# Patient Record
Sex: FEMALE | Race: WHITE | NOT HISPANIC OR LATINO | Employment: OTHER | URBAN - METROPOLITAN AREA
[De-identification: names, ages, dates, MRNs, and addresses within clinical notes are randomized per-mention and may not be internally consistent; named-entity substitution may affect disease eponyms.]

---

## 2017-02-06 ENCOUNTER — GENERIC CONVERSION - ENCOUNTER (OUTPATIENT)
Dept: OTHER | Facility: OTHER | Age: 82
End: 2017-02-06

## 2017-02-07 ENCOUNTER — GENERIC CONVERSION - ENCOUNTER (OUTPATIENT)
Dept: OTHER | Facility: OTHER | Age: 82
End: 2017-02-07

## 2017-02-14 ENCOUNTER — GENERIC CONVERSION - ENCOUNTER (OUTPATIENT)
Dept: OTHER | Facility: OTHER | Age: 82
End: 2017-02-14

## 2017-02-16 ENCOUNTER — ALLSCRIPTS OFFICE VISIT (OUTPATIENT)
Dept: OTHER | Facility: OTHER | Age: 82
End: 2017-02-16

## 2017-05-25 ENCOUNTER — ALLSCRIPTS OFFICE VISIT (OUTPATIENT)
Dept: OTHER | Facility: OTHER | Age: 82
End: 2017-05-25

## 2017-05-25 DIAGNOSIS — E03.9 HYPOTHYROIDISM: ICD-10-CM

## 2017-05-25 DIAGNOSIS — I48.91 ATRIAL FIBRILLATION (HCC): ICD-10-CM

## 2017-08-01 ENCOUNTER — ALLSCRIPTS OFFICE VISIT (OUTPATIENT)
Dept: OTHER | Facility: OTHER | Age: 82
End: 2017-08-01

## 2017-08-01 ENCOUNTER — GENERIC CONVERSION - ENCOUNTER (OUTPATIENT)
Dept: OTHER | Facility: OTHER | Age: 82
End: 2017-08-01

## 2017-10-11 ENCOUNTER — ALLSCRIPTS OFFICE VISIT (OUTPATIENT)
Dept: OTHER | Facility: OTHER | Age: 82
End: 2017-10-11

## 2017-10-13 NOTE — PROGRESS NOTES
Assessment  1  Benign essential hypertension (401 1) (I10)   2  Atrial fibrillation (427 31) (I48 91)   3  Hypercholesterolemia (272 0) (E78 00)   4  Hypothyroidism (244 9) (E03 9)    Plan  Benign essential hypertension    · *VB - Urinary Incontinence Screen (Dx Z13 89 Screen for UI); Status:Complete - Retrospective By  Protocol Authorization;   Done: 06LJP0570 11:30AM  Need for immunization against influenza    · Stop: Fluzone High-Dose 0 5 ML Intramuscular Suspension Prefilled Syringe  PMH: Depression screen    · *VB-Depression Screening ; every 1 year; Last 43VPC0693; Next 25HJJ4638; Status:Active  PMH: Depression screen, PMH: Encounter for special screening examination for genitourinary  disorder    · *VB - Urinary Incontinence Screen (Dx Z13 89 Screen for UI) ; every 1 year; Last 94LBW6391; Next  76IWW2475; Status:Active    Discussion/Summary  Counseling Documentation With Imm: The patient was counseled regarding diagnostic results,-prognosis,-impressions  Chief Complaint  Chief Complaint Free Text Note Form: Patient is here today for a follow up visit for hypertension  testing done 10/4/17  History of Present Illness  Hypertension (Follow-Up): The patient's hypertension is secondary to   The patient states she has been stable with her blood pressure control since the last visit  She has no comorbid illnesses  Symptoms: The patient is currently asymptomatic  denies impaired vision,-denies dyspnea,-denies chest pain,-denies intermittent leg claudication-and-denies lower extremity edema  Home monitoring: The patient checks her blood pressure sporadically  Blood pressure control has been poor  Atrial Fibrillation (Follow-Up): The patient presents with paroxysmal atrial fibrillation  The treatment strategy for this patient is rate control  She states her atrial fibrillation has been well controlled since the last visit  She has no comorbid illnesses  She has no significant interval events  Symptoms: The patient is currently asymptomatic  denies palpitations,-denies chest pain,-denies exercise intolerance,-denies dyspnea on exertion-and-denies dizziness  Monitoring: The patient has regular PT/INR checks  Risks: increased risk for falling, but-the patient has had a home evaluation for prevention of falls  Disease Management: the patient is doing well with her goals  Hypothyroidism (Follow-Up): The patient is being seen for follow-up of hypothyroidism  The patient reports doing well and TSH normal  She has had no significant interval events  The patient is currently asymptomatic  Medications include levothyroxine  Review of Systems  Complete-Female:   Constitutional: feeling tired, but-no fever,-not feeling poorly-and-no chills  Eyes: No complaints of eye pain, no red eyes, no eyesight problems, no discharge, no dry eyes, no itching of eyes  Cardiovascular: No complaints of slow heart rate, no fast heart rate, no chest pain, no palpitations, no leg claudication, no lower extremity edema  Respiratory: No complaints of shortness of breath, no wheezing, no cough, no SOB on exertion, no orthopnea, no PND  Gastrointestinal: No complaints of abdominal pain, no constipation, no nausea or vomiting, no diarrhea, no bloody stools  Genitourinary: incontinence  Musculoskeletal: arthralgias  Integumentary: No complaints of skin rash or lesions, no itching, no skin wounds, no breast pain or lump  Neurological: No complaints of headache, no confusion, no convulsions, no numbness, no dizziness or fainting, no tingling, no limb weakness, no difficulty walking  Psychiatric: Not suicidal, no sleep disturbance, no anxiety or depression, no change in personality, no emotional problems  Hematologic/Lymphatic: No complaints of swollen glands, no swollen glands in the neck, does not bleed easily, does not bruise easily     Preventive Quality 65 Older:   Preventive Quality 65 and Older: Falls Risk: The patient fell 0 times in the past 12 months  Symptoms Include: leg weakness, but no confusion, no lightheadedness, no vertigo, no dizziness, no syncope, no impaired balance, no visual problems, no recurring falls and no recent fall Urinary Incontinence Symptoms includes: urinary incontinence, but no incomplete bladder emptying, no urinary frequency, no urinary urgency, no urinary hesitancy, no dysuria, no nocturia, no straining, no weak stream, no intermittent stream, no post-void dribbling, no vaginal pressure and no vaginal dryness       Active Problems  1  At high risk for falls (V15 88) (Z91 81)   2  Atrial fibrillation (427 31) (I48 91)   3  Benign essential hypertension (401 1) (I10)   4  Bladder prolapse, female, 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) (N81 10)   5  Enteritis (558 9) (K52 9)   6  Hypercholesterolemia (272 0) (E78 00)   7  Hypothyroidism (244 9) (E03 9)   8  Loss of bladder control (788 30) (R32)   9  Osteoarthrosis of knee (715 36) (M17 10)   10  Primary osteoarthritis of both knees (715 16) (M17 0)   11  Retention of urine (788 20) (R33 9)    Past Medical History  1  History of , 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)   2  History of Acute gastroenteritis (558 9) (K52 9)   3  History of Acute pain of left knee (719 46) (M25 562)   4  History of Acute pain of right knee (719 46) (M25 561)   5  History of Carcinoma Of The Skin (V10 83)   6  Denied: History of Carrier Of STD   7  History of Cellulitis (682 9) (L03 90)   8  History of Effusion of both knee joints (719 06) (M25 461,M25 462)   9  History of Encounter for screening colonoscopy (V76 51) (Z12 11)   10  Encounter for special screening examination for genitourinary disorder (V81 6) (Z13 89)   11  History of Female Stress Incontinence (625 6)   12  History of Fever of unknown origin (780 60) (R50 9)   13  History of Foot Pain (Soft Tissue) (729 5)   14  History of Gastric ulcer (531 90) (K25 9)   15   History of Hip pain, unspecified laterality   16  History of cataract (V12 49) (Z86 69)   17  History of common cold (V12 09) (Z86 19)   18  History of dehydration (V12 29) (Z86 39)   19  History of dizziness (V13 89) (Z87 898)   20  History of gastroesophageal reflux (GERD) (V12 79) (Z87 19)   21  History of hyperlipidemia (V12 29) (Z86 39)   22  History of hypertension (V12 59) (Z86 79)   23  History of low back pain (V13 59) (Z87 39)   24  History of spinal stenosis (V13 59) (Z87 39)   25  History of Hyperthyroidism (242 90) (E05 90)   26  History of Joint pain, knee (719 46) (M25 569)   27  History of Lyme disease (088 81) (A69 20)   28  Denied: History of Mental Status Change   29  History of Microscopic hematuria (599 72) (R31 29)   30  History of Need for prophylactic vaccination and inoculation against influenza (V04 81) (Z23)   31  History of Need for prophylactic vaccination and inoculation against influenza (V04 81) (Z23)   32  History of Nonvenomous Insect Bite (E906 4)   33  Normal delivery (650) (O80,Z37 9)   34  History of Paroxysmal atrial fibrillation (427 31) (I48 0)   35  History of Perirectal abscess (566) (K61 1)   36  History of Platelet dysfunction (287 1) (D69 1)   37  History of Screening for osteoporosis (V82 81) (Z13 820)   38  History of Sinus Tachycardia (427 89)   39  History of Tachycardia (785 0) (R00 0)   40  History of Trigger finger, acquired (727 03) (M65 30)   41  History of Visit for pre-operative examination (V72 84) (Z01 818)   42  History of Visit for screening mammogram (V76 12) (Z12 31)    Surgical History  1  History of Hip Replacement   2  History of Knee Arthroscopy (Therapeutic)   3  History of Laminectomy Lumbar   4  History of Neuroplasty Median Nerve At Carpal Tunnel   5  History of Tonsillectomy With Adenoidectomy    Family History  Mother    1  Family history of rheumatic fever (V17 49) (Z82 49)  Father    2  Family history of cardiac disorder (V17 49) (Z82 49)  Daughter    3   Family history of psoriasis (V19 4) (Z84 0)  Son    4  Family history of cardiac disorder (V17 49) (Z82 49)  Brother    5  Family history of Crohn's (Granulomatous) Colitis    Social History   · Alcohol Use (History)   · Caffeine Use   · Denied: History of Drug Use   · Marital History   · Never A Smoker   · Occupation:   ·     Current Meds   1  Levothyroxine Sodium 50 MCG Oral Tablet; TAKE 1 TABLET DAILY AS DIRECTED  Requested for:   19Sep2017; Last Rx:19Sep2017 Ordered   2  Losartan Potassium 100 MG Oral Tablet; TAKE 1 TABLET DAILY; Therapy: 27DRG8054 to (Ancelmo Romo)  Requested for: 05Sep2017; Last Rx:05Sep2017   Ordered   3  Metoprolol Tartrate 100 MG Oral Tablet; TAKE 1 TABLET TWICE DAILY  Requested for: 05Sep2017;   Last Rx:05Sep2017 Ordered   4  MiraLax POWD; MIX 1 CAPFUL IN 8 OUNCES OF WATER AND DRINK AT BEDTIME AS NEEDED FOR   CONSTIPATION; Therapy: (Recorded:87Twh4714) to Recorded   5  Senna-Docusate Sodium 8 6-50 MG Oral Tablet; TAKE 1 TABLET TWICE DAILY; Therapy: (Recorded:66Jys0746) to Recorded   6  Simvastatin 10 MG Oral Tablet; take 1 tablet daily at bedtime  Requested for: 19Sep2017; Last   Rx:19Sep2017 Ordered   7  Vicodin ES 7 5-300 MG Oral Tablet; TAKE 1 TABLET Every 6 hours PRN pain; Therapy: 31VCH4072 to (Evaluate:03Oct2017); Last Rx:28Tec0301 Ordered   8  Warfarin Sodium 5 MG Oral Tablet; TAKE 1 TABLET DAILY OR AS DIRECTED  Requested for:   19Sep2017; Last Rx:66Psb6548 Ordered    Allergies  1  Daypro TABS   2  Penicillins    Vitals  Vital Signs    Recorded: 97JNS6497 11:33AM   Temperature 97 9 F, Tympanic   Heart Rate 72, L Radial   Pulse Quality Regular, L Radial   Respiration 14   Systolic 149, LUE, Sitting   Diastolic 80, LUE, Sitting   Height 5 ft 0 24 in   Weight 169 lb 12 8 oz   BMI Calculated 32 9   BSA Calculated 1 75   O2 Saturation 98, RA     Physical Exam    Constitutional   General appearance: No acute distress, well appearing and well nourished      Eyes   Conjunctiva and lids: No swelling, erythema or discharge  Ears, Nose, Mouth, and Throat   External inspection of ears and nose: Normal     Pulmonary   Auscultation of lungs: Clear to auscultation  Cardiovascular   Auscultation of heart: Normal rate and rhythm, normal S1 and S2, without murmurs  Examination of extremities for edema and/or varicosities: Normal     Carotid pulses: Normal     Lymphatic   Palpation of lymph nodes in neck: No lymphadenopathy  Musculoskeletal   Gait and station: Abnormal   Gait evaluation demonstrated insufficiencies for an exercise program-and-limping on the right  Digits and nails: Abnormal     Inspection/palpation of joints, bones, and muscles: Normal     Skin   Skin and subcutaneous tissue: Abnormal   dry skin  Neurologic   Cranial nerves: Cranial nerves 2-12 intact  Reflexes: 2+ and symmetric  Sensation: No sensory loss  Psychiatric   Orientation to person, place, and time: Normal     Mood and affect: Normal          Results/Data  PHQ-9 Adult Depression Screening 56IQU4489 11:32AM User, [facility]     Test Name Result Flag Reference   PHQ-9 Adult Depression Score 6     Over the last two weeks, how often have you been bothered by any of the following problems? Little interest or pleasure in doing things: Not at all - 0  Feeling down, depressed, or hopeless: Not at all - 0  Trouble falling or staying asleep, or sleeping too much: Nearly every day - 3  Feeling tired or having little energy: Not at all - 0  Poor appetite or over eating: Not at all - 0  Feeling bad about yourself - or that you are a failure or have let yourself or your family down: Not at all - 0  Trouble concentrating on things, such as reading the newspaper or watching television: Not at all - 0  Moving or speaking so slowly that other people could have noticed   Or the opposite -  being so fidgety or restless that you have been moving around a lot more than usual: Nearly every day - 3  Thoughts that you would be better off dead, or of hurting yourself in some way: Not at all - 0   PHQ-9 Adult Depression Screening Negative     PHQ-9 Difficulty Level Not difficult at all     PHQ-9 Severity Mild Depression       Falls Risk Assessment (Dx Z13 89 Screen for Neurologic Disorder) 11Oct2017 11:30AM User, Ryne     Test Name Result Flag Reference   Falls Risk      No falls in the past year     *VB - Urinary Incontinence Screen (Dx Z13 89 Screen for UI) 01RNE6528 11:30AM Jaylan Ramírez     Test Name Result Flag Reference   Urinary Incontinence Assessment 11Oct2017         Health Management  Encounter for screening for other nervous system disorder   *VB - Fall Risk Assessment  (Dx Z13 89 Screen for Neurologic Disorder); every 1 year; Last  50BQL2206; Next Due: 88VTH0745; Overdue  Encounter for special screening examination for genitourinary disorder   *VB - Urinary Incontinence Screen (Dx Z13 89 Screen for UI); every 1 year; Last 63UCC5094; Next  Due: 02NTI5269; Active  History of Depression screen   *VB - Urinary Incontinence Screen (Dx Z13 89 Screen for UI); every 1 year; Last 90HQW2377; Next  Due: 26UJN7239; Active  *VB-Depression Screening; every 1 year; Last 33OKP9252; Next Due: 29XUU6921; Active  History of Screening for osteoporosis   * Dexa Scan Axial Skel (Hip, Pelvis, Spine); every 2 years; Last 79ROK3610; Next Due: 22Foo3488; Overdue  Osteoarthrosis of knee   Dexa Scan - POC; every 2 years; Last 63Dxd7833; Next Due: 51Aep0899; Overdue  Health Maintenance   Medicare Annual Wellness Visit; every 1 year; Last 67BXT7758; Next Due: 16DQP9191;  Overdue    Signatures   Electronically signed by : Leny Elizabeth MD; Oct 11 2017 11:55AM EST                       (Author)

## 2017-12-20 DIAGNOSIS — I48.91 ATRIAL FIBRILLATION (HCC): ICD-10-CM

## 2017-12-22 ENCOUNTER — GENERIC CONVERSION - ENCOUNTER (OUTPATIENT)
Dept: OTHER | Facility: OTHER | Age: 82
End: 2017-12-22

## 2018-01-09 NOTE — MISCELLANEOUS
History of Present Illness  TCM Communication [facility]: The patient is being contacted for follow-up after hospitalization and Unable to contact pt within [facility] timeframe  She was hospitalized at [facility]  The date of admission: 17, date of discharge: 2/10/17  Diagnosis: enteritis, HTN, A-fib, hypothyroid, loss of bladder control, retention, of urine  She was discharged to home  Medications were not reviewed today  She did not schedule a follow up appointment  Communication performed and completed by kb      Active Problems    1  Atrial fibrillation (427 31) (I48 91)   2  Benign essential hypertension (401 1) (I10)   3  Bladder prolapse, female, 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) (N81 10)   4  Enteritis (558 9) (K52 9)   5  Hypercholesterolemia (272 0) (E78 00)   6  Hypothyroidism (244 9) (E03 9)   7  Loss of bladder control (788 30) (R32)   8  Osteoarthrosis of knee (715 36) (M17 9)   9  Primary osteoarthritis of both knees (715 16) (M17 0)   10  Retention of urine (788 20) (R33 9)    Past Medical History    1  History of , 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)   2  History of Acute pain of left knee (719 46) (M25 562)   3  History of Acute pain of right knee (719 46) (M25 561)   4  History of Carcinoma Of The Skin (V10 83)   5  Denied: History of Carrier Of STD   6  History of Cellulitis (682 9) (L03 90)   7  History of Effusion of both knee joints (719 06) (M25 461,M25 462)   8  History of Encounter for screening colonoscopy (V76 51) (Z12 11)   9  Encounter for special screening examination for genitourinary disorder (V81 6) (Z13 89)   10  History of Female Stress Incontinence (625 6)   11  History of Fever of unknown origin (780 60) (R50 9)   12  History of Foot Pain (Soft Tissue) (729 5)   13  History of Gastric ulcer (531 90) (K25 9)   14  History of Hip pain, unspecified laterality   15  History of common cold (V12 09) (Z86 19)   16  History of dehydration (V12 29) (Z86 39)   17   History of dizziness (V13 89) (Z87 898)   18  History of gastroesophageal reflux (GERD) (V12 79) (Z87 19)   19  History of hyperlipidemia (V12 29) (Z86 39)   20  History of hypertension (V12 59) (Z86 79)   21  History of low back pain (V13 59) (Z87 39)   22  History of spinal stenosis (V13 59) (Z87 39)   23  History of Hyperthyroidism (242 90) (E05 90)   24  History of Joint pain, knee (719 46) (M25 569)   25  History of Lyme disease (088 81) (A69 20)   26  Denied: History of Mental Status Change   27  History of Microscopic hematuria (599 72) (R31 29)   28  History of Need for prophylactic vaccination and inoculation against influenza (V04 81)    (Z23)   29  History of Need for prophylactic vaccination and inoculation against influenza (V04 81)    (Z23)   30  History of Nonvenomous Insect Bite (E906 4)   31  Normal delivery (650) (O80,Z37 9)   32  History of Paroxysmal atrial fibrillation (427 31) (I48 0)   33  History of Perirectal abscess (566) (K61 1)   34  History of Platelet dysfunction (287 1) (D69 1)   35  History of Screening for osteoporosis (V82 81) (Z13 820)   36  History of Sinus Tachycardia (427 89)   37  History of Tachycardia (785 0) (R00 0)   38  History of Trigger finger, acquired (727 03) (M65 30)   39  History of Visit for pre-operative examination (V72 84) (Z01 818)   40  History of Visit for screening mammogram (V76 12) (Z12 31)    Surgical History    1  History of Hip Replacement   2  History of Knee Arthroscopy   3  History of Laminectomy Lumbar   4  History of Neuroplasty Median Nerve At Carpal Tunnel   5  History of Tonsillectomy With Adenoidectomy    Family History  Mother    1  Family history of rheumatic fever (V17 49) (Z82 49)  Father    2  Family history of cardiac disorder (V17 49) (Z82 49)  Daughter    3  Family history of psoriasis (V19 4) (Z84 0)  Son    4  Family history of cardiac disorder (V17 49) (Z82 49)  Brother    5   Family history of Crohn's (Granulomatous) Colitis    Social History    · Alcohol Use (History)   · Caffeine Use   · Denied: History of Drug Use   · Marital History   · Never A Smoker   · Occupation:   ·     Current Meds   1  Levothyroxine Sodium 50 MCG Oral Tablet; TAKE 1 TABLET DAILY AS DIRECTED    Requested for: 65Bdw4096; Last Rx:72Xib4636 Ordered   2  Losartan Potassium 100 MG Oral Tablet (Cozaar); TAKE 1 TABLET DAILY; Therapy: 89XTM2630 to (Evaluate:12Mar2017)  Requested for: 44Ypx5851; Last   Rx:17Vvv1244 Ordered   3  Metoprolol Tartrate 100 MG Oral Tablet (Lopressor); TAKE 1 TABLET TWICE DAILY    Requested for: 45Wrl8899; Last Rx:63Lrt7796 Ordered   4  Polyethylene Glycol 3350 Oral Packet; take 1 packet daily; Therapy: (Recorded:88Yoi8872) to Recorded   5  Senna-Docusate Sodium 8 6-50 MG Oral Tablet; TAKE 1 TABLET TWICE DAILY; Therapy: (Recorded:48Xok7588) to Recorded   6  Simvastatin 10 MG Oral Tablet (Zocor); take 1 tablet daily at bedtime  Requested for:   65Wfv6091; Last Rx:13Jxx1551 Ordered   7  Vicodin ES 7 5-300 MG Oral Tablet; TAKE 1 TABLET Every 6 hours PRN pain; Therapy: 84HTJ6842 to (Evaluate:97Hcp2917); Last Rx:22Pyp8853 Ordered   8  Warfarin Sodium 5 MG Oral Tablet (Coumadin); TAKE 1 TABLET DAILY OR AS DIRECTED    Requested for: 73Itx3460; Last Rx:73Rpy5917 Ordered    Allergies    1  Daypro TABS   2  Penicillins    Health Management  Encounter for screening for other nervous system disorder   *VB - Fall Risk Assessment  (Dx Z13 89 Screen for Neurologic Disorder); every 1 year; Last  90BGW2893; Next Due: 18VVU2832; Overdue  Encounter for special screening examination for genitourinary disorder   *VB - Urinary Incontinence Screen (Dx Z13 89 Screen for UI); every 1 year; Last 58SPT9762; Next  Due: 63HVH3509; Overdue  History of Depression screen   *VB - Urinary Incontinence Screen (Dx Z13 89 Screen for UI); every 1 year; Last 36BYJ8269; Next  Due: 71DLA6006; Overdue  *VB-Depression Screening; every 1 year; Last 86SMN8081; Next Due: 07NEK9091;  Overdue  History of Screening for osteoporosis   * Dexa Scan Axial Skel (Hip, Pelvis, Spine); every 2 years; Last 33UTG1872; Next Due: 59Vjw5998; Overdue  Osteoarthrosis of knee   Dexa Scan - POC; every 2 years; Last 76Muv7426; Next Due: 04Hmh8059; Overdue  Health Maintenance   Medicare Annual Wellness Visit; every 1 year; Last 59FWX3926; Next Due: 30IJM3199;  Overdue    Future Appointments    Date/Time Provider Specialty Site   02/16/2017 09:30 AM Amilcar Vela MD Internal Medicine [facility]     Signatures   Electronically signed by : Patrice Singh, ; Feb 14 2017  4:05PM EST                       (Co-author)    Electronically signed by : Afshin Pedraza MD; Feb 15 2017  5:49PM EST

## 2018-01-12 VITALS
HEIGHT: 61 IN | DIASTOLIC BLOOD PRESSURE: 82 MMHG | OXYGEN SATURATION: 97 % | HEART RATE: 80 BPM | BODY MASS INDEX: 32.1 KG/M2 | SYSTOLIC BLOOD PRESSURE: 140 MMHG | WEIGHT: 170 LBS | TEMPERATURE: 98.9 F

## 2018-01-13 VITALS
HEIGHT: 60 IN | DIASTOLIC BLOOD PRESSURE: 80 MMHG | OXYGEN SATURATION: 98 % | TEMPERATURE: 97.9 F | RESPIRATION RATE: 14 BRPM | SYSTOLIC BLOOD PRESSURE: 150 MMHG | HEART RATE: 72 BPM | BODY MASS INDEX: 33.34 KG/M2 | WEIGHT: 169.8 LBS

## 2018-01-14 VITALS
TEMPERATURE: 98.5 F | DIASTOLIC BLOOD PRESSURE: 82 MMHG | SYSTOLIC BLOOD PRESSURE: 148 MMHG | WEIGHT: 169.8 LBS | BODY MASS INDEX: 33.16 KG/M2 | HEART RATE: 73 BPM | OXYGEN SATURATION: 96 %

## 2018-01-14 VITALS
BODY MASS INDEX: 33.28 KG/M2 | SYSTOLIC BLOOD PRESSURE: 176 MMHG | HEART RATE: 76 BPM | TEMPERATURE: 97.6 F | WEIGHT: 169.5 LBS | DIASTOLIC BLOOD PRESSURE: 96 MMHG | OXYGEN SATURATION: 95 % | HEIGHT: 60 IN

## 2018-01-23 ENCOUNTER — GENERIC CONVERSION - ENCOUNTER (OUTPATIENT)
Dept: OTHER | Facility: OTHER | Age: 83
End: 2018-01-23

## 2018-01-26 ENCOUNTER — TELEPHONE (OUTPATIENT)
Dept: INTERNAL MEDICINE CLINIC | Age: 83
End: 2018-01-26

## 2018-01-26 NOTE — TELEPHONE ENCOUNTER
Patient is calling to request a refill on her Vicoden 7 5/300mg 1 tablet po every 6 hours as needed      Please call her at the home number when ready to

## 2018-01-29 DIAGNOSIS — M17.10 OSTEOARTHRITIS OF KNEE, UNSPECIFIED LATERALITY, UNSPECIFIED OSTEOARTHRITIS TYPE: Primary | ICD-10-CM

## 2018-01-29 RX ORDER — HYDROCODONE BITARTRATE AND ACETAMINOPHEN 7.5; 3 MG/1; MG/1
1 TABLET ORAL EVERY 6 HOURS PRN
Qty: 60 TABLET | Refills: 0 | Status: SHIPPED | OUTPATIENT
Start: 2018-01-29 | End: 2018-02-19 | Stop reason: SDUPTHER

## 2018-01-29 RX ORDER — HYDROCODONE BITARTRATE AND ACETAMINOPHEN 7.5; 3 MG/1; MG/1
1 TABLET ORAL EVERY 6 HOURS PRN
COMMUNITY
Start: 2014-07-01 | End: 2018-01-29 | Stop reason: SDUPTHER

## 2018-02-19 ENCOUNTER — OFFICE VISIT (OUTPATIENT)
Dept: INTERNAL MEDICINE CLINIC | Age: 83
End: 2018-02-19
Payer: MEDICARE

## 2018-02-19 VITALS
OXYGEN SATURATION: 97 % | RESPIRATION RATE: 16 BRPM | WEIGHT: 172.2 LBS | SYSTOLIC BLOOD PRESSURE: 144 MMHG | HEIGHT: 60 IN | TEMPERATURE: 99 F | DIASTOLIC BLOOD PRESSURE: 90 MMHG | BODY MASS INDEX: 33.81 KG/M2 | HEART RATE: 76 BPM

## 2018-02-19 DIAGNOSIS — M17.10 OSTEOARTHRITIS OF KNEE, UNSPECIFIED LATERALITY, UNSPECIFIED OSTEOARTHRITIS TYPE: ICD-10-CM

## 2018-02-19 DIAGNOSIS — I48.91 ATRIAL FIBRILLATION, UNSPECIFIED TYPE (HCC): ICD-10-CM

## 2018-02-19 DIAGNOSIS — E78.5 HYPERLIPIDEMIA, UNSPECIFIED HYPERLIPIDEMIA TYPE: ICD-10-CM

## 2018-02-19 DIAGNOSIS — E78.00 HYPERCHOLESTEROLEMIA: ICD-10-CM

## 2018-02-19 DIAGNOSIS — Z78.0 POSTMENOPAUSAL: Primary | ICD-10-CM

## 2018-02-19 DIAGNOSIS — I10 HYPERTENSION, UNSPECIFIED TYPE: ICD-10-CM

## 2018-02-19 DIAGNOSIS — M17.0 PRIMARY OSTEOARTHRITIS OF BOTH KNEES: ICD-10-CM

## 2018-02-19 DIAGNOSIS — I10 BENIGN ESSENTIAL HYPERTENSION: ICD-10-CM

## 2018-02-19 DIAGNOSIS — E03.9 HYPOTHYROIDISM, UNSPECIFIED TYPE: ICD-10-CM

## 2018-02-19 PROCEDURE — 99214 OFFICE O/P EST MOD 30 MIN: CPT | Performed by: INTERNAL MEDICINE

## 2018-02-19 RX ORDER — HYDROCODONE BITARTRATE AND ACETAMINOPHEN 5; 300 MG/1; MG/1
1 TABLET ORAL EVERY 6 HOURS
COMMUNITY
End: 2018-02-19 | Stop reason: DRUGHIGH

## 2018-02-19 RX ORDER — WARFARIN SODIUM 5 MG/1
1 TABLET ORAL DAILY
COMMUNITY
End: 2018-02-19 | Stop reason: SDUPTHER

## 2018-02-19 RX ORDER — LEVOTHYROXINE SODIUM 0.05 MG/1
1 TABLET ORAL DAILY
COMMUNITY
End: 2018-02-19 | Stop reason: SDUPTHER

## 2018-02-19 RX ORDER — LEVOTHYROXINE SODIUM 0.05 MG/1
50 TABLET ORAL DAILY
Qty: 30 TABLET | Refills: 5 | Status: SHIPPED | OUTPATIENT
Start: 2018-02-19 | End: 2018-06-27 | Stop reason: SDUPTHER

## 2018-02-19 RX ORDER — METOPROLOL TARTRATE 100 MG/1
100 TABLET ORAL 2 TIMES DAILY
Qty: 30 TABLET | Refills: 5 | Status: SHIPPED | OUTPATIENT
Start: 2018-02-19 | End: 2018-03-28 | Stop reason: SDUPTHER

## 2018-02-19 RX ORDER — LOSARTAN POTASSIUM 100 MG/1
1 TABLET ORAL DAILY
COMMUNITY
Start: 2014-05-07 | End: 2018-02-19 | Stop reason: SDUPTHER

## 2018-02-19 RX ORDER — SIMVASTATIN 10 MG
10 TABLET ORAL
Qty: 30 TABLET | Refills: 5 | Status: SHIPPED | OUTPATIENT
Start: 2018-02-19 | End: 2018-06-27 | Stop reason: SDUPTHER

## 2018-02-19 RX ORDER — HYDROCODONE BITARTRATE AND ACETAMINOPHEN 7.5; 3 MG/1; MG/1
1 TABLET ORAL EVERY 6 HOURS PRN
Qty: 60 TABLET | Refills: 0 | Status: SHIPPED | OUTPATIENT
Start: 2018-02-19 | End: 2018-03-16 | Stop reason: SDUPTHER

## 2018-02-19 RX ORDER — METOPROLOL TARTRATE 100 MG/1
1 TABLET ORAL 2 TIMES DAILY
COMMUNITY
End: 2018-02-19 | Stop reason: SDUPTHER

## 2018-02-19 RX ORDER — WARFARIN SODIUM 5 MG/1
TABLET ORAL
Qty: 30 TABLET | Refills: 5 | Status: SHIPPED | OUTPATIENT
Start: 2018-02-19 | End: 2018-06-27 | Stop reason: SDUPTHER

## 2018-02-19 RX ORDER — SIMVASTATIN 10 MG
1 TABLET ORAL
COMMUNITY
End: 2018-02-19 | Stop reason: SDUPTHER

## 2018-02-19 RX ORDER — LOSARTAN POTASSIUM 100 MG/1
100 TABLET ORAL DAILY
Qty: 30 TABLET | Refills: 5 | Status: SHIPPED | OUTPATIENT
Start: 2018-02-19 | End: 2018-06-27 | Stop reason: SDUPTHER

## 2018-02-19 NOTE — PROGRESS NOTES
Assessment/Plan:         Diagnoses and all orders for this visit:    Postmenopausal  -     DXA bone density spine hip and pelvis; Future   we will order DEXA scan for follow-up      -       Osteoarthritis of knee, unspecified laterality, unspecified osteoarthritis type  -     HYDROcodone-acetaminophen (VICODIN ES) 7 5-300 MG per tablet; Take 1 tablet by mouth every 6 (six) hours as needed for severe pain Max Daily Amount: 4 tablets    Hypothyroidism  -     levothyroxine 50 mcg tablet; Take 1 tablet (50 mcg total) by mouth daily    Last TSH was normal  Benign essential hypertension    -     losartan (COZAAR) 100 MG tablet; Take 1 tablet (100 mg total) by mouth daily  -     metoprolol tartrate (LOPRESSOR) 100 mg tablet; Take 1 tablet (100 mg total) by mouth 2 (two) times a day  e  Hyperlipidemia   no lipid panel is done this time we will follow it up  Atrial fibrillation, unspecified type (HCC)  -     simvastatin (ZOCOR) 10 mg tablet; Take 1 tablet (10 mg total) by mouth  -     warfarin (COUMADIN) 5 mg tablet; M, W, F, Sun 2 5 mg, all other days 5 mg      Hypercholesterolemia    Other orders  -     levothyroxine 50 mcg tablet; Take 1 tablet by mouth daily  -     losartan (COZAAR) 100 MG tablet; Take 1 tablet by mouth daily  -     simvastatin (ZOCOR) 10 mg tablet; Take 1 tablet by mouth  -     warfarin (COUMADIN) 5 mg tablet; Take 1 tablet by mouth daily  -     Discontinue: HYDROcodone-acetaminophen (XODOL) 5-300 MG per tablet; Take 1 tablet by mouth every 6 (six) hours  -     Polyethylene Glycol 3350 (MIRALAX PO); Take 17 g by mouth every 24 hours  -     metoprolol tartrate (LOPRESSOR) 100 mg tablet; Take 1 tablet by mouth 2 (two) times a day          Subjective:      Patient ID: Toi Sotelo is a 80 y o  female  Patient  offer no problems today  HPI  Hypertension (Follow-Up):  Primary her essential hypertension The patient states she has been stable with her blood pressure control since the last visit   She has no comorbid illnesses  Symptoms: The patient is currently asymptomatic  denies impaired vision,-denies dyspnea,-denies chest pain,-denies intermittent leg claudication-and-denies lower extremity edema  Home monitoring: The patient checks her blood pressure sporadically  Blood pressure control has been poor  Atrial Fibrillation (Follow-Up): The patient presents with paroxysmal atrial fibrillation  The treatment strategy for this patient is rate control  She states her atrial fibrillation has been well controlled since the last visit  She has no comorbid illnesses  She has no significant interval events  Symptoms: The patient is currently asymptomatic  denies palpitations,-denies chest pain,-denies exercise intolerance,-denies dyspnea on exertion-and-denies dizziness  Monitoring: The patient has regular PT/INR checks  Risks: increased risk for falling, but-the patient has had a home evaluation for prevention of falls  Disease Management: the patient is doing well with her goals  Hypothyroidism (Follow-Up): The patient is being seen for follow-up of hypothyroidism  The patient reports doing well and TSH normal  She has had no significant interval events  The patient is currently asymptomatic  Medications include levothyroxine  The following portions of the patient's history were reviewed and updated as appropriate: allergies, current medications, past family history, past medical history, past social history, past surgical history and problem list     Review of Systems   Constitutional: Negative for chills and fatigue  HENT: Negative for congestion, ear pain, hearing loss, postnasal drip, sinus pressure, sore throat and voice change  Eyes: Negative for pain, discharge and visual disturbance  Respiratory: Negative for cough, chest tightness and shortness of breath  Cardiovascular: Negative for chest pain, palpitations and leg swelling     Gastrointestinal: Negative for abdominal pain, blood in stool, diarrhea, nausea and rectal pain  Genitourinary: Negative for difficulty urinating, dysuria and urgency  Musculoskeletal: Negative for arthralgias and joint swelling  Skin: Negative for rash  Allergic/Immunologic: Negative for environmental allergies and food allergies  Neurological: Negative for dizziness, tremors, weakness, numbness and headaches  Hematological: Negative for adenopathy  Bruises/bleeds easily  Psychiatric/Behavioral: Negative for behavioral problems and hallucinations           Past Medical History:   Diagnosis Date    , spontaneous complete     Carcinoma of skin     Cataract     last assessed: 17    Cellulitis     Effusion of both knee joints     resolved: 3/25/15    Female stress incontinence     last assessed: 13    Gastric ulcer     last assessed: 14    GERD (gastroesophageal reflux disease)     Hyperlipidemia     Hypertension     Hyperthyroidism     Lyme disease     last assessed: 13    Microscopic hematuria     last assessed: 13    Normal delivery     1950 son, 1952 son, 12 daughter, 26 daughter, 65 daughter    Paroxysmal atrial fibrillation ([facility] [address] )     last assessed: 13    Perirectal abscess     last assessed: 13    Platelet dysfunction (HCC)     PAF    Sinus tachycardia     last assessed: 13    Spinal stenosis     lumbar; last assessed: 10/4/13    Tachycardia     unspecified; last assessed: 13    Trigger finger, acquired     last assessed: 6/30/15         Current Outpatient Prescriptions:     losartan (COZAAR) 100 MG tablet, Take 1 tablet by mouth daily, Disp: , Rfl:     Polyethylene Glycol 3350 (MIRALAX PO), Take 17 g by mouth every 24 hours, Disp: , Rfl:     HYDROcodone-acetaminophen (VICODIN ES) 7 5-300 MG per tablet, Take 1 tablet by mouth every 6 (six) hours as needed for severe pain Earliest Fill Date: 18 Max Daily Amount: 4 tablets, Disp: 60 tablet, Rfl: 0    levothyroxine 50 mcg tablet, Take 1 tablet by mouth daily, Disp: , Rfl:     metoprolol tartrate (LOPRESSOR) 100 mg tablet, Take 1 tablet by mouth 2 (two) times a day, Disp: , Rfl:     simvastatin (ZOCOR) 10 mg tablet, Take 1 tablet by mouth, Disp: , Rfl:     warfarin (COUMADIN) 5 mg tablet, Take 1 tablet by mouth daily, Disp: , Rfl:     Allergies   Allergen Reactions    Oxaprozin Hives    Penicillins Hives       Social History   Past Surgical History:   Procedure Laterality Date    KNEE ARTHROSCOPY Bilateral     2008    LUMBAR LAMINECTOMY      5/09    NEUROPLASTY / TRANSPOSITION MEDIAN NERVE AT CARPAL TUNNEL Bilateral     2011    TONSILLECTOMY AND ADENOIDECTOMY      TOTAL HIP ARTHROPLASTY      joint replacement; L hip; 2/2/10     Family History   Problem Relation Age of Onset    Rheumatic fever Mother     Heart disease Father     Crohn's disease Brother      (Granulomatous) Golitis    Psoriasis Daughter     Heart disease Son        Objective:  /90 (BP Location: Left arm, Patient Position: Sitting, Cuff Size: Adult)   Pulse 76   Temp 99 °F (37 2 °C) (Oral)   Resp 16   Ht 4' 11 72" (1 517 m)   Wt 78 1 kg (172 lb 3 2 oz)   SpO2 97% Comment: room air  BMI 33 94 kg/m²        Physical Exam   Constitutional: She is oriented to person, place, and time  She appears well-developed and well-nourished  HENT:   Right Ear: External ear normal    Mouth/Throat: Oropharynx is clear and moist    Eyes: Conjunctivae and EOM are normal  Pupils are equal, round, and reactive to light  Neck: Normal range of motion  No JVD present  No thyromegaly present  Cardiovascular: Normal rate, regular rhythm, normal heart sounds and intact distal pulses  Pulmonary/Chest: Breath sounds normal    Abdominal: Soft  Bowel sounds are normal    Musculoskeletal: Normal range of motion  Lymphadenopathy:     She has no cervical adenopathy  Neurological: She is alert and oriented to person, place, and time  She has normal reflexes     Skin: Skin is dry  Psychiatric: She has a normal mood and affect   Her behavior is normal  Judgment and thought content normal

## 2018-02-21 LAB — INR PPP: 2.2 (ref 0.86–1.16)

## 2018-02-23 ENCOUNTER — ANTICOAG VISIT (OUTPATIENT)
Dept: INTERNAL MEDICINE CLINIC | Age: 83
End: 2018-02-23

## 2018-02-23 DIAGNOSIS — I48.20 CHRONIC ATRIAL FIBRILLATION (HCC): Primary | ICD-10-CM

## 2018-03-16 DIAGNOSIS — M17.10 OSTEOARTHRITIS OF KNEE, UNSPECIFIED LATERALITY, UNSPECIFIED OSTEOARTHRITIS TYPE: ICD-10-CM

## 2018-03-16 RX ORDER — HYDROCODONE BITARTRATE AND ACETAMINOPHEN 7.5; 3 MG/1; MG/1
1 TABLET ORAL EVERY 6 HOURS PRN
Qty: 60 TABLET | Refills: 0 | Status: SHIPPED | OUTPATIENT
Start: 2018-03-16 | End: 2018-04-13 | Stop reason: SDUPTHER

## 2018-03-23 LAB — INR PPP: 2.6 (ref 0.86–1.16)

## 2018-03-26 ENCOUNTER — ANTICOAG VISIT (OUTPATIENT)
Dept: INTERNAL MEDICINE CLINIC | Age: 83
End: 2018-03-26

## 2018-03-28 DIAGNOSIS — I10 HYPERTENSION, UNSPECIFIED TYPE: ICD-10-CM

## 2018-03-28 RX ORDER — METOPROLOL TARTRATE 100 MG/1
100 TABLET ORAL 2 TIMES DAILY
Qty: 60 TABLET | Refills: 5 | Status: SHIPPED | OUTPATIENT
Start: 2018-03-28 | End: 2018-06-27 | Stop reason: SDUPTHER

## 2018-04-13 DIAGNOSIS — M17.10 OSTEOARTHRITIS OF KNEE, UNSPECIFIED LATERALITY, UNSPECIFIED OSTEOARTHRITIS TYPE: ICD-10-CM

## 2018-04-13 RX ORDER — HYDROCODONE BITARTRATE AND ACETAMINOPHEN 7.5; 3 MG/1; MG/1
1 TABLET ORAL EVERY 6 HOURS PRN
Qty: 60 TABLET | Refills: 0 | Status: SHIPPED | OUTPATIENT
Start: 2018-04-13 | End: 2018-05-14 | Stop reason: SDUPTHER

## 2018-04-26 LAB — INR PPP: 2.8 (ref 0.86–1.16)

## 2018-04-27 ENCOUNTER — ANTICOAG VISIT (OUTPATIENT)
Dept: INTERNAL MEDICINE CLINIC | Age: 83
End: 2018-04-27

## 2018-05-09 LAB — INR PPP: 2.2 (ref 0.86–1.16)

## 2018-05-10 ENCOUNTER — ANTICOAG VISIT (OUTPATIENT)
Dept: INTERNAL MEDICINE CLINIC | Age: 83
End: 2018-05-10

## 2018-05-14 DIAGNOSIS — M17.10 OSTEOARTHRITIS OF KNEE, UNSPECIFIED LATERALITY, UNSPECIFIED OSTEOARTHRITIS TYPE: ICD-10-CM

## 2018-05-14 RX ORDER — HYDROCODONE BITARTRATE AND ACETAMINOPHEN 7.5; 3 MG/1; MG/1
1 TABLET ORAL EVERY 6 HOURS PRN
Qty: 60 TABLET | Refills: 0 | Status: SHIPPED | OUTPATIENT
Start: 2018-05-14 | End: 2018-06-13 | Stop reason: SDUPTHER

## 2018-05-15 NOTE — TELEPHONE ENCOUNTER
5/15/18 called pt [facility] pickup [facility]-staci joshi sent there- do not have to come into [facility]

## 2018-05-21 ENCOUNTER — OFFICE VISIT (OUTPATIENT)
Dept: INTERNAL MEDICINE CLINIC | Age: 83
End: 2018-05-21
Payer: MEDICARE

## 2018-05-21 ENCOUNTER — DOCUMENTATION (OUTPATIENT)
Dept: INTERNAL MEDICINE CLINIC | Age: 83
End: 2018-05-21

## 2018-05-21 VITALS
BODY MASS INDEX: 33.89 KG/M2 | HEART RATE: 87 BPM | WEIGHT: 172.6 LBS | TEMPERATURE: 98.8 F | DIASTOLIC BLOOD PRESSURE: 70 MMHG | SYSTOLIC BLOOD PRESSURE: 120 MMHG | HEIGHT: 60 IN | OXYGEN SATURATION: 97 %

## 2018-05-21 DIAGNOSIS — J32.9 BACTERIAL SINUSITIS: Primary | ICD-10-CM

## 2018-05-21 DIAGNOSIS — H10.9 BACTERIAL CONJUNCTIVITIS OF LEFT EYE: ICD-10-CM

## 2018-05-21 DIAGNOSIS — B96.89 BACTERIAL SINUSITIS: Primary | ICD-10-CM

## 2018-05-21 PROBLEM — R32 URINARY INCONTINENCE: Status: ACTIVE | Noted: 2017-02-07

## 2018-05-21 PROBLEM — R32 LOSS OF BLADDER CONTROL: Status: ACTIVE | Noted: 2017-02-13

## 2018-05-21 PROBLEM — K52.9 ENTERITIS: Status: ACTIVE | Noted: 2017-02-13

## 2018-05-21 PROBLEM — R33.9 RETENTION OF URINE: Status: ACTIVE | Noted: 2017-02-13

## 2018-05-21 PROCEDURE — 99214 OFFICE O/P EST MOD 30 MIN: CPT | Performed by: NURSE PRACTITIONER

## 2018-05-21 RX ORDER — POLYETHYLENE GLYCOL 3350 17 G/17G
POWDER, FOR SOLUTION ORAL AS NEEDED
COMMUNITY
End: 2019-07-01 | Stop reason: SDUPTHER

## 2018-05-21 RX ORDER — SENNA AND DOCUSATE SODIUM 50; 8.6 MG/1; MG/1
1 TABLET, FILM COATED ORAL AS NEEDED
COMMUNITY
End: 2019-07-01 | Stop reason: SDUPTHER

## 2018-05-21 RX ORDER — OFLOXACIN 3 MG/ML
1 SOLUTION/ DROPS OPHTHALMIC 4 TIMES DAILY
Qty: 5 ML | Refills: 0 | Status: SHIPPED | OUTPATIENT
Start: 2018-05-21 | End: 2018-10-01 | Stop reason: ALTCHOICE

## 2018-05-21 RX ORDER — DOXYCYCLINE HYCLATE 100 MG/1
100 CAPSULE ORAL EVERY 12 HOURS SCHEDULED
Qty: 14 CAPSULE | Refills: 0 | Status: SHIPPED | OUTPATIENT
Start: 2018-05-21 | End: 2018-05-28

## 2018-05-21 NOTE — PROGRESS NOTES
Patient was started on doxycycline today for sinus infection  She was due to have her INR checked this Wednesday 5/23  I advised her to have an INR checked on 05/24  I also advised her to have her INR checked again on 05/30  It is then that she will have completed the course of antibiotics and we can make a determination on when she is next due to have her INR checked based on those results  I wanted to make you aware to monitor for those impending results, thank you

## 2018-05-21 NOTE — PATIENT INSTRUCTIONS
Start Doxy for sinus infection and use eye drops 4 times per day for eye infection  CHECK INR ON Thursday 5/24 AND AGAIN ON MAY 30TH  WE WILL GIVE YOU INSTRUCTIONS ON THOSE DAYS FOR HOW TO ADJUST YOUR COUMADIN  Conjunctivitis   AMBULATORY CARE:   Conjunctivitis,  or pink eye, is inflammation of your conjunctiva  The conjunctiva is a thin tissue that covers the front of your eye and the back of your eyelids  The conjunctiva helps protect your eye and keep it moist  Conjunctivitis may be caused by bacteria, allergies, or a virus  If your conjunctivitis is caused by bacteria, it may get better on its own in about 7 days  Viral conjunctivitis can last up to 3 weeks  Common symptoms may include any of the following: You will usually have symptoms in both eyes if your conjunctivitis is caused by allergies  You may also have other allergic symptoms, such as a rash or runny nose  Symptoms will usually start in 1 eye if your conjunctivitis is caused by a virus or bacteria  · Redness in the whites of your eye    · Itching in your eye or around your eye    · Feeling like there is something in your eye    · Watery or thick, sticky discharge    · Crusty eyelids when you wake up in the morning    · Burning, stinging, or swelling in your eye    · Pain when you see bright light  Seek care immediately if:   · You have worsening eye pain  · The swelling in your eye gets worse, even after treatment  · Your vision suddenly becomes worse or you cannot see at all  Contact your healthcare provider if:   · You develop a fever and ear pain  · You have tiny bumps or spots of blood on your eye  · You have questions or concerns about your condition or care  Treatment  will depend on the cause of your conjunctivitis  You may need antibiotics or allergy medicine as a pill, eye drop, or eye ointment  Manage your symptoms:   · Apply a cool compress  Wet a washcloth with cold water and place it on your eye   This will help decrease itching and irritation  · Do not wear contact lenses  They can irritate your eye  Throw away the pair you are using and ask when you can wear them again  Use a new pair of lenses when your healthcare provider says it is okay  · Avoid irritants  Stay away from smoke filled areas  Shield your eyes from wind and sun  · Flush your eye  You may need to flush your eye with saline to help decrease your symptoms  Ask for more information on how to flush your eye  Medicines:  Treatment depends on what is causing your conjunctivitis  You may be given any of the following:  · Allergy medicine  helps decrease itchy, red, swollen eyes caused by allergies  It may be given as a pill, eye drops, or nasal spray  · Antibiotics  may be needed if your conjunctivitis is caused by bacteria  This medicine may be given as a pill, eye drops, or eye ointment  · Take your medicine as directed  Contact your healthcare provider if you think your medicine is not helping or if you have side effects  Tell him or her if you are allergic to any medicine  Keep a list of the medicines, vitamins, and herbs you take  Include the amounts, and when and why you take them  Bring the list or the pill bottles to follow-up visits  Carry your medicine list with you in case of an emergency  Prevent the spread of conjunctivitis:   · Wash your hands with soap and water often  Wash your hands before and after you touch your eyes  Also wash your hands before you prepare or eat food and after you use the bathroom or change a diaper  · Avoid allergens  Try to avoid the things that cause your allergies, such as pets, dust, or grass  · Avoid contact with others  Do not share towels or washcloths  Try to stay away from others as much as possible  Ask when you can return to work or school  · Throw away eye makeup  The bacteria that caused your conjunctivitis can stay in eye makeup   Throw away mascara and other eye makeup  © 2017 [employer]  Information is for End User's use only and may not be sold, redistributed or otherwise used for commercial purposes  All illustrations and images included in CareNotes® are the copyrighted property of [employer]  or [employer]  The above information is an  only  It is not intended as medical advice for individual conditions or treatments  Talk to your doctor, nurse or pharmacist before following any medical regimen to see if it is safe and effective for you  Sinusitis   AMBULATORY CARE:   Sinusitis  is inflammation or infection of your sinuses  It is most often caused by a virus  Acute sinusitis may last up to 12 weeks  Chronic sinusitis lasts longer than 12 weeks  Recurrent sinusitis means you have 4 or more times in 1 year  Common symptoms include the following:   · Fever    · Pain, pressure, redness, or swelling around the forehead, cheeks, or eyes    · Thick yellow or green discharge from your nose    · Tenderness when you touch your face over your sinuses    · Dry cough that happens mostly at night or when you lie down    · Headache and face pain that is worse when you lean forward    · Tooth pain, or pain when you chew  Seek care immediately if:   · Your eye and eyelid are red, swollen, and painful  · You cannot open your eye  · You have vision changes, such as double vision  · Your eyeball bulges out or you cannot move your eye  · You are more sleepy than normal, or you notice changes in your ability to think, move, or talk  · You have a stiff neck, a fever, or a bad headache  · You have swelling of your forehead or scalp  Contact your healthcare provider if:   · Your symptoms do not improve after 3 days  · Your symptoms do not go away after 10 days  · You have nausea and are vomiting  · Your nose is bleeding  · You have questions or concerns about your condition or care    Treatment for sinusitis:  Your symptoms may go away on their own  Your healthcare provider may recommend watchful waiting for up to 10 days before starting antibiotics  You may  need any of the following:  · Acetaminophen  decreases pain and fever  It is available without a doctor's order  Ask how much to take and how often to take it  Follow directions  Read the labels of all other medicines you are using to see if they also contain acetaminophen, or ask your doctor or pharmacist  Acetaminophen can cause liver damage if not taken correctly  Do not use more than 4 grams (4,000 milligrams) total of acetaminophen in one day  · NSAIDs , such as ibuprofen, help decrease swelling, pain, and fever  This medicine is available with or without a doctor's order  NSAIDs can cause stomach bleeding or kidney problems in certain people  If you take blood thinner medicine, always ask your healthcare provider if NSAIDs are safe for you  Always read the medicine label and follow directions  · Nasal steroid sprays  may help decrease inflammation in your nose and sinuses  · Decongestants  help reduce swelling and drain mucus in the nose and sinuses  They may help you breathe easier  · Antihistamines  help dry mucus in the nose and relieve sneezing  · Antibiotics  help treat or prevent a bacterial infection  · Take your medicine as directed  Contact your healthcare provider if you think your medicine is not helping or if you have side effects  Tell him or her if you are allergic to any medicine  Keep a list of the medicines, vitamins, and herbs you take  Include the amounts, and when and why you take them  Bring the list or the pill bottles to follow-up visits  Carry your medicine list with you in case of an emergency  Self-care:   · Rinse your sinuses  Use a sinus rinse device to rinse your nasal passages with a saline (salt water) solution or distilled water  Do not use tap water   This will help thin the mucus in your nose and rinse away pollen and dirt  It will also help reduce swelling so you can breathe normally  Ask your healthcare provider how often to do this  · Breathe in steam   Heat a bowl of water until you see steam  Lean over the bowl and make a tent over your head with a large towel  Breathe deeply for about 20 minutes  Be careful not to get too close to the steam or burn yourself  Do this 3 times a day  You can also breathe deeply when you take a hot shower  · Sleep with your head elevated  Place an extra pillow under your head before you go to sleep to help your sinuses drain  · Drink liquids as directed  Ask your healthcare provider how much liquid to drink each day and which liquids are best for you  Liquids will thin the mucus in your nose and help it drain  Avoid drinks that contain alcohol or caffeine  · Do not smoke, and avoid secondhand smoke  Nicotine and other chemicals in cigarettes and cigars can make your symptoms worse  Ask your healthcare provider for information if you currently smoke and need help to quit  E-cigarettes or smokeless tobacco still contain nicotine  Talk to your healthcare provider before you use these products  Prevent the spread of germs that cause sinusitis:  Wash your hands often with soap and water  Wash your hands after you use the bathroom, change a child's diaper, or sneeze  Wash your hands before you prepare or eat food  Follow up with your healthcare provider as directed: You may be referred to an ear, nose, and throat specialist  Write down your questions so you remember to ask them during your visits  © 2017 [employer] Information is for End User's use only and may not be sold, redistributed or otherwise used for commercial purposes  All illustrations and images included in CareNotes® are the copyrighted property of [employer]  or [employer]  The above information is an  only   It is not intended as medical advice for individual conditions or treatments  Talk to your doctor, nurse or pharmacist before following any medical regimen to see if it is safe and effective for you

## 2018-05-21 NOTE — PROGRESS NOTES
Assessment/Plan:    No problem-specific Assessment & Plan notes found for this encounter  Diagnoses and all orders for this visit:    Bacterial sinusitis  -     doxycycline hyclate (VIBRAMYCIN) 100 mg capsule; Take 1 capsule (100 mg total) by mouth every 12 (twelve) hours for 7 days    Bacterial conjunctivitis of left eye  -     ofloxacin (OCUFLOX) 0 3 % ophthalmic solution; Administer 1 drop into the left eye 4 (four) times a day    Other orders  -     polyethylene glycol (MIRALAX) powder; Take by mouth  -     senna-docusate sodium (SENOKOT-S) 8 6-50 mg per tablet; Take 1 tablet by mouth 2 (two) times a day     Will start patient on doxycycline for sinusitis given the point tenderness and foul taste in her mouth  Will also start her on ofloxacin ophthalmic drops in her left eye for bacterial conjunctivitis  The patient is on Coumadin, and would be due to have her INR checked on 05/23, however will have patient check INR on 05/24 and again on 05/30 to monitor her INR while on doxycycline  The patient and her daughter verbalized understanding of these instructions  They also verbalized understanding to complete full course of antibiotics to prevent resistance and provide full treatment for the patient  They will return as needed should symptoms worsen or fail to improve  Subjective:      Patient ID: Timo Amos is a 80 y o  female  Patient presents for an acute visit  She reports that 3 days ago, she started to notice that her left eye was dry and red  She applied some eyedrops, but did not notice any improvement  Today she noted green eye drainage draining from her left eye and reports that her eye is now itching  She denies pain in her eye or any known injury to her eye  She does were glasses but denies wearing contact lenses and did have left cataract surgery in that left eye last summer  She also reports that over the past 2 days, she has noted a sore throat and a very mild cough  When she does cough she notes a very foul, "rancid" taste in her mouth that she is concerned about  She denies a headache but does report some head congestion and some postnasal drip  She denies known fevers or chills, but does report a history of sinusitis in the past with nontraditional symptoms  Eye Problem    The left eye is affected  This is a new problem  The current episode started in the past 7 days  The problem occurs constantly  The problem has been gradually worsening  There was no injury mechanism  The patient is experiencing no pain  There is no known exposure to pink eye  She does not wear contacts  Associated symptoms include an eye discharge, eye redness, itching and a recent URI  Pertinent negatives include no blurred vision, double vision, fever, foreign body sensation, nausea, photophobia or vomiting  She has tried eye drops for the symptoms  The treatment provided mild relief  Cough   This is a new problem  The current episode started yesterday  The problem has been unchanged  The problem occurs every few hours  The cough is productive of sputum  Associated symptoms include ear congestion (hearing feels muffled), eye redness, postnasal drip (patient is unsure), rhinorrhea and a sore throat  Pertinent negatives include no chest pain, chills, ear pain, fever, headaches, heartburn, myalgias, nasal congestion, rash or shortness of breath  Nothing aggravates the symptoms  Treatments tried:  cough drop  The treatment provided mild relief  The following portions of the patient's history were reviewed and updated as appropriate: allergies, current medications, past family history, past medical history, past social history, past surgical history and problem list     Review of Systems   Constitutional: Negative for chills, fatigue and fever  HENT: Positive for congestion, postnasal drip (patient is unsure), rhinorrhea and sore throat  Negative for ear pain, sinus pain and sinus pressure  Eyes: Positive for discharge, redness and itching  Negative for blurred vision, double vision, photophobia and pain  Respiratory: Positive for cough  Negative for shortness of breath  Cardiovascular: Negative for chest pain  Gastrointestinal: Negative for abdominal pain, diarrhea, heartburn, nausea and vomiting  Genitourinary: Negative for dysuria  Musculoskeletal: Negative for myalgias  Skin: Negative for rash  Neurological: Negative for headaches  Psychiatric/Behavioral: The patient is not nervous/anxious            Past Medical History:   Diagnosis Date    , spontaneous complete     Carcinoma of skin     Cataract     last assessed: 17    Cellulitis     Effusion of both knee joints     resolved: 3/25/15    Female stress incontinence     last assessed: 13    Gastric ulcer     last assessed: 14    GERD (gastroesophageal reflux disease)     Hyperlipidemia     Hypertension     Hyperthyroidism     Lyme disease     last assessed: 13    Microscopic hematuria     last assessed: 13    Normal delivery     1950 son, 1952 son, 12 daughter, 26 daughter, 65 daughter    Paroxysmal atrial fibrillation ([facility] [address] )     last assessed: 13    Perirectal abscess     last assessed: 13    Platelet dysfunction (HCC)     PAF    Sinus tachycardia     last assessed: 13    Spinal stenosis     lumbar; last assessed: 10/4/13    Tachycardia     unspecified; last assessed: 13    Trigger finger, acquired     last assessed: 6/30/15         Current Outpatient Prescriptions:     HYDROcodone-acetaminophen (VICODIN ES) 7 5-300 MG per tablet, Take 1 tablet by mouth every 6 (six) hours as needed for severe pain Earliest Fill Date: 18 Max Daily Amount: 4 tablets, Disp: 60 tablet, Rfl: 0    levothyroxine 50 mcg tablet, Take 1 tablet (50 mcg total) by mouth daily, Disp: 30 tablet, Rfl: 5    losartan (COZAAR) 100 MG tablet, Take 1 tablet (100 mg total) by mouth daily, Disp: 30 tablet, Rfl: 5    metoprolol tartrate (LOPRESSOR) 100 mg tablet, Take 1 tablet (100 mg total) by mouth 2 (two) times a day, Disp: 60 tablet, Rfl: 5    polyethylene glycol (MIRALAX) powder, Take by mouth, Disp: , Rfl:     Polyethylene Glycol 3350 (MIRALAX PO), Take 17 g by mouth every 24 hours, Disp: , Rfl:     senna-docusate sodium (SENOKOT-S) 8 6-50 mg per tablet, Take 1 tablet by mouth 2 (two) times a day, Disp: , Rfl:     warfarin (COUMADIN) 5 mg tablet, M, W, F, Sun 2 5 mg, all other days 5 mg, Disp: 30 tablet, Rfl: 5    doxycycline hyclate (VIBRAMYCIN) 100 mg capsule, Take 1 capsule (100 mg total) by mouth every 12 (twelve) hours for 7 days, Disp: 14 capsule, Rfl: 0    ofloxacin (OCUFLOX) 0 3 % ophthalmic solution, Administer 1 drop into the left eye 4 (four) times a day, Disp: 5 mL, Rfl: 0    simvastatin (ZOCOR) 10 mg tablet, Take 1 tablet (10 mg total) by mouth daily at bedtime for 90 days, Disp: 30 tablet, Rfl: 5    Allergies   Allergen Reactions    Oxaprozin Hives    Penicillins Hives       Social History   Past Surgical History:   Procedure Laterality Date    KNEE ARTHROSCOPY Bilateral     2008    LUMBAR LAMINECTOMY      5/09    NEUROPLASTY / TRANSPOSITION MEDIAN NERVE AT CARPAL TUNNEL Bilateral     2011    TONSILLECTOMY AND ADENOIDECTOMY      TOTAL HIP ARTHROPLASTY      joint replacement; L hip; 2/2/10     Family History   Problem Relation Age of Onset    Rheumatic fever Mother     Heart disease Father     Crohn's disease Brother      (Granulomatous) Golitis    Psoriasis Daughter     Heart disease Son        Objective:  /70 (BP Location: Right arm, Patient Position: Sitting, Cuff Size: Standard)   Pulse 87   Temp 98 8 °F (37 1 °C) (Tympanic)   Ht 4' 11 72" (1 517 m)   Wt 78 3 kg (172 lb 9 6 oz)   SpO2 97%   BMI 34 03 kg/m²        Physical Exam   Constitutional: She is oriented to person, place, and time  She appears well-developed and well-nourished  No distress  HENT:   Head: Normocephalic and atraumatic  Right Ear: External ear normal    Left Ear: External ear normal    Nose: Right sinus exhibits maxillary sinus tenderness  Mouth/Throat: Oropharynx is clear and moist  No oropharyngeal exudate  Eyes: EOM are normal  Pupils are equal, round, and reactive to light  Lids are everted and swept, no foreign bodies found  Left eye exhibits discharge (Purulent and green in color) and exudate  No foreign body present in the left eye  Left conjunctiva is injected  Left conjunctiva has no hemorrhage  No scleral icterus  Neck: Normal range of motion  Neck supple  No thyromegaly present  Cardiovascular: Normal rate and normal heart sounds  Pulmonary/Chest: Effort normal and breath sounds normal  No respiratory distress  She has no wheezes  Abdominal: Soft  Bowel sounds are normal  She exhibits no distension  Musculoskeletal:   Ambulates with a cane   Neurological: She is alert and oriented to person, place, and time  Skin: Skin is warm and dry  Psychiatric: She has a normal mood and affect  Her behavior is normal  Judgment and thought content normal    Vitals reviewed

## 2018-05-24 LAB — INR PPP: 2.4 (ref 0.86–1.17)

## 2018-05-25 ENCOUNTER — ANTICOAG VISIT (OUTPATIENT)
Dept: INTERNAL MEDICINE CLINIC | Age: 83
End: 2018-05-25

## 2018-05-30 LAB — INR PPP: 2.1 (ref 0.86–1.17)

## 2018-05-31 ENCOUNTER — ANTICOAG VISIT (OUTPATIENT)
Dept: INTERNAL MEDICINE CLINIC | Age: 83
End: 2018-05-31

## 2018-06-13 DIAGNOSIS — M17.10 OSTEOARTHRITIS OF KNEE, UNSPECIFIED LATERALITY, UNSPECIFIED OSTEOARTHRITIS TYPE: ICD-10-CM

## 2018-06-13 RX ORDER — HYDROCODONE BITARTRATE AND ACETAMINOPHEN 7.5; 3 MG/1; MG/1
1 TABLET ORAL EVERY 6 HOURS PRN
Qty: 60 TABLET | Refills: 0 | Status: SHIPPED | OUTPATIENT
Start: 2018-06-13 | End: 2018-07-16 | Stop reason: SDUPTHER

## 2018-06-20 ENCOUNTER — TELEPHONE (OUTPATIENT)
Dept: INTERNAL MEDICINE CLINIC | Facility: CLINIC | Age: 83
End: 2018-06-20

## 2018-06-26 ENCOUNTER — TELEPHONE (OUTPATIENT)
Dept: INTERNAL MEDICINE CLINIC | Facility: CLINIC | Age: 83
End: 2018-06-26

## 2018-06-26 NOTE — TELEPHONE ENCOUNTER
Message left on [phone] reminding the patient that her PT/INR is overdue  She is to go for this testing as soon as possible  If this test was done recently, a request was made to call the office with information as to when and where so that the results can be obtained and addressed

## 2018-06-27 ENCOUNTER — OFFICE VISIT (OUTPATIENT)
Dept: INTERNAL MEDICINE CLINIC | Age: 83
End: 2018-06-27
Payer: MEDICARE

## 2018-06-27 VITALS
SYSTOLIC BLOOD PRESSURE: 156 MMHG | HEART RATE: 78 BPM | WEIGHT: 173.2 LBS | BODY MASS INDEX: 34 KG/M2 | OXYGEN SATURATION: 97 % | DIASTOLIC BLOOD PRESSURE: 82 MMHG | TEMPERATURE: 96.2 F | HEIGHT: 60 IN

## 2018-06-27 DIAGNOSIS — I48.91 ATRIAL FIBRILLATION, UNSPECIFIED TYPE (HCC): ICD-10-CM

## 2018-06-27 DIAGNOSIS — I10 HYPERTENSION, UNSPECIFIED TYPE: ICD-10-CM

## 2018-06-27 DIAGNOSIS — E03.9 HYPOTHYROIDISM, UNSPECIFIED TYPE: ICD-10-CM

## 2018-06-27 DIAGNOSIS — N39.492 POSTURAL URINARY INCONTINENCE: Primary | ICD-10-CM

## 2018-06-27 PROBLEM — K52.9 ENTERITIS: Status: RESOLVED | Noted: 2017-02-13 | Resolved: 2018-06-27

## 2018-06-27 PROBLEM — R33.9 RETENTION OF URINE: Status: RESOLVED | Noted: 2017-02-13 | Resolved: 2018-06-27

## 2018-06-27 PROCEDURE — 99214 OFFICE O/P EST MOD 30 MIN: CPT | Performed by: INTERNAL MEDICINE

## 2018-06-27 RX ORDER — LEVOTHYROXINE SODIUM 0.05 MG/1
50 TABLET ORAL DAILY
Qty: 30 TABLET | Refills: 5 | Status: SHIPPED | OUTPATIENT
Start: 2018-06-27 | End: 2018-11-02 | Stop reason: SDUPTHER

## 2018-06-27 RX ORDER — LOSARTAN POTASSIUM 100 MG/1
100 TABLET ORAL DAILY
Qty: 30 TABLET | Refills: 5 | Status: SHIPPED | OUTPATIENT
Start: 2018-06-27 | End: 2018-11-02 | Stop reason: SDUPTHER

## 2018-06-27 RX ORDER — METOPROLOL TARTRATE 100 MG/1
100 TABLET ORAL 2 TIMES DAILY
Qty: 60 TABLET | Refills: 5 | Status: SHIPPED | OUTPATIENT
Start: 2018-06-27 | End: 2018-11-02 | Stop reason: SDUPTHER

## 2018-06-27 RX ORDER — SIMVASTATIN 10 MG
10 TABLET ORAL
Qty: 30 TABLET | Refills: 5 | Status: SHIPPED | OUTPATIENT
Start: 2018-06-27 | End: 2018-11-02 | Stop reason: SDUPTHER

## 2018-06-27 RX ORDER — WARFARIN SODIUM 5 MG/1
TABLET ORAL
Qty: 30 TABLET | Refills: 5 | Status: SHIPPED | OUTPATIENT
Start: 2018-06-27 | End: 2018-11-02 | Stop reason: SDUPTHER

## 2018-06-27 NOTE — PROGRESS NOTES
Assessment/Plan:    No problem-specific Assessment & Plan notes found for this encounter  Diagnoses and all orders for this visit:    Postural urinary incontinence  Patient has some postural and also stress incontinence,  Was the seen  By the urologist but the that did not help she wear depends  Atrial fibrillation, unspecified type ([address] )  -     simvastatin (ZOCOR) 10 mg tablet; Take 1 tablet (10 mg total) by mouth daily at bedtime for 90 days  -     warfarin (COUMADIN) 5 mg tablet; Take 5 mg on Tue, Thur, Sat and 2 5 mg on Sun, Mon, Wed, Fri   patient is on anticoagulation for atrial fibrillation also she had a previous history of DVT her atrial fibrillation is the paroxysmal with controlled ventricular response no signs of CHF  Hypertension, unspecified type  -     losartan (COZAAR) 100 MG tablet; Take 1 tablet (100 mg total) by mouth daily  -     metoprolol tartrate (LOPRESSOR) 100 mg tablet; Take 1 tablet (100 mg total) by mouth 2 (two) times a day   hypertension is poorly controlled on Cozaar and the Lopressor  Hypothyroidism, unspecified type  -     levothyroxine 50 mcg tablet;  Take 1 tablet (50 mcg total) by mouth daily   will check the TSH for evaluation of hypothyroidism at this point she does not have any symptoms       right knee discomfort and also difficulty in ambulation bowing of the knee was noted she is high risk for falls because of this knee problem she is a moderate risk for total knee replacement I discussed with the patient regarding follow-up with Orthopedics in case if we need to go for a total knee replacement patient is not very anxious about it     stage III chronic renal insufficiency was noted which is stable her creatinine is 1 1 it was 1  1 8 before she has a mild degree of hypo calcemia which is better than before she was 7 4 now it is 8 4  Subjective:    complaining of difficulty in walking and the knee difficulty and also swelling of the knee   Patient ID: Quentin Kim is a 80 y o  female  HPI   this 80 years young lady is presented to the office with chief complain of knee problem but these are chronic problems she is followed up here for hypothyroidism hypertension atrial fibrillation patient is stable living at home with her daughters or the daughters lives with her  The following portions of the patient's history were reviewed and updated as appropriate:   Review of Systems   Constitutional: Negative for activity change, appetite change, fatigue and unexpected weight change  HENT: Positive for sinus pain  Negative for ear pain and hearing loss  Eyes: Negative for pain and visual disturbance  Respiratory: Negative for shortness of breath  Cardiovascular: Negative for chest pain and leg swelling  Gastrointestinal: Negative for abdominal pain, constipation and diarrhea  Genitourinary: Positive for urgency (with some incontinence standing up)  Negative for dysuria  Musculoskeletal: Positive for arthralgias and back pain  Neurological: Negative for dizziness, light-headedness and numbness  Occaisional "hot, burning" sensation in feet and the feet turn red and then it goes away   Psychiatric/Behavioral: Positive for sleep disturbance (trouble falling asleep, can only stay asleep a few hours, need to nap during the day)  Negative for decreased concentration  Objective:      /82 (BP Location: Left arm, Patient Position: Sitting, Cuff Size: Standard)   Pulse 78   Temp (!) 96 2 °F (35 7 °C) (Tympanic)   Ht 4' 11 76" (1 518 m)   Wt 78 6 kg (173 lb 3 2 oz)   SpO2 97%   BMI 34 09 kg/m²          Physical Exam   Constitutional: She appears well-developed  No distress  HENT:   Head: Normocephalic and atraumatic  Eyes: Conjunctivae are normal  No scleral icterus  Neck: No JVD present  No tracheal deviation present  No thyromegaly present  Cardiovascular: Normal rate, regular rhythm and normal heart sounds      Pulmonary/Chest: Effort normal and breath sounds normal  She exhibits no tenderness  Abdominal: Soft  Bowel sounds are normal  There is no tenderness  Lymphadenopathy:     She has no cervical adenopathy  Neurological: She has normal reflexes  A sensory deficit (left lateral foot decreased sensation to pinprick ) is present  Gait abnormal    Cannot straighten back when walking   Skin: Skin is dry  She is not diaphoretic

## 2018-06-29 ENCOUNTER — TELEPHONE (OUTPATIENT)
Dept: INTERNAL MEDICINE CLINIC | Facility: CLINIC | Age: 83
End: 2018-06-29

## 2018-07-06 ENCOUNTER — TELEPHONE (OUTPATIENT)
Dept: INTERNAL MEDICINE CLINIC | Age: 83
End: 2018-07-06

## 2018-07-06 ENCOUNTER — ANTICOAG VISIT (OUTPATIENT)
Dept: INTERNAL MEDICINE CLINIC | Facility: CLINIC | Age: 83
End: 2018-07-06

## 2018-07-06 LAB — INR PPP: 2.6 (ref 0.86–1.17)

## 2018-07-06 NOTE — TELEPHONE ENCOUNTER
Patient had pt inr done yesterdat at [facility] in [place] she is waiting for her orders

## 2018-07-12 ENCOUNTER — TELEPHONE (OUTPATIENT)
Dept: INTERNAL MEDICINE CLINIC | Age: 83
End: 2018-07-12

## 2018-07-12 ENCOUNTER — APPOINTMENT (EMERGENCY)
Dept: RADIOLOGY | Facility: HOSPITAL | Age: 83
End: 2018-07-12
Payer: MEDICARE

## 2018-07-12 ENCOUNTER — HOSPITAL ENCOUNTER (EMERGENCY)
Facility: HOSPITAL | Age: 83
Discharge: HOME | End: 2018-07-12
Attending: EMERGENCY MEDICINE | Admitting: EMERGENCY MEDICINE
Payer: MEDICARE

## 2018-07-12 VITALS
HEART RATE: 103 BPM | HEIGHT: 60 IN | TEMPERATURE: 99.2 F | DIASTOLIC BLOOD PRESSURE: 67 MMHG | RESPIRATION RATE: 22 BRPM | WEIGHT: 170 LBS | BODY MASS INDEX: 33.38 KG/M2 | SYSTOLIC BLOOD PRESSURE: 154 MMHG | OXYGEN SATURATION: 97 %

## 2018-07-12 DIAGNOSIS — R11.10 VOMITING: Primary | ICD-10-CM

## 2018-07-12 LAB
ALBUMIN SERPL BCP-MCNC: 3.3 G/DL (ref 3.5–5)
ALP SERPL-CCNC: 114 U/L (ref 46–116)
ALT SERPL W P-5'-P-CCNC: 15 U/L (ref 12–78)
ANION GAP SERPL CALCULATED.3IONS-SCNC: 10 MMOL/L (ref 4–13)
AST SERPL W P-5'-P-CCNC: 17 U/L (ref 5–45)
ATRIAL RATE: 146 BPM
BASOPHILS # BLD MANUAL: 0 THOUSAND/UL (ref 0–0.1)
BASOPHILS NFR MAR MANUAL: 0 % (ref 0–1)
BILIRUB SERPL-MCNC: 0.55 MG/DL (ref 0.2–1)
BUN SERPL-MCNC: 22 MG/DL (ref 5–25)
CALCIUM SERPL-MCNC: 8.5 MG/DL (ref 8.3–10.1)
CHLORIDE SERPL-SCNC: 104 MMOL/L (ref 100–108)
CO2 SERPL-SCNC: 25 MMOL/L (ref 21–32)
CREAT SERPL-MCNC: 1.21 MG/DL (ref 0.6–1.3)
EOSINOPHIL # BLD MANUAL: 0 THOUSAND/UL (ref 0–0.4)
EOSINOPHIL NFR BLD MANUAL: 0 % (ref 0–6)
ERYTHROCYTE [DISTWIDTH] IN BLOOD BY AUTOMATED COUNT: 14.7 % (ref 11.6–15.1)
GFR SERPL CREATININE-BSD FRML MDRD: 39 ML/MIN/1.73SQ M
GLUCOSE SERPL-MCNC: 153 MG/DL (ref 65–140)
HCT VFR BLD AUTO: 45.2 % (ref 34.8–46.1)
HGB BLD-MCNC: 15.1 G/DL (ref 11.5–15.4)
LIPASE SERPL-CCNC: 66 U/L (ref 73–393)
LYMPHOCYTES # BLD AUTO: 0.3 THOUSAND/UL (ref 0.6–4.47)
LYMPHOCYTES # BLD AUTO: 3 % (ref 14–44)
MCH RBC QN AUTO: 28.7 PG (ref 26.8–34.3)
MCHC RBC AUTO-ENTMCNC: 33.4 G/DL (ref 31.4–37.4)
MCV RBC AUTO: 86 FL (ref 82–98)
MONOCYTES # BLD AUTO: 0.3 THOUSAND/UL (ref 0–1.22)
MONOCYTES NFR BLD: 3 % (ref 4–12)
MYELOCYTES NFR BLD MANUAL: 1 % (ref 0–1)
NEUTROPHILS # BLD MANUAL: 9.24 THOUSAND/UL (ref 1.85–7.62)
NEUTS BAND NFR BLD MANUAL: 3 % (ref 0–8)
NEUTS SEG NFR BLD AUTO: 90 % (ref 43–75)
NRBC BLD AUTO-RTO: 0 /100 WBCS
P AXIS: 246 DEGREES
PLATELET # BLD AUTO: 209 THOUSANDS/UL (ref 149–390)
PLATELET BLD QL SMEAR: ADEQUATE
PMV BLD AUTO: 10.4 FL (ref 8.9–12.7)
POTASSIUM SERPL-SCNC: 4 MMOL/L (ref 3.5–5.3)
PROT SERPL-MCNC: 7 G/DL (ref 6.4–8.2)
QRS AXIS: 51 DEGREES
QRSD INTERVAL: 80 MS
QT INTERVAL: 288 MS
QTC INTERVAL: 448 MS
RBC # BLD AUTO: 5.26 MILLION/UL (ref 3.81–5.12)
RBC MORPH BLD: NORMAL
SODIUM SERPL-SCNC: 139 MMOL/L (ref 136–145)
T WAVE AXIS: 53 DEGREES
VENTRICULAR RATE: 146 BPM
WBC # BLD AUTO: 9.94 THOUSAND/UL (ref 4.31–10.16)

## 2018-07-12 PROCEDURE — 85007 BL SMEAR W/DIFF WBC COUNT: CPT | Performed by: EMERGENCY MEDICINE

## 2018-07-12 PROCEDURE — 93005 ELECTROCARDIOGRAM TRACING: CPT

## 2018-07-12 PROCEDURE — 93010 ELECTROCARDIOGRAM REPORT: CPT | Performed by: INTERNAL MEDICINE

## 2018-07-12 PROCEDURE — 80053 COMPREHEN METABOLIC PANEL: CPT | Performed by: EMERGENCY MEDICINE

## 2018-07-12 PROCEDURE — 36415 COLL VENOUS BLD VENIPUNCTURE: CPT

## 2018-07-12 PROCEDURE — 74176 CT ABD & PELVIS W/O CONTRAST: CPT

## 2018-07-12 PROCEDURE — 85027 COMPLETE CBC AUTOMATED: CPT | Performed by: EMERGENCY MEDICINE

## 2018-07-12 PROCEDURE — 99284 EMERGENCY DEPT VISIT MOD MDM: CPT

## 2018-07-12 PROCEDURE — 83690 ASSAY OF LIPASE: CPT | Performed by: EMERGENCY MEDICINE

## 2018-07-12 RX ORDER — HYDROCODONE BITARTRATE AND ACETAMINOPHEN 5; 325 MG/1; MG/1
1 TABLET ORAL ONCE
Status: DISCONTINUED | OUTPATIENT
Start: 2018-07-12 | End: 2018-07-12

## 2018-07-12 RX ORDER — ONDANSETRON 4 MG/1
4 TABLET, FILM COATED ORAL EVERY 6 HOURS
Qty: 12 TABLET | Refills: 0 | Status: SHIPPED | OUTPATIENT
Start: 2018-07-12 | End: 2019-02-27 | Stop reason: SDUPTHER

## 2018-07-12 RX ORDER — ACETAMINOPHEN 325 MG/1
325 TABLET ORAL ONCE
Status: COMPLETED | OUTPATIENT
Start: 2018-07-12 | End: 2018-07-12

## 2018-07-12 RX ADMIN — ACETAMINOPHEN 325 MG: 325 TABLET, FILM COATED ORAL at 16:24

## 2018-07-12 NOTE — ED ATTENDING ATTESTATION
Tammy Bustamante DO, saw and evaluated the patient  I have discussed the patient with the resident/non-physician practitioner and agree with the resident's/non-physician practitioner's findings, Plan of Care, and MDM as documented in the resident's/non-physician practitioner's note, except where noted  All available labs and Radiology studies were reviewed  At this point I agree with the current assessment done in the Emergency Department  I have conducted an independent evaluation of this patient a history and physical is as follows:      79 yo female presents for evaluation of acute onset vomiting last night mostly clear with some pink tint after eating ravioli  Had increased fecal urgency described as "foamy" but without blood or mucus  Pt states she is unable to "hold anything down"  Has had a couple more episodes of emesis this AM  Denies sick contacts, f/c, CP/SOB, urinary sxs, HA, neck pain,       abd snd mild to mod TTP diffusely, R side > L  No r/g bs+    Imp: acute vomiting, diarrhea  Likely AGE but pts  at one point, now down to 101 NSR  Unclear why pt tachycardic plan: abd labs reviewed - no acute findings  Will obtain CT abd/pelvis to assess for intraabdominal pathology  Pt has CKD and eGFR today is 39 so we will obtain non contrast CT      Critical Care Time  CritCare Time    Procedures

## 2018-07-12 NOTE — TELEPHONE ENCOUNTER
Patients daughter called regarding Jessica Martinez:  Patient has been vomitting and throwing up since before midnight and it is getting worse    Satnam Ribeiro wondering if she should go to the er or been seen  I contacted Blanche and told her the situation and she stated the patient should be seen in the ER    They will be calling 911 and taking her to [facility]

## 2018-07-12 NOTE — ED PROVIDER NOTES
History  Chief Complaint   Patient presents with    Vomiting     Pt c/o vomiting since last night as well as diarrhea that started after eating ravioli  79 y/o female presents to ED via EMS for nausea and vomiting  Last night she woke up and started vomiting (8x) and shortly after experienced multiple episodes of foamy diarrhea (5x)  Vomit was described as a clearish liquid with a red tinge to it which the patient attributes to having ravioli for dinner  Diarrhea was described as foamy and non-bloody  Patient endorses limited colicky pain diffusely in the LLQ and RLQ which did not travel anywhere and has since resolved  Family is with her and claim that she gets "stress diarrhea" when she gets bad news and she was informed that a family member is dying yesterday  Patient on coumadin as  DVT prophylaxis  Prior to Admission Medications   Prescriptions Last Dose Informant Patient Reported? Taking?    HYDROcodone-acetaminophen (VICODIN ES) 7 5-300 MG per tablet   No Yes   Sig: Take 1 tablet by mouth every 6 (six) hours as needed for severe pain Max Daily Amount: 4 tablets   levothyroxine 50 mcg tablet   No Yes   Sig: Take 1 tablet (50 mcg total) by mouth daily   losartan (COZAAR) 100 MG tablet   No Yes   Sig: Take 1 tablet (100 mg total) by mouth daily   metoprolol tartrate (LOPRESSOR) 100 mg tablet   No Yes   Sig: Take 1 tablet (100 mg total) by mouth 2 (two) times a day   ofloxacin (OCUFLOX) 0 3 % ophthalmic solution   No No   Sig: Administer 1 drop into the left eye 4 (four) times a day   polyethylene glycol (MIRALAX) powder   Yes No   Sig: Take by mouth   senna-docusate sodium (SENOKOT-S) 8 6-50 mg per tablet   Yes No   Sig: Take 1 tablet by mouth 2 (two) times a day   simvastatin (ZOCOR) 10 mg tablet   No Yes   Sig: Take 1 tablet (10 mg total) by mouth daily at bedtime for 90 days   warfarin (COUMADIN) 5 mg tablet   No Yes   Sig: Take 5 mg on Tue, Thur, Sat and 2 5 mg on Sun, Mon, Wed, Fri Facility-Administered Medications: None       Past Medical History:   Diagnosis Date    , spontaneous complete     Carcinoma of skin     Cataract     last assessed: 17    Cellulitis     Effusion of both knee joints     resolved: 3/25/15    Female stress incontinence     last assessed: 13    Gastric ulcer     last assessed: 14    GERD (gastroesophageal reflux disease)     Hyperlipidemia     Hypertension     Hyperthyroidism     Lyme disease     last assessed: 13    Microscopic hematuria     last assessed: 13    Normal delivery     1950 son, 1952 son, 12 daughter, 26 daughter, 65 daughter    Paroxysmal atrial fibrillation ([facility] [address] )     last assessed: 13    Perirectal abscess     last assessed: 13    Platelet dysfunction (HCC)     PAF    Sinus tachycardia     last assessed: 13    Spinal stenosis     lumbar; last assessed: 10/4/13    Tachycardia     unspecified; last assessed: 13    Trigger finger, acquired     last assessed: 6/30/15       Past Surgical History:   Procedure Laterality Date    KNEE ARTHROSCOPY Bilateral         LUMBAR LAMINECTOMY          NEUROPLASTY / TRANSPOSITION MEDIAN NERVE AT CARPAL TUNNEL Bilateral         TONSILLECTOMY AND ADENOIDECTOMY      TOTAL HIP ARTHROPLASTY      joint replacement; L hip; 2/2/10       Family History   Problem Relation Age of Onset    Rheumatic fever Mother     Heart disease Father     Crohn's disease Brother         (Granulomatous) Golitis    Psoriasis Daughter     Heart disease Son      I have reviewed and agree with the history as documented  Social History   Substance Use Topics    Smoking status: Never Smoker    Smokeless tobacco: Never Used    Alcohol use Yes      Comment: very infrequently        Review of Systems   Constitutional: Negative for chills, diaphoresis, fatigue and fever  Respiratory: Negative for cough and shortness of breath      Cardiovascular: Negative for chest pain and palpitations  Gastrointestinal: Positive for abdominal pain (Colicky in lower quadrants, since resolved), diarrhea (Non bloody, foamy), nausea and vomiting (Non bilious, non bloody)  Negative for abdominal distention and constipation  Genitourinary: Negative for dysuria, frequency and hematuria  Musculoskeletal: Positive for arthralgias (Chronic arthritis pain in knees and back)  Negative for myalgias and neck pain  Neurological: Negative for dizziness, syncope, light-headedness and headaches  Physical Exam  ED Triage Vitals [07/12/18 1219]   Temperature Pulse Respirations Blood Pressure SpO2   99 2 °F (37 3 °C) 80 18 117/69 94 %      Temp Source Heart Rate Source Patient Position - Orthostatic VS BP Location FiO2 (%)   Oral Monitor Lying Right arm --      Pain Score       No Pain           Orthostatic Vital Signs  Vitals:    07/12/18 1219 07/12/18 1230 07/12/18 1330 07/12/18 1551   BP: 117/69 117/69 135/65 154/67   Pulse: 80 (!) 144 (!) 106 103   Patient Position - Orthostatic VS: Lying  Lying Lying       Physical Exam   Constitutional: She is oriented to person, place, and time  She appears well-developed and well-nourished  No distress  HENT:   Head: Normocephalic and atraumatic  Eyes: Conjunctivae and EOM are normal  Pupils are equal, round, and reactive to light  Right eye exhibits no discharge  Left eye exhibits no discharge  Neck: Normal range of motion  Neck supple  No JVD present  Cardiovascular: Regular rhythm, normal heart sounds and intact distal pulses  Exam reveals no gallop and no friction rub  No murmur heard  Pulmonary/Chest: Effort normal and breath sounds normal  No respiratory distress  She has no wheezes  She has no rales  Abdominal: Soft  Bowel sounds are normal  She exhibits no distension and no mass  There is tenderness (Grimaces with deep palpation of lower quadrants)  There is no rebound and no guarding     Neurological: She is alert and oriented to person, place, and time  Skin: She is not diaphoretic  ED Medications  Medications   HYDROcodone-acetaminophen (NORCO) 5-325 mg per tablet 1 tablet (1 tablet Oral Not Given 7/12/18 1551)       Diagnostic Studies  Results Reviewed     Procedure Component Value Units Date/Time    CBC and differential [[id number]]  (Abnormal) Collected:  07/12/18 1236    Lab Status:  Final result Specimen:  Blood from Arm, Left Updated:  07/12/18 1308     WBC 9 94 Thousand/uL      RBC 5 26 (H) Million/uL      Hemoglobin 15 1 g/dL      Hematocrit 45 2 %      MCV 86 fL      MCH 28 7 pg      MCHC 33 4 g/dL      RDW 14 7 %      MPV 10 4 fL      Platelets 191 Thousands/uL      nRBC 0 /100 WBCs     Narrative: This is an appended report  These results have been appended to a previously verified report  Comprehensive metabolic panel [[id number]]  (Abnormal) Collected:  07/12/18 1236    Lab Status:  Final result Specimen:  Blood from Arm, Left Updated:  07/12/18 1305     Sodium 139 mmol/L      Potassium 4 0 mmol/L      Chloride 104 mmol/L      CO2 25 mmol/L      Anion Gap 10 mmol/L      BUN 22 mg/dL      Creatinine 1 21 mg/dL      Glucose 153 (H) mg/dL      Calcium 8 5 mg/dL      AST 17 U/L      ALT 15 U/L      Alkaline Phosphatase 114 U/L      Total Protein 7 0 g/dL      Albumin 3 3 (L) g/dL      Total Bilirubin 0 55 mg/dL      eGFR 39 ml/min/1 73sq m     Narrative:         National Kidney Disease Education Program recommendations are as follows:  GFR calculation is accurate only with a steady state creatinine  Chronic Kidney disease less than 60 ml/min/1 73 sq  meters  Kidney failure less than 15 ml/min/1 73 sq  meters  Lipase [[id number]]  (Abnormal) Collected:  07/12/18 1236    Lab Status:  Final result Specimen:  Blood from Arm, Left Updated:  07/12/18 1305     Lipase 66 (L) u/L                  CT abdomen pelvis wo contrast   Final Result by Calista Nicole MD (07/12 1121)      1    No acute inflammatory process in the abdomen or pelvis  2   Diverticulosis coli  3   4 mm lingular nodule  Based on current Fleischner Society 2017 Guidelines on incidental pulmonary nodule, no routine follow-up is needed if the patient is considered low risk for lung cancer  If the patient is considered high risk for lung cancer,    12 month follow-up non-contrast chest CT is recommended  Considerations related to the patient's age and/or comorbidities may be used to alter these recommendations  Workstation performed: [id number]               Procedures  Procedures      Phone Consults  ED Phone Contact    ED Course           Identification of Seniors at Risk      Most Recent Value   (ISAR) Identification of Seniors at Risk   Before the illness or injury that brought you to the Emergency, did you need someone to help you on a regular basis? 0 Filed at: 07/12/2018 1220   In the last 24 hours, have you needed more help than usual?  0 Filed at: 07/12/2018 1220   Have you been hospitalized for one or more nights during the past 6 months? 0 Filed at: 07/12/2018 1220   In general, do you see well?  0 Filed at: 07/12/2018 1220   In general, do you have serious problems with your memory? 0 Filed at: 07/12/2018 1220   Do you take more than three different medications every day? 1 Filed at: 07/12/2018 1220   ISAR Score  1 Filed at: 07/12/2018 1220                          MDM  Number of Diagnoses or Management Options  Vomiting:   Diagnosis management comments: 79 y/o female presents to ED for multiple episode of vomiting and diarrhea  Blood work is unremarkable with normal white count, slightly elevated glucose, and electrolytes wnl  Ct performed to rule out intra-abdominal pathology due to unexplained vomiting, tachycardia, and abdominal tenderness  CT was returned showing diverticulosis and no abdominal pathology  Patient was discharged home with zofran for nausea and instructions to maintain hydration       CritCare Time    Disposition  Final diagnoses:   Vomiting     Time reflects when diagnosis was documented in both MDM as applicable and the Disposition within this note     Time User Action Codes Description Comment    7/12/2018  3:46 PM Cece Parson Add [R11 10] Vomiting       ED Disposition     ED Disposition Condition Comment    Discharge  [facility] discharge to home/self care  Condition at discharge: Stable        Follow-up Information     Follow up With Specialties Details Why Contact Info Additional [address] Emergency Department Emergency Medicine  If symptoms worsen [address]  [phone]  ED, [address] [place], [place], [address]          Patient's Medications   Discharge Prescriptions    ONDANSETRON (ZOFRAN) 4 MG TABLET    Take 1 tablet (4 mg total) by mouth every 6 (six) hours       Start Date: 7/12/2018 End Date: --       Order Dose: 4 mg       Quantity: 12 tablet    Refills: 0     No discharge procedures on file  ED Provider  Attending physically available and evaluated [facility] I managed the patient along with the ED Attending      Electronically Signed by         Quentin Ferro MD  07/12/18 8706

## 2018-07-12 NOTE — DISCHARGE INSTRUCTIONS
Acute Nausea and Vomiting   WHAT YOU NEED TO KNOW:   Acute nausea and vomiting start suddenly, worsen quickly, and last a short time  DISCHARGE INSTRUCTIONS:   Return to the emergency department if:   · You see blood in your vomit or your bowel movements  · You have sudden, severe pain in your chest and upper abdomen after hard vomiting or retching  · You have swelling in your neck and chest      · You are dizzy, cold, and thirsty and your eyes and mouth are dry  · You are urinating very little or not at all  · You have muscle weakness, leg cramps, and trouble breathing  · Your heart is beating much faster than normal      · You continue to vomit for more than 48 hours  Contact your healthcare provider if:   · You have frequent dry heaves (vomiting but nothing comes out)  · Your nausea and vomiting does not get better or go away after you use medicine  · You have questions or concerns about your condition or treatment  Medicines: You may need any of the following:  · Medicines  may be given to calm your stomach and stop your vomiting  You may also need medicines to help you feel more relaxed or to stop nausea and vomiting caused by motion sickness  · Gastrointestinal stimulants  are used to help empty your stomach and bowels  This may help decrease nausea and vomiting  · Take your medicine as directed  Contact your healthcare provider if you think your medicine is not helping or if you have side effects  Tell him or her if you are allergic to any medicine  Keep a list of the medicines, vitamins, and herbs you take  Include the amounts, and when and why you take them  Bring the list or the pill bottles to follow-up visits  Carry your medicine list with you in case of an emergency  Prevent or manage acute nausea and vomiting:   · Do not drink alcohol  Alcohol may upset or irritate your stomach  Too much alcohol can also cause acute nausea and vomiting  · Control stress    Headaches due to stress may cause nausea and vomiting  Find ways to relax and manage your stress  Get more rest and sleep  · Drink more liquids as directed  Vomiting can lead to dehydration  It is important to drink more liquids to help replace lost body fluids  Ask your healthcare provider how much liquid to drink each day and which liquids are best for you  Your provider may recommend that you drink an oral rehydration solution (ORS)  ORS contains water, salts, and sugar that are needed to replace the lost body fluids  Ask what kind of ORS to use, how much to drink, and where to get it  · Eat smaller meals, more often  Eat small amounts of food every 2 to 3 hours, even if you are not hungry  Food in your stomach may decrease your nausea  · Talk to your healthcare provider before you take over-the-counter (OTC) medicines  These medicines can cause serious problems if you use certain other medicines, or you have a medical condition  You may have problems if you use too much or use them for longer than the label says  Follow directions on the label carefully  Follow up with your healthcare provider as directed:  Write down your questions so you remember to ask them during your follow-up visits  © 2017 [address]  Information is for End User's use only and may not be sold, redistributed or otherwise used for commercial purposes  All illustrations and images included in CareNotes® are the copyrighted property of [employer]  or [address]  The above information is an  only  It is not intended as medical advice for individual conditions or treatments  Talk to your doctor, nurse or pharmacist before following any medical regimen to see if it is safe and effective for you

## 2018-07-16 DIAGNOSIS — M17.10 OSTEOARTHRITIS OF KNEE, UNSPECIFIED LATERALITY, UNSPECIFIED OSTEOARTHRITIS TYPE: ICD-10-CM

## 2018-07-16 RX ORDER — HYDROCODONE BITARTRATE AND ACETAMINOPHEN 7.5; 3 MG/1; MG/1
1 TABLET ORAL EVERY 6 HOURS PRN
Qty: 60 TABLET | Refills: 0 | Status: SHIPPED | OUTPATIENT
Start: 2018-07-16 | End: 2018-08-16 | Stop reason: SDUPTHER

## 2018-07-16 NOTE — TELEPHONE ENCOUNTER
Needs refill of Hydrocodone----pharmacy called her and told her she did not have any more refills      To [facility]

## 2018-08-07 ENCOUNTER — ANTICOAG VISIT (OUTPATIENT)
Dept: INTERNAL MEDICINE CLINIC | Facility: CLINIC | Age: 83
End: 2018-08-07

## 2018-08-07 LAB — INR PPP: 2.7 (ref 0.86–1.17)

## 2018-08-15 DIAGNOSIS — M17.10 OSTEOARTHRITIS OF KNEE, UNSPECIFIED LATERALITY, UNSPECIFIED OSTEOARTHRITIS TYPE: ICD-10-CM

## 2018-08-15 RX ORDER — HYDROCODONE BITARTRATE AND ACETAMINOPHEN 7.5; 3 MG/1; MG/1
1 TABLET ORAL EVERY 6 HOURS PRN
Qty: 60 TABLET | OUTPATIENT
Start: 2018-08-15

## 2018-08-15 NOTE — TELEPHONE ENCOUNTER
Patient would like a refill for her hydrocodone 7 5/300 called in to [facility] as they will deliver it to her home

## 2018-08-16 DIAGNOSIS — M17.10 OSTEOARTHRITIS OF KNEE, UNSPECIFIED LATERALITY, UNSPECIFIED OSTEOARTHRITIS TYPE: ICD-10-CM

## 2018-08-16 RX ORDER — HYDROCODONE BITARTRATE AND ACETAMINOPHEN 7.5; 3 MG/1; MG/1
1 TABLET ORAL EVERY 6 HOURS PRN
Qty: 60 TABLET | Refills: 0 | Status: SHIPPED | OUTPATIENT
Start: 2018-08-16 | End: 2018-09-14 | Stop reason: SDUPTHER

## 2018-09-10 ENCOUNTER — ANTICOAG VISIT (OUTPATIENT)
Dept: INTERNAL MEDICINE CLINIC | Facility: CLINIC | Age: 83
End: 2018-09-10

## 2018-09-10 LAB — INR PPP: 3.2 (ref 0.86–1.17)

## 2018-09-14 RX ORDER — HYDROCODONE BITARTRATE AND ACETAMINOPHEN 7.5; 3 MG/1; MG/1
1 TABLET ORAL EVERY 6 HOURS PRN
Qty: 60 TABLET | Refills: 0 | Status: SHIPPED | OUTPATIENT
Start: 2018-09-14 | End: 2018-10-08 | Stop reason: SDUPTHER

## 2018-09-17 LAB — INR PPP: 2.4 (ref 0.86–1.17)

## 2018-09-18 ENCOUNTER — ANTICOAG VISIT (OUTPATIENT)
Dept: INTERNAL MEDICINE CLINIC | Age: 83
End: 2018-09-18

## 2018-09-18 NOTE — PROGRESS NOTES
LMOM - on daughter, Veronika Shafer, cell [phone], no answer on patient's phone  Left Coumadin dosage instructions and to call back confirming call

## 2018-10-01 ENCOUNTER — OFFICE VISIT (OUTPATIENT)
Dept: INTERNAL MEDICINE CLINIC | Age: 83
End: 2018-10-01
Payer: MEDICARE

## 2018-10-01 VITALS
OXYGEN SATURATION: 96 % | DIASTOLIC BLOOD PRESSURE: 80 MMHG | SYSTOLIC BLOOD PRESSURE: 128 MMHG | HEIGHT: 60 IN | BODY MASS INDEX: 33.65 KG/M2 | WEIGHT: 171.4 LBS | HEART RATE: 67 BPM | TEMPERATURE: 98.5 F

## 2018-10-01 DIAGNOSIS — Z00.00 MEDICARE ANNUAL WELLNESS VISIT, SUBSEQUENT: ICD-10-CM

## 2018-10-01 DIAGNOSIS — Z23 NEED FOR STREPTOCOCCUS PNEUMONIAE VACCINATION: ICD-10-CM

## 2018-10-01 DIAGNOSIS — Z78.0 POSTMENOPAUSAL: Primary | ICD-10-CM

## 2018-10-01 PROCEDURE — 90670 PCV13 VACCINE IM: CPT

## 2018-10-01 PROCEDURE — G0009 ADMIN PNEUMOCOCCAL VACCINE: HCPCS

## 2018-10-01 PROCEDURE — G0439 PPPS, SUBSEQ VISIT: HCPCS | Performed by: NURSE PRACTITIONER

## 2018-10-01 NOTE — PATIENT INSTRUCTIONS
Pneumonia vaccine given in office today  Please follow up with the pharmacy for the Shingrix vaccine (for shingles)  You can check with your insurance regarding the tetanus vaccine  Please schedule your DEXA scan and an eye exam for glaucoma screening prior to your next follow up in November  Continue well balanced diet  Activity as tolerated  Keep follow up in November with Dr Sis Hilario

## 2018-10-01 NOTE — PROGRESS NOTES
Assessment and Plan:    Problem List Items Addressed This Visit     Medicare annual wellness visit, subsequent      Other Visit Diagnoses     Postmenopausal    -  Primary    Need for Streptococcus pneumoniae vaccination        Relevant Orders    PNEUMOCOCCAL CONJUGATE VACCINE 13-VALENT GREATER THAN 6 MONTHS (Completed)        Health Maintenance Due   Topic Date Due    SLP PLAN OF CARE  [birth year]27    DTaP,Tdap,and Td Vaccines (1 - Tdap) 1948    Pneumococcal PPSV23/PCV13 65+ Years / High and Highest Risk (2 of 2 - PCV13) 2014    DXA SCAN  2016    INFLUENZA VACCINE  2018     PCV13 given in office today  Pt refused influenza vaccine but was counseled on benefits of vaccine  She will follow up with pharmacy regarding [address] and will contact her insurance regarding Tetanus vaccine  She erports she is UTD on glaucoma screening and had it "a few weeks ago" She is due for a DEXA scan and the order was reprinted for her today  She is doing quite well on exam and had no concerns or complaints  She will follow up routinely with Dr Trena Gonzalez on   HPI:  Yady Schumacher is a 80 y o  female here for her Subsequent Wellness Visit      Patient Active Problem List   Diagnosis    Atrial fibrillation ([facility] [address] )    Benign essential hypertension    Hypercholesterolemia    Hypertension    Hypothyroidism    Primary osteoarthritis of both knees    Bladder prolapse, female, acquired    Loss of bladder control    Osteoarthrosis of knee    Urinary incontinence    Medicare annual wellness visit, subsequent     Past Medical History:   Diagnosis Date    , spontaneous complete     Carcinoma of skin     Cataract     last assessed: 17    Cellulitis     Effusion of both knee joints     resolved: 3/25/15    Female stress incontinence     last assessed: 13    Gastric ulcer     last assessed: 14    GERD (gastroesophageal reflux disease)     Hyperlipidemia     Hypertension     Hyperthyroidism     Lyme disease     last assessed: 9/26/13    Microscopic hematuria     last assessed: 9/26/13    Normal delivery     1950 son, 1952 son, 12 daughter, 26 daughter, 65 daughter    Paroxysmal atrial fibrillation ([facility] [address] )     last assessed: 9/26/13    Perirectal abscess     last assessed: 9/26/13    Platelet dysfunction (HCC)     PAF    Sinus tachycardia     last assessed: 9/26/13    Spinal stenosis     lumbar; last assessed: 10/4/13    Tachycardia     unspecified; last assessed: 9/26/13    Trigger finger, acquired     last assessed: 6/30/15     Past Surgical History:   Procedure Laterality Date    KNEE ARTHROSCOPY Bilateral     2008    LUMBAR LAMINECTOMY      5/09    NEUROPLASTY / TRANSPOSITION MEDIAN NERVE AT CARPAL TUNNEL Bilateral     2011    TONSILLECTOMY AND ADENOIDECTOMY      TOTAL HIP ARTHROPLASTY      joint replacement; L hip; 2/2/10     Family History   Problem Relation Age of Onset    Rheumatic fever Mother     Heart disease Father     Crohn's disease Brother         (Granulomatous) Golitis    Psoriasis Daughter     Heart disease Son      History   Smoking Status    Never Smoker   Smokeless Tobacco    Never Used     History   Alcohol Use    Yes     Comment: rare      History   Drug Use No       Current Outpatient Prescriptions   Medication Sig Dispense Refill    HYDROcodone-acetaminophen (VICODIN ES) 7 5-300 MG per tablet Take 1 tablet by mouth every 6 (six) hours as needed for severe pain Max Daily Amount: 4 tablets 60 tablet 0    levothyroxine 50 mcg tablet Take 1 tablet (50 mcg total) by mouth daily 30 tablet 5    losartan (COZAAR) 100 MG tablet Take 1 tablet (100 mg total) by mouth daily 30 tablet 5    metoprolol tartrate (LOPRESSOR) 100 mg tablet Take 1 tablet (100 mg total) by mouth 2 (two) times a day 60 tablet 5    ondansetron (ZOFRAN) 4 mg tablet Take 1 tablet (4 mg total) by mouth every 6 (six) hours 12 tablet 0    polyethylene glycol (MIRALAX) powder Take by mouth      senna-docusate sodium (SENOKOT-S) 8 6-50 mg per tablet Take 1 tablet by mouth 2 (two) times a day      simvastatin (ZOCOR) 10 mg tablet Take 1 tablet (10 mg total) by mouth daily at bedtime for 90 days 30 tablet 5    warfarin (COUMADIN) 5 mg tablet Take 5 mg on Tue, Thur, Sat and 2 5 mg on Sun, Mon, Wed, Fri 30 tablet 5     No current facility-administered medications for this visit  Allergies   Allergen Reactions    Cortisone Hives     swelling of mouth    Oxaprozin Hives    Penicillins Hives     Immunization History   Administered Date(s) Administered    Pneumococcal Conjugate 13-Valent 10/01/2018    Pneumococcal Polysaccharide PPV23 07/09/2013    Zoster 07/12/2013       Patient Care Team:  Christal Crespo MD as PCP - Kristine Earl MD (Ophthalmology)    Medicare Screening Tests and Risk Assessments:  Last Medicare Wellness visit information reviewed, patient interviewed and updates made to the record today  Health Risk Assessment:  Patient rates overall health as very good  Patient feels that their physical health rating is Same  Eyesight was rated as Same  Hearing was rated as Same  Patient feels that their emotional and mental health rating is Same  Pain experienced by patient in the last 7 days has been None  Patient states that she has experienced no weight loss or gain in last 6 months  Emotional/Mental Health:  Patient has been feeling nervous/anxious  PHQ-9 Depression Screening:    Frequency of the following problems over the past two weeks:      1  Little interest or pleasure in doing things: 0 - not at all      2  Feeling down, depressed, or hopeless: 0 - not at all  PHQ-2 Score: 0          Broken Bones/Falls: Fall Risk Assessment:    In the past year, patient has experienced: No history of falling in past year          Bladder/Bowel:  Patient has leaked urine accidently in the last six months    Patient reports no loss of bowel control  Immunizations:  Patient has not had a flu vaccination within the last year  Patient has received a pneumonia shot  Patient has received a shingles shot  Patient has not received tetanus/diphtheria shot  (Additional Comments: She refuses influenza vaccine  Pt had PPSV23 but needs Prevnar 13  Had zostavax in 2013, due for Shingrix)    Home Safety:  Patient does not have trouble with stairs inside or outside of their home  Patient currently reports that there are no safety hazards present in home, no working smoke alarms, no working carbon monoxide detectors  (Additional Comments: Pt will be getting Smoke alarms & CO detectors )    Preventative Screenings:   No breast cancer screening performed, no colon cancer screen completed, cholesterol screen completed, 7/12/2018  no glaucoma eye exam completed(Additional Comments: Due for eye exam)    Nutrition:  Current diet: Regular and Limited junk food with servings of the following:    Medications:  Patient is not currently taking any over-the-counter supplements  Patient is able to manage medications  Lifestyle Choices:  Patient reports no tobacco use  Patient has not smoked or used tobacco in the past   Patient reports alcohol use  Alcohol use per week: [place] [employer] Birth[employer]  Patient drives a vehicle  Patient wears seat belt  Current level of exercise of physical activity described by patient as: Limited, uses cane  Activities of Daily Living:  Can get out of bed by his or her self, able to dress self, able to make own meals, able to do own shopping, able to bathe self, can do own laundry/housekeeping, can manage own money, pay bills and track expenses    Previous Hospitalizations:  Hospitalization or ED visit in past 12 months  Number of hospitalizations within the last year: 1-2  Additional Comments: Patient was in the ED for vomiting on 7/12/2018 but no admission    Advanced Directives:  Patient has decided on a power of   Patient has spoken to designated power of   Patient has completed advanced directive  Preventative Screening/Counseling:      Cardiovascular:      General: Risks and Benefits Discussed and Screening Current      Counseling: Healthy Diet and Healthy Weight      Comments: Current ECG in chart        Diabetes:      General: Risks and Benefits Discussed and Screening Current      Counseling: Healthy Diet          Colorectal Cancer:      General: Screening Not Indicated          Breast Cancer:      General: Screening Not Indicated          Cervical Cancer:      General: Screening Not Indicated          Osteoporosis:      General: Risks and Benefits Discussed      Due for studies: Bone Density Ultrasound          AAA:      General: Screening Not Indicated          Glaucoma:      General: Risks and Benefits Discussed      Comments: Patient has eye dr but due for appt        HIV:      General: Screening Not Indicated          Hepatitis C:      General: Screening Not Indicated        Advanced Directives:   Patient has living will for healthcare, patient has an advanced directive  End of life assessment reviewed with patient  Provider agrees with end of life decisions   Immunizations:      Influenza: Patient Declines      Pneumococcal: Risks & Benefits Discussed and Lifetime Vaccine Completed      Shingrix: Shingrix Vaccine Needed Today      Zostavax: Zostavax Vaccine UTD      TDAP: Tdap Vaccine Needed Today  Additional Comments: Patient will check with insurance on Tdap  Will follow up at pharmacy for Shingrix  Refused influenza  Will get Prevnar 13 in office today     Other Preventative Counseling (Non-Medicare): Fall Prevention, Nutrition Counseling and Car/seat belt/driving safety reviewed      Physical Exam   Constitutional: She is oriented to person, place, and time  She appears well-developed and well-nourished  No distress  HENT:   Head: Normocephalic and atraumatic     Eyes: Conjunctivae and EOM are normal    Neck: Normal range of motion  Neck supple  No thyromegaly present  Cardiovascular: Normal rate, regular rhythm and normal heart sounds  Pulmonary/Chest: Effort normal and breath sounds normal  No respiratory distress  She has no wheezes  Musculoskeletal: She exhibits no edema  Lymphadenopathy:     She has no cervical adenopathy  Neurological: She is alert and oriented to person, place, and time  Skin: Skin is warm and dry  She is not diaphoretic  Psychiatric: She has a normal mood and affect  Her behavior is normal    Vitals reviewed

## 2018-10-03 ENCOUNTER — ANTICOAG VISIT (OUTPATIENT)
Dept: INTERNAL MEDICINE CLINIC | Age: 83
End: 2018-10-03

## 2018-10-03 LAB — INR PPP: 2.1 (ref 0.86–1.17)

## 2018-10-08 DIAGNOSIS — M17.10 OSTEOARTHRITIS OF KNEE, UNSPECIFIED LATERALITY, UNSPECIFIED OSTEOARTHRITIS TYPE: ICD-10-CM

## 2018-10-09 RX ORDER — HYDROCODONE BITARTRATE AND ACETAMINOPHEN 7.5; 3 MG/1; MG/1
1 TABLET ORAL EVERY 6 HOURS PRN
Qty: 60 TABLET | Refills: 0 | Status: SHIPPED | OUTPATIENT
Start: 2018-10-09 | End: 2018-11-02 | Stop reason: SDUPTHER

## 2018-10-25 ENCOUNTER — ANTICOAG VISIT (OUTPATIENT)
Dept: INTERNAL MEDICINE CLINIC | Facility: CLINIC | Age: 83
End: 2018-10-25

## 2018-10-25 LAB — INR PPP: 2.6 (ref 0.86–1.17)

## 2018-11-02 ENCOUNTER — OFFICE VISIT (OUTPATIENT)
Dept: INTERNAL MEDICINE CLINIC | Age: 83
End: 2018-11-02
Payer: MEDICARE

## 2018-11-02 VITALS
TEMPERATURE: 98.1 F | OXYGEN SATURATION: 98 % | DIASTOLIC BLOOD PRESSURE: 88 MMHG | HEART RATE: 83 BPM | WEIGHT: 173.4 LBS | SYSTOLIC BLOOD PRESSURE: 152 MMHG | BODY MASS INDEX: 33.86 KG/M2

## 2018-11-02 DIAGNOSIS — M17.10 OSTEOARTHRITIS OF KNEE, UNSPECIFIED LATERALITY, UNSPECIFIED OSTEOARTHRITIS TYPE: ICD-10-CM

## 2018-11-02 DIAGNOSIS — N39.492 POSTURAL URINARY INCONTINENCE: ICD-10-CM

## 2018-11-02 DIAGNOSIS — I48.91 ATRIAL FIBRILLATION, UNSPECIFIED TYPE (HCC): ICD-10-CM

## 2018-11-02 DIAGNOSIS — E03.9 HYPOTHYROIDISM, UNSPECIFIED TYPE: Primary | ICD-10-CM

## 2018-11-02 DIAGNOSIS — M17.0 PRIMARY OSTEOARTHRITIS OF BOTH KNEES: ICD-10-CM

## 2018-11-02 DIAGNOSIS — I10 HYPERTENSION, UNSPECIFIED TYPE: ICD-10-CM

## 2018-11-02 PROCEDURE — 99214 OFFICE O/P EST MOD 30 MIN: CPT | Performed by: INTERNAL MEDICINE

## 2018-11-02 RX ORDER — LEVOTHYROXINE SODIUM 0.05 MG/1
50 TABLET ORAL DAILY
Qty: 30 TABLET | Refills: 5 | Status: SHIPPED | OUTPATIENT
Start: 2018-11-02 | End: 2019-07-01 | Stop reason: SDUPTHER

## 2018-11-02 RX ORDER — HYDROCODONE BITARTRATE AND ACETAMINOPHEN 7.5; 3 MG/1; MG/1
1 TABLET ORAL EVERY 6 HOURS PRN
Qty: 60 TABLET | Refills: 0 | Status: SHIPPED | OUTPATIENT
Start: 2018-11-02 | End: 2018-11-29 | Stop reason: SDUPTHER

## 2018-11-02 RX ORDER — WARFARIN SODIUM 5 MG/1
TABLET ORAL
Qty: 30 TABLET | Refills: 5 | Status: SHIPPED | OUTPATIENT
Start: 2018-11-02 | End: 2019-07-01 | Stop reason: SDUPTHER

## 2018-11-02 RX ORDER — METOPROLOL TARTRATE 100 MG/1
100 TABLET ORAL 2 TIMES DAILY
Qty: 60 TABLET | Refills: 5 | Status: SHIPPED | OUTPATIENT
Start: 2018-11-02 | End: 2019-07-01 | Stop reason: SDUPTHER

## 2018-11-02 RX ORDER — LOSARTAN POTASSIUM 100 MG/1
100 TABLET ORAL DAILY
Qty: 30 TABLET | Refills: 5 | Status: SHIPPED | OUTPATIENT
Start: 2018-11-02 | End: 2019-07-01 | Stop reason: SDUPTHER

## 2018-11-02 RX ORDER — SIMVASTATIN 10 MG
10 TABLET ORAL
Qty: 30 TABLET | Refills: 5 | Status: SHIPPED | OUTPATIENT
Start: 2018-11-02 | End: 2019-07-01 | Stop reason: SDUPTHER

## 2018-11-02 NOTE — PROGRESS NOTES
Assessment/Plan:    No problem-specific Assessment & Plan notes found for this encounter  Diagnoses and all orders for this visit:    Hypothyroidism, unspecified type  This is a chronic problem that is stable at this time on levothyroxine  Patient states she does not have any new symptoms and denies fatigue, cold intolerance, brittle / dry hair, constipation, or weight gain  Plan to continue levothyroxine 50 mcg  Postural urinary incontinence  Currently wearing Depends  Patient states there have been no new changes  Atrial fibrillation, unspecified type [facility])  Patient currently takes warfarin, with INR of 2 6 on 10/24/2018  Patient denies palpations, lightheadedness, and weakness  The patient does not have any signs or symptoms of CHF continue with the present management of a her atrial fibrillation    Hypertension, unspecified type  Patient denies all symptoms including chest pain, SOB, headaches, dizziness, vision changes, leg pain or leg swelling  Patient does not check her blood pressure at home  Hypertension is poor controlled but recent blood pressures were pretty good I will continue with the same medication and no changes    Bilateral knee pain    Patient states this problem has become slightly aggravated due to the cold weather  Currently takes hydrocodone twice a day at most, usually in the morning and at night before nighttime  Patient states this problem is unchanged  Continue current treatment regimen  She also states that she has redness and a burning sensation in her feet that has been present for a few years  Subjective: The patient presents today with no new / acute complaints  Patient ID: Lacey Taylor is a 80 y o  female      HPI      The following portions of the patient's history were reviewed and updated as appropriate: allergies, current medications, past family history, past medical history, past social history, past surgical history and problem list     Review of Systems   Constitutional: Negative for activity change, appetite change, fatigue, fever and unexpected weight change  HENT: Negative for congestion, ear discharge, ear pain, rhinorrhea, sinus pain and trouble swallowing  Mild ear congestion - relieved by yawning or coughing   Eyes: Negative for pain, discharge and visual disturbance  Respiratory: Negative for cough, chest tightness and shortness of breath  Cardiovascular: Negative for chest pain, palpitations and leg swelling  Gastrointestinal: Negative for abdominal pain, constipation, diarrhea, nausea and vomiting  Endocrine: Negative for cold intolerance  Genitourinary: Negative for dysuria, flank pain, frequency and hematuria  Postural incontinence   Musculoskeletal: Positive for arthralgias, back pain and neck pain  Negative for gait problem  Chronic neck / knee / back pain due to arthritis   Skin:        Rash at waistband from Depends   Neurological: Negative for dizziness, weakness, light-headedness and headaches  Psychiatric/Behavioral: Negative for confusion and decreased concentration  Objective:      /88 (BP Location: Left arm, Patient Position: Sitting, Cuff Size: Standard)   Pulse 83   Temp 98 1 °F (36 7 °C) (Tympanic)   Wt 78 7 kg (173 lb 6 4 oz)   SpO2 98%   BMI 33 86 kg/m²          Physical Exam   Constitutional: She is oriented to person, place, and time  She appears well-developed and well-nourished  HENT:   Head: Normocephalic and atraumatic  Right Ear: External ear normal    Left Ear: External ear normal    Nose: Nose normal    Mouth/Throat: Oropharynx is clear and moist    Eyes: Pupils are equal, round, and reactive to light  Conjunctivae are normal  Right eye exhibits no discharge  Left eye exhibits no discharge  Neck: Neck supple  Cardiovascular: Normal rate, regular rhythm, normal heart sounds and intact distal pulses  No murmur heard    Pulmonary/Chest: Effort normal and breath sounds normal  No respiratory distress  Abdominal: Soft  Bowel sounds are normal  She exhibits no distension  There is no tenderness  Musculoskeletal: She exhibits no edema, tenderness or deformity  Lymphadenopathy:     She has no cervical adenopathy  Neurological: She is alert and oriented to person, place, and time  She exhibits normal muscle tone  Skin: Skin is warm and dry  Psychiatric: She has a normal mood and affect   Her behavior is normal

## 2018-11-23 ENCOUNTER — ANTICOAG VISIT (OUTPATIENT)
Dept: INTERNAL MEDICINE CLINIC | Age: 83
End: 2018-11-23

## 2018-11-23 LAB — INR PPP: 2 (ref 0.86–1.17)

## 2018-11-26 ENCOUNTER — TELEPHONE (OUTPATIENT)
Dept: INTERNAL MEDICINE CLINIC | Age: 83
End: 2018-11-26

## 2018-11-26 NOTE — TELEPHONE ENCOUNTER
Patient is called for her Hydocodoine, but I told her that it was too soon and to call back on Friday

## 2018-11-29 DIAGNOSIS — M17.10 OSTEOARTHRITIS OF KNEE, UNSPECIFIED LATERALITY, UNSPECIFIED OSTEOARTHRITIS TYPE: ICD-10-CM

## 2018-11-29 RX ORDER — HYDROCODONE BITARTRATE AND ACETAMINOPHEN 7.5; 3 MG/1; MG/1
1 TABLET ORAL EVERY 6 HOURS PRN
Qty: 60 TABLET | Refills: 0 | Status: SHIPPED | OUTPATIENT
Start: 2018-11-29 | End: 2018-12-26 | Stop reason: SDUPTHER

## 2018-12-26 DIAGNOSIS — M17.10 OSTEOARTHRITIS OF KNEE, UNSPECIFIED LATERALITY, UNSPECIFIED OSTEOARTHRITIS TYPE: ICD-10-CM

## 2018-12-26 RX ORDER — HYDROCODONE BITARTRATE AND ACETAMINOPHEN 7.5; 3 MG/1; MG/1
1 TABLET ORAL EVERY 6 HOURS PRN
Qty: 60 TABLET | Refills: 0 | Status: SHIPPED | OUTPATIENT
Start: 2018-12-26 | End: 2019-01-24 | Stop reason: SDUPTHER

## 2018-12-27 DIAGNOSIS — I48.91 ATRIAL FIBRILLATION, UNSPECIFIED TYPE (HCC): ICD-10-CM

## 2018-12-28 ENCOUNTER — ANTICOAG VISIT (OUTPATIENT)
Dept: INTERNAL MEDICINE CLINIC | Facility: CLINIC | Age: 83
End: 2018-12-28

## 2018-12-28 LAB — INR PPP: 2.3 (ref 0.86–1.17)

## 2019-01-11 ENCOUNTER — OFFICE VISIT (OUTPATIENT)
Dept: OBGYN CLINIC | Facility: MEDICAL CENTER | Age: 84
End: 2019-01-11
Payer: MEDICARE

## 2019-01-11 VITALS — HEART RATE: 80 BPM | DIASTOLIC BLOOD PRESSURE: 90 MMHG | RESPIRATION RATE: 20 BRPM | SYSTOLIC BLOOD PRESSURE: 153 MMHG

## 2019-01-11 DIAGNOSIS — M25.461 EFFUSION OF RIGHT KNEE: ICD-10-CM

## 2019-01-11 DIAGNOSIS — G89.29 CHRONIC PAIN OF RIGHT KNEE: ICD-10-CM

## 2019-01-11 DIAGNOSIS — M17.12 PRIMARY OSTEOARTHRITIS OF LEFT KNEE: ICD-10-CM

## 2019-01-11 DIAGNOSIS — G89.29 CHRONIC PAIN OF LEFT KNEE: ICD-10-CM

## 2019-01-11 DIAGNOSIS — M25.562 CHRONIC PAIN OF LEFT KNEE: ICD-10-CM

## 2019-01-11 DIAGNOSIS — M17.11 PRIMARY OSTEOARTHRITIS OF RIGHT KNEE: Primary | ICD-10-CM

## 2019-01-11 DIAGNOSIS — M25.561 CHRONIC PAIN OF RIGHT KNEE: ICD-10-CM

## 2019-01-11 PROCEDURE — 20610 DRAIN/INJ JOINT/BURSA W/O US: CPT | Performed by: ORTHOPAEDIC SURGERY

## 2019-01-11 PROCEDURE — 99214 OFFICE O/P EST MOD 30 MIN: CPT | Performed by: ORTHOPAEDIC SURGERY

## 2019-01-11 RX ORDER — BUPIVACAINE HYDROCHLORIDE 2.5 MG/ML
2 INJECTION, SOLUTION INFILTRATION; PERINEURAL
Status: COMPLETED | OUTPATIENT
Start: 2019-01-11 | End: 2019-01-11

## 2019-01-11 RX ORDER — BETAMETHASONE SODIUM PHOSPHATE AND BETAMETHASONE ACETATE 3; 3 MG/ML; MG/ML
12 INJECTION, SUSPENSION INTRA-ARTICULAR; INTRALESIONAL; INTRAMUSCULAR; SOFT TISSUE
Status: COMPLETED | OUTPATIENT
Start: 2019-01-11 | End: 2019-01-11

## 2019-01-11 RX ORDER — LIDOCAINE HYDROCHLORIDE 20 MG/ML
2 INJECTION, SOLUTION EPIDURAL; INFILTRATION; INTRACAUDAL; PERINEURAL
Status: COMPLETED | OUTPATIENT
Start: 2019-01-11 | End: 2019-01-11

## 2019-01-11 RX ADMIN — BUPIVACAINE HYDROCHLORIDE 2 ML: 2.5 INJECTION, SOLUTION INFILTRATION; PERINEURAL at 10:56

## 2019-01-11 RX ADMIN — LIDOCAINE HYDROCHLORIDE 2 ML: 20 INJECTION, SOLUTION EPIDURAL; INFILTRATION; INTRACAUDAL; PERINEURAL at 10:56

## 2019-01-11 RX ADMIN — BUPIVACAINE HYDROCHLORIDE 2 ML: 2.5 INJECTION, SOLUTION INFILTRATION; PERINEURAL at 10:57

## 2019-01-11 RX ADMIN — BETAMETHASONE SODIUM PHOSPHATE AND BETAMETHASONE ACETATE 12 MG: 3; 3 INJECTION, SUSPENSION INTRA-ARTICULAR; INTRALESIONAL; INTRAMUSCULAR; SOFT TISSUE at 10:57

## 2019-01-11 RX ADMIN — LIDOCAINE HYDROCHLORIDE 2 ML: 20 INJECTION, SOLUTION EPIDURAL; INFILTRATION; INTRACAUDAL; PERINEURAL at 10:57

## 2019-01-11 RX ADMIN — BETAMETHASONE SODIUM PHOSPHATE AND BETAMETHASONE ACETATE 12 MG: 3; 3 INJECTION, SUSPENSION INTRA-ARTICULAR; INTRALESIONAL; INTRAMUSCULAR; SOFT TISSUE at 10:56

## 2019-01-11 NOTE — PROGRESS NOTES
Assessment:  1  Primary osteoarthritis of right knee  Large joint arthrocentesis   2  Chronic pain of right knee  Large joint arthrocentesis   3  Effusion of right knee  Large joint arthrocentesis   4  Primary osteoarthritis of left knee  Large joint arthrocentesis   5  Chronic pain of left knee  Large joint arthrocentesis       Plan:  Diagnosis, treatment options and associated risks were discussed with the patient including no treatment, nonsurgical treatment and potential for surgical intervention  The patient was given the opportunity to ask questions regarding each  Right knee was aspirated and injected with cortisone whereas her left knee was just injected with cortisone today  Ice and post-injection protocol advised  Weight bearing and activities as tolerated   Patient was informed on the network policy of narcotic medication prescribed to non operative patients  Patient states she will return to her family physician for further prescriptions  To do next visit:  Return in about 3 months (around 4/11/2019) for re-check  The above stated was discussed in layman's terms and the patient expressed understanding  All questions were answered to the patient's satisfaction  Scribe Attestation    I,:   Uvaldo Mccall am acting as a scribe while in the presence of the attending physician :        I,:   Linda Shaikh MD personally performed the services described in this documentation    as scribed in my presence :              Subjective:   Lacey Taylor is a 80 y o  female who presents for repeat evaluation of her bilateral knees known osteoarthritis  She had cortisone injections to both knees performed in November 2016 in which she responded for rather favorably to  She returns today with use of a seated rolling walker for ambulatory assistance in a fixed forward flexed posture    She went to her primary care physician who has prescribed her narcotics which helps with some of her back and knee pain       Review of systems negative unless otherwise specified in HPI    Past Medical History:   Diagnosis Date    , spontaneous complete     Carcinoma of skin     Cataract     last assessed: 17    Cellulitis     Effusion of both knee joints     resolved: 3/25/15    Female stress incontinence     last assessed: 13    Gastric ulcer     last assessed: 14    GERD (gastroesophageal reflux disease)     Hyperlipidemia     Hypertension     Hyperthyroidism     Lyme disease     last assessed: 13    Microscopic hematuria     last assessed: 13    Normal delivery     1950 son, 1952 son, 12 daughter, 26 daughter, 65 daughter    Paroxysmal atrial fibrillation ([facility] [address] )     last assessed: 13    Perirectal abscess     last assessed: 13    Platelet dysfunction (HCC)     PAF    Sinus tachycardia     last assessed: 13    Spinal stenosis     lumbar; last assessed: 10/4/13    Tachycardia     unspecified; last assessed: 13    Trigger finger, acquired     last assessed: 6/30/15       Past Surgical History:   Procedure Laterality Date    KNEE ARTHROSCOPY Bilateral         LUMBAR LAMINECTOMY          NEUROPLASTY / TRANSPOSITION MEDIAN NERVE AT CARPAL TUNNEL Bilateral         TONSILLECTOMY AND ADENOIDECTOMY      TOTAL HIP ARTHROPLASTY      joint replacement; L hip; 2/2/10       Family History   Problem Relation Age of Onset    Rheumatic fever Mother     Heart disease Father     Crohn's disease Brother         (Granulomatous) Golitis    Psoriasis Daughter     Heart disease Son        Social History     Occupational History    Retired teacher      Social History Main Topics    Smoking status: Never Smoker    Smokeless tobacco: Never Used    Alcohol use Yes      Comment: rare    Drug use: No    Sexual activity: Not on file         Current Outpatient Prescriptions:     HYDROcodone-acetaminophen (VICODIN ES) 7 5-300 MG per tablet, Take 1 tablet by mouth every 6 (six) hours as needed for severe pain Max Daily Amount: 4 tablets, Disp: 60 tablet, Rfl: 0    levothyroxine 50 mcg tablet, Take 1 tablet (50 mcg total) by mouth daily, Disp: 30 tablet, Rfl: 5    losartan (COZAAR) 100 MG tablet, Take 1 tablet (100 mg total) by mouth daily, Disp: 30 tablet, Rfl: 5    metoprolol tartrate (LOPRESSOR) 100 mg tablet, Take 1 tablet (100 mg total) by mouth 2 (two) times a day, Disp: 60 tablet, Rfl: 5    ondansetron (ZOFRAN) 4 mg tablet, Take 1 tablet (4 mg total) by mouth every 6 (six) hours, Disp: 12 tablet, Rfl: 0    polyethylene glycol (MIRALAX) powder, Take by mouth, Disp: , Rfl:     senna-docusate sodium (SENOKOT-S) 8 6-50 mg per tablet, Take 1 tablet by mouth daily  , Disp: , Rfl:     simvastatin (ZOCOR) 10 mg tablet, Take 1 tablet (10 mg total) by mouth daily at bedtime for 90 days, Disp: 30 tablet, Rfl: 5    warfarin (COUMADIN) 5 mg tablet, Take 5 mg on Tue, Thur, Sat and 2 5 mg on Sun, Mon, Wed, Fri, Disp: 30 tablet, Rfl: 5    Allergies   Allergen Reactions    Cortisone Hives     swelling of mouth    Oxaprozin Hives    Penicillins Hives            Vitals:    01/11/19 1003   BP: 153/90   Pulse:    Resp:        Objective:                    Right Knee Exam     Tenderness   Right knee tenderness location: Diffuse tenderness medially and laterally  Other   Erythema: absent  Sensation: normal  Swelling: mild  Other tests: effusion present      Left Knee Exam     Tenderness   Left knee tenderness location: Diffuse medial tenderness      Other   Erythema: absent  Sensation: normal  Swelling: mild  Effusion: no effusion present    Comments:    Range of motion shows decreased extension and flexion restricted by pain, with significant crepitus at both knees            Diagnostics, reviewed and taken today if performed as documented:    None performed          Procedures, if performed today:    Large joint arthrocentesis  Date/Time: 1/11/2019 10:56 AM  Consent given by: patient  Site marked: site marked  Supporting Documentation  Indications: pain   Procedure Details  Location: knee - R knee  Preparation: Patient was prepped and draped in the usual sterile fashion  Needle size: 22 G  Ultrasound guidance: no  Medications administered: 12 mg betamethasone acetate-betamethasone sodium phosphate 6 (3-3) mg/mL; 2 mL bupivacaine 0 25 %; 2 mL lidocaine (PF) 2 %    Aspirate amount: 20 mL  Aspirate: clear and yellow  Patient tolerance: patient tolerated the procedure well with no immediate complications  Dressing:  Sterile dressing applied  Large joint arthrocentesis  Date/Time: 1/11/2019 10:57 AM  Consent given by: patient  Site marked: site marked  Supporting Documentation  Indications: pain   Procedure Details  Location: knee - L knee  Preparation: Patient was prepped and draped in the usual sterile fashion  Needle size: 22 G  Ultrasound guidance: no  Medications administered: 12 mg betamethasone acetate-betamethasone sodium phosphate 6 (3-3) mg/mL; 2 mL bupivacaine 0 25 %; 2 mL lidocaine (PF) 2 %    Patient tolerance: patient tolerated the procedure well with no immediate complications  Dressing:  Sterile dressing applied          Portions of the record may have been created with voice recognition software   Occasional wrong word or "sound a like" substitutions may have occurred due to the inherent limitations of voice recognition software   Read the chart carefully and recognize, using context, where substitutions have occurred

## 2019-01-24 DIAGNOSIS — M17.10 OSTEOARTHRITIS OF KNEE, UNSPECIFIED LATERALITY, UNSPECIFIED OSTEOARTHRITIS TYPE: ICD-10-CM

## 2019-01-24 RX ORDER — HYDROCODONE BITARTRATE AND ACETAMINOPHEN 7.5; 3 MG/1; MG/1
1 TABLET ORAL EVERY 6 HOURS PRN
Qty: 60 TABLET | Refills: 0 | Status: SHIPPED | OUTPATIENT
Start: 2019-01-24 | End: 2019-02-19 | Stop reason: SDUPTHER

## 2019-01-29 ENCOUNTER — ANTICOAG VISIT (OUTPATIENT)
Dept: INTERNAL MEDICINE CLINIC | Facility: CLINIC | Age: 84
End: 2019-01-29

## 2019-01-29 LAB — INR PPP: 1.7 (ref 0.86–1.17)

## 2019-02-13 ENCOUNTER — ANTICOAG VISIT (OUTPATIENT)
Dept: INTERNAL MEDICINE CLINIC | Facility: CLINIC | Age: 84
End: 2019-02-13

## 2019-02-13 LAB — INR PPP: 2 (ref 0.86–1.17)

## 2019-02-19 DIAGNOSIS — M17.10 OSTEOARTHRITIS OF KNEE, UNSPECIFIED LATERALITY, UNSPECIFIED OSTEOARTHRITIS TYPE: ICD-10-CM

## 2019-02-19 RX ORDER — HYDROCODONE BITARTRATE AND ACETAMINOPHEN 7.5; 3 MG/1; MG/1
1 TABLET ORAL EVERY 6 HOURS PRN
Qty: 60 TABLET | Refills: 0 | Status: SHIPPED | OUTPATIENT
Start: 2019-02-19 | End: 2019-03-18 | Stop reason: SDUPTHER

## 2019-02-26 LAB — INR PPP: 1.8 (ref 0.86–1.17)

## 2019-02-27 ENCOUNTER — OFFICE VISIT (OUTPATIENT)
Dept: INTERNAL MEDICINE CLINIC | Age: 84
End: 2019-02-27
Payer: MEDICARE

## 2019-02-27 ENCOUNTER — ANTICOAG VISIT (OUTPATIENT)
Dept: INTERNAL MEDICINE CLINIC | Age: 84
End: 2019-02-27

## 2019-02-27 VITALS
SYSTOLIC BLOOD PRESSURE: 142 MMHG | WEIGHT: 174.8 LBS | DIASTOLIC BLOOD PRESSURE: 80 MMHG | TEMPERATURE: 98.8 F | OXYGEN SATURATION: 96 % | BODY MASS INDEX: 34.14 KG/M2 | HEART RATE: 84 BPM

## 2019-02-27 DIAGNOSIS — M17.0 PRIMARY OSTEOARTHRITIS OF BOTH KNEES: ICD-10-CM

## 2019-02-27 DIAGNOSIS — R11.10 VOMITING: ICD-10-CM

## 2019-02-27 DIAGNOSIS — E03.9 HYPOTHYROIDISM, UNSPECIFIED TYPE: ICD-10-CM

## 2019-02-27 DIAGNOSIS — Z13.820 SCREENING FOR OSTEOPOROSIS: Primary | ICD-10-CM

## 2019-02-27 DIAGNOSIS — I48.91 ATRIAL FIBRILLATION, UNSPECIFIED TYPE (HCC): ICD-10-CM

## 2019-02-27 PROBLEM — Z00.00 MEDICARE ANNUAL WELLNESS VISIT, SUBSEQUENT: Status: RESOLVED | Noted: 2018-10-01 | Resolved: 2019-02-27

## 2019-02-27 PROCEDURE — 99214 OFFICE O/P EST MOD 30 MIN: CPT | Performed by: INTERNAL MEDICINE

## 2019-02-27 RX ORDER — ONDANSETRON 4 MG/1
4 TABLET, FILM COATED ORAL EVERY 6 HOURS
Qty: 12 TABLET | Refills: 0 | Status: SHIPPED | OUTPATIENT
Start: 2019-02-27 | End: 2019-06-03

## 2019-02-27 NOTE — PROGRESS NOTES
Assessment/Plan:    No problem-specific Assessment & Plan notes found for this encounter  Diagnoses and all orders for this visit:    Screening for osteoporosis  -     DXA bone density spine hip and pelvis; Future    Vomiting  -     ondansetron (ZOFRAN) 4 mg tablet; Take 1 tablet (4 mg total) by mouth every 6 (six) hours  Patient does not use the medications regularly but she wanted to keep it on hand so that if she needed she can take it but she did not take this medication for a long time now  Hypothyroidism, unspecified type  Last TSH was done in in May of 2018 and it was normal will repeat again on her next visit   Atrial fibrillation, unspecified type [facility])  Patient is on anticoagulation for atrial fibrillation no signs or symptoms of CHF  Primary osteoarthritis of both knees    patient was seen by the orthopedic surgery for bilateral swelling of the knees and arthrocentesis was done, patient is concerned about the Lyme disease because she had history of Lyme disease before  Apparently the synovial fluid was not sent for any testing and their diagnosis was primary osteoarthritis of the knee    Subjective:   Complaining of about recent knee swelling and concerned about Lyme disease   Patient ID: Lacey Taylor is a 80 y o  female      HPI  [de-identified] years young lady is here today for the regular follow-up recently seen by the Orthopedics as given above she denying any problems right now except for the pain in the knee sometime no chest pain or shortness of breath no palpitation no nausea or vomiting no diarrhea she is living with her daughter but she is completely independent in doing all her ADLs she uses walker for walking no recent falls at home  The following portions of the patient's history were reviewed and updated as appropriate: allergies, current medications, past family history, past medical history, past social history, past surgical history and problem list     Review of Systems Constitutional: Negative for activity change, appetite change, fatigue, fever and unexpected weight change  HENT: Negative for congestion, ear discharge, ear pain, rhinorrhea, sinus pain and trouble swallowing  Mild ear congestion - relieved by yawning or coughing   Eyes: Negative for pain, discharge and visual disturbance  Respiratory: Negative for cough, chest tightness and shortness of breath  Cardiovascular: Negative for chest pain, palpitations and leg swelling  Gastrointestinal: Negative for abdominal pain, constipation, diarrhea, nausea and vomiting  Endocrine: Negative for cold intolerance  Genitourinary: Negative for dysuria, flank pain, frequency and hematuria  Postural incontinence   Musculoskeletal: Positive for arthralgias, back pain and neck pain  Negative for gait problem  Chronic neck / knee / back pain due to arthritis   Skin:        Rash at waistband from [employer]   Neurological: Negative for dizziness, weakness, light-headedness and headaches  Psychiatric/Behavioral: Negative for confusion and decreased concentration           Past Medical History:   Diagnosis Date    , spontaneous complete     Carcinoma of skin     Cataract     last assessed: 17    Cellulitis     Effusion of both knee joints     resolved: 3/25/15    Female stress incontinence     last assessed: 13    Gastric ulcer     last assessed: 14    GERD (gastroesophageal reflux disease)     Hyperlipidemia     Hypertension     Hyperthyroidism     Lyme disease     last assessed: 13    Microscopic hematuria     last assessed: 13    Normal delivery     1950 son, 1952 son, 12 daughter, 26 daughter, 65 daughter    Paroxysmal atrial fibrillation ([facility] [address] )     last assessed: 13    Perirectal abscess     last assessed: 13    Platelet dysfunction (HCC)     PAF    Sinus tachycardia     last assessed: 13    Spinal stenosis     lumbar; last assessed: 10/4/13    Tachycardia     unspecified; last assessed: 9/26/13    Trigger finger, acquired     last assessed: 6/30/15         Current Outpatient Medications:     HYDROcodone-acetaminophen (VICODIN ES) 7 5-300 MG per tablet, Take 1 tablet by mouth every 6 (six) hours as needed for severe painMax Daily Amount: 4 tablets, Disp: 60 tablet, Rfl: 0    levothyroxine 50 mcg tablet, Take 1 tablet (50 mcg total) by mouth daily, Disp: 30 tablet, Rfl: 5    losartan (COZAAR) 100 MG tablet, Take 1 tablet (100 mg total) by mouth daily, Disp: 30 tablet, Rfl: 5    metoprolol tartrate (LOPRESSOR) 100 mg tablet, Take 1 tablet (100 mg total) by mouth 2 (two) times a day, Disp: 60 tablet, Rfl: 5    ondansetron (ZOFRAN) 4 mg tablet, Take 1 tablet (4 mg total) by mouth every 6 (six) hours (Patient taking differently: Take 4 mg by mouth as needed ), Disp: 12 tablet, Rfl: 0    polyethylene glycol (MIRALAX) powder, Take by mouth as needed , Disp: , Rfl:     senna-docusate sodium (SENOKOT-S) 8 6-50 mg per tablet, Take 1 tablet by mouth as needed , Disp: , Rfl:     simvastatin (ZOCOR) 10 mg tablet, Take 1 tablet (10 mg total) by mouth daily at bedtime for 90 days, Disp: 30 tablet, Rfl: 5    warfarin (COUMADIN) 5 mg tablet, Take 5 mg on Tue, Thur, Sat and 2 5 mg on Sun, Mon, Wed, Fri (Patient taking differently: Take 5 mg on Tue, W, Sat and 2 5 mg all other days), Disp: 30 tablet, Rfl: 5    Allergies   Allergen Reactions    Cortisone Hives     swelling of mouth    Oxaprozin Hives    Penicillins Hives       Social History   Past Surgical History:   Procedure Laterality Date    KNEE ARTHROSCOPY Bilateral     2008    LUMBAR LAMINECTOMY      5/09    NEUROPLASTY / TRANSPOSITION MEDIAN NERVE AT CARPAL TUNNEL Bilateral     2011    TONSILLECTOMY AND ADENOIDECTOMY      TOTAL HIP ARTHROPLASTY      joint replacement; L hip; 2/2/10     Family History   Problem Relation Age of Onset    Rheumatic fever Mother     Heart disease Father     Crohn's disease Brother         (Granulomatous) Golitis    Psoriasis Daughter     Heart disease Son        Objective:  /80 (BP Location: Left arm, Patient Position: Sitting, Cuff Size: Standard)   Pulse 84   Temp 98 8 °F (37 1 °C) (Tympanic)   Wt 79 3 kg (174 lb 12 8 oz)   SpO2 96%   BMI 34 14 kg/m²        Physical Exam   Constitutional: She is oriented to person, place, and time  She appears well-developed and well-nourished  HENT:   Head: Normocephalic and atraumatic  Right Ear: External ear normal    Left Ear: External ear normal    Nose: Nose normal    Mouth/Throat: Oropharynx is clear and moist    Eyes: Pupils are equal, round, and reactive to light  Conjunctivae are normal  Right eye exhibits no discharge  Left eye exhibits no discharge  Neck: Neck supple  Cardiovascular: Normal rate, regular rhythm, normal heart sounds and intact distal pulses  No murmur heard  Pulmonary/Chest: Effort normal and breath sounds normal  No respiratory distress  Abdominal: Soft  Bowel sounds are normal  She exhibits no distension  There is no tenderness  Musculoskeletal: She exhibits no edema, tenderness or deformity  Bilateral knee swelling and also bowing of the right knee she does not wanted to go for total knee replacement although she is not a very poor candidate and she is very active   Lymphadenopathy:     She has no cervical adenopathy  Neurological: She is alert and oriented to person, place, and time  She exhibits normal muscle tone  Skin: Skin is warm and dry  Psychiatric: She has a normal mood and affect   Her behavior is normal          Recent Results (from the past 672 hour(s))   Protime-INR    Collection Time: 02/11/19 12:00 AM   Result Value Ref Range    INR 2 00 (A) 0 86 - 1 17   Protime-INR    Collection Time: 02/26/19 12:00 AM   Result Value Ref Range    INR 1 80 (A) 0 86 - 1 17

## 2019-03-11 DIAGNOSIS — Z78.0 POST-MENOPAUSAL: Primary | ICD-10-CM

## 2019-03-12 ENCOUNTER — ANTICOAG VISIT (OUTPATIENT)
Dept: INTERNAL MEDICINE CLINIC | Facility: CLINIC | Age: 84
End: 2019-03-12

## 2019-03-12 LAB — INR PPP: 2 (ref 0.86–1.17)

## 2019-03-12 NOTE — TELEPHONE ENCOUNTER
Left message regarding results. No need for call back, informed if any concerns to call back or follow up with primary care doctor.    Message left on home voicemail  The PT/INR is overdue  She is to go for the testing as soon as possible or call the office if the testing was already done so that we can track down the results

## 2019-03-18 DIAGNOSIS — M17.10 OSTEOARTHRITIS OF KNEE, UNSPECIFIED LATERALITY, UNSPECIFIED OSTEOARTHRITIS TYPE: ICD-10-CM

## 2019-03-19 RX ORDER — HYDROCODONE BITARTRATE AND ACETAMINOPHEN 7.5; 3 MG/1; MG/1
1 TABLET ORAL EVERY 6 HOURS PRN
Qty: 60 TABLET | Refills: 0 | Status: SHIPPED | OUTPATIENT
Start: 2019-03-19 | End: 2019-04-11 | Stop reason: SDUPTHER

## 2019-03-26 ENCOUNTER — ANTICOAG VISIT (OUTPATIENT)
Dept: INTERNAL MEDICINE CLINIC | Age: 84
End: 2019-03-26

## 2019-03-26 LAB — INR PPP: 2.2 (ref 0.86–1.17)

## 2019-03-31 DIAGNOSIS — Z78.0 POST-MENOPAUSAL: ICD-10-CM

## 2019-04-11 DIAGNOSIS — M17.10 OSTEOARTHRITIS OF KNEE, UNSPECIFIED LATERALITY, UNSPECIFIED OSTEOARTHRITIS TYPE: ICD-10-CM

## 2019-04-11 RX ORDER — HYDROCODONE BITARTRATE AND ACETAMINOPHEN 7.5; 3 MG/1; MG/1
1 TABLET ORAL EVERY 6 HOURS PRN
Qty: 60 TABLET | Refills: 0 | Status: SHIPPED | OUTPATIENT
Start: 2019-04-11 | End: 2019-05-07 | Stop reason: SDUPTHER

## 2019-04-12 ENCOUNTER — OFFICE VISIT (OUTPATIENT)
Dept: OBGYN CLINIC | Facility: MEDICAL CENTER | Age: 84
End: 2019-04-12
Payer: MEDICARE

## 2019-04-12 VITALS
RESPIRATION RATE: 20 BRPM | WEIGHT: 170 LBS | BODY MASS INDEX: 33.38 KG/M2 | SYSTOLIC BLOOD PRESSURE: 131 MMHG | HEIGHT: 60 IN | DIASTOLIC BLOOD PRESSURE: 84 MMHG | HEART RATE: 88 BPM

## 2019-04-12 DIAGNOSIS — G89.29 CHRONIC PAIN OF LEFT KNEE: ICD-10-CM

## 2019-04-12 DIAGNOSIS — M25.562 CHRONIC PAIN OF LEFT KNEE: ICD-10-CM

## 2019-04-12 DIAGNOSIS — M25.561 CHRONIC PAIN OF RIGHT KNEE: ICD-10-CM

## 2019-04-12 DIAGNOSIS — M17.12 PRIMARY OSTEOARTHRITIS OF LEFT KNEE: ICD-10-CM

## 2019-04-12 DIAGNOSIS — M17.11 PRIMARY OSTEOARTHRITIS OF RIGHT KNEE: Primary | ICD-10-CM

## 2019-04-12 DIAGNOSIS — G89.29 CHRONIC PAIN OF RIGHT KNEE: ICD-10-CM

## 2019-04-12 PROCEDURE — 99213 OFFICE O/P EST LOW 20 MIN: CPT | Performed by: ORTHOPAEDIC SURGERY

## 2019-04-12 PROCEDURE — 20610 DRAIN/INJ JOINT/BURSA W/O US: CPT | Performed by: ORTHOPAEDIC SURGERY

## 2019-04-12 RX ORDER — BETAMETHASONE SODIUM PHOSPHATE AND BETAMETHASONE ACETATE 3; 3 MG/ML; MG/ML
12 INJECTION, SUSPENSION INTRA-ARTICULAR; INTRALESIONAL; INTRAMUSCULAR; SOFT TISSUE
Status: COMPLETED | OUTPATIENT
Start: 2019-04-12 | End: 2019-04-12

## 2019-04-12 RX ORDER — LIDOCAINE HYDROCHLORIDE 10 MG/ML
2 INJECTION, SOLUTION INFILTRATION; PERINEURAL
Status: COMPLETED | OUTPATIENT
Start: 2019-04-12 | End: 2019-04-12

## 2019-04-12 RX ADMIN — BETAMETHASONE SODIUM PHOSPHATE AND BETAMETHASONE ACETATE 12 MG: 3; 3 INJECTION, SUSPENSION INTRA-ARTICULAR; INTRALESIONAL; INTRAMUSCULAR; SOFT TISSUE at 10:31

## 2019-04-12 RX ADMIN — LIDOCAINE HYDROCHLORIDE 2 ML: 10 INJECTION, SOLUTION INFILTRATION; PERINEURAL at 10:31

## 2019-04-29 ENCOUNTER — TELEPHONE (OUTPATIENT)
Dept: INTERNAL MEDICINE CLINIC | Facility: CLINIC | Age: 84
End: 2019-04-29

## 2019-04-29 ENCOUNTER — ANTICOAG VISIT (OUTPATIENT)
Dept: INTERNAL MEDICINE CLINIC | Facility: CLINIC | Age: 84
End: 2019-04-29

## 2019-04-29 LAB — INR PPP: 2.6 (ref 0.86–1.17)

## 2019-05-07 DIAGNOSIS — M17.10 OSTEOARTHRITIS OF KNEE, UNSPECIFIED LATERALITY, UNSPECIFIED OSTEOARTHRITIS TYPE: ICD-10-CM

## 2019-05-07 RX ORDER — HYDROCODONE BITARTRATE AND ACETAMINOPHEN 7.5; 3 MG/1; MG/1
1 TABLET ORAL EVERY 6 HOURS PRN
Qty: 60 TABLET | Refills: 0 | Status: SHIPPED | OUTPATIENT
Start: 2019-05-07 | End: 2019-06-03 | Stop reason: SDUPTHER

## 2019-05-15 ENCOUNTER — TELEPHONE (OUTPATIENT)
Dept: INTERNAL MEDICINE CLINIC | Age: 84
End: 2019-05-15

## 2019-05-15 DIAGNOSIS — N39.492 POSTURAL URINARY INCONTINENCE: Primary | ICD-10-CM

## 2019-05-28 ENCOUNTER — ANTICOAG VISIT (OUTPATIENT)
Dept: INTERNAL MEDICINE CLINIC | Facility: CLINIC | Age: 84
End: 2019-05-28

## 2019-05-28 LAB — INR PPP: 2.4 (ref 0.86–1.17)

## 2019-06-03 ENCOUNTER — OFFICE VISIT (OUTPATIENT)
Dept: INTERNAL MEDICINE CLINIC | Age: 84
End: 2019-06-03
Payer: MEDICARE

## 2019-06-03 VITALS
DIASTOLIC BLOOD PRESSURE: 78 MMHG | HEART RATE: 88 BPM | OXYGEN SATURATION: 97 % | TEMPERATURE: 98.2 F | BODY MASS INDEX: 34.04 KG/M2 | SYSTOLIC BLOOD PRESSURE: 130 MMHG | WEIGHT: 173.4 LBS | HEIGHT: 60 IN

## 2019-06-03 DIAGNOSIS — I48.91 ATRIAL FIBRILLATION, UNSPECIFIED TYPE (HCC): ICD-10-CM

## 2019-06-03 DIAGNOSIS — M17.10 OSTEOARTHRITIS OF KNEE, UNSPECIFIED LATERALITY, UNSPECIFIED OSTEOARTHRITIS TYPE: ICD-10-CM

## 2019-06-03 DIAGNOSIS — E03.9 HYPOTHYROIDISM, UNSPECIFIED TYPE: ICD-10-CM

## 2019-06-03 DIAGNOSIS — N81.10 BLADDER PROLAPSE, FEMALE, ACQUIRED: ICD-10-CM

## 2019-06-03 DIAGNOSIS — N18.30 STAGE 3 CHRONIC KIDNEY DISEASE (HCC): Primary | ICD-10-CM

## 2019-06-03 PROCEDURE — 99214 OFFICE O/P EST MOD 30 MIN: CPT | Performed by: INTERNAL MEDICINE

## 2019-06-03 RX ORDER — HYDROCODONE BITARTRATE AND ACETAMINOPHEN 7.5; 3 MG/1; MG/1
1 TABLET ORAL EVERY 6 HOURS PRN
Qty: 60 TABLET | Refills: 0 | Status: SHIPPED | OUTPATIENT
Start: 2019-06-03 | End: 2019-07-01 | Stop reason: SDUPTHER

## 2019-06-03 RX ORDER — WARFARIN SODIUM 2 MG/1
TABLET ORAL DAILY
COMMUNITY
End: 2022-05-11 | Stop reason: DRUGHIGH

## 2019-06-24 ENCOUNTER — HOSPITAL ENCOUNTER (EMERGENCY)
Facility: HOSPITAL | Age: 84
Discharge: HOME | End: 2019-06-24
Admitting: SURGERY
Payer: MEDICARE

## 2019-06-24 ENCOUNTER — TELEPHONE (OUTPATIENT)
Dept: OTHER | Facility: OTHER | Age: 84
End: 2019-06-24

## 2019-06-24 ENCOUNTER — APPOINTMENT (EMERGENCY)
Dept: RADIOLOGY | Facility: HOSPITAL | Age: 84
End: 2019-06-24
Payer: MEDICARE

## 2019-06-24 VITALS
BODY MASS INDEX: 33.38 KG/M2 | DIASTOLIC BLOOD PRESSURE: 92 MMHG | OXYGEN SATURATION: 96 % | HEIGHT: 60 IN | WEIGHT: 170 LBS | HEART RATE: 84 BPM | TEMPERATURE: 98.3 F | RESPIRATION RATE: 16 BRPM | SYSTOLIC BLOOD PRESSURE: 186 MMHG

## 2019-06-24 PROBLEM — S09.90XA CLOSED HEAD INJURY: Status: ACTIVE | Noted: 2019-06-24

## 2019-06-24 PROBLEM — M54.50 CHRONIC LOW BACK PAIN: Status: ACTIVE | Noted: 2019-06-24

## 2019-06-24 PROBLEM — G89.29 CHRONIC LOW BACK PAIN: Status: ACTIVE | Noted: 2019-06-24

## 2019-06-24 PROBLEM — R29.6 UNWITNESSED FALL: Status: ACTIVE | Noted: 2019-06-24

## 2019-06-24 PROBLEM — M54.2 NECK PAIN: Chronic | Status: ACTIVE | Noted: 2019-06-24

## 2019-06-24 LAB
BASE EXCESS BLDA CALC-SCNC: 6 MMOL/L (ref -2–3)
CA-I BLD-SCNC: 1.01 MMOL/L (ref 1.12–1.32)
GLUCOSE SERPL-MCNC: 115 MG/DL (ref 65–140)
HCO3 BLDA-SCNC: 32.8 MMOL/L (ref 24–30)
HCT VFR BLD CALC: 45 % (ref 34.8–46.1)
HGB BLDA-MCNC: 15.3 G/DL (ref 11.5–15.4)
PCO2 BLD: 34 MMOL/L (ref 21–32)
PCO2 BLD: 52.3 MM HG (ref 42–50)
PH BLD: 7.41 [PH] (ref 7.3–7.4)
PO2 BLD: 23 MM HG (ref 35–45)
POTASSIUM BLD-SCNC: 7.2 MMOL/L (ref 3.5–5.3)
SAO2 % BLD FROM PO2: 38 % (ref 95–98)
SODIUM BLD-SCNC: 138 MMOL/L (ref 136–145)
SPECIMEN SOURCE: ABNORMAL

## 2019-06-24 PROCEDURE — NC001 PR NO CHARGE: Performed by: EMERGENCY MEDICINE

## 2019-06-24 PROCEDURE — 85014 HEMATOCRIT: CPT

## 2019-06-24 PROCEDURE — 84132 ASSAY OF SERUM POTASSIUM: CPT

## 2019-06-24 PROCEDURE — 74177 CT ABD & PELVIS W/CONTRAST: CPT

## 2019-06-24 PROCEDURE — 99282 EMERGENCY DEPT VISIT SF MDM: CPT | Performed by: SURGERY

## 2019-06-24 PROCEDURE — 99284 EMERGENCY DEPT VISIT MOD MDM: CPT

## 2019-06-24 PROCEDURE — 82803 BLOOD GASES ANY COMBINATION: CPT

## 2019-06-24 PROCEDURE — 70450 CT HEAD/BRAIN W/O DYE: CPT

## 2019-06-24 PROCEDURE — 84295 ASSAY OF SERUM SODIUM: CPT

## 2019-06-24 PROCEDURE — 82330 ASSAY OF CALCIUM: CPT

## 2019-06-24 PROCEDURE — 82947 ASSAY GLUCOSE BLOOD QUANT: CPT

## 2019-06-24 PROCEDURE — 72125 CT NECK SPINE W/O DYE: CPT

## 2019-06-24 RX ORDER — AMOXICILLIN 250 MG
1 CAPSULE ORAL AS NEEDED
COMMUNITY

## 2019-06-24 RX ORDER — LEVOTHYROXINE SODIUM 0.05 MG/1
50 TABLET ORAL DAILY
COMMUNITY
End: 2019-07-01 | Stop reason: SDUPTHER

## 2019-06-24 RX ORDER — HYDROCODONE BITARTRATE AND ACETAMINOPHEN 7.5; 3 MG/1; MG/1
1 TABLET ORAL EVERY 6 HOURS PRN
COMMUNITY
End: 2019-07-01 | Stop reason: SDUPTHER

## 2019-06-24 RX ORDER — LOSARTAN POTASSIUM 100 MG/1
100 TABLET ORAL DAILY
COMMUNITY
End: 2019-07-01 | Stop reason: SDUPTHER

## 2019-06-24 RX ORDER — SIMVASTATIN 10 MG
10 TABLET ORAL
COMMUNITY
End: 2019-07-01 | Stop reason: SDUPTHER

## 2019-06-24 RX ORDER — ONDANSETRON 4 MG/1
4 TABLET, FILM COATED ORAL EVERY 6 HOURS PRN
COMMUNITY
End: 2021-07-07 | Stop reason: SDUPTHER

## 2019-06-24 RX ORDER — WARFARIN SODIUM 5 MG/1
TABLET ORAL
COMMUNITY
End: 2019-07-01 | Stop reason: SDUPTHER

## 2019-06-24 RX ORDER — METOPROLOL TARTRATE 100 MG/1
25 TABLET ORAL EVERY 12 HOURS SCHEDULED
COMMUNITY
End: 2019-07-01 | Stop reason: SDUPTHER

## 2019-06-24 RX ORDER — POLYETHYLENE GLYCOL 3350 17 G/17G
17 POWDER, FOR SOLUTION ORAL AS NEEDED
COMMUNITY

## 2019-06-24 RX ADMIN — IOHEXOL 100 ML: 350 INJECTION, SOLUTION INTRAVENOUS at 02:52

## 2019-06-27 ENCOUNTER — TELEPHONE (OUTPATIENT)
Dept: INTERNAL MEDICINE CLINIC | Age: 84
End: 2019-06-27

## 2019-06-27 DIAGNOSIS — Z71.89 COMPLEX CARE COORDINATION: Primary | ICD-10-CM

## 2019-06-27 DIAGNOSIS — I48.91 ATRIAL FIBRILLATION, UNSPECIFIED TYPE (HCC): Primary | ICD-10-CM

## 2019-06-28 ENCOUNTER — ANTICOAG VISIT (OUTPATIENT)
Dept: INTERNAL MEDICINE CLINIC | Facility: CLINIC | Age: 84
End: 2019-06-28

## 2019-06-28 LAB — INR PPP: 2.8 (ref 0.84–1.19)

## 2019-07-01 DIAGNOSIS — I48.91 ATRIAL FIBRILLATION, UNSPECIFIED TYPE (HCC): ICD-10-CM

## 2019-07-01 DIAGNOSIS — E03.9 HYPOTHYROIDISM, UNSPECIFIED TYPE: ICD-10-CM

## 2019-07-01 DIAGNOSIS — I10 HYPERTENSION, UNSPECIFIED TYPE: ICD-10-CM

## 2019-07-01 DIAGNOSIS — M17.10 OSTEOARTHRITIS OF KNEE, UNSPECIFIED LATERALITY, UNSPECIFIED OSTEOARTHRITIS TYPE: ICD-10-CM

## 2019-07-01 RX ORDER — HYDROCODONE BITARTRATE AND ACETAMINOPHEN 7.5; 3 MG/1; MG/1
1 TABLET ORAL EVERY 6 HOURS PRN
Qty: 60 TABLET | Refills: 0 | Status: SHIPPED | OUTPATIENT
Start: 2019-07-01 | End: 2019-07-31 | Stop reason: SDUPTHER

## 2019-07-01 RX ORDER — LEVOTHYROXINE SODIUM 0.05 MG/1
50 TABLET ORAL DAILY
Qty: 30 TABLET | Refills: 5 | Status: SHIPPED | OUTPATIENT
Start: 2019-07-01 | End: 2019-11-20 | Stop reason: SDUPTHER

## 2019-07-01 RX ORDER — LOSARTAN POTASSIUM 100 MG/1
100 TABLET ORAL DAILY
Qty: 30 TABLET | Refills: 5 | Status: SHIPPED | OUTPATIENT
Start: 2019-07-01 | End: 2020-01-20 | Stop reason: SDUPTHER

## 2019-07-01 RX ORDER — SIMVASTATIN 10 MG
10 TABLET ORAL
Qty: 30 TABLET | Refills: 5 | Status: SHIPPED | OUTPATIENT
Start: 2019-07-01 | End: 2020-01-20 | Stop reason: SDUPTHER

## 2019-07-01 RX ORDER — WARFARIN SODIUM 5 MG/1
TABLET ORAL
Qty: 30 TABLET | Refills: 5 | Status: SHIPPED | OUTPATIENT
Start: 2019-07-01 | End: 2020-01-20 | Stop reason: SDUPTHER

## 2019-07-01 RX ORDER — METOPROLOL TARTRATE 100 MG/1
100 TABLET ORAL 2 TIMES DAILY
Qty: 60 TABLET | Refills: 5 | Status: SHIPPED | OUTPATIENT
Start: 2019-07-01 | End: 2020-01-20 | Stop reason: SDUPTHER

## 2019-07-01 NOTE — TELEPHONE ENCOUNTER
Patient is requesting a refill of the hydrocodone  She said this is the second request  Please send to [facility] [place]

## 2019-07-01 NOTE — TELEPHONE ENCOUNTER
Patient called back and said she also needs refills of the followin) Losartan  2) Simvastatin  3) Warfarin  4) Metoprolol  5) Levothyroxine    She uses [facility]

## 2019-07-12 ENCOUNTER — OFFICE VISIT (OUTPATIENT)
Dept: OBGYN CLINIC | Facility: MEDICAL CENTER | Age: 84
End: 2019-07-12
Payer: MEDICARE

## 2019-07-12 VITALS
HEIGHT: 60 IN | SYSTOLIC BLOOD PRESSURE: 165 MMHG | HEART RATE: 73 BPM | BODY MASS INDEX: 33.96 KG/M2 | DIASTOLIC BLOOD PRESSURE: 91 MMHG | WEIGHT: 173 LBS | RESPIRATION RATE: 20 BRPM

## 2019-07-12 DIAGNOSIS — M17.11 PRIMARY OSTEOARTHRITIS OF RIGHT KNEE: Primary | ICD-10-CM

## 2019-07-12 DIAGNOSIS — M17.12 PRIMARY OSTEOARTHRITIS OF LEFT KNEE: ICD-10-CM

## 2019-07-12 PROCEDURE — 99213 OFFICE O/P EST LOW 20 MIN: CPT | Performed by: ORTHOPAEDIC SURGERY

## 2019-07-12 PROCEDURE — 20610 DRAIN/INJ JOINT/BURSA W/O US: CPT | Performed by: ORTHOPAEDIC SURGERY

## 2019-07-12 RX ORDER — LIDOCAINE HYDROCHLORIDE 10 MG/ML
2 INJECTION, SOLUTION INFILTRATION; PERINEURAL
Status: COMPLETED | OUTPATIENT
Start: 2019-07-12 | End: 2019-07-12

## 2019-07-12 RX ORDER — BETAMETHASONE SODIUM PHOSPHATE AND BETAMETHASONE ACETATE 3; 3 MG/ML; MG/ML
12 INJECTION, SUSPENSION INTRA-ARTICULAR; INTRALESIONAL; INTRAMUSCULAR; SOFT TISSUE
Status: COMPLETED | OUTPATIENT
Start: 2019-07-12 | End: 2019-07-12

## 2019-07-12 RX ORDER — BUPIVACAINE HYDROCHLORIDE 7.5 MG/ML
2 INJECTION, SOLUTION EPIDURAL; RETROBULBAR
Status: COMPLETED | OUTPATIENT
Start: 2019-07-12 | End: 2019-07-12

## 2019-07-12 RX ADMIN — LIDOCAINE HYDROCHLORIDE 2 ML: 10 INJECTION, SOLUTION INFILTRATION; PERINEURAL at 10:33

## 2019-07-12 RX ADMIN — BETAMETHASONE SODIUM PHOSPHATE AND BETAMETHASONE ACETATE 12 MG: 3; 3 INJECTION, SUSPENSION INTRA-ARTICULAR; INTRALESIONAL; INTRAMUSCULAR; SOFT TISSUE at 10:33

## 2019-07-12 RX ADMIN — BUPIVACAINE HYDROCHLORIDE 2 ML: 7.5 INJECTION, SOLUTION EPIDURAL; RETROBULBAR at 10:33

## 2019-07-12 NOTE — PROGRESS NOTES
89 y o female presenting for follow-up with bilateral knee pain  She was administered bilateral knee corticosteroid injections at her last visit  She reports that this improved her pain significantly, but that the shots have started to wear off  Currently she has pain with extended walking, but is still able to do most of her activities      Review of Systems  Review of systems negative unless otherwise specified in HPI    Past Medical History  Past Medical History:   Diagnosis Date    , spontaneous complete     Carcinoma of skin     Cataract     last assessed: 17    Cataract     Cellulitis     Effusion of both knee joints     resolved: 3/25/15    Effusion of both knee joints     Female stress incontinence     last assessed: 13    Female stress incontinence     Gastric ulcer     last assessed: 14    Gastric ulcer     GERD (gastroesophageal reflux disease)     Hyperlipidemia     Hypertension     Hyperthyroidism     Lyme disease     last assessed: 13    Lyme disease     Microscopic hematuria     last assessed: 13    Microscopic hematuria     Normal delivery     1950 son, 1952 son, 12 daughter, 26 daughter, 65 daughter    Paroxysmal atrial fibrillation ([address] )     last assessed: 13    Paroxysmal atrial fibrillation ([address] )     Perirectal abscess     last assessed: 13    Platelet dysfunction (HCC)     PAF    Platelet dysfunction (HCC)     Sinus tachycardia     last assessed: 13    Sinus tachycardia     Spinal stenosis     lumbar; last assessed: 10/4/13    Stage 3 chronic kidney disease ([address] ) 6/3/2019    Stage 3 chronic kidney disease ([address] )     Tachycardia     unspecified; last assessed: 13    Trigger finger, acquired     last assessed: 6/30/15    Trigger finger, acquired        Past Surgical History  Past Surgical History:   Procedure Laterality Date    KNEE ARTHROSCOPY Bilateral         KNEE ARTHROSCOPY      LUMBAR LAMINECTOMY      5/09    LUMBAR LAMINECTOMY      NEUROPLASTY / TRANSPOSITION MEDIAN NERVE AT CARPAL TUNNEL Bilateral     2011    NEUROPLASTY / TRANSPOSITION MEDIAN NERVE AT CARPAL TUNNEL      TONSILLECTOMY AND ADENOIDECTOMY      TOTAL HIP ARTHROPLASTY      joint replacement; L hip; 2/2/10    TOTAL HIP ARTHROPLASTY         Current Medications  Current Outpatient Medications on File Prior to Visit   Medication Sig Dispense Refill    HYDROcodone-acetaminophen (VICODIN ES) 7 5-300 MG per tablet Take 1 tablet by mouth every 6 (six) hours as needed for severe painMax Daily Amount: 4 tablets 60 tablet 0    levothyroxine 50 mcg tablet Take 1 tablet (50 mcg total) by mouth daily 30 tablet 5    losartan (COZAAR) 100 MG tablet Take 1 tablet (100 mg total) by mouth daily 30 tablet 5    metoprolol tartrate (LOPRESSOR) 100 mg tablet Take 1 tablet (100 mg total) by mouth 2 (two) times a day 60 tablet 5    ondansetron (ZOFRAN) 4 mg tablet Take 4 mg by mouth every 6 (six) hours as needed for nausea or vomiting      polyethylene glycol (MIRALAX) 17 g packet Take 17 g by mouth daily      senna-docusate sodium (SENOKOT S) 8 6-50 mg per tablet Take 1 tablet by mouth as needed for constipation      simvastatin (ZOCOR) 10 mg tablet Take 1 tablet (10 mg total) by mouth daily at bedtime for 90 days 30 tablet 5    warfarin (COUMADIN) 2 mg tablet Take by mouth daily      warfarin (COUMADIN) 5 mg tablet Take 5 mg on Tue, W, Sat and 2 5 mg all other days 30 tablet 5     No current facility-administered medications on file prior to visit          Recent Labs [facility])  0   Lab Value Date/Time    HCT 45 06/24/2019 0234    HCT 45 2 07/12/2018 1236    HCT 46 2 (H) 04/24/2015 0732    HGB 15 3 06/24/2019 0234    HGB 15 1 07/12/2018 1236    HGB 15 6 (H) 04/24/2015 0732    WBC 9 94 07/12/2018 1236    WBC 8 14 04/24/2015 0732    INR 2 80 (A) 06/28/2019    INR 1 68 (H) 04/24/2015 0732    GLUCOSE 115 06/24/2019 0234 GLUCOSE 94 04/24/2015 0732         Physical exam  · General: Awake, Alert, Oriented  · Eyes: Pupils equal, round and reactive to light  · Heart: regular rate and rhythm  · Lungs: No audible wheezing  · Abdomen: soft  MSK left knee:  Valgus knee  Minimal effusion  Moderately tender to palpation around the knee  Range of motion from 5-120 degrees  Stable to valgus/varus  No gross motor sensory deficit    MSK right knee:  Valgus knee  Minimal effusion  Moderately tender palpation around the knee  Range of motion from  degrees  Stable to valgus/varus  No gross motor sensory deficits    Imaging  No new imaging this visit    Procedure  Large joint arthrocentesis: bilateral knee  Date/Time: 7/12/2019 10:33 AM  Consent given by: patient  Site marked: site marked  Timeout: Immediately prior to procedure a time out was called to verify the correct patient, procedure, equipment, support staff and site/side marked as required   Supporting Documentation  Indications: pain   Procedure Details  Location: knee - bilateral knee  Preparation: Patient was prepped and draped in the usual sterile fashion  Needle size: 22 G  Approach: anteromedial    Medications (Right): 2 mL bupivacaine (PF) 0 75 %; 12 mg betamethasone acetate-betamethasone sodium phosphate 6 (3-3) mg/mL; 2 mL lidocaine 1 %Medications (Left): 2 mL bupivacaine (PF) 0 75 %; 12 mg betamethasone acetate-betamethasone sodium phosphate 6 (3-3) mg/mL; 2 mL lidocaine 1 %   Patient tolerance: patient tolerated the procedure well with no immediate complications  Dressing:  Sterile dressing applied            Assessment/Plan:   80 y  o female presenting for follow-up for bilateral knee osteoarthritis

## 2019-07-24 ENCOUNTER — PATIENT OUTREACH (OUTPATIENT)
Dept: INTERNAL MEDICINE CLINIC | Age: 84
End: 2019-07-24

## 2019-07-24 NOTE — PROGRESS NOTES
Outpatient Care Management Note:  RE: Message left for return outreach after explaining role at the practice

## 2019-07-29 ENCOUNTER — ANTICOAG VISIT (OUTPATIENT)
Dept: INTERNAL MEDICINE CLINIC | Facility: CLINIC | Age: 84
End: 2019-07-29

## 2019-07-29 ENCOUNTER — TELEPHONE (OUTPATIENT)
Dept: INTERNAL MEDICINE CLINIC | Facility: CLINIC | Age: 84
End: 2019-07-29

## 2019-07-29 DIAGNOSIS — M17.10 OSTEOARTHRITIS OF KNEE, UNSPECIFIED LATERALITY, UNSPECIFIED OSTEOARTHRITIS TYPE: ICD-10-CM

## 2019-07-29 LAB — INR PPP: 3.2 (ref 0.84–1.19)

## 2019-07-29 NOTE — TELEPHONE ENCOUNTER
Patient is calling in for the refill on her hydrocodone/acetaminophen sent to [facility]    Patient is aware it is to early and if we can take care of it on Wednesday 07/31/2019 for her so it can be delivered to her house from [address] [place] [address]

## 2019-07-29 NOTE — PROGRESS NOTES
INR 3 2  Currently on 5mg Tu, Wed, Sat, 5mg all others  Previously stable on this dose for many months  Will change to 5mg Wed, Sat, 2 5mg all others  Check in 2 weeks  Thank you

## 2019-07-29 NOTE — PROGRESS NOTES
Spoke with pt  She confirmed current Coumadin dose is 5 mg every Tu,W,Sat and 2 5 mg all other days  She will change to 5 mg every Wed,Sat and 2 5 mg all other days and check INR in 2 wks   kz

## 2019-07-30 DIAGNOSIS — E55.9 VITAMIN D DEFICIENCY: Primary | ICD-10-CM

## 2019-07-30 RX ORDER — ERGOCALCIFEROL 1.25 MG/1
50000 CAPSULE ORAL WEEKLY
Qty: 8 CAPSULE | Refills: 0 | Status: SHIPPED | OUTPATIENT
Start: 2019-07-30 | End: 2019-09-03 | Stop reason: SDUPTHER

## 2019-07-31 ENCOUNTER — PATIENT OUTREACH (OUTPATIENT)
Dept: INTERNAL MEDICINE CLINIC | Age: 84
End: 2019-07-31

## 2019-07-31 RX ORDER — HYDROCODONE BITARTRATE AND ACETAMINOPHEN 7.5; 3 MG/1; MG/1
1 TABLET ORAL EVERY 6 HOURS PRN
Qty: 60 TABLET | Refills: 0 | Status: SHIPPED | OUTPATIENT
Start: 2019-07-31 | End: 2019-08-23 | Stop reason: SDUPTHER

## 2019-07-31 NOTE — PROGRESS NOTES
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

## 2019-08-15 ENCOUNTER — TELEPHONE (OUTPATIENT)
Dept: INTERNAL MEDICINE CLINIC | Facility: CLINIC | Age: 84
End: 2019-08-15

## 2019-08-15 ENCOUNTER — ANTICOAG VISIT (OUTPATIENT)
Dept: INTERNAL MEDICINE CLINIC | Facility: CLINIC | Age: 84
End: 2019-08-15

## 2019-08-15 LAB — INR PPP: 2.3 (ref 0.84–1.19)

## 2019-08-15 NOTE — TELEPHONE ENCOUNTER
----- Message from [address] sent at 8/15/2019  9:22 AM EDT -----  INR therapeutic  Continue same dose and recheck INR in 1 month

## 2019-08-23 DIAGNOSIS — M17.10 OSTEOARTHRITIS OF KNEE, UNSPECIFIED LATERALITY, UNSPECIFIED OSTEOARTHRITIS TYPE: ICD-10-CM

## 2019-08-23 NOTE — TELEPHONE ENCOUNTER
Dr Tony Webb PCP, not in office will send to [facility] [place] provider pool to sign off on

## 2019-08-26 RX ORDER — HYDROCODONE BITARTRATE AND ACETAMINOPHEN 7.5; 3 MG/1; MG/1
1 TABLET ORAL EVERY 6 HOURS PRN
Qty: 60 TABLET | Refills: 0 | Status: SHIPPED | OUTPATIENT
Start: 2019-08-26 | End: 2019-09-20 | Stop reason: SDUPTHER

## 2019-09-03 ENCOUNTER — OFFICE VISIT (OUTPATIENT)
Dept: INTERNAL MEDICINE CLINIC | Age: 84
End: 2019-09-03
Payer: MEDICARE

## 2019-09-03 VITALS
WEIGHT: 174 LBS | DIASTOLIC BLOOD PRESSURE: 90 MMHG | HEIGHT: 60 IN | SYSTOLIC BLOOD PRESSURE: 130 MMHG | OXYGEN SATURATION: 96 % | HEART RATE: 74 BPM | TEMPERATURE: 97.5 F | BODY MASS INDEX: 34.16 KG/M2

## 2019-09-03 DIAGNOSIS — E03.9 HYPOTHYROIDISM, UNSPECIFIED TYPE: Primary | ICD-10-CM

## 2019-09-03 DIAGNOSIS — M17.0 PRIMARY OSTEOARTHRITIS OF BOTH KNEES: ICD-10-CM

## 2019-09-03 DIAGNOSIS — E55.9 VITAMIN D DEFICIENCY: ICD-10-CM

## 2019-09-03 DIAGNOSIS — I48.91 ATRIAL FIBRILLATION, UNSPECIFIED TYPE (HCC): ICD-10-CM

## 2019-09-03 DIAGNOSIS — N18.30 STAGE 3 CHRONIC KIDNEY DISEASE (HCC): ICD-10-CM

## 2019-09-03 DIAGNOSIS — D75.1 POLYCYTHEMIA: ICD-10-CM

## 2019-09-03 DIAGNOSIS — I10 BENIGN ESSENTIAL HYPERTENSION: ICD-10-CM

## 2019-09-03 PROCEDURE — 99214 OFFICE O/P EST MOD 30 MIN: CPT | Performed by: INTERNAL MEDICINE

## 2019-09-03 RX ORDER — ERGOCALCIFEROL 1.25 MG/1
50000 CAPSULE ORAL WEEKLY
Qty: 8 CAPSULE | Refills: 0 | Status: SHIPPED | OUTPATIENT
Start: 2019-09-03

## 2019-09-03 NOTE — ASSESSMENT & PLAN NOTE
She does not have any symptoms of polycythemia she is on Coumadin her hemoglobin hematocrit is variable but it is on the high side will continue to follow she may need the Hematology-Oncology consultation if the numbers gets more worst or if we see increase in the WBC count and platelet count

## 2019-09-03 NOTE — ASSESSMENT & PLAN NOTE
Patient takes medication as directed with no complaints  Denies weight changes or intolerance to cold

## 2019-09-03 NOTE — PROGRESS NOTES
Assessment/Plan:    Hypothyroidism  Patient takes medication as directed with no complaints  Denies weight changes or intolerance to cold    Atrial fibrillation (HCC)  Patient compliant with coumadin  Denies SOB and chest pain    Benign essential hypertension  Patient compliant with medications    Primary osteoarthritis of both knees  Patient complaining of pain and fluid in both legs    Polycythemia  She does not have any symptoms of polycythemia she is on Coumadin her hemoglobin hematocrit is variable but it is on the high side will continue to follow she may need the Hematology-Oncology consultation if the numbers gets more worst or if we see increase in the WBC count and platelet count       Diagnoses and all orders for this visit:    Hypothyroidism, unspecified type    Vitamin D deficiency  -     ergocalciferol (VITAMIN D2) 50,000 units; Take 1 capsule (50,000 Units total) by mouth once a week    Stage 3 chronic kidney disease ([facility] [address] )    Primary osteoarthritis of both knees    Atrial fibrillation, unspecified type ([facility] [address] )    Benign essential hypertension        Subjective: The patient was recently fell out of the bed and went to the emergency room at [facility] in [facility] where she was evaluated  CT scan of the head cervical spine abdomen and pelvis and the lumbar spine was done, multiple trauma x-rays were done patient was discharged home after the whole workup patient does not have any complaints right now related to that I reviewed the her testings   Patient ID: Cornell Martínez is a 80 y o  female  HPI  Please see above for the detail of her hospital visit and HPI patient is here for the regular follow-up    Does not have much complaints today  The following portions of the patient's history were reviewed and updated as appropriate: allergies, current medications, past family history, past medical history, past social history, past surgical history and problem list     Review of Systems Constitutional: Negative for activity change, appetite change, chills, diaphoresis, fatigue, fever and unexpected weight change  HENT: Negative  Eyes: Negative  Respiratory: Negative  Cardiovascular: Positive for leg swelling  Negative for chest pain and palpitations  Gastrointestinal: Positive for constipation  Negative for abdominal distention, abdominal pain, anal bleeding, blood in stool, diarrhea, nausea, rectal pain and vomiting  Endocrine: Negative  Genitourinary: Negative  Musculoskeletal: Positive for arthralgias, back pain, joint swelling, neck pain and neck stiffness  Negative for gait problem and myalgias  Skin: Negative  Allergic/Immunologic: Positive for environmental allergies  Negative for food allergies and immunocompromised state  Neurological: Negative  Hematological: Negative  Psychiatric/Behavioral: Negative  Objective:      /90 (BP Location: Left arm, Patient Position: Sitting, Cuff Size: Large)   Pulse 74   Temp 97 5 °F (36 4 °C) (Tympanic)   Ht 5' (1 524 m)   Wt 78 9 kg (174 lb)   SpO2 96%   BMI 33 98 kg/m²          Physical Exam   Constitutional: She is oriented to person, place, and time  She appears well-developed and well-nourished  No distress  HENT:   Head: Normocephalic  Eyes: Pupils are equal, round, and reactive to light  Cardiovascular: Normal rate, regular rhythm, normal heart sounds and intact distal pulses  Pulmonary/Chest: Effort normal and breath sounds normal    Musculoskeletal: She exhibits edema and tenderness  Neurological: She is alert and oriented to person, place, and time  Skin: Skin is warm and dry  Capillary refill takes 2 to 3 seconds  She is not diaphoretic  Psychiatric: She has a normal mood and affect   Her behavior is normal  Judgment and thought content normal

## 2019-09-18 ENCOUNTER — ANTICOAG VISIT (OUTPATIENT)
Dept: INTERNAL MEDICINE CLINIC | Facility: CLINIC | Age: 84
End: 2019-09-18

## 2019-09-18 LAB — INR PPP: 1.8 (ref 0.84–1.19)

## 2019-09-19 ENCOUNTER — TELEPHONE (OUTPATIENT)
Dept: INTERNAL MEDICINE CLINIC | Age: 84
End: 2019-09-19

## 2019-09-19 NOTE — TELEPHONE ENCOUNTER
Patient would like to have a refill on the Ergocalciferol 50,000 units    Dr Lurdes Posada stated on her last visit that she was able to take extra of the Hydrocodone due to her fall she had but now she is short before she is due      Can we find out with Dr Lurdes Posada if this was ok for her to do and see what we can do to get this refilled earlier for her    [facility]

## 2019-09-19 NOTE — TELEPHONE ENCOUNTER
Dr José Walker in [address] [place] [address] please speak with him when in office tomorrow

## 2019-09-20 DIAGNOSIS — M17.10 OSTEOARTHRITIS OF KNEE, UNSPECIFIED LATERALITY, UNSPECIFIED OSTEOARTHRITIS TYPE: ICD-10-CM

## 2019-09-20 RX ORDER — HYDROCODONE BITARTRATE AND ACETAMINOPHEN 7.5; 3 MG/1; MG/1
1 TABLET ORAL EVERY 6 HOURS PRN
Qty: 75 TABLET | Refills: 0 | Status: SHIPPED | OUTPATIENT
Start: 2019-09-20 | End: 2019-10-22 | Stop reason: SDUPTHER

## 2019-10-04 ENCOUNTER — ANTICOAG VISIT (OUTPATIENT)
Dept: INTERNAL MEDICINE CLINIC | Facility: CLINIC | Age: 84
End: 2019-10-04

## 2019-10-04 LAB — INR PPP: 2.7 (ref 0.84–1.19)

## 2019-10-11 ENCOUNTER — OFFICE VISIT (OUTPATIENT)
Dept: OBGYN CLINIC | Facility: MEDICAL CENTER | Age: 84
End: 2019-10-11
Payer: MEDICARE

## 2019-10-11 VITALS — DIASTOLIC BLOOD PRESSURE: 71 MMHG | SYSTOLIC BLOOD PRESSURE: 123 MMHG | RESPIRATION RATE: 18 BRPM | HEART RATE: 94 BPM

## 2019-10-11 DIAGNOSIS — M17.0 BILATERAL PRIMARY OSTEOARTHRITIS OF KNEE: Primary | ICD-10-CM

## 2019-10-11 PROCEDURE — 99213 OFFICE O/P EST LOW 20 MIN: CPT | Performed by: ORTHOPAEDIC SURGERY

## 2019-10-11 PROCEDURE — 20610 DRAIN/INJ JOINT/BURSA W/O US: CPT | Performed by: ORTHOPAEDIC SURGERY

## 2019-10-11 RX ORDER — LIDOCAINE HYDROCHLORIDE 10 MG/ML
2 INJECTION, SOLUTION INFILTRATION; PERINEURAL
Status: COMPLETED | OUTPATIENT
Start: 2019-10-11 | End: 2019-10-11

## 2019-10-11 RX ORDER — BUPIVACAINE HYDROCHLORIDE 2.5 MG/ML
2 INJECTION, SOLUTION INFILTRATION; PERINEURAL
Status: COMPLETED | OUTPATIENT
Start: 2019-10-11 | End: 2019-10-11

## 2019-10-11 RX ORDER — BETAMETHASONE SODIUM PHOSPHATE AND BETAMETHASONE ACETATE 3; 3 MG/ML; MG/ML
12 INJECTION, SUSPENSION INTRA-ARTICULAR; INTRALESIONAL; INTRAMUSCULAR; SOFT TISSUE
Status: COMPLETED | OUTPATIENT
Start: 2019-10-11 | End: 2019-10-11

## 2019-10-11 RX ADMIN — BETAMETHASONE SODIUM PHOSPHATE AND BETAMETHASONE ACETATE 12 MG: 3; 3 INJECTION, SUSPENSION INTRA-ARTICULAR; INTRALESIONAL; INTRAMUSCULAR; SOFT TISSUE at 10:17

## 2019-10-11 RX ADMIN — BUPIVACAINE HYDROCHLORIDE 2 ML: 2.5 INJECTION, SOLUTION INFILTRATION; PERINEURAL at 10:17

## 2019-10-11 RX ADMIN — LIDOCAINE HYDROCHLORIDE 2 ML: 10 INJECTION, SOLUTION INFILTRATION; PERINEURAL at 10:17

## 2019-10-11 NOTE — PROGRESS NOTES
80 y o female presenting for follow-up for bilateral knee osteoarthritis  At her last visit patient received bilateral corticosteroid injections  She reports significant relief from these injections, but the relief has worn off  She is interested in getting repeat injections today  Additionally she is reporting left-sided lower back pain  No numbness or tingling to the extremities      Review of Systems  Review of systems negative unless otherwise specified in HPI    Past Medical History  Past Medical History:   Diagnosis Date    , spontaneous complete     Carcinoma of skin     Cataract     last assessed: 17    Cataract     Cellulitis     Effusion of both knee joints     resolved: 3/25/15    Effusion of both knee joints     Enteritis     Female stress incontinence     last assessed: 13    Female stress incontinence     Gastric ulcer     last assessed: 14    Gastric ulcer     GERD (gastroesophageal reflux disease)     Hyperlipidemia     Hypertension     Hyperthyroidism     Lyme disease     last assessed: 13    Lyme disease     Microscopic hematuria     last assessed: 13    Microscopic hematuria     Normal delivery     1950 son, 1952 son, 12 daughter, 26 daughter, 65 daughter    Paroxysmal atrial fibrillation ([address] )     last assessed: 13    Paroxysmal atrial fibrillation ([address] )     Perirectal abscess     last assessed: 13    Platelet dysfunction (HCC)     PAF    Platelet dysfunction (HCC)     Sinus tachycardia     last assessed: 13    Sinus tachycardia     Spinal stenosis     lumbar; last assessed: 10/4/13    Stage 3 chronic kidney disease ([address] ) 6/3/2019    Stage 3 chronic kidney disease ([address] )     Tachycardia     unspecified; last assessed: 13    Trigger finger, acquired     last assessed: 6/30/15    Trigger finger, acquired        Past Surgical History  Past Surgical History:   Procedure Laterality Date    KNEE ARTHROSCOPY Bilateral     2008    KNEE ARTHROSCOPY      LUMBAR LAMINECTOMY      5/09    LUMBAR LAMINECTOMY      NEUROPLASTY / TRANSPOSITION MEDIAN NERVE AT CARPAL TUNNEL Bilateral     2011    NEUROPLASTY / TRANSPOSITION MEDIAN NERVE AT CARPAL TUNNEL      TONSILLECTOMY AND ADENOIDECTOMY      TOTAL HIP ARTHROPLASTY      joint replacement; L hip; 2/2/10    TOTAL HIP ARTHROPLASTY         Current Medications  Current Outpatient Medications on File Prior to Visit   Medication Sig Dispense Refill    ergocalciferol (VITAMIN D2) 50,000 units Take 1 capsule (50,000 Units total) by mouth once a week 8 capsule 0    HYDROcodone-acetaminophen (VICODIN ES) 7 5-300 MG per tablet Take 1 tablet by mouth every 6 (six) hours as needed for severe painMax Daily Amount: 4 tablets 75 tablet 0    levothyroxine 50 mcg tablet Take 1 tablet (50 mcg total) by mouth daily 30 tablet 5    losartan (COZAAR) 100 MG tablet Take 1 tablet (100 mg total) by mouth daily 30 tablet 5    metoprolol tartrate (LOPRESSOR) 100 mg tablet Take 1 tablet (100 mg total) by mouth 2 (two) times a day 60 tablet 5    ondansetron (ZOFRAN) 4 mg tablet Take 4 mg by mouth every 6 (six) hours as needed for nausea or vomiting      polyethylene glycol (MIRALAX) 17 g packet Take 17 g by mouth daily      senna-docusate sodium (SENOKOT S) 8 6-50 mg per tablet Take 1 tablet by mouth as needed for constipation      simvastatin (ZOCOR) 10 mg tablet Take 1 tablet (10 mg total) by mouth daily at bedtime for 90 days 30 tablet 5    warfarin (COUMADIN) 2 mg tablet Take by mouth daily      warfarin (COUMADIN) 5 mg tablet Take 5 mg on Tue, W, Sat and 2 5 mg all other days 30 tablet 5     No current facility-administered medications on file prior to visit          Recent Labs [facility])  0   Lab Value Date/Time    HCT 45 06/24/2019 0234    HCT 45 2 07/12/2018 1236    HCT 46 2 (H) 04/24/2015 0732    HGB 15 3 06/24/2019 0234    HGB 15 1 07/12/2018 1236    HGB 15 6 (H) 04/24/2015 0732    WBC 9 94 07/12/2018 1236    WBC 8 14 04/24/2015 0732    INR 2 70 (A) 10/03/2019    INR 1 68 (H) 04/24/2015 0732    GLUCOSE 115 06/24/2019 0234    GLUCOSE 94 04/24/2015 0732         Physical exam  · General: Awake, Alert, Oriented  · Eyes: Pupils equal, round and reactive to light  · Heart: regular rate and rhythm  · Lungs: No audible wheezing  · Abdomen: soft  MSK left knee  -skin intact over the left knee without erythema or ecchymosis  -trace effusion  -no tenderness to palpation  -range of motion 0-100 degrees  -stable to valgus/varus, Lachman's, posterior drawer  -motor intact hip flexion, knee flexion/extension, ankle dorsiflexion/plantar flexion, EHL/FHL  -sensation intact L3-S1  -2+ PT pulse    MSK right knee skin intact over the right knee without erythema or ecchymosis  -no effusion  -no tenderness to palpation  -range of motion from 10 to 90°  -stable to valgus/varus, Lachman's, posterior drawer  -motor intact hip flexion, knee flexion/extension, ankle dorsiflexion/plantar flexion EHL/FHL  -sensation intact L3-S1  -2+ PT pulse    Imaging  No new imaging this encounter    Procedure  Large joint arthrocentesis: bilateral knee  Date/Time: 10/11/2019 10:17 AM  Consent given by: patient  Site marked: site marked  Timeout: Immediately prior to procedure a time out was called to verify the correct patient, procedure, equipment, support staff and site/side marked as required   Supporting Documentation  Indications: pain   Procedure Details  Location: knee - bilateral knee  Needle size: 22 G  Approach: anteromedial    Medications (Right): 2 mL bupivacaine 0 25 %; 2 mL lidocaine 1 %; 12 mg betamethasone acetate-betamethasone sodium phosphate 6 (3-3) mg/mLMedications (Left): 2 mL bupivacaine 0 25 %; 2 mL lidocaine 1 %; 12 mg betamethasone acetate-betamethasone sodium phosphate 6 (3-3) mg/mL   Patient tolerance: patient tolerated the procedure well with no immediate complications  Dressing: Sterile dressing applied            Assessment/Plan:   80 y  o female presenting for follow-up for bilateral knee osteoarthritis  Patient was offered and administered bilateral knee corticosteroid injections    Patient was instructed to return to the office in 3 months for repeat examination and possible injections if indicated at that time

## 2019-10-22 DIAGNOSIS — M17.10 OSTEOARTHRITIS OF KNEE, UNSPECIFIED LATERALITY, UNSPECIFIED OSTEOARTHRITIS TYPE: ICD-10-CM

## 2019-10-22 RX ORDER — HYDROCODONE BITARTRATE AND ACETAMINOPHEN 7.5; 3 MG/1; MG/1
1 TABLET ORAL EVERY 6 HOURS PRN
Qty: 75 TABLET | Refills: 0 | Status: SHIPPED | OUTPATIENT
Start: 2019-10-22 | End: 2019-11-23 | Stop reason: SDUPTHER

## 2019-11-07 ENCOUNTER — ANTICOAG VISIT (OUTPATIENT)
Dept: INTERNAL MEDICINE CLINIC | Facility: CLINIC | Age: 84
End: 2019-11-07

## 2019-11-07 LAB — INR PPP: 2.7 (ref 0.84–1.19)

## 2019-11-07 NOTE — PROGRESS NOTES
Spoke with pt  She confirmed current Coumadin dose 5 mg every M,W,Sat and 2 5 mg allother days    She will cont same dose and check INR in 1 month  kz

## 2019-11-07 NOTE — PROGRESS NOTES
current Coumadin dose 5 mg every M,W,F and 2 5 mg all other days  - cont same dose and check INR in 1 month

## 2019-11-11 ENCOUNTER — OFFICE VISIT (OUTPATIENT)
Dept: INTERNAL MEDICINE CLINIC | Age: 84
End: 2019-11-11
Payer: MEDICARE

## 2019-11-11 VITALS
TEMPERATURE: 98.1 F | HEIGHT: 60 IN | BODY MASS INDEX: 34.32 KG/M2 | HEART RATE: 64 BPM | SYSTOLIC BLOOD PRESSURE: 140 MMHG | DIASTOLIC BLOOD PRESSURE: 80 MMHG | OXYGEN SATURATION: 95 % | WEIGHT: 174.8 LBS

## 2019-11-11 DIAGNOSIS — D75.1 POLYCYTHEMIA: ICD-10-CM

## 2019-11-11 DIAGNOSIS — E03.9 HYPOTHYROIDISM, UNSPECIFIED TYPE: ICD-10-CM

## 2019-11-11 DIAGNOSIS — I48.91 ATRIAL FIBRILLATION, UNSPECIFIED TYPE (HCC): ICD-10-CM

## 2019-11-11 DIAGNOSIS — I10 BENIGN ESSENTIAL HYPERTENSION: Primary | ICD-10-CM

## 2019-11-11 DIAGNOSIS — N18.30 CKD (CHRONIC KIDNEY DISEASE) STAGE 3, GFR 30-59 ML/MIN (HCC): ICD-10-CM

## 2019-11-11 PROCEDURE — 99214 OFFICE O/P EST MOD 30 MIN: CPT | Performed by: INTERNAL MEDICINE

## 2019-11-11 PROCEDURE — G0439 PPPS, SUBSEQ VISIT: HCPCS | Performed by: INTERNAL MEDICINE

## 2019-11-11 NOTE — PROGRESS NOTES
Assessment/Plan:       Problem List Items Addressed This Visit        Endocrine    Hypothyroidism     - Well controlled on 50 mcg thyroxine  Denies cold intolerance, fatigue, weight gain  Cardiovascular and Mediastinum    Atrial fibrillation ([address] )     - Compliant with medications, most recent INR 2 7  Benign essential hypertension - Primary     Controlled on current medications, at goal as patient is 80years old                 Subjective:   Chief Complaint   Patient presents with    Medicare Wellness Visit     SUB  AWV        Patient ID: Syeda Blair is a 80 y o  female  Patient states she has been experiencing worsening pain for the past month from her arthritis as she has been more active (shopping)  Usually, she takes 2 Vicodin 7 5/300 (one in the morning and one at night) but has been taking 3 due to the increased pain, which helps  Followed by ortho for knee injections, most recent 10/11  Her other complaint is that she has to use [employer]ends for incontinence since 2009 and is worried about the cost  Patient reports constant dribbling and urination with cough/sneezing  She is curious about other options  Otherwise patient is doing well  The following portions of the patient's history were reviewed and updated as appropriate: allergies, current medications, past family history, past medical history, past social history, past surgical history and problem list     Review of Systems   Constitutional: Negative for chills, fatigue and fever  Respiratory: Negative for cough, chest tightness and shortness of breath  Cardiovascular: Negative for chest pain  Gastrointestinal: Negative for abdominal pain  Endocrine: Negative for heat intolerance  Genitourinary:        Incontinence   Musculoskeletal: Positive for back pain, gait problem and joint swelling  Using walker   Neurological: Negative for dizziness and headaches           Objective:      /80 (BP Location: Left arm, Patient Position: Sitting, Cuff Size: Standard)   Pulse 64   Temp 98 1 °F (36 7 °C) (Tympanic)   Ht 5' (1 524 m)   Wt 79 3 kg (174 lb 12 8 oz)   SpO2 95%   BMI 34 14 kg/m²          Physical Exam   Constitutional: She is oriented to person, place, and time  She appears well-developed and well-nourished  No distress  HENT:   Head: Normocephalic  Neck: Normal range of motion  Cardiovascular: Normal rate and intact distal pulses  No murmur heard  Irregular rhythm   Pulmonary/Chest: Effort normal and breath sounds normal  She has no wheezes  Abdominal: Soft  Bowel sounds are normal  There is no tenderness  Musculoskeletal: Normal range of motion  She exhibits edema  +1 pitting edema bilaterally   Bilateral knee swelling, lateral joint tenderness   Neurological: She is alert and oriented to person, place, and time  Skin: Skin is warm and dry  Capillary refill takes less than 2 seconds  Psychiatric: She has a normal mood and affect

## 2019-11-11 NOTE — PATIENT INSTRUCTIONS
Medicare Preventive Visit Patient Instructions  Thank you for completing your Welcome to Medicare Visit or Medicare Annual Wellness Visit today  Your next wellness visit will be due in one year (11/11/2020)  The screening/preventive services that you may require over the next 5-10 years are detailed below  Some tests may not apply to you based off risk factors and/or age  Screening tests ordered at today's visit but not completed yet may show as past due  Also, please note that scanned in results may not display below  Preventive Screenings:  Service Recommendations Previous Testing/Comments   Colorectal Cancer Screening  * Colonoscopy    * Fecal Occult Blood Test (FOBT)/Fecal Immunochemical Test (FIT)  * Fecal DNA/Cologuard Test  * Flexible Sigmoidoscopy Age: 54-65 years old   Colonoscopy: every 10 years (may be performed more frequently if at higher risk)  OR  FOBT/FIT: every 1 year  OR  Cologuard: every 3 years  OR  Sigmoidoscopy: every 5 years  Screening may be recommended earlier than age 48 if at higher risk for colorectal cancer  Also, an individualized decision between you and your healthcare provider will decide whether screening between the ages of 74-80 would be appropriate  Colonoscopy: 01/22/2008  FOBT/FIT: Not on file  Cologuard: Not on file  Sigmoidoscopy: Not on file    Screening Not Indicated     Breast Cancer Screening Age: 36 years old  Frequency: every 1-2 years  Not required if history of left and right mastectomy Mammogram: 07/18/2003       Cervical Cancer Screening Between the ages of 21-29, pap smear recommended once every 3 years  Between the ages of 33-67, can perform pap smear with HPV co-testing every 5 years     Recommendations may differ for women with a history of total hysterectomy, cervical cancer, or abnormal pap smears in past  Pap Smear: Not on file    Screening Not Indicated   Hepatitis C Screening Once for adults born between 1945 and 1965  More frequently in patients at high risk for Hepatitis C Hep C Antibody: Not on file       Diabetes Screening 1-2 times per year if you're at risk for diabetes or have pre-diabetes Fasting glucose: No results in last 5 years   A1C: No results in last 5 years       Cholesterol Screening Once every 5 years if you don't have a lipid disorder  May order more often based on risk factors  Lipid panel: 03/11/2019    Screening Not Indicated  History Lipid Disorder     Other Preventive Screenings Covered by Medicare:  1  Abdominal Aortic Aneurysm (AAA) Screening: covered once if your at risk  You're considered to be at risk if you have a family history of AAA  2  Lung Cancer Screening: covers low dose CT scan once per year if you meet all of the following conditions: (1) Age 50-69; (2) No signs or symptoms of lung cancer; (3) Current smoker or have quit smoking within the last 15 years; (4) You have a tobacco smoking history of at least 30 pack years (packs per day multiplied by number of years you smoked); (5) You get a written order from a healthcare provider  3  Glaucoma Screening: covered annually if you're considered high risk: (1) You have diabetes OR (2) Family history of glaucoma OR (3)  aged 48 and older OR (3)  American aged 72 and older  3  Osteoporosis Screening: covered every 2 years if you meet one of the following conditions: (1) You're estrogen deficient and at risk for osteoporosis based off medical history and other findings; (2) Have a vertebral abnormality; (3) On glucocorticoid therapy for more than 3 months; (4) Have primary hyperparathyroidism; (5) On osteoporosis medications and need to assess response to drug therapy  · Last bone density test (DXA Scan): 03/25/2019   5  HIV Screening: covered annually if you're between the age of 15-65  Also covered annually if you are younger than 13 and older than 72 with risk factors for HIV infection   For pregnant patients, it is covered up to 3 times per pregnancy  Immunizations:  Immunization Recommendations   Influenza Vaccine Annual influenza vaccination during flu season is recommended for all persons aged >= 6 months who do not have contraindications   Pneumococcal Vaccine (Prevnar and Pneumovax)  * Prevnar = PCV13  * Pneumovax = PPSV23   Adults 25-60 years old: 1-3 doses may be recommended based on certain risk factors  Adults 72 years old: Prevnar (PCV13) vaccine recommended followed by Pneumovax (PPSV23) vaccine  If already received PPSV23 since turning 65, then PCV13 recommended at least one year after PPSV23 dose  Hepatitis B Vaccine 3 dose series if at intermediate or high risk (ex: diabetes, end stage renal disease, liver disease)   Tetanus (Td) Vaccine - COST NOT COVERED BY MEDICARE PART B Following completion of primary series, a booster dose should be given every 10 years to maintain immunity against tetanus  Td may also be given as tetanus wound prophylaxis  Tdap Vaccine - COST NOT COVERED BY MEDICARE PART B Recommended at least once for all adults  For pregnant patients, recommended with each pregnancy  Shingles Vaccine (Shingrix) - COST NOT COVERED BY MEDICARE PART B  2 shot series recommended in those aged 48 and above     Health Maintenance Due:      Topic Date Due    DXA SCAN  03/25/2021     Immunizations Due:      Topic Date Due    HEPATITIS B VACCINES (1 of 3 - Risk 3-dose series) 02/03/1946    DTaP,Tdap,and Td Vaccines (1 - Tdap) 02/03/1948     Advance Directives   What are advance directives? Advance directives are legal documents that state your wishes and plans for medical care  These plans are made ahead of time in case you lose your ability to make decisions for yourself  Advance directives can apply to any medical decision, such as the treatments you want, and if you want to donate organs  What are the types of advance directives?   There are many types of advance directives, and each state has rules about how to use them  You may choose a combination of any of the following:  · Living will: This is a written record of the treatment you want  You can also choose which treatments you do not want, which to limit, and which to stop at a certain time  This includes surgery, medicine, IV fluid, and tube feedings  · Durable power of  for healthcare [facility]): This is a written record that states who you want to make healthcare choices for you when you are unable to make them for yourself  This person, called a proxy, is usually a family member or a friend  You may choose more than 1 proxy  · Do not resuscitate (DNR) order:  A DNR order is used in case your heart stops beating or you stop breathing  It is a request not to have certain forms of treatment, such as CPR  A DNR order may be included in other types of advance directives  · Medical directive: This covers the care that you want if you are in a coma, near death, or unable to make decisions for yourself  You can list the treatments you want for each condition  Treatment may include pain medicine, surgery, blood transfusions, dialysis, IV or tube feedings, and a ventilator (breathing machine)  · Values history: This document has questions about your views, beliefs, and how you feel and think about life  This information can help others choose the care that you would choose  Why are advance directives important? An advance directive helps you control your care  Although spoken wishes may be used, it is better to have your wishes written down  Spoken wishes can be misunderstood, or not followed  Treatments may be given even if you do not want them  An advance directive may make it easier for your family to make difficult choices about your care  Urinary Incontinence   Urinary incontinence (UI)  is when you lose control of your bladder  UI develops because your bladder cannot store or empty urine properly   The 3 most common types of UI are stress incontinence, urge incontinence, or both  Medicines:   · May be given to help strengthen your bladder control  Report any side effects of medication to your healthcare provider  Do pelvic muscle exercises often:  Your pelvic muscles help you stop urinating  Squeeze these muscles tight for 5 seconds, then relax for 5 seconds  Gradually work up to squeezing for 10 seconds  Do 3 sets of 15 repetitions a day, or as directed  This will help strengthen your pelvic muscles and improve bladder control  Train your bladder:  Go to the bathroom at set times, such as every 2 hours, even if you do not feel the urge to go  You can also try to hold your urine when you feel the urge to go  For example, hold your urine for 5 minutes when you feel the urge to go  As that becomes easier, hold your urine for 10 minutes  Self-care:   · Keep a UI record  Write down how often you leak urine and how much you leak  Make a note of what you were doing when you leaked urine  · Drink liquids as directed  You may need to limit the amount of liquid you drink to help control your urine leakage  Do not drink any liquid right before you go to bed  Limit or do not have drinks that contain caffeine or alcohol  · Prevent constipation  Eat a variety of high-fiber foods  Good examples are high-fiber cereals, beans, vegetables, and whole-grain breads  Walking is the best way to trigger your intestines to have a bowel movement  · Exercise regularly and maintain a healthy weight  Weight loss and exercise will decrease pressure on your bladder and help you control your leakage  · Use a catheter as directed  to help empty your bladder  A catheter is a tiny, plastic tube that is put into your bladder to drain your urine  · Go to behavior therapy as directed  Behavior therapy may be used to help you learn to control your urge to urinate      Weight Management   Why it is important to manage your weight:  Being overweight increases your risk of health conditions such as heart disease, high blood pressure, type 2 diabetes, and certain types of cancer  It can also increase your risk for osteoarthritis, sleep apnea, and other respiratory problems  Aim for a slow, steady weight loss  Even a small amount of weight loss can lower your risk of health problems  How to lose weight safely:  A safe and healthy way to lose weight is to eat fewer calories and get regular exercise  You can lose up about 1 pound a week by decreasing the number of calories you eat by 500 calories each day  Healthy meal plan for weight management:  A healthy meal plan includes a variety of foods, contains fewer calories, and helps you stay healthy  A healthy meal plan includes the following:  · Eat whole-grain foods more often  A healthy meal plan should contain fiber  Fiber is the part of grains, fruits, and vegetables that is not broken down by your body  Whole-grain foods are healthy and provide extra fiber in your diet  Some examples of whole-grain foods are whole-wheat breads and pastas, oatmeal, brown rice, and bulgur  · Eat a variety of vegetables every day  Include dark, leafy greens such as spinach, kale, gail greens, and mustard greens  Eat yellow and orange vegetables such as carrots, sweet potatoes, and winter squash  · Eat a variety of fruits every day  Choose fresh or canned fruit (canned in its own juice or light syrup) instead of juice  Fruit juice has very little or no fiber  · Eat low-fat dairy foods  Drink fat-free (skim) milk or 1% milk  Eat fat-free yogurt and low-fat cottage cheese  Try low-fat cheeses such as mozzarella and other reduced-fat cheeses  · Choose meat and other protein foods that are low in fat  Choose beans or other legumes such as split peas or lentils  Choose fish, skinless poultry (chicken or turkey), or lean cuts of red meat (beef or pork)  Before you cook meat or poultry, cut off any visible fat  · Use less fat and oil    Try baking foods instead of frying them  Add less fat, such as margarine, sour cream, regular salad dressing and mayonnaise to foods  Eat fewer high-fat foods  Some examples of high-fat foods include french fries, doughnuts, ice cream, and cakes  · Eat fewer sweets  Limit foods and drinks that are high in sugar  This includes candy, cookies, regular soda, and sweetened drinks  Exercise:  Exercise at least 30 minutes per day on most days of the week  Some examples of exercise include walking, biking, dancing, and swimming  You can also fit in more physical activity by taking the stairs instead of the elevator or parking farther away from stores  Ask your healthcare provider about the best exercise plan for you  © Copyright [employer] 2018 Information is for End User's use only and may not be sold, redistributed or otherwise used for commercial purposes   All illustrations and images included in CareNotes® are the copyrighted property of A [employer]  or [address] [place] [address]

## 2019-11-11 NOTE — PROGRESS NOTES
Assessment and Plan:     Problem List Items Addressed This Visit     None           Preventive health issues were discussed with patient, and age appropriate screening tests were ordered as noted in patient's After Visit Summary  Personalized health advice and appropriate referrals for health education or preventive services given if needed, as noted in patient's After Visit Summary       History of Present Illness:     Patient presents for Medicare Annual Wellness visit    Patient Care Team:  Marsha Oppenheim, MD as PCP - General (Internal Medicine)  Merari Palafox MD (Ophthalmology)     Problem List:     Patient Active Problem List   Diagnosis    Atrial fibrillation ([facility] [address] )    Benign essential hypertension    Hypercholesterolemia    Hypothyroidism    Bilateral primary osteoarthritis of knee    Bladder prolapse, female, acquired    Loss of bladder control    Osteoarthrosis of knee    Urinary incontinence    Chronic pain of left knee    Effusion of right knee    Stage 3 chronic kidney disease ([address] )    Closed head injury    Unwitnessed fall    Neck pain    Chronic low back pain    Polycythemia      Past Medical and Surgical History:     Past Medical History:   Diagnosis Date    , spontaneous complete     Carcinoma of skin     Cataract     last assessed: 17    Cataract     Cellulitis     Effusion of both knee joints     resolved: 3/25/15    Effusion of both knee joints     Enteritis     Female stress incontinence     last assessed: 13    Female stress incontinence     Gastric ulcer     last assessed: 14    Gastric ulcer     GERD (gastroesophageal reflux disease)     Hyperlipidemia     Hypertension     Hyperthyroidism     Lyme disease     last assessed: 13    Lyme disease     Microscopic hematuria     last assessed: 13    Microscopic hematuria     Normal delivery     1950 son, 1952 son, 12 daughter, 26 daughter, 65 daughter   [facility] Paroxysmal atrial fibrillation ([facility] [address] )     last assessed: 9/26/13    [facility]     Perirectal abscess     last assessed: 9/26/13    Platelet dysfunction (HCC)     PAF    Platelet dysfunction (HCC)     Sinus tachycardia     last assessed: 9/26/13    Sinus tachycardia     Spinal stenosis     lumbar; last assessed: 10/4/13    Stage 3 chronic kidney disease ([facility] [address] ) 6/3/2019    Stage 3 chronic kidney disease (HCC)     Tachycardia     unspecified; last assessed: 9/26/13    Trigger finger, acquired     last assessed: 6/30/15    Trigger finger, acquired      Past Surgical History:   Procedure Laterality Date    KNEE ARTHROSCOPY Bilateral     2008    KNEE ARTHROSCOPY      LUMBAR LAMINECTOMY      5/09    LUMBAR LAMINECTOMY      NEUROPLASTY / TRANSPOSITION MEDIAN NERVE AT CARPAL TUNNEL Bilateral     2011    NEUROPLASTY / TRANSPOSITION MEDIAN NERVE AT CARPAL TUNNEL      TONSILLECTOMY AND ADENOIDECTOMY      TOTAL HIP ARTHROPLASTY      joint replacement; L hip; 2/2/10    TOTAL HIP ARTHROPLASTY        Family History:     Family History   Problem Relation Age of Onset    Rheumatic fever Mother     Heart disease Father     Crohn's disease Brother         (Granulomatous) Golitis    Psoriasis Daughter     Heart disease Son       Social History:     Social History     Socioeconomic History    Marital status:       Spouse name: Not on file    Number of children: Not on file    Years of education: Not on file    Highest education level: Not on file   Occupational History    Occupation: Retired teacher   Social Needs    Financial resource strain: Not on file    Food insecurity:     Worry: Not on file     Inability: Not on file   [employer] needs:     Medical: Not on file     Non-medical: Not on file   Tobacco Use    Smoking status: Never Smoker    Smokeless tobacco: Never Used   Substance and Sexual Activity    Alcohol use: Yes     Frequency: Monthly or less     Drinks per session: 1 or 2     Binge frequency: Never     Comment: rare    Drug use: Never    Sexual activity: Not on file   Lifestyle    Physical activity:     Days per week: Not on file     Minutes per session: Not on file    Stress: Not on file   Relationships    Social connections:     Talks on phone: Not on file     Gets together: Not on file     Attends Muslim service: Not on file     Active member of club or organization: Not on file     Attends meetings of clubs or organizations: Not on file     Relationship status: Not on file    Intimate partner violence:     Fear of current or ex partner: Not on file     Emotionally abused: Not on file     Physically abused: Not on file     Forced sexual activity: Not on file   Other Topics Concern    Not on file   Social History Narrative    ** Merged History Encounter **         Caffeine use: 2 servings of coffee a day           Medications and Allergies:     Current Outpatient Medications   Medication Sig Dispense Refill    ergocalciferol (VITAMIN D2) 50,000 units Take 1 capsule (50,000 Units total) by mouth once a week 8 capsule 0    HYDROcodone-acetaminophen (VICODIN ES) 7 5-300 MG per tablet Take 1 tablet by mouth every 6 (six) hours as needed for severe painMax Daily Amount: 4 tablets 75 tablet 0    levothyroxine 50 mcg tablet Take 1 tablet (50 mcg total) by mouth daily 30 tablet 5    losartan (COZAAR) 100 MG tablet Take 1 tablet (100 mg total) by mouth daily 30 tablet 5    metoprolol tartrate (LOPRESSOR) 100 mg tablet Take 1 tablet (100 mg total) by mouth 2 (two) times a day 60 tablet 5    ondansetron (ZOFRAN) 4 mg tablet Take 4 mg by mouth every 6 (six) hours as needed for nausea or vomiting      polyethylene glycol (MIRALAX) 17 g packet Take 17 g by mouth daily      senna-docusate sodium (SENOKOT S) 8 6-50 mg per tablet Take 1 tablet by mouth as needed for constipation      simvastatin (ZOCOR) 10 mg tablet Take 1 tablet (10 mg total) by mouth daily at bedtime for 90 days 30 tablet 5    warfarin (COUMADIN) 2 mg tablet Take by mouth daily      warfarin (COUMADIN) 5 mg tablet Take 5 mg on Tue, W, Sat and 2 5 mg all other days 30 tablet 5     No current facility-administered medications for this visit  Allergies   Allergen Reactions    Cortisone     Oxaprozin Hives    Penicillins Hives      Immunizations:     Immunization History   Administered Date(s) Administered    Pneumococcal Conjugate 13-Valent 10/01/2018    Pneumococcal Polysaccharide PPV23 07/09/2013    Zoster 07/12/2013      Health Maintenance:         Topic Date Due    DXA SCAN  03/25/2021         Topic Date Due    HEPATITIS B VACCINES (1 of 3 - Risk 3-dose series) 02/03/1946    DTaP,Tdap,and Td Vaccines (1 - Tdap) 02/03/1948      Medicare Health Risk Assessment:     There were no vitals taken for this visit  Jeffrey Zheng is here for her Subsequent Wellness visit  Last Medicare Wellness visit information reviewed, patient interviewed and updates made to the record today  Health Risk Assessment:   Patient rates overall health as good  Patient feels that their physical health rating is same  Eyesight was rated as same  Hearing was rated as same  Patient feels that their emotional and mental health rating is same  Pain experienced in the last 7 days has been some  Patient's pain rating has been 7/10  Patient states that she has experienced no weight loss or gain in last 6 months  Depression Screening:   PHQ-2 Score: 0      Fall Risk Screening: In the past year, patient has experienced: no history of falling in past year      Urinary Incontinence Screening:   Patient has leaked urine accidently in the last six months  Home Safety:  Patient does not have trouble with stairs inside or outside of their home  Patient has no working smoke alarms and has no working carbon monoxide detector  Home safety hazards include: none       Nutrition:   Current diet is Regular and No Added Salt      Medications:   Patient is not currently taking any over-the-counter supplements  Patient is able to manage medications  Activities of Daily Living (ADLs)/Instrumental Activities of Daily Living (IADLs):   Walk and transfer into and out of bed and chair?: Yes  Dress and groom yourself?: Yes    Bathe or shower yourself?: Yes    Feed yourself? Yes  Do your laundry/housekeeping?: No  Manage your money, pay your bills and track your expenses?: Yes  Make your own meals?: No    Do your own shopping?: Yes    Previous Hospitalizations:   Any hospitalizations or ED visits within the last 12 months?: No    How many hospitalizations have you had in the last year?: 1-2    Advance Care Planning:   Living will: Yes    Durable POA for healthcare: Yes    Advanced directive: Yes    Advanced directive counseling given: Yes      Cognitive Screening:   Provider or family/friend/caregiver concerned regarding cognition?: No    PREVENTIVE SCREENINGS      Cardiovascular Screening:    General: Screening Not Indicated and History Lipid Disorder      Diabetes Screening:     General: Screening Current      Colorectal Cancer Screening:     General: Screening Not Indicated      Breast Cancer Screening:     General: Screening Not Indicated      Cervical Cancer Screening:    General: Screening Not Indicated      Osteoporosis Screening:    General: Screening Current      Abdominal Aortic Aneurysm (AAA) Screening:        General: Screening Not Indicated      Lung Cancer Screening:     General: Screening Not Indicated      Hepatitis C Screening:    General: Screening Not Indicated    Other Counseling Topics:   Calcium and vitamin D intake and regular weightbearing exercise         Akbar Jefferson MD

## 2019-11-18 ENCOUNTER — TELEPHONE (OUTPATIENT)
Dept: INTERNAL MEDICINE CLINIC | Age: 84
End: 2019-11-18

## 2019-11-18 DIAGNOSIS — M17.10 OSTEOARTHRITIS OF KNEE, UNSPECIFIED LATERALITY, UNSPECIFIED OSTEOARTHRITIS TYPE: ICD-10-CM

## 2019-11-18 DIAGNOSIS — E55.9 VITAMIN D DEFICIENCY: ICD-10-CM

## 2019-11-18 RX ORDER — ERGOCALCIFEROL 1.25 MG/1
50000 CAPSULE ORAL WEEKLY
Qty: 8 CAPSULE | Refills: 0 | Status: CANCELLED | OUTPATIENT
Start: 2019-11-18

## 2019-11-18 RX ORDER — HYDROCODONE BITARTRATE AND ACETAMINOPHEN 7.5; 3 MG/1; MG/1
1 TABLET ORAL EVERY 6 HOURS PRN
Qty: 75 TABLET | Refills: 0 | Status: CANCELLED | OUTPATIENT
Start: 2019-11-18

## 2019-11-18 NOTE — TELEPHONE ENCOUNTER
This patient needs a refill on her Hydrocodone -acetaminophen and her vitamin D and the pharmacy is [facility]

## 2019-11-20 DIAGNOSIS — E03.9 HYPOTHYROIDISM, UNSPECIFIED TYPE: ICD-10-CM

## 2019-11-20 RX ORDER — LEVOTHYROXINE SODIUM 0.05 MG/1
50 TABLET ORAL DAILY
Qty: 30 TABLET | Refills: 5 | Status: SHIPPED | OUTPATIENT
Start: 2019-11-20 | End: 2019-11-23 | Stop reason: SDUPTHER

## 2019-11-20 NOTE — TELEPHONE ENCOUNTER
Patient stated she called on Monday and requested refills of levothyroxine, vitamin D and hydrocodone/acetaminophen to be called into [facility]  Please call the patient when this has been processed  Thanks

## 2019-11-23 DIAGNOSIS — E03.9 HYPOTHYROIDISM, UNSPECIFIED TYPE: ICD-10-CM

## 2019-11-23 DIAGNOSIS — M17.10 OSTEOARTHRITIS OF KNEE, UNSPECIFIED LATERALITY, UNSPECIFIED OSTEOARTHRITIS TYPE: ICD-10-CM

## 2019-11-25 RX ORDER — LEVOTHYROXINE SODIUM 0.05 MG/1
50 TABLET ORAL DAILY
Qty: 30 TABLET | Refills: 5 | Status: SHIPPED | OUTPATIENT
Start: 2019-11-25 | End: 2020-01-20 | Stop reason: SDUPTHER

## 2019-11-25 RX ORDER — HYDROCODONE BITARTRATE AND ACETAMINOPHEN 7.5; 3 MG/1; MG/1
1 TABLET ORAL EVERY 6 HOURS PRN
Qty: 75 TABLET | Refills: 0 | Status: SHIPPED | OUTPATIENT
Start: 2019-11-25 | End: 2019-12-23 | Stop reason: SDUPTHER

## 2019-12-09 ENCOUNTER — ANTICOAG VISIT (OUTPATIENT)
Dept: INTERNAL MEDICINE CLINIC | Facility: CLINIC | Age: 84
End: 2019-12-09

## 2019-12-09 LAB — INR PPP: 2.5 (ref 0.84–1.19)

## 2019-12-19 DIAGNOSIS — M17.10 OSTEOARTHRITIS OF KNEE, UNSPECIFIED LATERALITY, UNSPECIFIED OSTEOARTHRITIS TYPE: ICD-10-CM

## 2019-12-19 RX ORDER — HYDROCODONE BITARTRATE AND ACETAMINOPHEN 7.5; 3 MG/1; MG/1
1 TABLET ORAL EVERY 6 HOURS PRN
Qty: 75 TABLET | Refills: 0 | Status: CANCELLED | OUTPATIENT
Start: 2019-12-19

## 2019-12-19 NOTE — TELEPHONE ENCOUNTER
Patient requesting refill of hydrocodone/acetaminophen 7 5/300 be sent to [facility]

## 2019-12-23 DIAGNOSIS — M17.10 OSTEOARTHRITIS OF KNEE, UNSPECIFIED LATERALITY, UNSPECIFIED OSTEOARTHRITIS TYPE: ICD-10-CM

## 2019-12-23 RX ORDER — HYDROCODONE BITARTRATE AND ACETAMINOPHEN 7.5; 3 MG/1; MG/1
1 TABLET ORAL EVERY 6 HOURS PRN
Qty: 75 TABLET | Refills: 0 | Status: SHIPPED | OUTPATIENT
Start: 2019-12-23 | End: 2020-01-20 | Stop reason: SDUPTHER

## 2020-01-08 ENCOUNTER — ANTICOAG VISIT (OUTPATIENT)
Dept: INTERNAL MEDICINE CLINIC | Facility: CLINIC | Age: 85
End: 2020-01-08

## 2020-01-08 LAB — INR PPP: 2.9 (ref 0.84–1.19)

## 2020-01-17 ENCOUNTER — OFFICE VISIT (OUTPATIENT)
Dept: OBGYN CLINIC | Facility: MEDICAL CENTER | Age: 85
End: 2020-01-17
Payer: MEDICARE

## 2020-01-17 VITALS
DIASTOLIC BLOOD PRESSURE: 83 MMHG | WEIGHT: 176.6 LBS | SYSTOLIC BLOOD PRESSURE: 170 MMHG | HEART RATE: 76 BPM | BODY MASS INDEX: 34.67 KG/M2 | HEIGHT: 60 IN

## 2020-01-17 DIAGNOSIS — M17.0 PRIMARY OSTEOARTHRITIS OF BOTH KNEES: Primary | ICD-10-CM

## 2020-01-17 DIAGNOSIS — I48.91 ATRIAL FIBRILLATION, UNSPECIFIED TYPE (HCC): ICD-10-CM

## 2020-01-17 DIAGNOSIS — I10 HYPERTENSION, UNSPECIFIED TYPE: ICD-10-CM

## 2020-01-17 DIAGNOSIS — M17.10 OSTEOARTHRITIS OF KNEE, UNSPECIFIED LATERALITY, UNSPECIFIED OSTEOARTHRITIS TYPE: ICD-10-CM

## 2020-01-17 PROCEDURE — 99213 OFFICE O/P EST LOW 20 MIN: CPT | Performed by: ORTHOPAEDIC SURGERY

## 2020-01-17 PROCEDURE — 20610 DRAIN/INJ JOINT/BURSA W/O US: CPT | Performed by: ORTHOPAEDIC SURGERY

## 2020-01-17 RX ORDER — BUPIVACAINE HYDROCHLORIDE 2.5 MG/ML
2 INJECTION, SOLUTION INFILTRATION; PERINEURAL
Status: COMPLETED | OUTPATIENT
Start: 2020-01-17 | End: 2020-01-17

## 2020-01-17 RX ORDER — HYDROCODONE BITARTRATE AND ACETAMINOPHEN 7.5; 3 MG/1; MG/1
1 TABLET ORAL EVERY 6 HOURS PRN
Qty: 75 TABLET | Refills: 0 | Status: CANCELLED | OUTPATIENT
Start: 2020-01-17

## 2020-01-17 RX ORDER — BETAMETHASONE SODIUM PHOSPHATE AND BETAMETHASONE ACETATE 3; 3 MG/ML; MG/ML
12 INJECTION, SUSPENSION INTRA-ARTICULAR; INTRALESIONAL; INTRAMUSCULAR; SOFT TISSUE
Status: COMPLETED | OUTPATIENT
Start: 2020-01-17 | End: 2020-01-17

## 2020-01-17 RX ORDER — LIDOCAINE HYDROCHLORIDE 10 MG/ML
2 INJECTION, SOLUTION INFILTRATION; PERINEURAL
Status: COMPLETED | OUTPATIENT
Start: 2020-01-17 | End: 2020-01-17

## 2020-01-17 RX ADMIN — BETAMETHASONE SODIUM PHOSPHATE AND BETAMETHASONE ACETATE 12 MG: 3; 3 INJECTION, SUSPENSION INTRA-ARTICULAR; INTRALESIONAL; INTRAMUSCULAR; SOFT TISSUE at 10:30

## 2020-01-17 RX ADMIN — LIDOCAINE HYDROCHLORIDE 2 ML: 10 INJECTION, SOLUTION INFILTRATION; PERINEURAL at 10:30

## 2020-01-17 RX ADMIN — BUPIVACAINE HYDROCHLORIDE 2 ML: 2.5 INJECTION, SOLUTION INFILTRATION; PERINEURAL at 10:30

## 2020-01-17 NOTE — PROGRESS NOTES
89 y o female presenting for follow-up of bilateral knee pain  At her last visit in October she had bilateral knee steroid injections which gave her good symptom relief for approximately 2 and half months  Around Kevin she is to have recurrence of her symptoms that is worse on the left side  Pain is located diffusely throughout the knee made worse with weight-bearing activities and motion is relieved by rest   She has noticed more swelling her left knee in her left lower leg  She also complains of new onset of left ankle pain  This is associated with swelling in her leg and not exacerbated by motion or weight-bearing activities      Review of Systems  Review of systems negative unless otherwise specified in HPI    Past Medical History  Past Medical History:   Diagnosis Date    , spontaneous complete     Carcinoma of skin     Cataract     last assessed: 17    Cataract     Cellulitis     Effusion of both knee joints     resolved: 3/25/15    Effusion of both knee joints     Enteritis     Female stress incontinence     last assessed: 13    Female stress incontinence     Gastric ulcer     last assessed: 14    Gastric ulcer     GERD (gastroesophageal reflux disease)     Hyperlipidemia     Hypertension     Hyperthyroidism     Lyme disease     last assessed: 13    Lyme disease     Microscopic hematuria     last assessed: 13    Microscopic hematuria     Normal delivery     1950 son, 1952 son, 12 daughter, 26 daughter, 65 daughter    Paroxysmal atrial fibrillation ([address] )     last assessed: 13    Paroxysmal atrial fibrillation ([address] )     Perirectal abscess     last assessed: 13    Platelet dysfunction (HCC)     PAF    Platelet dysfunction (HCC)     Sinus tachycardia     last assessed: 13    Sinus tachycardia     Spinal stenosis     lumbar; last assessed: 10/4/13    Stage 3 chronic kidney disease ([address] ) 6/3/2019    [facility]     Tachycardia     unspecified; last assessed: 9/26/13    Trigger finger, acquired     last assessed: 6/30/15    Trigger finger, acquired        Past Surgical History  Past Surgical History:   Procedure Laterality Date    KNEE ARTHROSCOPY Bilateral     2008    KNEE ARTHROSCOPY      LUMBAR LAMINECTOMY      5/09    LUMBAR LAMINECTOMY      NEUROPLASTY / TRANSPOSITION MEDIAN NERVE AT CARPAL TUNNEL Bilateral     2011    NEUROPLASTY / TRANSPOSITION MEDIAN NERVE AT CARPAL TUNNEL      TONSILLECTOMY AND ADENOIDECTOMY      TOTAL HIP ARTHROPLASTY      joint replacement; L hip; 2/2/10    TOTAL HIP ARTHROPLASTY         Current Medications  Current Outpatient Medications on File Prior to Visit   Medication Sig Dispense Refill    ergocalciferol (VITAMIN D2) 50,000 units Take 1 capsule (50,000 Units total) by mouth once a week 8 capsule 0    HYDROcodone-acetaminophen (VICODIN ES) 7 5-300 MG per tablet Take 1 tablet by mouth every 6 (six) hours as needed for severe painMax Daily Amount: 4 tablets 75 tablet 0    levothyroxine 50 mcg tablet Take 1 tablet (50 mcg total) by mouth daily 30 tablet 5    losartan (COZAAR) 100 MG tablet Take 1 tablet (100 mg total) by mouth daily 30 tablet 5    metoprolol tartrate (LOPRESSOR) 100 mg tablet Take 1 tablet (100 mg total) by mouth 2 (two) times a day 60 tablet 5    ondansetron (ZOFRAN) 4 mg tablet Take 4 mg by mouth every 6 (six) hours as needed for nausea or vomiting      polyethylene glycol (MIRALAX) 17 g packet Take 17 g by mouth daily      senna-docusate sodium (SENOKOT S) 8 6-50 mg per tablet Take 1 tablet by mouth as needed for constipation      simvastatin (ZOCOR) 10 mg tablet Take 1 tablet (10 mg total) by mouth daily at bedtime for 90 days 30 tablet 5    warfarin (COUMADIN) 2 mg tablet Take by mouth daily      warfarin (COUMADIN) 5 mg tablet Take 5 mg on Tue, W, Sat and 2 5 mg all other days 30 tablet 5     No current facility-administered medications on file prior to visit  Recent Labs [facility])  0   Lab Value Date/Time    HCT 45 06/24/2019 0234    HCT 45 2 07/12/2018 1236    HCT 46 2 (H) 04/24/2015 0732    HGB 15 3 06/24/2019 0234    HGB 15 1 07/12/2018 1236    HGB 15 6 (H) 04/24/2015 0732    WBC 9 94 07/12/2018 1236    WBC 8 14 04/24/2015 0732    INR 2 90 (A) 01/08/2020    INR 1 68 (H) 04/24/2015 0732    GLUCOSE 115 06/24/2019 0234    GLUCOSE 94 04/24/2015 0732         Physical exam  · General: Awake, Alert, Oriented  · Eyes: Pupils equal, round and reactive to light  · Heart: regular rate and rhythm  · Lungs: No audible wheezing  · Abdomen: soft  Bilateral lower extremity  · Varus alignment  · Skin intact  · Extension 15°, flexion 105°  · Tender palpation over medial and lateral joint lines  · No knee effusion  · Knee stable in sagittal and coronal planes  · 5 out of 5 quad and hamstring strength  · Sensation intact L1-S1       Imaging  No new images to review    Procedure  Large joint arthrocentesis: R knee  Date/Time: 1/17/2020 10:30 AM  Consent given by: patient  Site marked: site marked  Timeout: Immediately prior to procedure a time out was called to verify the correct patient, procedure, equipment, support staff and site/side marked as required   Supporting Documentation  Indications: pain   Procedure Details  Location: knee - R knee  Preparation: Patient was prepped and draped in the usual sterile fashion  Needle size: 22 G  Ultrasound guidance: no  Approach: anteromedial  Medications administered: 2 mL bupivacaine 0 25 %; 12 mg betamethasone acetate-betamethasone sodium phosphate 6 (3-3) mg/mL; 2 mL lidocaine 1 %    Patient tolerance: patient tolerated the procedure well with no immediate complications  Dressing:  Sterile dressing applied    Large joint arthrocentesis: L knee  Date/Time: 1/17/2020 10:30 AM  Consent given by: patient  Site marked: site marked  Timeout: Immediately prior to procedure a time out was called to verify the correct patient, procedure, equipment, support staff and site/side marked as required   Supporting Documentation  Indications: pain   Procedure Details  Location: knee - L knee  Preparation: Patient was prepped and draped in the usual sterile fashion  Needle size: 22 G  Ultrasound guidance: no  Approach: anteromedial  Medications administered: 2 mL bupivacaine 0 25 %; 12 mg betamethasone acetate-betamethasone sodium phosphate 6 (3-3) mg/mL; 2 mL lidocaine 1 %    Patient tolerance: patient tolerated the procedure well with no immediate complications  Dressing:  Sterile dressing applied            Assessment/Plan:   80 y  o female with bilateral knee arthritis and a ruptured left bakers cyst causing left leg swelling    · Injection of corticosteroid medication was administered to bilateral knees as outlined above  · Weightbearing as tolerated, activities as tolerated  · Follow-up in 3 months for repeat examination

## 2020-01-20 DIAGNOSIS — I48.91 ATRIAL FIBRILLATION, UNSPECIFIED TYPE (HCC): ICD-10-CM

## 2020-01-20 DIAGNOSIS — M17.10 OSTEOARTHRITIS OF KNEE, UNSPECIFIED LATERALITY, UNSPECIFIED OSTEOARTHRITIS TYPE: ICD-10-CM

## 2020-01-20 DIAGNOSIS — E03.9 HYPOTHYROIDISM, UNSPECIFIED TYPE: ICD-10-CM

## 2020-01-20 DIAGNOSIS — I10 HYPERTENSION, UNSPECIFIED TYPE: ICD-10-CM

## 2020-01-20 RX ORDER — METOPROLOL TARTRATE 100 MG/1
100 TABLET ORAL 2 TIMES DAILY
Qty: 60 TABLET | Refills: 0 | OUTPATIENT
Start: 2020-01-20

## 2020-01-20 RX ORDER — SIMVASTATIN 10 MG
TABLET ORAL
Qty: 30 TABLET | Refills: 0 | OUTPATIENT
Start: 2020-01-20

## 2020-01-20 RX ORDER — LOSARTAN POTASSIUM 100 MG/1
100 TABLET ORAL DAILY
Qty: 30 TABLET | Refills: 0 | OUTPATIENT
Start: 2020-01-20

## 2020-01-21 RX ORDER — LEVOTHYROXINE SODIUM 0.05 MG/1
50 TABLET ORAL DAILY
Qty: 30 TABLET | Refills: 5 | Status: SHIPPED | OUTPATIENT
Start: 2020-01-21 | End: 2020-02-12 | Stop reason: SDUPTHER

## 2020-01-21 RX ORDER — SIMVASTATIN 10 MG
10 TABLET ORAL
Qty: 30 TABLET | Refills: 5 | Status: SHIPPED | OUTPATIENT
Start: 2020-01-21 | End: 2020-05-13

## 2020-01-21 RX ORDER — HYDROCODONE BITARTRATE AND ACETAMINOPHEN 7.5; 3 MG/1; MG/1
1 TABLET ORAL EVERY 6 HOURS PRN
Qty: 75 TABLET | Refills: 0 | Status: SHIPPED | OUTPATIENT
Start: 2020-01-21 | End: 2020-02-12 | Stop reason: SDUPTHER

## 2020-01-21 RX ORDER — LOSARTAN POTASSIUM 100 MG/1
100 TABLET ORAL DAILY
Qty: 30 TABLET | Refills: 5 | Status: SHIPPED | OUTPATIENT
Start: 2020-01-21 | End: 2020-02-12 | Stop reason: SDUPTHER

## 2020-01-21 RX ORDER — WARFARIN SODIUM 5 MG/1
TABLET ORAL
Qty: 30 TABLET | Refills: 5 | Status: SHIPPED | OUTPATIENT
Start: 2020-01-21 | End: 2020-08-13

## 2020-01-21 RX ORDER — METOPROLOL TARTRATE 100 MG/1
100 TABLET ORAL 2 TIMES DAILY
Qty: 60 TABLET | Refills: 5 | Status: SHIPPED | OUTPATIENT
Start: 2020-01-21 | End: 2020-02-12 | Stop reason: SDUPTHER

## 2020-01-28 LAB — INR PPP: 1.9 (ref 0.84–1.19)

## 2020-01-29 ENCOUNTER — ANTICOAG VISIT (OUTPATIENT)
Dept: INTERNAL MEDICINE CLINIC | Age: 85
End: 2020-01-29

## 2020-02-05 LAB — INR PPP: 2.7 (ref 0.84–1.19)

## 2020-02-06 ENCOUNTER — ANTICOAG VISIT (OUTPATIENT)
Dept: INTERNAL MEDICINE CLINIC | Age: 85
End: 2020-02-06

## 2020-02-12 ENCOUNTER — OFFICE VISIT (OUTPATIENT)
Dept: INTERNAL MEDICINE CLINIC | Age: 85
End: 2020-02-12
Payer: MEDICARE

## 2020-02-12 VITALS
TEMPERATURE: 98.2 F | HEIGHT: 60 IN | DIASTOLIC BLOOD PRESSURE: 72 MMHG | BODY MASS INDEX: 34.36 KG/M2 | WEIGHT: 175 LBS | SYSTOLIC BLOOD PRESSURE: 124 MMHG | OXYGEN SATURATION: 98 % | HEART RATE: 77 BPM

## 2020-02-12 DIAGNOSIS — I10 BENIGN ESSENTIAL HYPERTENSION: ICD-10-CM

## 2020-02-12 DIAGNOSIS — E03.9 HYPOTHYROIDISM, UNSPECIFIED TYPE: ICD-10-CM

## 2020-02-12 DIAGNOSIS — I10 HYPERTENSION, UNSPECIFIED TYPE: ICD-10-CM

## 2020-02-12 DIAGNOSIS — I48.21 PERMANENT ATRIAL FIBRILLATION (HCC): Primary | ICD-10-CM

## 2020-02-12 DIAGNOSIS — E78.00 HYPERCHOLESTEROLEMIA: ICD-10-CM

## 2020-02-12 DIAGNOSIS — E78.00 HYPERCHOLESTEROLEMIA: Primary | ICD-10-CM

## 2020-02-12 DIAGNOSIS — J06.9 VIRAL UPPER RESPIRATORY TRACT INFECTION: ICD-10-CM

## 2020-02-12 DIAGNOSIS — M17.0 BILATERAL PRIMARY OSTEOARTHRITIS OF KNEE: ICD-10-CM

## 2020-02-12 DIAGNOSIS — M17.10 OSTEOARTHRITIS OF KNEE, UNSPECIFIED LATERALITY, UNSPECIFIED OSTEOARTHRITIS TYPE: ICD-10-CM

## 2020-02-12 PROCEDURE — 3074F SYST BP LT 130 MM HG: CPT | Performed by: INTERNAL MEDICINE

## 2020-02-12 PROCEDURE — 1036F TOBACCO NON-USER: CPT | Performed by: INTERNAL MEDICINE

## 2020-02-12 PROCEDURE — 3078F DIAST BP <80 MM HG: CPT | Performed by: INTERNAL MEDICINE

## 2020-02-12 PROCEDURE — 99214 OFFICE O/P EST MOD 30 MIN: CPT | Performed by: INTERNAL MEDICINE

## 2020-02-12 PROCEDURE — 1160F RVW MEDS BY RX/DR IN RCRD: CPT | Performed by: INTERNAL MEDICINE

## 2020-02-12 PROCEDURE — 4040F PNEUMOC VAC/ADMIN/RCVD: CPT | Performed by: INTERNAL MEDICINE

## 2020-02-12 PROCEDURE — 3008F BODY MASS INDEX DOCD: CPT | Performed by: INTERNAL MEDICINE

## 2020-02-12 RX ORDER — HYDROCODONE BITARTRATE AND ACETAMINOPHEN 7.5; 3 MG/1; MG/1
1 TABLET ORAL EVERY 6 HOURS PRN
Qty: 75 TABLET | Refills: 0 | Status: SHIPPED | OUTPATIENT
Start: 2020-02-12 | End: 2020-03-13 | Stop reason: SDUPTHER

## 2020-02-12 RX ORDER — SIMVASTATIN 20 MG
TABLET ORAL
COMMUNITY
Start: 2020-01-21 | End: 2020-02-12 | Stop reason: SDUPTHER

## 2020-02-12 NOTE — PROGRESS NOTES
Assessment/Plan:     Diagnoses and all orders for this visit:    Permanent atrial fibrillation  Continue anticoagulation no signs or symptoms of heart failure  Osteoarthritis of knee, unspecified laterality, unspecified osteoarthritis type  -     HYDROcodone-acetaminophen (VICODIN ES) 7 5-300 MG per tablet; Take 1 tablet by mouth every 6 (six) hours as needed for severe painMax Daily Amount: 4 tablets    Benign essential hypertension  Controlled  Hypercholesterolemia  Continue with the medication the way she is taking she is taking Zocor  Hypothyroidism, unspecified type    Bilateral primary osteoarthritis of knee  Followed up by the Orthopedics  Viral upper respiratory tract infection  Viral I recommended her Tylenol cold and Sinus as needed for congestion  Other orders  -     simvastatin (ZOCOR) 20 mg tablet        BMI Counseling: Body mass index is 34 18 kg/m²  The BMI is above normal  Nutrition recommendations include decreasing portion sizes, encouraging healthy choices of fruits and vegetables and moderation in carbohydrate intake  Exercise recommendations include moderate physical activity 150 minutes/week  Subjective:   Chief Complaint   Patient presents with    Follow-up     HTN, Hypothyroidism and CKD    Health Maint     Tdap due    Cold Like Symptoms     pt reports sinus congestion and headache, ear pressure and post nasal drip X 3 days         Patient ID: Baljinder Ramos is a 80 y o  female      HPI  This is a very pleasant 80 years young lady who is here today for the regular follow-up complaining of URI symptoms sinus pressure decreased hearing sore throat for last 3 days  Hypertension is very well controlled continue with the same medications  Atrial fibrillation with controlled ventricular response continue with anticoagulation  Severe DJD of both knees difficulty in ambulation patient is followed up by the orthopedic will continue with the follow-ups  Diet exercise to lose the weight but patient cannot do much exercise because of her problems with the knee  Recent blood workup was done in 2019 which was unremarkable patient is getting the PT and INR followed up    The following portions of the patient's history were reviewed and updated as appropriate: allergies, current medications, past family history, past medical history, past social history, past surgical history and problem list     Review of Systems   Constitutional: Negative for chills and fatigue  HENT: Positive for congestion, sinus pain, sneezing and sore throat  Negative for ear pain, hearing loss, postnasal drip, sinus pressure and voice change  Eyes: Negative for pain, discharge and visual disturbance  Respiratory: Negative for cough, chest tightness and shortness of breath  Cardiovascular: Negative for chest pain, palpitations and leg swelling  Gastrointestinal: Negative for abdominal pain, blood in stool, diarrhea, nausea and rectal pain  Genitourinary: Negative for difficulty urinating, dysuria and urgency  Musculoskeletal: Positive for back pain  Negative for arthralgias and joint swelling  Knee pain better    Skin: Negative for rash  Allergic/Immunologic: Negative for environmental allergies and food allergies  Neurological: Negative for dizziness, tremors, weakness, numbness and headaches  Hematological: Negative for adenopathy  Psychiatric/Behavioral: Negative for behavioral problems and hallucinations           Past Medical History:   Diagnosis Date    , spontaneous complete     Carcinoma of skin     Cataract     last assessed: 17    Cataract     Cellulitis     Effusion of both knee joints     resolved: 3/25/15    Effusion of both knee joints     Enteritis     Female stress incontinence     last assessed: 13    Female stress incontinence     Gastric ulcer     last assessed: 14    Gastric ulcer     GERD (gastroesophageal reflux disease)     Hyperlipidemia     Hypertension     Hyperthyroidism     Lyme disease     last assessed: 9/26/13    Lyme disease     Microscopic hematuria     last assessed: 9/26/13    Microscopic hematuria     Normal delivery     1950 son, 1952 son, 12 daughter, 26 daughter, 65 daughter    Paroxysmal [facility] ([facility] )     last assessed: 9/26/13    Paroxysmal atrial fibrillation ([facility]     Perirectal abscess     last assessed: 9/26/13    Platelet dysfunction (HCC)     PAF    Platelet dysfunction (HCC)     Sinus tachycardia     last assessed: 9/26/13    Sinus tachycardia     Spinal stenosis     lumbar; last assessed: 10/4/13    Stage 3 chronic kidney disease ([facility] ) 6/3/2019    Stage 3 chronic kidney disease ([facility] )     Tachycardia     unspecified; last assessed: 9/26/13    Trigger finger, acquired     last assessed: 6/30/15    Trigger finger, acquired          Current Outpatient Medications:     ergocalciferol (VITAMIN D2) 50,000 units, Take 1 capsule (50,000 Units total) by mouth once a week, Disp: 8 capsule, Rfl: 0    HYDROcodone-acetaminophen (VICODIN ES) 7 5-300 MG per tablet, Take 1 tablet by mouth every 6 (six) hours as needed for severe painMax Daily Amount: 4 tablets, Disp: 75 tablet, Rfl: 0    levothyroxine 50 mcg tablet, Take 1 tablet (50 mcg total) by mouth daily, Disp: 30 tablet, Rfl: 5    losartan (COZAAR) 100 MG tablet, Take 1 tablet (100 mg total) by mouth daily, Disp: 30 tablet, Rfl: 5    metoprolol tartrate (LOPRESSOR) 100 mg tablet, Take 1 tablet (100 mg total) by mouth 2 (two) times a day, Disp: 60 tablet, Rfl: 5    ondansetron (ZOFRAN) 4 mg tablet, Take 4 mg by mouth every 6 (six) hours as needed for nausea or vomiting, Disp: , Rfl:     polyethylene glycol (MIRALAX) 17 g packet, Take 17 g by mouth daily, Disp: , Rfl:     senna-docusate sodium (SENOKOT S) 8 6-50 mg per tablet, Take 1 tablet by mouth as needed for constipation, Disp: , Rfl:     simvastatin (ZOCOR) 20 mg tablet, , Disp: , Rfl:     warfarin (COUMADIN) 2 mg tablet, Take by mouth daily, Disp: , Rfl:     warfarin (COUMADIN) 5 mg tablet, Take 5 mg on Tue, W, Sat and 2 5 mg all other days, Disp: 30 tablet, Rfl: 5    simvastatin (ZOCOR) 10 mg tablet, Take 1 tablet (10 mg total) by mouth daily at bedtime (Patient not taking: Reported on 2/12/2020), Disp: 30 tablet, Rfl: 5    Allergies   Allergen Reactions    Cortisone     Oxaprozin Hives    Penicillins Hives       Social History   Past Surgical History:   Procedure Laterality Date    KNEE ARTHROSCOPY Bilateral     2008    KNEE ARTHROSCOPY      LUMBAR LAMINECTOMY      5/09    LUMBAR LAMINECTOMY      NEUROPLASTY / TRANSPOSITION MEDIAN NERVE AT CARPAL TUNNEL Bilateral     2011    NEUROPLASTY / TRANSPOSITION MEDIAN NERVE AT CARPAL TUNNEL      TONSILLECTOMY AND ADENOIDECTOMY      TOTAL HIP ARTHROPLASTY      joint replacement; L hip; 2/2/10    TOTAL HIP ARTHROPLASTY       Family History   Problem Relation Age of Onset    Rheumatic fever Mother     Heart disease Father     Crohn's disease Brother         (Granulomatous) Golitis    Psoriasis Daughter     Heart disease Son        Objective:  /72 (BP Location: Left arm, Patient Position: Sitting, Cuff Size: Standard)   Pulse 77   Temp 98 2 °F (36 8 °C) (Tympanic)   Ht 5' (1 524 m)   Wt 79 4 kg (175 lb)   SpO2 98%   BMI 34 18 kg/m²        Physical Exam   Constitutional: She is oriented to person, place, and time  She appears well-developed and well-nourished  HENT:   Right Ear: External ear normal    Mouth/Throat: Oropharynx is clear and moist      Face slightly flushed,  Ear bulging TM  Nose ,turbinates swollen red and edematose no pus  Throat tongue normal , enlarge tonsils , erythema of posterior pharynx , no exudates     Eyes: Pupils are equal, round, and reactive to light  Conjunctivae and EOM are normal    Neck: Normal range of motion  No JVD present  No thyromegaly present     Cardiovascular: Normal rate, regular rhythm, normal heart sounds and intact distal pulses  Pulmonary/Chest: Breath sounds normal    Abdominal: Soft  Bowel sounds are normal    Musculoskeletal:   Ambulatory dysfunction significant bowing of the both knees patient is followed up by the Orthopedics and getting injections   Lymphadenopathy:     She has no cervical adenopathy  Neurological: She is alert and oriented to person, place, and time  She has normal reflexes  Skin: Skin is dry  Psychiatric: She has a normal mood and affect   Her behavior is normal  Judgment and thought content normal

## 2020-02-13 RX ORDER — METOPROLOL TARTRATE 100 MG/1
100 TABLET ORAL 2 TIMES DAILY
Qty: 60 TABLET | Refills: 5 | Status: SHIPPED | OUTPATIENT
Start: 2020-02-13 | End: 2020-08-13

## 2020-02-13 RX ORDER — LEVOTHYROXINE SODIUM 0.05 MG/1
50 TABLET ORAL DAILY
Qty: 30 TABLET | Refills: 5 | Status: SHIPPED | OUTPATIENT
Start: 2020-02-13 | End: 2020-08-13 | Stop reason: SDUPTHER

## 2020-02-13 RX ORDER — LOSARTAN POTASSIUM 100 MG/1
100 TABLET ORAL DAILY
Qty: 30 TABLET | Refills: 5 | Status: SHIPPED | OUTPATIENT
Start: 2020-02-13 | End: 2020-08-13 | Stop reason: SDUPTHER

## 2020-02-13 RX ORDER — SIMVASTATIN 20 MG
20 TABLET ORAL
Qty: 90 TABLET | Refills: 1 | Status: SHIPPED | OUTPATIENT
Start: 2020-02-13 | End: 2020-08-13 | Stop reason: SDUPTHER

## 2020-03-09 ENCOUNTER — ANTICOAG VISIT (OUTPATIENT)
Dept: INTERNAL MEDICINE CLINIC | Facility: CLINIC | Age: 85
End: 2020-03-09

## 2020-03-09 LAB — INR PPP: 2.5 (ref 0.84–1.19)

## 2020-03-11 ENCOUNTER — TELEPHONE (OUTPATIENT)
Dept: INTERNAL MEDICINE CLINIC | Age: 85
End: 2020-03-11

## 2020-03-11 DIAGNOSIS — M17.10 OSTEOARTHRITIS OF KNEE, UNSPECIFIED LATERALITY, UNSPECIFIED OSTEOARTHRITIS TYPE: ICD-10-CM

## 2020-03-11 RX ORDER — HYDROCODONE BITARTRATE AND ACETAMINOPHEN 7.5; 3 MG/1; MG/1
1 TABLET ORAL EVERY 6 HOURS PRN
Qty: 75 TABLET | Refills: 0 | Status: CANCELLED | OUTPATIENT
Start: 2020-03-11

## 2020-03-11 NOTE — TELEPHONE ENCOUNTER
3/11/20 pt last seen and reviewed 2/12/20, na 5/13/20, last filled  2/12/20 amt 75/0 , checked  PDMP

## 2020-03-11 NOTE — TELEPHONE ENCOUNTER
Patient needs her Hydrocodone refilled and sent to [facility] for a 30 day supply

## 2020-03-13 DIAGNOSIS — M17.10 OSTEOARTHRITIS OF KNEE, UNSPECIFIED LATERALITY, UNSPECIFIED OSTEOARTHRITIS TYPE: ICD-10-CM

## 2020-03-13 RX ORDER — HYDROCODONE BITARTRATE AND ACETAMINOPHEN 7.5; 3 MG/1; MG/1
1 TABLET ORAL EVERY 6 HOURS PRN
Qty: 75 TABLET | Refills: 0 | Status: SHIPPED | OUTPATIENT
Start: 2020-03-13 | End: 2020-03-30 | Stop reason: SDUPTHER

## 2020-03-13 NOTE — TELEPHONE ENCOUNTER
Orin Long MD never filled medication     Please medication to pharmacy for pt as PCP is now out of office

## 2020-03-29 DIAGNOSIS — M17.10 OSTEOARTHRITIS OF KNEE, UNSPECIFIED LATERALITY, UNSPECIFIED OSTEOARTHRITIS TYPE: ICD-10-CM

## 2020-03-29 RX ORDER — HYDROCODONE BITARTRATE AND ACETAMINOPHEN 7.5; 3 MG/1; MG/1
1 TABLET ORAL EVERY 6 HOURS PRN
Qty: 75 TABLET | Refills: 0 | Status: CANCELLED | OUTPATIENT
Start: 2020-03-29

## 2020-03-30 DIAGNOSIS — M17.10 OSTEOARTHRITIS OF KNEE, UNSPECIFIED LATERALITY, UNSPECIFIED OSTEOARTHRITIS TYPE: ICD-10-CM

## 2020-03-30 RX ORDER — HYDROCODONE BITARTRATE AND ACETAMINOPHEN 7.5; 3 MG/1; MG/1
1 TABLET ORAL EVERY 6 HOURS PRN
Qty: 75 TABLET | Refills: 0 | Status: SHIPPED | OUTPATIENT
Start: 2020-03-30 | End: 2020-04-25 | Stop reason: SDUPTHER

## 2020-03-30 NOTE — TELEPHONE ENCOUNTER
This patient called the office today and needs her Oxycodone refilled and sent to [facility]  She was seen in 2/2020 and is coming back in 3 months, 5/2020

## 2020-04-10 ENCOUNTER — ANTICOAG VISIT (OUTPATIENT)
Dept: INTERNAL MEDICINE CLINIC | Facility: CLINIC | Age: 85
End: 2020-04-10

## 2020-04-10 LAB — INR PPP: 2 (ref 0.84–1.19)

## 2020-04-14 ENCOUNTER — TELEPHONE (OUTPATIENT)
Dept: INTERNAL MEDICINE CLINIC | Age: 85
End: 2020-04-14

## 2020-04-25 DIAGNOSIS — M17.10 OSTEOARTHRITIS OF KNEE, UNSPECIFIED LATERALITY, UNSPECIFIED OSTEOARTHRITIS TYPE: ICD-10-CM

## 2020-04-27 ENCOUNTER — TELEPHONE (OUTPATIENT)
Dept: INTERNAL MEDICINE CLINIC | Facility: CLINIC | Age: 85
End: 2020-04-27

## 2020-04-27 DIAGNOSIS — M17.10 OSTEOARTHRITIS OF KNEE, UNSPECIFIED LATERALITY, UNSPECIFIED OSTEOARTHRITIS TYPE: ICD-10-CM

## 2020-04-27 RX ORDER — HYDROCODONE BITARTRATE AND ACETAMINOPHEN 7.5; 3 MG/1; MG/1
1 TABLET ORAL EVERY 6 HOURS PRN
Qty: 75 TABLET | OUTPATIENT
Start: 2020-04-27

## 2020-04-27 RX ORDER — HYDROCODONE BITARTRATE AND ACETAMINOPHEN 7.5; 3 MG/1; MG/1
1 TABLET ORAL EVERY 6 HOURS PRN
Qty: 75 TABLET | Refills: 0 | Status: SHIPPED | OUTPATIENT
Start: 2020-04-27 | End: 2020-05-21 | Stop reason: SDUPTHER

## 2020-05-13 ENCOUNTER — TELEMEDICINE (OUTPATIENT)
Dept: INTERNAL MEDICINE CLINIC | Age: 85
End: 2020-05-13
Payer: MEDICARE

## 2020-05-13 ENCOUNTER — ANTICOAG VISIT (OUTPATIENT)
Dept: INTERNAL MEDICINE CLINIC | Facility: CLINIC | Age: 85
End: 2020-05-13

## 2020-05-13 VITALS — WEIGHT: 173 LBS | BODY MASS INDEX: 33.79 KG/M2

## 2020-05-13 DIAGNOSIS — I10 BENIGN ESSENTIAL HYPERTENSION: Primary | ICD-10-CM

## 2020-05-13 DIAGNOSIS — I48.21 PERMANENT ATRIAL FIBRILLATION (HCC): Primary | ICD-10-CM

## 2020-05-13 DIAGNOSIS — I48.91 ATRIAL FIBRILLATION, UNSPECIFIED TYPE (HCC): ICD-10-CM

## 2020-05-13 DIAGNOSIS — E03.9 HYPOTHYROIDISM, UNSPECIFIED TYPE: ICD-10-CM

## 2020-05-13 DIAGNOSIS — D75.1 POLYCYTHEMIA: ICD-10-CM

## 2020-05-13 LAB — INR PPP: 2.5 (ref 0.84–1.19)

## 2020-05-13 PROCEDURE — 99441 PR PHYS/QHP TELEPHONE EVALUATION 5-10 MIN: CPT | Performed by: INTERNAL MEDICINE

## 2020-05-21 DIAGNOSIS — M17.10 OSTEOARTHRITIS OF KNEE, UNSPECIFIED LATERALITY, UNSPECIFIED OSTEOARTHRITIS TYPE: ICD-10-CM

## 2020-05-22 DIAGNOSIS — M17.10 OSTEOARTHRITIS OF KNEE, UNSPECIFIED LATERALITY, UNSPECIFIED OSTEOARTHRITIS TYPE: ICD-10-CM

## 2020-05-22 RX ORDER — HYDROCODONE BITARTRATE AND ACETAMINOPHEN 7.5; 3 MG/1; MG/1
1 TABLET ORAL EVERY 6 HOURS PRN
Qty: 75 TABLET | Refills: 0 | Status: SHIPPED | OUTPATIENT
Start: 2020-05-22 | End: 2020-07-14

## 2020-06-02 RX ORDER — HYDROCODONE BITARTRATE AND ACETAMINOPHEN 7.5; 3 MG/1; MG/1
1 TABLET ORAL EVERY 6 HOURS PRN
Qty: 75 TABLET | Refills: 0 | Status: CANCELLED | OUTPATIENT
Start: 2020-06-02

## 2020-06-15 DIAGNOSIS — I48.91 ATRIAL FIBRILLATION, UNSPECIFIED TYPE (HCC): Primary | ICD-10-CM

## 2020-06-15 RX ORDER — HYDROCODONE BITARTRATE AND ACETAMINOPHEN 7.5; 3 MG/1; MG/1
1 TABLET ORAL EVERY 6 HOURS PRN
Qty: 75 TABLET | Refills: 0 | Status: SHIPPED | OUTPATIENT
Start: 2020-06-15 | End: 2020-07-13 | Stop reason: SDUPTHER

## 2020-06-16 ENCOUNTER — TELEPHONE (OUTPATIENT)
Dept: INTERNAL MEDICINE CLINIC | Facility: CLINIC | Age: 85
End: 2020-06-16

## 2020-06-16 ENCOUNTER — ANTICOAG VISIT (OUTPATIENT)
Dept: INTERNAL MEDICINE CLINIC | Facility: CLINIC | Age: 85
End: 2020-06-16

## 2020-06-16 LAB — INR PPP: 2.6 (ref 0.84–1.19)

## 2020-07-13 DIAGNOSIS — M17.10 OSTEOARTHRITIS OF KNEE, UNSPECIFIED LATERALITY, UNSPECIFIED OSTEOARTHRITIS TYPE: ICD-10-CM

## 2020-07-14 RX ORDER — HYDROCODONE BITARTRATE AND ACETAMINOPHEN 7.5; 3 MG/1; MG/1
1 TABLET ORAL EVERY 12 HOURS PRN
Qty: 75 TABLET | Refills: 0 | Status: SHIPPED | OUTPATIENT
Start: 2020-07-14 | End: 2020-08-11 | Stop reason: SDUPTHER

## 2020-07-16 ENCOUNTER — ANTICOAG VISIT (OUTPATIENT)
Dept: INTERNAL MEDICINE CLINIC | Facility: CLINIC | Age: 85
End: 2020-07-16

## 2020-07-16 LAB — INR PPP: 3.7 (ref 0.84–1.19)

## 2020-07-24 ENCOUNTER — ANTICOAG VISIT (OUTPATIENT)
Dept: INTERNAL MEDICINE CLINIC | Facility: CLINIC | Age: 85
End: 2020-07-24

## 2020-07-24 ENCOUNTER — TELEPHONE (OUTPATIENT)
Dept: INTERNAL MEDICINE CLINIC | Facility: CLINIC | Age: 85
End: 2020-07-24

## 2020-07-24 LAB — INR PPP: 2.4 (ref 0.84–1.19)

## 2020-08-07 ENCOUNTER — ANTICOAG VISIT (OUTPATIENT)
Dept: INTERNAL MEDICINE CLINIC | Facility: CLINIC | Age: 85
End: 2020-08-07

## 2020-08-07 LAB — INR PPP: 2 (ref 0.84–1.19)

## 2020-08-11 DIAGNOSIS — M17.10 OSTEOARTHRITIS OF KNEE, UNSPECIFIED LATERALITY, UNSPECIFIED OSTEOARTHRITIS TYPE: ICD-10-CM

## 2020-08-11 RX ORDER — HYDROCODONE BITARTRATE AND ACETAMINOPHEN 7.5; 3 MG/1; MG/1
1 TABLET ORAL EVERY 12 HOURS PRN
Qty: 75 TABLET | Refills: 0 | Status: SHIPPED | OUTPATIENT
Start: 2020-08-11 | End: 2020-08-25 | Stop reason: SDUPTHER

## 2020-08-13 DIAGNOSIS — E03.9 HYPOTHYROIDISM, UNSPECIFIED TYPE: ICD-10-CM

## 2020-08-13 DIAGNOSIS — I10 HYPERTENSION, UNSPECIFIED TYPE: ICD-10-CM

## 2020-08-13 DIAGNOSIS — E78.00 HYPERCHOLESTEROLEMIA: ICD-10-CM

## 2020-08-13 DIAGNOSIS — I48.91 ATRIAL FIBRILLATION, UNSPECIFIED TYPE (HCC): ICD-10-CM

## 2020-08-13 RX ORDER — LOSARTAN POTASSIUM 100 MG/1
100 TABLET ORAL DAILY
Qty: 90 TABLET | Refills: 1 | Status: SHIPPED | OUTPATIENT
Start: 2020-08-13 | End: 2021-04-14 | Stop reason: SDUPTHER

## 2020-08-13 RX ORDER — SIMVASTATIN 20 MG
20 TABLET ORAL
Qty: 90 TABLET | Refills: 1 | Status: SHIPPED | OUTPATIENT
Start: 2020-08-13 | End: 2021-04-14 | Stop reason: SDUPTHER

## 2020-08-13 RX ORDER — WARFARIN SODIUM 5 MG/1
TABLET ORAL
Qty: 30 TABLET | Refills: 4 | Status: SHIPPED | OUTPATIENT
Start: 2020-08-13 | End: 2022-02-14 | Stop reason: SDUPTHER

## 2020-08-13 RX ORDER — METOPROLOL TARTRATE 100 MG/1
100 TABLET ORAL 2 TIMES DAILY
Qty: 180 TABLET | Refills: 1 | Status: SHIPPED | OUTPATIENT
Start: 2020-08-13 | End: 2021-04-14 | Stop reason: SDUPTHER

## 2020-08-13 RX ORDER — LEVOTHYROXINE SODIUM 0.05 MG/1
50 TABLET ORAL DAILY
Qty: 90 TABLET | Refills: 1 | Status: SHIPPED | OUTPATIENT
Start: 2020-08-13 | End: 2021-04-14 | Stop reason: SDUPTHER

## 2020-08-13 NOTE — TELEPHONE ENCOUNTER
This patient needs a refill on her Simvastatin, Metoprolo Tartrate, Losartan, Levothyroxine, and Hydorcodone   The pharmacy is [facility]

## 2020-08-25 ENCOUNTER — OFFICE VISIT (OUTPATIENT)
Dept: INTERNAL MEDICINE CLINIC | Age: 85
End: 2020-08-25
Payer: MEDICARE

## 2020-08-25 VITALS
TEMPERATURE: 97.8 F | OXYGEN SATURATION: 97 % | BODY MASS INDEX: 33.69 KG/M2 | WEIGHT: 171.6 LBS | HEART RATE: 73 BPM | SYSTOLIC BLOOD PRESSURE: 126 MMHG | HEIGHT: 60 IN | DIASTOLIC BLOOD PRESSURE: 70 MMHG

## 2020-08-25 DIAGNOSIS — E03.9 HYPOTHYROIDISM, UNSPECIFIED TYPE: ICD-10-CM

## 2020-08-25 DIAGNOSIS — I48.21 PERMANENT ATRIAL FIBRILLATION (HCC): Primary | ICD-10-CM

## 2020-08-25 DIAGNOSIS — I10 BENIGN ESSENTIAL HYPERTENSION: ICD-10-CM

## 2020-08-25 DIAGNOSIS — I48.91 ATRIAL FIBRILLATION, UNSPECIFIED TYPE (HCC): ICD-10-CM

## 2020-08-25 DIAGNOSIS — N18.30 STAGE 3 CHRONIC KIDNEY DISEASE (HCC): ICD-10-CM

## 2020-08-25 DIAGNOSIS — M17.10 OSTEOARTHRITIS OF KNEE, UNSPECIFIED LATERALITY, UNSPECIFIED OSTEOARTHRITIS TYPE: ICD-10-CM

## 2020-08-25 PROBLEM — J06.9 VIRAL UPPER RESPIRATORY TRACT INFECTION: Status: RESOLVED | Noted: 2020-02-12 | Resolved: 2020-08-25

## 2020-08-25 PROBLEM — M54.2 NECK PAIN: Chronic | Status: RESOLVED | Noted: 2019-06-24 | Resolved: 2020-08-25

## 2020-08-25 PROCEDURE — 99214 OFFICE O/P EST MOD 30 MIN: CPT | Performed by: INTERNAL MEDICINE

## 2020-08-25 PROCEDURE — 3008F BODY MASS INDEX DOCD: CPT | Performed by: INTERNAL MEDICINE

## 2020-08-25 PROCEDURE — 3074F SYST BP LT 130 MM HG: CPT | Performed by: INTERNAL MEDICINE

## 2020-08-25 PROCEDURE — 4040F PNEUMOC VAC/ADMIN/RCVD: CPT | Performed by: INTERNAL MEDICINE

## 2020-08-25 PROCEDURE — 1036F TOBACCO NON-USER: CPT | Performed by: INTERNAL MEDICINE

## 2020-08-25 PROCEDURE — 1160F RVW MEDS BY RX/DR IN RCRD: CPT | Performed by: INTERNAL MEDICINE

## 2020-08-25 PROCEDURE — 3078F DIAST BP <80 MM HG: CPT | Performed by: INTERNAL MEDICINE

## 2020-08-25 RX ORDER — HYDROCODONE BITARTRATE AND ACETAMINOPHEN 7.5; 3 MG/1; MG/1
1 TABLET ORAL EVERY 6 HOURS PRN
Qty: 120 TABLET | Refills: 0 | Status: SHIPPED | OUTPATIENT
Start: 2020-08-25 | End: 2020-10-20 | Stop reason: SDUPTHER

## 2020-08-25 NOTE — PROGRESS NOTES
Assessment/Plan:     Diagnoses and all orders for this visit:    Permanent atrial fibrillation (HCC)  -     CBC and differential; Future  -     Comprehensive metabolic panel; Future  -     Lipid panel; Future  -     TSH, 3rd generation with Free T4 reflex; Future  -     UA w Reflex to Microscopic w Reflex to Culture    Osteoarthritis of knee, unspecified laterality, unspecified osteoarthritis type  -     HYDROcodone-acetaminophen (XODOL) 7 5-300 MG per tablet; Take 1 tablet by mouth every 6 (six) hours as needed for severe painMax Daily Amount: 4 tablets    Atrial fibrillation, unspecified type (HCC)    Benign essential hypertension  -     CBC and differential; Future  -     Comprehensive metabolic panel; Future  -     Lipid panel; Future  -     TSH, 3rd generation with Free T4 reflex; Future  -     UA w Reflex to Microscopic w Reflex to Culture    Hypothyroidism, unspecified type  -     TSH, 3rd generation with Free T4 reflex; Future    Stage 3 chronic kidney disease (HCC)  -     CBC and differential; Future  -     Comprehensive metabolic panel; Future  -     Hemoglobin A1C; Future             Subjective:   Chief Complaint   Patient presents with    Follow-up     3 mo f/u Chronic conditions    Health Maint     AWV after 11/11/2020        Patient ID: Macie Aiken is a 80 y o  female      HPI  Is a very pleasant 80 years young lady who is here today for the regular follow-up she is doing good osteoarthritis is the biggest problem she has pain in the knees her back or her hip and also in the neck but overall she is stable she takes the pain medication and she does well  She has 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 the foot specialist gave her gabapentin but she does not wanted to use the medication  Hypertension is very well controlled  Atrial fibrillation with a controlled ventricular response with no significant signs or symptoms of CHF a mild 1+ pedal edema but otherwise lungs are clear she does not have any complaints of shortness of breath on exertion  Patient's PT and INR therapeutic  Plan is to follow her up in about the 3 months with blood workup ordered  The following portions of the patient's history were reviewed and updated as appropriate: allergies, current medications, past family history, past medical history, past social history, past surgical history and problem list     Review of Systems   Constitutional: Positive for fatigue  Negative for chills  HENT: Negative for congestion, ear pain, hearing loss, postnasal drip, sinus pressure, sore throat and voice change  Eyes: Negative for pain, discharge and visual disturbance  Respiratory: Negative for cough, chest tightness and shortness of breath  Cardiovascular: Negative for chest pain, palpitations and leg swelling  Gastrointestinal: Negative for abdominal pain, blood in stool, diarrhea, nausea and rectal pain  Genitourinary: Negative for difficulty urinating, dysuria and urgency  Musculoskeletal: Positive for back pain and neck pain  Negative for arthralgias and joint swelling  Knee pain   Skin: Negative for rash  Allergic/Immunologic: Negative for environmental allergies and food allergies  Neurological: Negative for dizziness, tremors, weakness, numbness and headaches  Hematological: Negative for adenopathy  Psychiatric/Behavioral: Negative for behavioral problems and hallucinations           Past Medical History:   Diagnosis Date    , spontaneous complete     Carcinoma of skin     Cataract     last assessed: 17    Cataract     Cellulitis     Effusion of both knee joints     resolved: 3/25/15    Effusion of both knee joints     Enteritis     Female stress incontinence     last assessed: 13    Female stress incontinence     Gastric ulcer     last assessed: 14    Gastric ulcer     GERD (gastroesophageal reflux disease)     Hyperlipidemia     Hypertension     Hyperthyroidism     Lyme disease     last assessed: 9/26/13    Lyme disease     Microscopic hematuria     last assessed: 9/26/13    Microscopic hematuria     Normal delivery     1950 son, 1952 son, 12 daughter, 26 daughter, 65 daughter    [facility]     last assessed: 9/26/13    Paroxysmal [facility] fibrillation ([facility] )     Perirectal abscess     last assessed: 9/26/13    Platelet dysfunction (HCC)     PAF    Platelet dysfunction (HCC)     Sinus tachycardia     last assessed: 9/26/13    Sinus tachycardia     Spinal stenosis     lumbar; last assessed: 10/4/13    Stage 3 chronic kidney disease ([facility] ) 6/3/2019    Stage 3 chronic kidney disease ([facility] )     Tachycardia     unspecified; last assessed: 9/26/13    Trigger finger, acquired     last assessed: 6/30/15    Trigger finger, acquired          Current Outpatient Medications:     ergocalciferol (VITAMIN D2) 50,000 units, Take 1 capsule (50,000 Units total) by mouth once a week, Disp: 8 capsule, Rfl: 0    HYDROcodone-acetaminophen (XODOL) 7 5-300 MG per tablet, Take 1 tablet by mouth every 6 (six) hours as needed for severe painMax Daily Amount: 4 tablets, Disp: 120 tablet, Rfl: 0    levothyroxine 50 mcg tablet, Take 1 tablet (50 mcg total) by mouth daily, Disp: 90 tablet, Rfl: 1    losartan (COZAAR) 100 MG tablet, Take 1 tablet (100 mg total) by mouth daily, Disp: 90 tablet, Rfl: 1    metoprolol tartrate (LOPRESSOR) 100 mg tablet, TAKE 1 TABLET (100 MG TOTAL) BY MOUTH 2 (TWO) TIMES A DAY, Disp: 180 tablet, Rfl: 1    ondansetron (ZOFRAN) 4 mg tablet, Take 4 mg by mouth every 6 (six) hours as needed for nausea or vomiting, Disp: , Rfl:     polyethylene glycol (MIRALAX) 17 g packet, Take 17 g by mouth daily, Disp: , Rfl:     senna-docusate sodium (SENOKOT S) 8 6-50 mg per tablet, Take 1 tablet by mouth as needed for constipation, Disp: , Rfl:     simvastatin (ZOCOR) 20 mg tablet, Take 1 tablet (20 mg total) by mouth daily at bedtime, Disp: 90 tablet, Rfl: 1    warfarin (COUMADIN) 2 mg tablet, Take by mouth daily As directed, Disp: , Rfl:     warfarin (COUMADIN) 5 mg tablet, TAKE 5 MG ON TUESDAY, WEDNESDAY , SATURDAY AND 2 5 MG ALL OTHER DAYS, Disp: 30 tablet, Rfl: 4    Allergies   Allergen Reactions    Cortisone     Oxaprozin Hives    Penicillins Hives       Social History   Past Surgical History:   Procedure Laterality Date    KNEE ARTHROSCOPY Bilateral     2008    KNEE ARTHROSCOPY      LUMBAR LAMINECTOMY      5/09    LUMBAR LAMINECTOMY      NEUROPLASTY / TRANSPOSITION MEDIAN NERVE AT CARPAL TUNNEL Bilateral     2011    NEUROPLASTY / TRANSPOSITION MEDIAN NERVE AT CARPAL TUNNEL      TONSILLECTOMY AND ADENOIDECTOMY      TOTAL HIP ARTHROPLASTY      joint replacement; L hip; 2/2/10    TOTAL HIP ARTHROPLASTY       Family History   Problem Relation Age of Onset    Rheumatic fever Mother     Heart disease Father     Crohn's disease Brother         (Granulomatous) Golitis    Psoriasis Daughter     Heart disease Son        Objective:  /70 (BP Location: Left arm, Patient Position: Sitting, Cuff Size: Standard)   Pulse 73   Temp 97 8 °F (36 6 °C) (Temporal)   Ht 5' (1 524 m)   Wt 77 8 kg (171 lb 9 6 oz)   SpO2 97%   BMI 33 51 kg/m²        Physical Exam  Constitutional:       Appearance: She is well-developed  HENT:      Right Ear: External ear normal    Eyes:      Conjunctiva/sclera: Conjunctivae normal       Pupils: Pupils are equal, round, and reactive to light  Neck:      Musculoskeletal: Normal range of motion  Thyroid: No thyromegaly  Vascular: No JVD  Cardiovascular:      Rate and Rhythm: Normal rate  Rhythm irregularly irregular  Heart sounds: Murmur present  Systolic murmur present with a grade of 2/6  Pulmonary:      Breath sounds: Normal breath sounds  Abdominal:      General: Bowel sounds are normal       Palpations: Abdomen is soft  Musculoskeletal: Normal range of motion        Right lower leg: Edema present  Left lower leg: Edema present  Lymphadenopathy:      Cervical: No cervical adenopathy  Skin:     General: Skin is dry  Neurological:      Mental Status: She is alert and oriented to person, place, and time  Deep Tendon Reflexes: Reflexes are normal and symmetric  Psychiatric:         Behavior: Behavior normal          Thought Content:  Thought content normal          Judgment: Judgment normal

## 2020-09-09 LAB — HBA1C MFR BLD HPLC: 6.1 %

## 2020-09-10 ENCOUNTER — ANTICOAG VISIT (OUTPATIENT)
Dept: INTERNAL MEDICINE CLINIC | Facility: CLINIC | Age: 85
End: 2020-09-10

## 2020-09-10 LAB — INR PPP: 2.9 (ref 0.84–1.19)

## 2020-10-14 ENCOUNTER — ANTICOAG VISIT (OUTPATIENT)
Dept: INTERNAL MEDICINE CLINIC | Facility: CLINIC | Age: 85
End: 2020-10-14

## 2020-10-14 LAB — INR PPP: 2.2 (ref 0.84–1.19)

## 2020-10-19 DIAGNOSIS — M17.10 OSTEOARTHRITIS OF KNEE, UNSPECIFIED LATERALITY, UNSPECIFIED OSTEOARTHRITIS TYPE: ICD-10-CM

## 2020-10-20 RX ORDER — HYDROCODONE BITARTRATE AND ACETAMINOPHEN 7.5; 3 MG/1; MG/1
1 TABLET ORAL EVERY 6 HOURS PRN
Qty: 120 TABLET | Refills: 0 | Status: SHIPPED | OUTPATIENT
Start: 2020-10-20 | End: 2020-11-27 | Stop reason: SDUPTHER

## 2020-11-18 ENCOUNTER — ANTICOAG VISIT (OUTPATIENT)
Dept: INTERNAL MEDICINE CLINIC | Facility: CLINIC | Age: 85
End: 2020-11-18

## 2020-11-18 LAB — INR PPP: 3 (ref 0.84–1.19)

## 2020-11-25 ENCOUNTER — OFFICE VISIT (OUTPATIENT)
Dept: INTERNAL MEDICINE CLINIC | Age: 85
End: 2020-11-25
Payer: MEDICARE

## 2020-11-25 VITALS
HEIGHT: 60 IN | BODY MASS INDEX: 33.96 KG/M2 | SYSTOLIC BLOOD PRESSURE: 130 MMHG | OXYGEN SATURATION: 95 % | HEART RATE: 89 BPM | WEIGHT: 173 LBS | TEMPERATURE: 98.7 F | DIASTOLIC BLOOD PRESSURE: 70 MMHG

## 2020-11-25 DIAGNOSIS — Z00.00 MEDICARE ANNUAL WELLNESS VISIT, SUBSEQUENT: ICD-10-CM

## 2020-11-25 DIAGNOSIS — E03.9 HYPOTHYROIDISM, UNSPECIFIED TYPE: ICD-10-CM

## 2020-11-25 DIAGNOSIS — D75.1 POLYCYTHEMIA: ICD-10-CM

## 2020-11-25 DIAGNOSIS — M17.0 BILATERAL PRIMARY OSTEOARTHRITIS OF KNEE: ICD-10-CM

## 2020-11-25 DIAGNOSIS — I10 BENIGN ESSENTIAL HYPERTENSION: ICD-10-CM

## 2020-11-25 DIAGNOSIS — I48.21 PERMANENT ATRIAL FIBRILLATION (HCC): Primary | ICD-10-CM

## 2020-11-25 PROCEDURE — G0439 PPPS, SUBSEQ VISIT: HCPCS | Performed by: INTERNAL MEDICINE

## 2020-11-25 PROCEDURE — 1123F ACP DISCUSS/DSCN MKR DOCD: CPT | Performed by: INTERNAL MEDICINE

## 2020-11-25 PROCEDURE — 99214 OFFICE O/P EST MOD 30 MIN: CPT | Performed by: INTERNAL MEDICINE

## 2020-11-27 DIAGNOSIS — M17.10 OSTEOARTHRITIS OF KNEE, UNSPECIFIED LATERALITY, UNSPECIFIED OSTEOARTHRITIS TYPE: ICD-10-CM

## 2020-11-27 RX ORDER — HYDROCODONE BITARTRATE AND ACETAMINOPHEN 7.5; 3 MG/1; MG/1
1 TABLET ORAL EVERY 6 HOURS PRN
Qty: 120 TABLET | Refills: 0 | Status: SHIPPED | OUTPATIENT
Start: 2020-11-27 | End: 2020-12-23 | Stop reason: SDUPTHER

## 2020-12-23 ENCOUNTER — ANTICOAG VISIT (OUTPATIENT)
Dept: INTERNAL MEDICINE CLINIC | Age: 85
End: 2020-12-23

## 2020-12-23 DIAGNOSIS — M17.10 OSTEOARTHRITIS OF KNEE, UNSPECIFIED LATERALITY, UNSPECIFIED OSTEOARTHRITIS TYPE: ICD-10-CM

## 2020-12-23 LAB — INR PPP: 2.8 (ref 0.84–1.19)

## 2020-12-24 RX ORDER — HYDROCODONE BITARTRATE AND ACETAMINOPHEN 7.5; 3 MG/1; MG/1
1 TABLET ORAL EVERY 6 HOURS PRN
Qty: 120 TABLET | Refills: 0 | Status: SHIPPED | OUTPATIENT
Start: 2020-12-24 | End: 2021-01-25 | Stop reason: SDUPTHER

## 2021-01-25 DIAGNOSIS — M17.10 OSTEOARTHRITIS OF KNEE, UNSPECIFIED LATERALITY, UNSPECIFIED OSTEOARTHRITIS TYPE: ICD-10-CM

## 2021-01-25 RX ORDER — HYDROCODONE BITARTRATE AND ACETAMINOPHEN 7.5; 3 MG/1; MG/1
1 TABLET ORAL EVERY 6 HOURS PRN
Qty: 120 TABLET | Refills: 0 | Status: SHIPPED | OUTPATIENT
Start: 2021-01-25 | End: 2021-02-25 | Stop reason: SDUPTHER

## 2021-01-26 LAB — INR PPP: 2.6 (ref 0.84–1.19)

## 2021-01-27 ENCOUNTER — ANTICOAG VISIT (OUTPATIENT)
Dept: INTERNAL MEDICINE CLINIC | Facility: CLINIC | Age: 86
End: 2021-01-27

## 2021-02-25 ENCOUNTER — ANTICOAG VISIT (OUTPATIENT)
Dept: INTERNAL MEDICINE CLINIC | Age: 86
End: 2021-02-25

## 2021-02-25 DIAGNOSIS — M17.10 OSTEOARTHRITIS OF KNEE, UNSPECIFIED LATERALITY, UNSPECIFIED OSTEOARTHRITIS TYPE: ICD-10-CM

## 2021-02-25 LAB — INR PPP: 2.4 (ref 0.84–1.19)

## 2021-02-26 RX ORDER — HYDROCODONE BITARTRATE AND ACETAMINOPHEN 7.5; 3 MG/1; MG/1
1 TABLET ORAL EVERY 6 HOURS PRN
Qty: 120 TABLET | Refills: 0 | Status: SHIPPED | OUTPATIENT
Start: 2021-02-26 | End: 2021-05-18 | Stop reason: SDUPTHER

## 2021-03-08 ENCOUNTER — IMMUNIZATIONS (OUTPATIENT)
Dept: FAMILY MEDICINE CLINIC | Facility: HOSPITAL | Age: 86
End: 2021-03-08

## 2021-03-08 DIAGNOSIS — Z23 ENCOUNTER FOR IMMUNIZATION: Primary | ICD-10-CM

## 2021-03-08 PROCEDURE — 0001A SARS-COV-2 / COVID-19 MRNA VACCINE (PFIZER-BIONTECH) 30 MCG: CPT | Performed by: NURSE PRACTITIONER

## 2021-03-08 PROCEDURE — 91300 SARS-COV-2 / COVID-19 MRNA VACCINE (PFIZER-BIONTECH) 30 MCG: CPT | Performed by: NURSE PRACTITIONER

## 2021-03-16 DIAGNOSIS — I10 HYPERTENSION, UNSPECIFIED TYPE: ICD-10-CM

## 2021-03-16 DIAGNOSIS — E03.9 HYPOTHYROIDISM, UNSPECIFIED TYPE: ICD-10-CM

## 2021-03-16 RX ORDER — LOSARTAN POTASSIUM 100 MG/1
100 TABLET ORAL DAILY
Qty: 90 TABLET | Refills: 0 | OUTPATIENT
Start: 2021-03-16

## 2021-03-16 RX ORDER — METOPROLOL TARTRATE 100 MG/1
100 TABLET ORAL 2 TIMES DAILY
Qty: 180 TABLET | Refills: 0 | OUTPATIENT
Start: 2021-03-16

## 2021-03-16 RX ORDER — LEVOTHYROXINE SODIUM 0.05 MG/1
50 TABLET ORAL DAILY
Qty: 90 TABLET | Refills: 0 | OUTPATIENT
Start: 2021-03-16

## 2021-03-26 ENCOUNTER — ANTICOAG VISIT (OUTPATIENT)
Dept: INTERNAL MEDICINE CLINIC | Facility: CLINIC | Age: 86
End: 2021-03-26

## 2021-03-26 LAB — INR PPP: 2.2 (ref 0.84–1.19)

## 2021-04-01 ENCOUNTER — IMMUNIZATIONS (OUTPATIENT)
Dept: FAMILY MEDICINE CLINIC | Facility: HOSPITAL | Age: 86
End: 2021-04-01

## 2021-04-01 DIAGNOSIS — Z23 ENCOUNTER FOR IMMUNIZATION: Primary | ICD-10-CM

## 2021-04-01 PROCEDURE — 0002A SARS-COV-2 / COVID-19 MRNA VACCINE (PFIZER-BIONTECH) 30 MCG: CPT

## 2021-04-01 PROCEDURE — 91300 SARS-COV-2 / COVID-19 MRNA VACCINE (PFIZER-BIONTECH) 30 MCG: CPT

## 2021-04-14 ENCOUNTER — OFFICE VISIT (OUTPATIENT)
Dept: INTERNAL MEDICINE CLINIC | Age: 86
End: 2021-04-14
Payer: MEDICARE

## 2021-04-14 VITALS
WEIGHT: 172.7 LBS | SYSTOLIC BLOOD PRESSURE: 148 MMHG | OXYGEN SATURATION: 95 % | TEMPERATURE: 98.9 F | HEIGHT: 60 IN | BODY MASS INDEX: 33.91 KG/M2 | HEART RATE: 76 BPM | DIASTOLIC BLOOD PRESSURE: 88 MMHG

## 2021-04-14 DIAGNOSIS — Z13.820 SCREENING FOR OSTEOPOROSIS: ICD-10-CM

## 2021-04-14 DIAGNOSIS — E78.00 HYPERCHOLESTEROLEMIA: ICD-10-CM

## 2021-04-14 DIAGNOSIS — E55.9 VITAMIN D DEFICIENCY: Primary | ICD-10-CM

## 2021-04-14 DIAGNOSIS — I10 HYPERTENSION, UNSPECIFIED TYPE: ICD-10-CM

## 2021-04-14 DIAGNOSIS — E03.9 HYPOTHYROIDISM, UNSPECIFIED TYPE: ICD-10-CM

## 2021-04-14 DIAGNOSIS — I10 BENIGN ESSENTIAL HYPERTENSION: ICD-10-CM

## 2021-04-14 DIAGNOSIS — M17.0 BILATERAL PRIMARY OSTEOARTHRITIS OF KNEE: ICD-10-CM

## 2021-04-14 DIAGNOSIS — I48.91 ATRIAL FIBRILLATION, UNSPECIFIED TYPE (HCC): ICD-10-CM

## 2021-04-14 PROCEDURE — 99214 OFFICE O/P EST MOD 30 MIN: CPT | Performed by: INTERNAL MEDICINE

## 2021-04-14 RX ORDER — LEVOTHYROXINE SODIUM 0.05 MG/1
50 TABLET ORAL DAILY
Qty: 90 TABLET | Refills: 1 | Status: SHIPPED | OUTPATIENT
Start: 2021-04-14 | End: 2021-10-26 | Stop reason: SDUPTHER

## 2021-04-14 RX ORDER — METOPROLOL TARTRATE 100 MG/1
100 TABLET ORAL 2 TIMES DAILY
Qty: 180 TABLET | Refills: 1 | Status: SHIPPED | OUTPATIENT
Start: 2021-04-14 | End: 2022-02-14 | Stop reason: SDUPTHER

## 2021-04-14 RX ORDER — LOSARTAN POTASSIUM 100 MG/1
100 TABLET ORAL DAILY
Qty: 90 TABLET | Refills: 1 | Status: SHIPPED | OUTPATIENT
Start: 2021-04-14 | End: 2021-10-26 | Stop reason: SDUPTHER

## 2021-04-14 RX ORDER — SIMVASTATIN 20 MG
20 TABLET ORAL
Qty: 90 TABLET | Refills: 1 | Status: SHIPPED | OUTPATIENT
Start: 2021-04-14 | End: 2021-10-26 | Stop reason: SDUPTHER

## 2021-04-14 NOTE — PROGRESS NOTES
Assessment/Plan:     Diagnoses and all orders for this visit:    Vitamin D deficiency    Screening for osteoporosis    Hypercholesterolemia  -     simvastatin (ZOCOR) 20 mg tablet; Take 1 tablet (20 mg total) by mouth daily at bedtime    Hypertension, unspecified type  -     losartan (COZAAR) 100 MG tablet; Take 1 tablet (100 mg total) by mouth daily  -     metoprolol tartrate (LOPRESSOR) 100 mg tablet; Take 1 tablet (100 mg total) by mouth 2 (two) times a day    Hypothyroidism, unspecified type  -     levothyroxine 50 mcg tablet; Take 1 tablet (50 mcg total) by mouth daily    Atrial fibrillation, unspecified type (HCC)    Benign essential hypertension    Bilateral primary osteoarthritis of knee    Other orders  -     Cancel: DXA bone density spine hip and pelvis; Future             Subjective:   Chief Complaint   Patient presents with    Follow-up     HTN   Health maint     Due for BMI FOLLOW UP, Dexa         Patient ID: Macie Aiken is a 80 y o  female  HPI  [de-identified] years young lady who is here today for the regular follow-up  Hypertension is good control continue with the same medication no changes  Osteoarthritis of the both knee but she does not wanted to do anything for that right now  Hypercholesterolemia she is on simvastatin  Stage 3 chronic kidney disease is stable  Atrial fibrillation with the controlled ventricular response no signs or symptoms of CHF  The following portions of the patient's history were reviewed and updated as appropriate: allergies, current medications, past family history, past medical history, past social history, past surgical history and problem list     Review of Systems   Constitutional: Negative for chills and fatigue  HENT: Negative for congestion, ear pain, hearing loss, postnasal drip, sinus pressure, sore throat and voice change  Eyes: Negative for pain, discharge and visual disturbance     Respiratory: Negative for cough, chest tightness and shortness of breath  Cardiovascular: Negative for chest pain, palpitations and leg swelling  Gastrointestinal: Negative for abdominal pain, blood in stool, diarrhea, nausea and rectal pain  Genitourinary: Negative for difficulty urinating, dysuria and urgency  Musculoskeletal: Negative for arthralgias and joint swelling  Ambulatory dysfunction using the walker, mostly because of the knee pain and knee problems and stiffness   Skin: Negative for rash  Allergic/Immunologic: Negative for environmental allergies and food allergies  Neurological: Negative for dizziness, tremors, weakness, numbness and headaches  Hematological: Negative for adenopathy  Psychiatric/Behavioral: Negative for behavioral problems and hallucinations           Past Medical History:   Diagnosis Date    , spontaneous complete     Carcinoma of skin     Cataract     last assessed: 17    Cataract     Cellulitis     Effusion of both knee joints     resolved: 3/25/15    Effusion of both knee joints     Enteritis     Female stress incontinence     last assessed: 13    Female stress incontinence     Gastric ulcer     last assessed: 14    Gastric ulcer     GERD (gastroesophageal reflux disease)     Hyperlipidemia     Hypertension     Hyperthyroidism     Lyme disease     last assessed: 13    Lyme disease     Microscopic hematuria     last assessed: 13    Microscopic hematuria     Normal delivery     1950 son, 1952 son, 12 daughter, 26 daughter, 65 daughter    Paroxysmal atrial fibrillation ([address] )     last assessed: 13    Paroxysmal atrial fibrillation ([address] )     Perirectal abscess     last assessed: 13    Platelet dysfunction (HCC)     PAF    Platelet dysfunction (HCC)     Sinus tachycardia     last assessed: 13    Sinus tachycardia     Spinal stenosis     lumbar; last assessed: 10/4/13    Stage 3 chronic kidney disease 6/3/2019    Stage 3 chronic kidney disease  Tachycardia     unspecified; last assessed: 9/26/13    Trigger finger, acquired     last assessed: 6/30/15    Trigger finger, acquired          Current Outpatient Medications:     ergocalciferol (VITAMIN D2) 50,000 units, Take 1 capsule (50,000 Units total) by mouth once a week, Disp: 8 capsule, Rfl: 0    HYDROcodone-acetaminophen (XODOL) 7 5-300 MG per tablet, Take 1 tablet by mouth every 6 (six) hours as needed for severe painMax Daily Amount: 4 tablets, Disp: 120 tablet, Rfl: 0    levothyroxine 50 mcg tablet, Take 1 tablet (50 mcg total) by mouth daily, Disp: 90 tablet, Rfl: 1    losartan (COZAAR) 100 MG tablet, Take 1 tablet (100 mg total) by mouth daily, Disp: 90 tablet, Rfl: 1    metoprolol tartrate (LOPRESSOR) 100 mg tablet, Take 1 tablet (100 mg total) by mouth 2 (two) times a day, Disp: 180 tablet, Rfl: 1    ondansetron (ZOFRAN) 4 mg tablet, Take 4 mg by mouth every 6 (six) hours as needed for nausea or vomiting, Disp: , Rfl:     polyethylene glycol (MIRALAX) 17 g packet, Take 17 g by mouth daily, Disp: , Rfl:     senna-docusate sodium (SENOKOT S) 8 6-50 mg per tablet, Take 1 tablet by mouth as needed for constipation, Disp: , Rfl:     simvastatin (ZOCOR) 20 mg tablet, Take 1 tablet (20 mg total) by mouth daily at bedtime, Disp: 90 tablet, Rfl: 1    warfarin (COUMADIN) 2 mg tablet, Take by mouth daily As directed, Disp: , Rfl:     warfarin (COUMADIN) 5 mg tablet, TAKE 5 MG ON TUESDAY, WEDNESDAY , SATURDAY AND 2 5 MG ALL OTHER DAYS, Disp: 30 tablet, Rfl: 4    Allergies   Allergen Reactions    Cortisone     Oxaprozin Hives    Penicillins Hives       Social History   Past Surgical History:   Procedure Laterality Date    KNEE ARTHROSCOPY Bilateral     2008    KNEE ARTHROSCOPY      LUMBAR LAMINECTOMY      5/09    LUMBAR LAMINECTOMY      NEUROPLASTY / TRANSPOSITION MEDIAN NERVE AT CARPAL TUNNEL Bilateral     2011    NEUROPLASTY / TRANSPOSITION MEDIAN NERVE AT CARPAL TUNNEL      TONSILLECTOMY AND ADENOIDECTOMY      TOTAL HIP ARTHROPLASTY      joint replacement; L hip; 2/2/10    TOTAL HIP ARTHROPLASTY       Family History   Problem Relation Age of Onset    Rheumatic fever Mother     Heart disease Father     Crohn's disease Brother         (Granulomatous) Golitis    Psoriasis Daughter     Heart disease Son        Objective:  /88 (BP Location: Left arm, Patient Position: Sitting, Cuff Size: Standard)   Pulse 76   Temp 98 9 °F (37 2 °C) (Temporal)   Ht 5' (1 524 m)   Wt 78 3 kg (172 lb 11 2 oz)   SpO2 95%   BMI 33 73 kg/m²        Physical Exam  Constitutional:       Appearance: She is well-developed  HENT:      Right Ear: External ear normal    Eyes:      Conjunctiva/sclera: Conjunctivae normal       Pupils: Pupils are equal, round, and reactive to light  Neck:      Musculoskeletal: Normal range of motion  Thyroid: No thyromegaly  Vascular: No JVD  Cardiovascular:      Rate and Rhythm: Normal rate and regular rhythm  Heart sounds: Murmur present  Systolic murmur present with a grade of 1/6  Pulmonary:      Breath sounds: Normal breath sounds  Abdominal:      General: Bowel sounds are normal       Palpations: Abdomen is soft  Musculoskeletal: Normal range of motion  Lymphadenopathy:      Cervical: No cervical adenopathy  Skin:     General: Skin is dry  Neurological:      Mental Status: She is alert and oriented to person, place, and time  Deep Tendon Reflexes: Reflexes are normal and symmetric     Psychiatric:         Behavior: Behavior normal

## 2021-04-28 ENCOUNTER — ANTICOAG VISIT (OUTPATIENT)
Dept: INTERNAL MEDICINE CLINIC | Facility: CLINIC | Age: 86
End: 2021-04-28

## 2021-04-28 LAB — INR PPP: 2.4 (ref 0.84–1.19)

## 2021-05-18 DIAGNOSIS — M17.10 OSTEOARTHRITIS OF KNEE, UNSPECIFIED LATERALITY, UNSPECIFIED OSTEOARTHRITIS TYPE: ICD-10-CM

## 2021-05-18 RX ORDER — HYDROCODONE BITARTRATE AND ACETAMINOPHEN 7.5; 3 MG/1; MG/1
1 TABLET ORAL EVERY 6 HOURS PRN
Qty: 120 TABLET | Refills: 0 | Status: SHIPPED | OUTPATIENT
Start: 2021-05-18 | End: 2021-06-14 | Stop reason: SDUPTHER

## 2021-06-03 ENCOUNTER — ANTICOAG VISIT (OUTPATIENT)
Dept: INTERNAL MEDICINE CLINIC | Facility: CLINIC | Age: 86
End: 2021-06-03

## 2021-06-03 LAB — INR PPP: 3.6 (ref 0.84–1.19)

## 2021-06-03 NOTE — PROGRESS NOTES
Dr Priscilla Moran reviewed the INR results and advised to decrease the Warfarin from 5 mg three times/week to 5 mg every Mon and Wed and 2 5 mg all the other days  INR in 2 wks  Patient repeated the instructions correctly

## 2021-06-14 DIAGNOSIS — M17.10 OSTEOARTHRITIS OF KNEE, UNSPECIFIED LATERALITY, UNSPECIFIED OSTEOARTHRITIS TYPE: ICD-10-CM

## 2021-06-14 RX ORDER — HYDROCODONE BITARTRATE AND ACETAMINOPHEN 7.5; 3 MG/1; MG/1
1 TABLET ORAL EVERY 6 HOURS PRN
Qty: 120 TABLET | Refills: 0 | Status: SHIPPED | OUTPATIENT
Start: 2021-06-14 | End: 2021-08-02 | Stop reason: SDUPTHER

## 2021-06-16 ENCOUNTER — ANTICOAG VISIT (OUTPATIENT)
Dept: INTERNAL MEDICINE CLINIC | Age: 86
End: 2021-06-16

## 2021-06-16 LAB — INR PPP: 2 (ref 0.84–1.19)

## 2021-06-16 NOTE — PROGRESS NOTES
INR therapeutic  Continue current regimen of 5mg coumadin on Mon, Wed, and 2 5mg coumadin on all other days  Repeat PT/INR in 2 weeks

## 2021-07-07 ENCOUNTER — OFFICE VISIT (OUTPATIENT)
Dept: INTERNAL MEDICINE CLINIC | Age: 86
End: 2021-07-07
Payer: MEDICARE

## 2021-07-07 ENCOUNTER — APPOINTMENT (OUTPATIENT)
Dept: LAB | Age: 86
End: 2021-07-07
Payer: MEDICARE

## 2021-07-07 VITALS
HEART RATE: 90 BPM | WEIGHT: 172.6 LBS | SYSTOLIC BLOOD PRESSURE: 152 MMHG | TEMPERATURE: 98.2 F | OXYGEN SATURATION: 97 % | HEIGHT: 60 IN | BODY MASS INDEX: 33.89 KG/M2 | DIASTOLIC BLOOD PRESSURE: 88 MMHG

## 2021-07-07 DIAGNOSIS — I10 BENIGN ESSENTIAL HYPERTENSION: ICD-10-CM

## 2021-07-07 DIAGNOSIS — E55.9 VITAMIN D DEFICIENCY: Primary | ICD-10-CM

## 2021-07-07 DIAGNOSIS — R11.0 NAUSEA: ICD-10-CM

## 2021-07-07 DIAGNOSIS — M25.511 CHRONIC RIGHT SHOULDER PAIN: ICD-10-CM

## 2021-07-07 DIAGNOSIS — D75.1 POLYCYTHEMIA: ICD-10-CM

## 2021-07-07 DIAGNOSIS — N18.30 STAGE 3 CHRONIC KIDNEY DISEASE (HCC): ICD-10-CM

## 2021-07-07 DIAGNOSIS — I48.91 ATRIAL FIBRILLATION, UNSPECIFIED TYPE (HCC): ICD-10-CM

## 2021-07-07 DIAGNOSIS — I10 HYPERTENSION, UNSPECIFIED TYPE: ICD-10-CM

## 2021-07-07 DIAGNOSIS — N18.31 STAGE 3A CHRONIC KIDNEY DISEASE (HCC): ICD-10-CM

## 2021-07-07 DIAGNOSIS — I48.21 PERMANENT ATRIAL FIBRILLATION (HCC): ICD-10-CM

## 2021-07-07 DIAGNOSIS — E03.9 HYPOTHYROIDISM, UNSPECIFIED TYPE: ICD-10-CM

## 2021-07-07 DIAGNOSIS — G89.29 CHRONIC RIGHT SHOULDER PAIN: ICD-10-CM

## 2021-07-07 LAB
ALBUMIN SERPL BCP-MCNC: 3.4 G/DL (ref 3.5–5)
ALP SERPL-CCNC: 129 U/L (ref 46–116)
ALT SERPL W P-5'-P-CCNC: 15 U/L (ref 12–78)
ANION GAP SERPL CALCULATED.3IONS-SCNC: 5 MMOL/L (ref 4–13)
AST SERPL W P-5'-P-CCNC: 17 U/L (ref 5–45)
BASOPHILS # BLD AUTO: 0.07 THOUSANDS/ΜL (ref 0–0.1)
BASOPHILS NFR BLD AUTO: 1 % (ref 0–1)
BILIRUB SERPL-MCNC: 0.49 MG/DL (ref 0.2–1)
BUN SERPL-MCNC: 18 MG/DL (ref 5–25)
CALCIUM ALBUM COR SERPL-MCNC: 9.3 MG/DL (ref 8.3–10.1)
CALCIUM SERPL-MCNC: 8.8 MG/DL (ref 8.3–10.1)
CHLORIDE SERPL-SCNC: 108 MMOL/L (ref 100–108)
CO2 SERPL-SCNC: 25 MMOL/L (ref 21–32)
CREAT SERPL-MCNC: 1.13 MG/DL (ref 0.6–1.3)
EOSINOPHIL # BLD AUTO: 0.34 THOUSAND/ΜL (ref 0–0.61)
EOSINOPHIL NFR BLD AUTO: 4 % (ref 0–6)
ERYTHROCYTE [DISTWIDTH] IN BLOOD BY AUTOMATED COUNT: 14.7 % (ref 11.6–15.1)
GFR SERPL CREATININE-BSD FRML MDRD: 42 ML/MIN/1.73SQ M
GLUCOSE P FAST SERPL-MCNC: 97 MG/DL (ref 65–99)
HCT VFR BLD AUTO: 44.3 % (ref 34.8–46.1)
HGB BLD-MCNC: 14.3 G/DL (ref 11.5–15.4)
IMM GRANULOCYTES # BLD AUTO: 0.03 THOUSAND/UL (ref 0–0.2)
IMM GRANULOCYTES NFR BLD AUTO: 0 % (ref 0–2)
INR PPP: 2.47 (ref 0.84–1.19)
LYMPHOCYTES # BLD AUTO: 1.31 THOUSANDS/ΜL (ref 0.6–4.47)
LYMPHOCYTES NFR BLD AUTO: 16 % (ref 14–44)
MCH RBC QN AUTO: 28.4 PG (ref 26.8–34.3)
MCHC RBC AUTO-ENTMCNC: 32.3 G/DL (ref 31.4–37.4)
MCV RBC AUTO: 88 FL (ref 82–98)
MONOCYTES # BLD AUTO: 0.66 THOUSAND/ΜL (ref 0.17–1.22)
MONOCYTES NFR BLD AUTO: 8 % (ref 4–12)
NEUTROPHILS # BLD AUTO: 5.99 THOUSANDS/ΜL (ref 1.85–7.62)
NEUTS SEG NFR BLD AUTO: 71 % (ref 43–75)
NRBC BLD AUTO-RTO: 0 /100 WBCS
PLATELET # BLD AUTO: 236 THOUSANDS/UL (ref 149–390)
PMV BLD AUTO: 11.2 FL (ref 8.9–12.7)
POTASSIUM SERPL-SCNC: 4.3 MMOL/L (ref 3.5–5.3)
PROT SERPL-MCNC: 7.6 G/DL (ref 6.4–8.2)
PROTHROMBIN TIME: 26.6 SECONDS (ref 11.6–14.5)
RBC # BLD AUTO: 5.04 MILLION/UL (ref 3.81–5.12)
SODIUM SERPL-SCNC: 138 MMOL/L (ref 136–145)
TSH SERPL DL<=0.05 MIU/L-ACNC: 3.5 UIU/ML (ref 0.36–3.74)
WBC # BLD AUTO: 8.4 THOUSAND/UL (ref 4.31–10.16)

## 2021-07-07 PROCEDURE — 85025 COMPLETE CBC W/AUTO DIFF WBC: CPT

## 2021-07-07 PROCEDURE — 84443 ASSAY THYROID STIM HORMONE: CPT

## 2021-07-07 PROCEDURE — 85610 PROTHROMBIN TIME: CPT

## 2021-07-07 PROCEDURE — 36415 COLL VENOUS BLD VENIPUNCTURE: CPT

## 2021-07-07 PROCEDURE — 99214 OFFICE O/P EST MOD 30 MIN: CPT | Performed by: INTERNAL MEDICINE

## 2021-07-07 PROCEDURE — 80053 COMPREHEN METABOLIC PANEL: CPT

## 2021-07-07 RX ORDER — ONDANSETRON 4 MG/1
4 TABLET, FILM COATED ORAL EVERY 6 HOURS PRN
Qty: 20 TABLET | Refills: 1 | Status: SHIPPED | OUTPATIENT
Start: 2021-07-07 | End: 2022-02-16 | Stop reason: SDUPTHER

## 2021-07-07 RX ORDER — GABAPENTIN 100 MG/1
100 CAPSULE ORAL 3 TIMES DAILY
COMMUNITY
Start: 2021-06-18 | End: 2022-03-31 | Stop reason: SDUPTHER

## 2021-07-07 NOTE — PROGRESS NOTES
Assessment/Plan:     Diagnoses and all orders for this visit:    Vitamin D deficiency    Permanent atrial fibrillation (HCC)    Stage 3a chronic kidney disease ([facility] [address] )    Benign essential hypertension    Chronic right shoulder pain  -     Ambulatory referral to Physical Therapy; Future  -     XR shoulder 2+ vw right; Future    Nausea  -     ondansetron (ZOFRAN) 4 mg tablet; Take 1 tablet (4 mg total) by mouth every 6 (six) hours as needed for nausea or vomiting    Other orders  -     Cancel: DXA bone density spine hip and pelvis; Future  -     gabapentin (NEURONTIN) 100 mg capsule; Take 100 mg by mouth 3 (three) times a day         BMI Counseling: Body mass index is 33 71 kg/m²  The BMI is above normal  Nutrition recommendations include decreasing portion sizes, encouraging healthy choices of fruits and vegetables and moderation in carbohydrate intake  Exercise recommendations include moderate physical activity 150 minutes/week  Subjective:   Chief Complaint   Patient presents with    Follow-up     3 month follow up for hypothyroidism   Health maint     BMI FOLLOW UP        Patient ID: Mira Blackwell is a 80 y o  female      HPI  Complaining of right shoulder pain difficulty in moving the right shoulder also bilateral knee pain she is followed up by the Orthopedics these are the chronic problems with her she had previous arthrocentesis in the shoulder and also in the knee I will recommend her to follow up by the orthopedic surgeon and also I will put her on for some physical therapy  Hypertension is well controlled  Osteoarthritis of the multiple joints  Atrial fibrillation with controlled ventricular response on anticoagulation  No signs or symptoms of heart failure    The following portions of the patient's history were reviewed and updated as appropriate: allergies, current medications, past family history, past medical history, past social history, past surgical history and problem list     Review of Systems   Constitutional: Positive for fatigue  Negative for chills  HENT: Negative for congestion, ear pain, hearing loss, postnasal drip, sinus pressure, sore throat and voice change  Eyes: Negative for pain, discharge and visual disturbance  Respiratory: Negative for cough, chest tightness and shortness of breath  Cardiovascular: Negative for chest pain, palpitations and leg swelling  Gastrointestinal: Negative for abdominal pain, blood in stool, diarrhea, nausea and rectal pain  Genitourinary: Negative for difficulty urinating, dysuria and urgency  Musculoskeletal: Positive for back pain, gait problem, neck pain and neck stiffness  Negative for arthralgias and joint swelling  Shoulder pain bilateral right more on the left   Skin: Negative for rash  Allergic/Immunologic: Negative for environmental allergies and food allergies  Neurological: Negative for dizziness, tremors, weakness, numbness and headaches  Hematological: Negative for adenopathy  Psychiatric/Behavioral: Negative for behavioral problems and hallucinations           Past Medical History:   Diagnosis Date    , spontaneous complete     Carcinoma of skin     Cataract     last assessed: 17    Cataract     Cellulitis     Effusion of both knee joints     resolved: 3/25/15    Effusion of both knee joints     Enteritis     Female stress incontinence     last assessed: 13    Female stress incontinence     Gastric ulcer     last assessed: 14    Gastric ulcer     GERD (gastroesophageal reflux disease)     Hyperlipidemia     Hypertension     Hyperthyroidism     Lyme disease     last assessed: 13    Lyme disease     Microscopic hematuria     last assessed: 13    Microscopic hematuria     Normal delivery     1950 son, 1952 son, 12 daughter, 26 daughter, 65 daughter    Paroxysmal atrial fibrillation ([facility] [address] )     last assessed: 13    Paroxysmal atrial fibrillation ([facility] [address] )  Perirectal abscess     last assessed: 9/26/13    Platelet dysfunction (HCC)     PAF    Platelet dysfunction (HCC)     Sinus tachycardia     last assessed: 9/26/13    Sinus tachycardia     Spinal stenosis     lumbar; last assessed: 10/4/13    Stage 3 chronic kidney disease ([facility] [address] ) 6/3/2019    Stage 3 chronic kidney disease (HCC)     Tachycardia     unspecified; last assessed: 9/26/13    Trigger finger, acquired     last assessed: 6/30/15    Trigger finger, acquired          Current Outpatient Medications:     ergocalciferol (VITAMIN D2) 50,000 units, Take 1 capsule (50,000 Units total) by mouth once a week, Disp: 8 capsule, Rfl: 0    HYDROcodone-acetaminophen (XODOL) 7 5-300 MG per tablet, Take 1 tablet by mouth every 6 (six) hours as needed for severe painMax Daily Amount: 4 tablets, Disp: 120 tablet, Rfl: 0    levothyroxine 50 mcg tablet, Take 1 tablet (50 mcg total) by mouth daily, Disp: 90 tablet, Rfl: 1    losartan (COZAAR) 100 MG tablet, Take 1 tablet (100 mg total) by mouth daily, Disp: 90 tablet, Rfl: 1    metoprolol tartrate (LOPRESSOR) 100 mg tablet, Take 1 tablet (100 mg total) by mouth 2 (two) times a day, Disp: 180 tablet, Rfl: 1    ondansetron (ZOFRAN) 4 mg tablet, Take 4 mg by mouth every 6 (six) hours as needed for nausea or vomiting, Disp: , Rfl:     polyethylene glycol (MIRALAX) 17 g packet, Take 17 g by mouth daily, Disp: , Rfl:     senna-docusate sodium (SENOKOT S) 8 6-50 mg per tablet, Take 1 tablet by mouth as needed for constipation, Disp: , Rfl:     simvastatin (ZOCOR) 20 mg tablet, Take 1 tablet (20 mg total) by mouth daily at bedtime, Disp: 90 tablet, Rfl: 1    warfarin (COUMADIN) 2 mg tablet, Take by mouth daily As directed, Disp: , Rfl:     warfarin (COUMADIN) 5 mg tablet, TAKE 5 MG ON TUESDAY, WEDNESDAY , SATURDAY AND 2 5 MG ALL OTHER DAYS, Disp: 30 tablet, Rfl: 4    gabapentin (NEURONTIN) 100 mg capsule, Take 100 mg by mouth 3 (three) times a day , Disp: , Rfl: Allergies   Allergen Reactions    Cortisone     Oxaprozin Hives    Penicillins Hives       Social History   Past Surgical History:   Procedure Laterality Date    KNEE ARTHROSCOPY Bilateral     2008    KNEE ARTHROSCOPY      LUMBAR LAMINECTOMY      5/09    LUMBAR LAMINECTOMY      NEUROPLASTY / TRANSPOSITION MEDIAN NERVE AT CARPAL TUNNEL Bilateral     2011    NEUROPLASTY / TRANSPOSITION MEDIAN NERVE AT CARPAL TUNNEL      TONSILLECTOMY AND ADENOIDECTOMY      TOTAL HIP ARTHROPLASTY      joint replacement; L hip; 2/2/10    TOTAL HIP ARTHROPLASTY       Family History   Problem Relation Age of Onset    Rheumatic fever Mother     Heart disease Father     Crohn's disease Brother         (Granulomatous) Golitis    Psoriasis Daughter     Heart disease Son        Objective:  /88 (BP Location: Left arm, Patient Position: Sitting, Cuff Size: Standard)   Pulse 90   Temp 98 2 °F (36 8 °C) (Temporal)   Ht 5' (1 524 m)   Wt 78 3 kg (172 lb 9 6 oz)   SpO2 97%   BMI 33 71 kg/m²        Physical Exam  Constitutional:       Appearance: She is well-developed  HENT:      Right Ear: External ear normal    Eyes:      Conjunctiva/sclera: Conjunctivae normal       Pupils: Pupils are equal, round, and reactive to light  Neck:      Thyroid: No thyromegaly  Vascular: No JVD  Cardiovascular:      Rate and Rhythm: Normal rate and regular rhythm  Heart sounds: Murmur heard  Systolic murmur is present with a grade of 2/6  Pulmonary:      Breath sounds: Normal breath sounds  Abdominal:      General: Bowel sounds are normal       Palpations: Abdomen is soft  Musculoskeletal:      Right shoulder: Bony tenderness and crepitus present  No swelling, deformity, effusion, laceration or tenderness  Decreased range of motion  Decreased strength  Normal pulse  Left shoulder: Bony tenderness present  No swelling, deformity, effusion, laceration or tenderness  Decreased range of motion  Normal pulse  Cervical back: Normal range of motion  Right knee: Swelling, deformity, effusion and crepitus present  Left knee: Swelling, deformity and crepitus present  Right lower le+ Edema present  Left lower le+ Edema present  Lymphadenopathy:      Cervical: No cervical adenopathy  Skin:     General: Skin is dry  Neurological:      Mental Status: She is alert and oriented to person, place, and time  Deep Tendon Reflexes: Reflexes are normal and symmetric     Psychiatric:         Behavior: Behavior normal

## 2021-07-08 ENCOUNTER — APPOINTMENT (OUTPATIENT)
Dept: LAB | Age: 86
End: 2021-07-08
Payer: MEDICARE

## 2021-07-08 ENCOUNTER — ANTICOAG VISIT (OUTPATIENT)
Dept: INTERNAL MEDICINE CLINIC | Facility: CLINIC | Age: 86
End: 2021-07-08

## 2021-07-08 DIAGNOSIS — E78.00 PURE HYPERCHOLESTEROLEMIA: ICD-10-CM

## 2021-07-08 DIAGNOSIS — I48.91 ATRIAL FIBRILLATION (HCC): ICD-10-CM

## 2021-07-08 LAB
BILIRUB UR QL STRIP: NEGATIVE
CLARITY UR: CLEAR
COLOR UR: YELLOW
GLUCOSE UR STRIP-MCNC: NEGATIVE MG/DL
HGB UR QL STRIP.AUTO: NEGATIVE
KETONES UR STRIP-MCNC: NEGATIVE MG/DL
LEUKOCYTE ESTERASE UR QL STRIP: NEGATIVE
NITRITE UR QL STRIP: NEGATIVE
PH UR STRIP.AUTO: 7 [PH]
PROT UR STRIP-MCNC: NEGATIVE MG/DL
SP GR UR STRIP.AUTO: 1.01 (ref 1–1.03)
UROBILINOGEN UR QL STRIP.AUTO: 0.2 E.U./DL

## 2021-07-08 PROCEDURE — 81003 URINALYSIS AUTO W/O SCOPE: CPT | Performed by: INTERNAL MEDICINE

## 2021-07-09 ENCOUNTER — ANTICOAG VISIT (OUTPATIENT)
Dept: INTERNAL MEDICINE CLINIC | Facility: CLINIC | Age: 86
End: 2021-07-09

## 2021-07-09 NOTE — PROGRESS NOTES
I spoke with the pt, elle been taking Warfarin 5 mg every Tue,Thurs,Sat  and 2 5 mg all other days "for years"  This is not reflected in her anticoag track  I will change her dosing on record    She will continue the same dose and go for INR in 1 month  idris

## 2021-07-19 ENCOUNTER — EVALUATION (OUTPATIENT)
Dept: PHYSICAL THERAPY | Facility: MEDICAL CENTER | Age: 86
End: 2021-07-19
Payer: MEDICARE

## 2021-07-19 DIAGNOSIS — G89.29 CHRONIC RIGHT SHOULDER PAIN: Primary | ICD-10-CM

## 2021-07-19 DIAGNOSIS — M25.511 CHRONIC RIGHT SHOULDER PAIN: Primary | ICD-10-CM

## 2021-07-19 PROCEDURE — 97162 PT EVAL MOD COMPLEX 30 MIN: CPT | Performed by: PHYSICAL THERAPIST

## 2021-07-19 PROCEDURE — 97110 THERAPEUTIC EXERCISES: CPT | Performed by: PHYSICAL THERAPIST

## 2021-07-19 NOTE — PROGRESS NOTES
PT Evaluation  and Discharge    Today's date: 2021  Patient name: Funmilayo House  : 2/3/1927  MRN: [id number]  Referring provider: Maranda Pederson MD  Dx:   Encounter Diagnosis     ICD-10-CM    1  Chronic right shoulder pain  M25 511 Ambulatory referral to Physical Therapy    G89 [id number]                   Assessment  Assessment details: Patient presents with signs and symptoms consistent with referring diagnosis  She presents with shoulder hypomobility  Positive prognostic indicators include: Motivated to improve  Negative prognostic indicators include: Long standing symptoms, wishes to only perform HEP  She presents with: pain, decreased: ROM, strength and  functional capacity  She requires skilled PT to address these deficits and restore maximal functional capacity  While she would benefit from in person skilled PT, she prefers to perform HEP as travel to/from clinic is a challenge for her  Thank you for this pleasant referral       Impairments: abnormal or restricted ROM, activity intolerance, impaired physical strength, lacks appropriate home exercise program and pain with function  Understanding of Dx/Px/POC: good   Prognosis: good    Goals  ST-6 weeks  1  Patient to be independent with HEP  2   Decrease pain at least 2 subjective levels  LT-12 weeks  1    Patient to voice comfort with self management of condition  2   75% or > decreased pain  3   75% or > decreased functional deficits  4   Normalize AROM of all deficit planes  5   Normalize strength  7   Patient to voice understanding of activities/positions to avoid      Plan  Patient would benefit from: skilled PT  Referral necessary: No  Planned modality interventions: cryotherapy  Planned therapy interventions: IADL retraining, joint mobilization, manual therapy, motor coordination training, neuromuscular re-education, patient education, postural training, self care, strengthening, stretching, therapeutic activities, therapeutic exercise, home exercise program, flexibility, ADL training, balance and body mechanics training  Frequency: 1x week  Duration in weeks: 6  Treatment plan discussed with: patient        Subjective Evaluation    History of Present Illness  Onset date: "80 years"  Mechanism of injury: Chief Complaint:  R Shoulder pain, decreased mobility    History: This R handed patient notes a long standing history of R shoulder pain  Symptoms generally are variable  She denies any recent diagnostic testing  Prior treatment has consisted of draining her shoulder  She mentioned her shoulder bothering her at a recent Dr visit and was referred to PT  She wishes to do therapy on her own at home noting traveling to appointments  She is a retired teacher  She enjoys knitting sweaters and reading  PMH: OA throughout body  > KRYSTEN, Lumbar surgery (unsure what), Knee surgery (x2)     Aggravating factors: raising arm into abduction  Relieving factors: heat    Functional Deficits: Washing dishes, reaching overhead, uses rollator for mobility    Patient Goals: Move shoulder better emphasizing HEP    Quality of life: good    Pain  At best pain ratin  At worst pain ratin  Location: R upper arm          Objective     Active Range of Motion     Right Shoulder   Flexion: 90 degrees with pain  Abduction: 85 degrees with pain  External rotation 45°: 30 degrees with pain  Internal rotation BTB: L5 with pain    Passive Range of Motion     Right Shoulder   Flexion: 140 degrees   Abduction: 140 degrees   External rotation 45°: 45 degrees   Internal rotation 45°: 45 degrees     Joint Play     Right Shoulder  Hypomobile in the anterior capsule, posterior capsule, inferior capsule and AC joint       Strength/Myotome Testing     Right Shoulder     Planes of Motion   Flexion: 4+   Extension: 4+   External rotation at 0°: 4+   Internal rotation at 0°: 4+     General Comments:      Shoulder Comments   (-) ERLS  (+) Impingement  / [facility] hypomobility             Precautions: B Knee OA Fall Risk      Manuals 7/19                                                                Neuro Re-Ed                                                                                                        Ther Ex             HEP 10'                                                                                                       Ther Activity                                       Gait Training                                       Modalities

## 2021-08-02 DIAGNOSIS — M17.10 OSTEOARTHRITIS OF KNEE, UNSPECIFIED LATERALITY, UNSPECIFIED OSTEOARTHRITIS TYPE: ICD-10-CM

## 2021-08-02 NOTE — TELEPHONE ENCOUNTER
Pt called office and asked for a refill of:    HYDROcodone-acetaminophen (Qi Remak) 7 5-300 MG per tablet          Send to: [facility] [address] [place] [address]       Pt would like [facility] to deliver it to her, I'm not sure if you are able to tell them that or if pt needs to call and ask them herself  Thank you

## 2021-08-03 RX ORDER — HYDROCODONE BITARTRATE AND ACETAMINOPHEN 7.5; 3 MG/1; MG/1
1 TABLET ORAL EVERY 6 HOURS PRN
Qty: 120 TABLET | Refills: 0 | Status: SHIPPED | OUTPATIENT
Start: 2021-08-03 | End: 2021-09-13 | Stop reason: SDUPTHER

## 2021-08-12 ENCOUNTER — ANTICOAG VISIT (OUTPATIENT)
Dept: INTERNAL MEDICINE CLINIC | Age: 86
End: 2021-08-12

## 2021-08-12 LAB — INR PPP: 2.5 (ref 0.84–1.19)

## 2021-08-26 ENCOUNTER — OFFICE VISIT (OUTPATIENT)
Dept: INTERNAL MEDICINE CLINIC | Age: 86
End: 2021-08-26
Payer: MEDICARE

## 2021-08-26 VITALS
OXYGEN SATURATION: 98 % | BODY MASS INDEX: 34.16 KG/M2 | SYSTOLIC BLOOD PRESSURE: 138 MMHG | HEIGHT: 60 IN | WEIGHT: 174 LBS | TEMPERATURE: 97.9 F | HEART RATE: 78 BPM | DIASTOLIC BLOOD PRESSURE: 76 MMHG

## 2021-08-26 DIAGNOSIS — E03.9 HYPOTHYROIDISM, UNSPECIFIED TYPE: ICD-10-CM

## 2021-08-26 DIAGNOSIS — D75.1 POLYCYTHEMIA: ICD-10-CM

## 2021-08-26 DIAGNOSIS — M54.2 NECK PAIN ON LEFT SIDE: Primary | ICD-10-CM

## 2021-08-26 DIAGNOSIS — N18.31 STAGE 3A CHRONIC KIDNEY DISEASE (HCC): ICD-10-CM

## 2021-08-26 DIAGNOSIS — I48.21 PERMANENT ATRIAL FIBRILLATION (HCC): ICD-10-CM

## 2021-08-26 DIAGNOSIS — I10 BENIGN ESSENTIAL HYPERTENSION: ICD-10-CM

## 2021-08-26 PROCEDURE — 99213 OFFICE O/P EST LOW 20 MIN: CPT | Performed by: INTERNAL MEDICINE

## 2021-08-26 NOTE — PROGRESS NOTES
Assessment/Plan:     Diagnoses and all orders for this visit:    Neck pain on left side  -     Diclofenac Sodium (VOLTAREN) 1 %; Apply 2 g topically 4 (four) times a day  -     XR spine cervical complete 4 or 5 vw non injury; Future    Permanent atrial fibrillation (HCC)  Continue on anticoagulation  Benign essential hypertension  Hypertension is very well  Hypothyroidism, unspecified type  TSH was normal  Stage 3a chronic kidney disease ([address] )  Renal functions were better than before  Polycythemia  Stable polycythemia  Other orders  -     Cancel: DXA bone density spine hip and pelvis; Future             M*Modal software was used to dictate this note  It may contain errors with dictating incorrect words or incorrect spelling  Please contact the provider directly with any questions  Subjective:   Chief Complaint   Patient presents with    Follow-up     6 week follow up         Patient ID: Ryan Burrows is a 80 y o  female  HPI  This 80 years young lady is here today for the regular follow-up she is doing well except for significant problem with the neck pain she had on arthritis everywhere she said that the her neck movements are pretty much limited and the left side she is hurting and the pain is little bit more than usual   No numbness or tingling in the arm no radicular symptoms  Hypertension is very well controlled  Atrial fibrillation with controlled ventricular response on anticoagulation no signs or symptoms of heart failure  Mild polycythemia but now the hemoglobin hematocrit is normal  Mild renal insufficiency but goes with her age her last GFR was 45  Osteoarthritis of the lower back knee  The following portions of the patient's history were reviewed and updated as appropriate: allergies, current medications, past family history, past medical history, past social history, past surgical history and problem list     Review of Systems   Constitutional: Negative for chills and fatigue     HENT: Negative for congestion, ear pain, hearing loss, postnasal drip, sinus pressure, sore throat and voice change  Eyes: Negative for pain, discharge and visual disturbance  Respiratory: Negative for cough, chest tightness and shortness of breath  Cardiovascular: Negative for chest pain, palpitations and leg swelling  Gastrointestinal: Negative for abdominal pain, blood in stool, diarrhea, nausea and rectal pain  Genitourinary: Negative for difficulty urinating, dysuria and urgency  Musculoskeletal: Positive for back pain and neck pain (Limited neck movements)  Negative for arthralgias and joint swelling  Shoulder pain    Skin: Negative for rash  Allergic/Immunologic: Negative for environmental allergies and food allergies  Neurological: Negative for dizziness, tremors, weakness, numbness and headaches  Hematological: Negative for adenopathy  Psychiatric/Behavioral: Negative for behavioral problems and hallucinations           Past Medical History:   Diagnosis Date    , spontaneous complete     Carcinoma of skin     Cataract     last assessed: 17    Cataract     Cellulitis     Effusion of both knee joints     resolved: 3/25/15    Effusion of both knee joints     Enteritis     Female stress incontinence     last assessed: 13    Female stress incontinence     Gastric ulcer     last assessed: 14    Gastric ulcer     GERD (gastroesophageal reflux disease)     Hyperlipidemia     Hypertension     Hyperthyroidism     Lyme disease     last assessed: 13    Lyme disease     Microscopic hematuria     last assessed: 13    Microscopic hematuria     Normal delivery     1950 son, 1952 son, 12 daughter, 26 daughter, 65 daughter    Paroxysmal atrial fibrillation ([facility] )     last assessed: 13    Paroxysmal atrial fibrillation ([facility] )     Perirectal abscess     last assessed: 13    Platelet dysfunction ([facility])     PAF    Platelet dysfunction ([facility] )     Sinus tachycardia     last assessed: 9/26/13    Sinus tachycardia     Spinal stenosis     lumbar; last assessed: 10/4/13    Stage 3 chronic kidney disease ([facility] ) 6/3/2019    Stage 3 chronic kidney disease (HCC)     Tachycardia     unspecified; last assessed: 9/26/13    Trigger finger, acquired     last assessed: 6/30/15    Trigger finger, acquired          Current Outpatient Medications:     ergocalciferol (VITAMIN D2) 50,000 units, Take 1 capsule (50,000 Units total) by mouth once a week, Disp: 8 capsule, Rfl: 0    gabapentin (NEURONTIN) 100 mg capsule, Take 100 mg by mouth 3 (three) times a day , Disp: , Rfl:     HYDROcodone-acetaminophen (XODOL) 7 5-300 MG per tablet, Take 1 tablet by mouth every 6 (six) hours as needed for severe painMax Daily Amount: 4 tablets, Disp: 120 tablet, Rfl: 0    levothyroxine 50 mcg tablet, Take 1 tablet (50 mcg total) by mouth daily, Disp: 90 tablet, Rfl: 1    losartan (COZAAR) 100 MG tablet, Take 1 tablet (100 mg total) by mouth daily, Disp: 90 tablet, Rfl: 1    metoprolol tartrate (LOPRESSOR) 100 mg tablet, Take 1 tablet (100 mg total) by mouth 2 (two) times a day, Disp: 180 tablet, Rfl: 1    ondansetron (ZOFRAN) 4 mg tablet, Take 1 tablet (4 mg total) by mouth every 6 (six) hours as needed for nausea or vomiting, Disp: 20 tablet, Rfl: 1    polyethylene glycol (MIRALAX) 17 g packet, Take 17 g by mouth daily, Disp: , Rfl:     senna-docusate sodium (SENOKOT S) 8 6-50 mg per tablet, Take 1 tablet by mouth as needed for constipation, Disp: , Rfl:     simvastatin (ZOCOR) 20 mg tablet, Take 1 tablet (20 mg total) by mouth daily at bedtime, Disp: 90 tablet, Rfl: 1    warfarin (COUMADIN) 2 mg tablet, Take by mouth daily As directed, Disp: , Rfl:     warfarin (COUMADIN) 5 mg tablet, TAKE 5 MG ON TUESDAY, WEDNESDAY , SATURDAY AND 2 5 MG ALL OTHER DAYS, Disp: 30 tablet, Rfl: 4    Allergies   Allergen Reactions    Cortisone     Oxaprozin Hives    Penicillins Hives Social History   Past Surgical History:   Procedure Laterality Date    KNEE ARTHROSCOPY Bilateral     2008    KNEE ARTHROSCOPY      LUMBAR LAMINECTOMY      5/09    LUMBAR LAMINECTOMY      NEUROPLASTY / TRANSPOSITION MEDIAN NERVE AT CARPAL TUNNEL Bilateral     2011    NEUROPLASTY / TRANSPOSITION MEDIAN NERVE AT CARPAL TUNNEL      TONSILLECTOMY AND ADENOIDECTOMY      TOTAL HIP ARTHROPLASTY      joint replacement; L hip; 2/2/10    TOTAL HIP ARTHROPLASTY       Family History   Problem Relation Age of Onset    Rheumatic fever Mother     Heart disease Father     Crohn's disease Brother         (Granulomatous) Golitis    Psoriasis Daughter     Heart disease Son        Objective:  /76 (BP Location: Left arm, Patient Position: Sitting, Cuff Size: Adult)   Pulse 78   Temp 97 9 °F (36 6 °C) (Temporal)   Ht 5' (1 524 m)   Wt 78 9 kg (174 lb)   SpO2 98%   BMI 33 98 kg/m²        Physical Exam  Constitutional:       Appearance: She is well-developed  She is obese  HENT:      Right Ear: External ear normal    Eyes:      Conjunctiva/sclera: Conjunctivae normal       Pupils: Pupils are equal, round, and reactive to light  Neck:      Thyroid: No thyromegaly  Vascular: No JVD  Cardiovascular:      Rate and Rhythm: Normal rate and regular rhythm  Heart sounds: Normal heart sounds  Pulmonary:      Breath sounds: Normal breath sounds  Abdominal:      General: Bowel sounds are normal       Palpations: Abdomen is soft  Musculoskeletal:      Cervical back: Edema, erythema, spasms, tenderness (Left occipital tubercle), bony tenderness and crepitus present  No lacerations, rigidity or torticollis  Pain with movement present  Decreased range of motion  Lymphadenopathy:      Cervical: No cervical adenopathy  Skin:     General: Skin is dry  Neurological:      General: No focal deficit present  Mental Status: She is alert and oriented to person, place, and time   Mental status is at baseline  Deep Tendon Reflexes: Reflexes are normal and symmetric     Psychiatric:         Behavior: Behavior normal

## 2021-09-13 DIAGNOSIS — M17.10 OSTEOARTHRITIS OF KNEE, UNSPECIFIED LATERALITY, UNSPECIFIED OSTEOARTHRITIS TYPE: ICD-10-CM

## 2021-09-13 RX ORDER — HYDROCODONE BITARTRATE AND ACETAMINOPHEN 7.5; 3 MG/1; MG/1
1 TABLET ORAL EVERY 6 HOURS PRN
Qty: 120 TABLET | Refills: 0 | Status: SHIPPED | OUTPATIENT
Start: 2021-09-13 | End: 2021-10-26 | Stop reason: SDUPTHER

## 2021-09-16 ENCOUNTER — ANTICOAG VISIT (OUTPATIENT)
Dept: INTERNAL MEDICINE CLINIC | Facility: CLINIC | Age: 86
End: 2021-09-16

## 2021-09-16 ENCOUNTER — APPOINTMENT (OUTPATIENT)
Dept: RADIOLOGY | Facility: MEDICAL CENTER | Age: 86
End: 2021-09-16
Payer: MEDICARE

## 2021-09-16 DIAGNOSIS — M25.511 CHRONIC RIGHT SHOULDER PAIN: ICD-10-CM

## 2021-09-16 DIAGNOSIS — G89.29 CHRONIC RIGHT SHOULDER PAIN: ICD-10-CM

## 2021-09-16 DIAGNOSIS — M54.2 NECK PAIN ON LEFT SIDE: ICD-10-CM

## 2021-09-16 LAB — INR PPP: 3.1 (ref 0.84–1.19)

## 2021-09-16 PROCEDURE — 72050 X-RAY EXAM NECK SPINE 4/5VWS: CPT

## 2021-09-16 PROCEDURE — 73030 X-RAY EXAM OF SHOULDER: CPT

## 2021-09-20 ENCOUNTER — OFFICE VISIT (OUTPATIENT)
Dept: INTERNAL MEDICINE CLINIC | Age: 86
End: 2021-09-20
Payer: MEDICARE

## 2021-09-20 VITALS
DIASTOLIC BLOOD PRESSURE: 82 MMHG | TEMPERATURE: 98.4 F | HEART RATE: 73 BPM | WEIGHT: 173.5 LBS | SYSTOLIC BLOOD PRESSURE: 162 MMHG | OXYGEN SATURATION: 96 % | BODY MASS INDEX: 34.06 KG/M2 | HEIGHT: 60 IN

## 2021-09-20 DIAGNOSIS — M54.2 CERVICALGIA: ICD-10-CM

## 2021-09-20 DIAGNOSIS — M54.2 NECK PAIN: Primary | Chronic | ICD-10-CM

## 2021-09-20 PROCEDURE — 99213 OFFICE O/P EST LOW 20 MIN: CPT | Performed by: STUDENT IN AN ORGANIZED HEALTH CARE EDUCATION/TRAINING PROGRAM

## 2021-09-20 NOTE — PROGRESS NOTES
ASSESSMENT/PLAN:    Case and plan discussed with Dr Christopher Hardwick    Diagnoses and all orders for this visit:    Neck pain  - Continue Xodol 7 5 -300 mg as needed  - Will refer to [facility] for evaluation for possible cervical spine injection to minimize use of opiates considering her age and potential risk for falls  -     Ambulatory Referral to [facility]; Future    Cervicalgia  -     Ambulatory Referral to [facility]; Future        Immunization History   Administered Date(s) Administered    Pneumococcal Conjugate 13-Valent 10/01/2018    Pneumococcal Polysaccharide PPV23 07/09/2013    SARS-CoV-2 / COVID-19 mRNA IM (Pfizer-BioNTech) 03/08/2021, 04/01/2021    Zoster 07/12/2013         HISTORY OF PRESENT ILLNESS:    Presents for f/u on neck pain  Last visit on 8/26  Reports neck pain is slightly better, depends on how she lays in bed  Limited neck AROM Left >> right  Shoulder pain has improved  Xray shows degenerative changes in multiple levels  She has been taking Xodol 7 5 -300 mg 2-3 times day  No other complains today  We spoke about 3rd dose of Covid vaccine, She is amenable to it  Review of Systems   Constitutional: Negative for activity change, chills, diaphoresis, fatigue and fever  HENT: Negative  Negative for rhinorrhea, sinus pain, sneezing and sore throat  Eyes: Negative  Negative for visual disturbance  Respiratory: Negative for cough, choking, chest tightness, shortness of breath and wheezing  Cardiovascular: Negative for chest pain, palpitations and leg swelling  Gastrointestinal: Negative for abdominal distention, abdominal pain, constipation, diarrhea, nausea and vomiting  Endocrine: Negative  Genitourinary: Negative  Negative for difficulty urinating, dysuria and urgency  Musculoskeletal: Positive for neck pain  Decreased ROM neck rotation   Skin: Negative  Negative for rash and wound     Neurological: Negative for dizziness, tremors, weakness, light-headedness, numbness and headaches  Psychiatric/Behavioral: Negative for confusion and sleep disturbance  OBJECTIVE:  Vitals:    09/20/21 1424   BP: 162/82   BP Location: Left arm   Patient Position: Sitting   Cuff Size: Adult   Pulse: 73   Temp: 98 4 °F (36 9 °C)   TempSrc: Temporal   SpO2: 96%   Weight: 78 7 kg (173 lb 8 oz)   Height: 5' (1 524 m)       Physical Exam:   GENERAL: NAD, Normal appearance  Non diaphoretic, non-toxic, not ill-appearing, well-developed, well-nourished  NEUROLOGIC:  Alert/oriented x3  No motor or sensory deficits  HEENT:  NC/AT, PERRL, EOMI, MMM, no scleral icterus  Limited AROM neck rotation to L >> R    CARDIAC:  RRR, +S1/S2, no S3/S4 heard, no m/g/r  PULMONARY:  non-labored breathing, CTA B/L, no wheezing/rales/rhonci appreciated at time of encounter  ABDOMEN:  Soft, NT/ND, +BS, no rebound/guarding/rigidity  Extremities:  2+ Pulses in DP/PT   No edema, cyanosis, or clubbing  SKIN:  No rashes or erythema        Current Outpatient Medications:     Diclofenac Sodium (VOLTAREN) 1 %, Apply 2 g topically 4 (four) times a day, Disp: 150 g, Rfl: 1    ergocalciferol (VITAMIN D2) 50,000 units, Take 1 capsule (50,000 Units total) by mouth once a week, Disp: 8 capsule, Rfl: 0    gabapentin (NEURONTIN) 100 mg capsule, Take 100 mg by mouth 3 (three) times a day , Disp: , Rfl:     HYDROcodone-acetaminophen (XODOL) 7 5-300 MG per tablet, Take 1 tablet by mouth every 6 (six) hours as needed for severe painMax Daily Amount: 4 tablets, Disp: 120 tablet, Rfl: 0    levothyroxine 50 mcg tablet, Take 1 tablet (50 mcg total) by mouth daily, Disp: 90 tablet, Rfl: 1    losartan (COZAAR) 100 MG tablet, Take 1 tablet (100 mg total) by mouth daily, Disp: 90 tablet, Rfl: 1    metoprolol tartrate (LOPRESSOR) 100 mg tablet, Take 1 tablet (100 mg total) by mouth 2 (two) times a day, Disp: 180 tablet, Rfl: 1    ondansetron (ZOFRAN) 4 mg tablet, Take 1 tablet (4 mg total) by mouth every 6 (six) hours as needed for nausea or vomiting, Disp: 20 tablet, Rfl: 1    polyethylene glycol (MIRALAX) 17 g packet, Take 17 g by mouth daily, Disp: , Rfl:     senna-docusate sodium (SENOKOT S) 8 6-50 mg per tablet, Take 1 tablet by mouth as needed for constipation, Disp: , Rfl:     simvastatin (ZOCOR) 20 mg tablet, Take 1 tablet (20 mg total) by mouth daily at bedtime, Disp: 90 tablet, Rfl: 1    warfarin (COUMADIN) 2 mg tablet, Take by mouth daily As directed, Disp: , Rfl:     warfarin (COUMADIN) 5 mg tablet, TAKE 5 MG ON TUESDAY, WEDNESDAY , SATURDAY AND 2 5 MG ALL OTHER DAYS, Disp: 30 tablet, Rfl: 4    Past Medical History:   Diagnosis Date    , spontaneous complete     Carcinoma of skin     Cataract     last assessed: 17    Cataract     Cellulitis     Effusion of both knee joints     resolved: 3/25/15    Effusion of both knee joints     Enteritis     Female stress incontinence     last assessed: 13    Female stress incontinence     Gastric ulcer     last assessed: 14    Gastric ulcer     GERD (gastroesophageal reflux disease)     Hyperlipidemia     Hypertension     Hyperthyroidism     Lyme disease     last assessed: 13    Lyme disease     Microscopic hematuria     last assessed: 13    Microscopic hematuria     Normal delivery     1950 son, 1952 son, 12 daughter, 26 daughter, 65 daughter    Paroxysmal atrial fibrillation ([address] )     last assessed: 13    Paroxysmal atrial fibrillation ([address] )     Perirectal abscess     last assessed: 13    Platelet dysfunction (HCC)     PAF    Platelet dysfunction (HCC)     Sinus tachycardia     last assessed: 13    Sinus tachycardia     Spinal stenosis     lumbar; last assessed: 10/4/13    Stage 3 chronic kidney disease ([address] ) 6/3/2019    Stage 3 chronic kidney disease ([address] )     Tachycardia     unspecified; last assessed: 13    Trigger finger, acquired     last assessed: 6/30/15    Trigger finger, acquired      Past Surgical History:   Procedure Laterality Date    KNEE ARTHROSCOPY Bilateral     2008    KNEE ARTHROSCOPY      LUMBAR LAMINECTOMY      5/09    LUMBAR LAMINECTOMY      NEUROPLASTY / TRANSPOSITION MEDIAN NERVE AT CARPAL TUNNEL Bilateral     2011    NEUROPLASTY / TRANSPOSITION MEDIAN NERVE AT CARPAL TUNNEL      TONSILLECTOMY AND ADENOIDECTOMY      TOTAL HIP ARTHROPLASTY      joint replacement; L hip; 2/2/10    TOTAL HIP ARTHROPLASTY       Family History   Problem Relation Age of Onset    Rheumatic fever Mother     Heart disease Father     Crohn's disease Brother         (Granulomatous) Golitis    Psoriasis Daughter     Heart disease Son      Social History     Socioeconomic History    Marital status:      Spouse name: Not on file    Number of children: Not on file    Years of education: Not on file    Highest education level: Not on file   Occupational History    Occupation: Retired teacher   Tobacco Use    Smoking status: Never Smoker    Smokeless tobacco: Never Used   Vaping Use    Vaping Use: Never used   Substance and Sexual Activity    Alcohol use: Yes     Comment: rare on special occassions only    Drug use: Never    Sexual activity: Not on file   Other Topics Concern    Not on file   Social History Narrative    ** Merged History Encounter **         Caffeine use: 2 servings of coffee a day         Social Determinants of Health     Financial Resource Strain:     Difficulty of Paying Living Expenses:    Food Insecurity:     Worried About Running Out of Food in the Last Year:     [address] in the Last Year:    Transportation Needs:     Lack of Transportation (Medical):      Lack of Transportation (Non-Medical):    Physical Activity:     Days of Exercise per Week:     Minutes of Exercise per Session:    Stress:     Feeling of Stress :    Social Connections:     Frequency of Communication with Friends and Family:     Frequency of Social Gatherings with Friends and Family:     Attends Religion Services:     Active Member of Clubs or Organizations:     Attends Club or Organization Meetings:     Marital Status:    Intimate Partner Violence:     Fear of Current or Ex-Partner:     Emotionally Abused:     Physically Abused:     Sexually Abused:      Social History     Tobacco Use   Smoking Status Never Smoker   Smokeless Tobacco Never Used     Social History     Substance and Sexual Activity   Alcohol Use Yes    Comment: rare on special occassions only     Social History     Substance and Sexual Activity   Drug Use Never         Piper Ojeda MD  Internal Medicine Residency, PGY-3  [facility]

## 2021-09-30 ENCOUNTER — TELEPHONE (OUTPATIENT)
Dept: PHYSICAL THERAPY | Facility: OTHER | Age: 86
End: 2021-09-30

## 2021-09-30 NOTE — TELEPHONE ENCOUNTER
Nurse reached out to discuss recent referral entered for  [facility] program and offerings  Patient declined to participate in the program at this time  Patient is not interested in S&P or PT eval  Nurse respected pts decision and let her know she can call CS back if she changes her mind- agreed  Nurse confirmed patient has [facility] contact information and encouraged to call program back if needed in the future  Patient agreed and very appreciative of call and discussion  Nurse wished her well and referral closed per protocol

## 2021-10-14 ENCOUNTER — ANTICOAG VISIT (OUTPATIENT)
Dept: INTERNAL MEDICINE CLINIC | Age: 86
End: 2021-10-14

## 2021-10-14 LAB — INR PPP: 2.1 (ref 0.84–1.19)

## 2021-10-26 DIAGNOSIS — M17.10 OSTEOARTHRITIS OF KNEE, UNSPECIFIED LATERALITY, UNSPECIFIED OSTEOARTHRITIS TYPE: ICD-10-CM

## 2021-10-26 DIAGNOSIS — I10 HYPERTENSION, UNSPECIFIED TYPE: ICD-10-CM

## 2021-10-26 DIAGNOSIS — E03.9 HYPOTHYROIDISM, UNSPECIFIED TYPE: ICD-10-CM

## 2021-10-26 DIAGNOSIS — E78.00 HYPERCHOLESTEROLEMIA: ICD-10-CM

## 2021-10-26 RX ORDER — HYDROCODONE BITARTRATE AND ACETAMINOPHEN 7.5; 3 MG/1; MG/1
1 TABLET ORAL EVERY 6 HOURS PRN
Qty: 120 TABLET | Refills: 0 | Status: SHIPPED | OUTPATIENT
Start: 2021-10-26 | End: 2021-12-08 | Stop reason: SDUPTHER

## 2021-10-26 RX ORDER — LOSARTAN POTASSIUM 100 MG/1
100 TABLET ORAL DAILY
Qty: 90 TABLET | Refills: 1 | Status: SHIPPED | OUTPATIENT
Start: 2021-10-26 | End: 2022-04-11 | Stop reason: SDUPTHER

## 2021-10-26 RX ORDER — SIMVASTATIN 20 MG
20 TABLET ORAL
Qty: 90 TABLET | Refills: 1 | Status: SHIPPED | OUTPATIENT
Start: 2021-10-26 | End: 2022-04-11 | Stop reason: SDUPTHER

## 2021-10-26 RX ORDER — LEVOTHYROXINE SODIUM 0.05 MG/1
50 TABLET ORAL DAILY
Qty: 90 TABLET | Refills: 1 | Status: SHIPPED | OUTPATIENT
Start: 2021-10-26 | End: 2022-04-11 | Stop reason: SDUPTHER

## 2021-11-15 ENCOUNTER — ANTICOAG VISIT (OUTPATIENT)
Dept: INTERNAL MEDICINE CLINIC | Age: 86
End: 2021-11-15

## 2021-11-15 LAB — INR PPP: 2 (ref 0.84–1.19)

## 2021-11-30 ENCOUNTER — ANTICOAG VISIT (OUTPATIENT)
Dept: INTERNAL MEDICINE CLINIC | Age: 86
End: 2021-11-30

## 2021-11-30 LAB — INR PPP: 2.6 (ref 0.84–1.19)

## 2021-12-08 DIAGNOSIS — M17.10 OSTEOARTHRITIS OF KNEE, UNSPECIFIED LATERALITY, UNSPECIFIED OSTEOARTHRITIS TYPE: ICD-10-CM

## 2021-12-08 RX ORDER — HYDROCODONE BITARTRATE AND ACETAMINOPHEN 7.5; 3 MG/1; MG/1
1 TABLET ORAL EVERY 6 HOURS PRN
Qty: 120 TABLET | Refills: 0 | Status: SHIPPED | OUTPATIENT
Start: 2021-12-08 | End: 2022-01-10 | Stop reason: SDUPTHER

## 2021-12-10 ENCOUNTER — OFFICE VISIT (OUTPATIENT)
Dept: OBGYN CLINIC | Facility: MEDICAL CENTER | Age: 86
End: 2021-12-10
Payer: MEDICARE

## 2021-12-10 VITALS
HEIGHT: 60 IN | SYSTOLIC BLOOD PRESSURE: 160 MMHG | BODY MASS INDEX: 33.38 KG/M2 | DIASTOLIC BLOOD PRESSURE: 87 MMHG | HEART RATE: 87 BPM | WEIGHT: 170 LBS

## 2021-12-10 DIAGNOSIS — M17.0 PRIMARY OSTEOARTHRITIS OF BOTH KNEES: Primary | ICD-10-CM

## 2021-12-10 DIAGNOSIS — M25.462 EFFUSION OF LEFT KNEE: ICD-10-CM

## 2021-12-10 PROCEDURE — 99213 OFFICE O/P EST LOW 20 MIN: CPT | Performed by: ORTHOPAEDIC SURGERY

## 2021-12-10 PROCEDURE — 20610 DRAIN/INJ JOINT/BURSA W/O US: CPT | Performed by: ORTHOPAEDIC SURGERY

## 2021-12-10 RX ORDER — LIDOCAINE HYDROCHLORIDE 10 MG/ML
3 INJECTION, SOLUTION INFILTRATION; PERINEURAL
Status: COMPLETED | OUTPATIENT
Start: 2021-12-10 | End: 2021-12-10

## 2021-12-10 RX ORDER — BUPIVACAINE HYDROCHLORIDE 2.5 MG/ML
2 INJECTION, SOLUTION INFILTRATION; PERINEURAL
Status: COMPLETED | OUTPATIENT
Start: 2021-12-10 | End: 2021-12-10

## 2021-12-10 RX ORDER — LIDOCAINE HYDROCHLORIDE 10 MG/ML
2 INJECTION, SOLUTION INFILTRATION; PERINEURAL
Status: COMPLETED | OUTPATIENT
Start: 2021-12-10 | End: 2021-12-10

## 2021-12-10 RX ORDER — BETAMETHASONE SODIUM PHOSPHATE AND BETAMETHASONE ACETATE 3; 3 MG/ML; MG/ML
12 INJECTION, SUSPENSION INTRA-ARTICULAR; INTRALESIONAL; INTRAMUSCULAR; SOFT TISSUE
Status: COMPLETED | OUTPATIENT
Start: 2021-12-10 | End: 2021-12-10

## 2021-12-10 RX ADMIN — BETAMETHASONE SODIUM PHOSPHATE AND BETAMETHASONE ACETATE 12 MG: 3; 3 INJECTION, SUSPENSION INTRA-ARTICULAR; INTRALESIONAL; INTRAMUSCULAR; SOFT TISSUE at 14:45

## 2021-12-10 RX ADMIN — LIDOCAINE HYDROCHLORIDE 2 ML: 10 INJECTION, SOLUTION INFILTRATION; PERINEURAL at 14:45

## 2021-12-10 RX ADMIN — BUPIVACAINE HYDROCHLORIDE 2 ML: 2.5 INJECTION, SOLUTION INFILTRATION; PERINEURAL at 14:45

## 2021-12-10 RX ADMIN — LIDOCAINE HYDROCHLORIDE 3 ML: 10 INJECTION, SOLUTION INFILTRATION; PERINEURAL at 14:45

## 2022-01-04 ENCOUNTER — TELEPHONE (OUTPATIENT)
Dept: INTERNAL MEDICINE CLINIC | Age: 87
End: 2022-01-04

## 2022-01-04 DIAGNOSIS — I48.21 PERMANENT ATRIAL FIBRILLATION (HCC): Primary | ICD-10-CM

## 2022-01-04 NOTE — TELEPHONE ENCOUNTER
Patient needs PT/INR slip    Patient has an appt on 01/10/2022 for 3 month check and an AWV does patient need other labwork

## 2022-01-05 NOTE — TELEPHONE ENCOUNTER
Order for PT/INR placed for the patient and faxed to [facility] per the patient request     No new orders in chart for the patient to have prior to office visit

## 2022-01-10 ENCOUNTER — TELEMEDICINE (OUTPATIENT)
Dept: INTERNAL MEDICINE CLINIC | Age: 87
End: 2022-01-10
Payer: MEDICARE

## 2022-01-10 DIAGNOSIS — E03.9 HYPOTHYROIDISM, UNSPECIFIED TYPE: ICD-10-CM

## 2022-01-10 DIAGNOSIS — I48.21 PERMANENT ATRIAL FIBRILLATION (HCC): ICD-10-CM

## 2022-01-10 DIAGNOSIS — D75.1 POLYCYTHEMIA: ICD-10-CM

## 2022-01-10 DIAGNOSIS — M17.10 OSTEOARTHRITIS OF KNEE, UNSPECIFIED LATERALITY, UNSPECIFIED OSTEOARTHRITIS TYPE: ICD-10-CM

## 2022-01-10 DIAGNOSIS — E55.9 VITAMIN D DEFICIENCY: Primary | ICD-10-CM

## 2022-01-10 DIAGNOSIS — I10 BENIGN ESSENTIAL HYPERTENSION: ICD-10-CM

## 2022-01-10 DIAGNOSIS — G89.29 CHRONIC PAIN OF LEFT KNEE: ICD-10-CM

## 2022-01-10 DIAGNOSIS — M25.562 CHRONIC PAIN OF LEFT KNEE: ICD-10-CM

## 2022-01-10 DIAGNOSIS — Z13.820 SCREENING FOR OSTEOPOROSIS: ICD-10-CM

## 2022-01-10 PROCEDURE — 99213 OFFICE O/P EST LOW 20 MIN: CPT | Performed by: INTERNAL MEDICINE

## 2022-01-10 PROCEDURE — G0439 PPPS, SUBSEQ VISIT: HCPCS | Performed by: INTERNAL MEDICINE

## 2022-01-10 RX ORDER — HYDROCODONE BITARTRATE AND ACETAMINOPHEN 7.5; 3 MG/1; MG/1
1 TABLET ORAL EVERY 6 HOURS PRN
Qty: 120 TABLET | Refills: 0 | Status: SHIPPED | OUTPATIENT
Start: 2022-01-10 | End: 2022-02-16 | Stop reason: SDUPTHER

## 2022-01-10 NOTE — PROGRESS NOTES
Assessment and Plan:     Problem List Items Addressed This Visit     None      Visit Diagnoses     Vitamin D deficiency    -  Primary    Screening for osteoporosis            BMI Counseling: There is no height or weight on file to calculate BMI  The BMI is above normal  Nutrition recommendations include decreasing portion sizes, encouraging healthy choices of fruits and vegetables and moderation in carbohydrate intake  Exercise recommendations include moderate physical activity 150 minutes/week  Rationale for BMI follow-up plan is due to patient being overweight or obese  Depression Screening and Follow-up Plan: Patient was screened for depression during today's encounter  They screened negative with a PHQ-2 score of 0  Preventive health issues were discussed with patient, and age appropriate screening tests were ordered as noted in patient's After Visit Summary  Personalized health advice and appropriate referrals for health education or preventive services given if needed, as noted in patient's After Visit Summary       History of Present Illness:     Patient presents for Medicare Annual Wellness visit    Patient Care Team:  Tatum Randall MD as PCP - General (Internal Medicine)  Orquidea Paulino MD (Ophthalmology)     Problem List:     Patient Active Problem List   Diagnosis    Atrial fibrillation ([address] )    Benign essential hypertension    Hypercholesterolemia    Hypothyroidism    Bilateral primary osteoarthritis of knee    Bladder prolapse, female, acquired    Loss of bladder control    Osteoarthrosis of knee    Urinary incontinence    Chronic pain of left knee    Effusion of right knee    Stage 3 chronic kidney disease ([address] )    Closed head injury    Unwitnessed fall    Neck pain    Chronic low back pain    Polycythemia      Past Medical and Surgical History:     Past Medical History:   Diagnosis Date    , spontaneous complete     Carcinoma of skin     Cataract     last assessed: 8/1/17    Cataract     Cellulitis     Effusion of both knee joints     resolved: 3/25/15    Effusion of both knee joints     Enteritis     Female stress incontinence     last assessed: 9/26/13    Female stress incontinence     Gastric ulcer     last assessed: 1/30/14    Gastric ulcer     GERD (gastroesophageal reflux disease)     Hyperlipidemia     Hypertension     Hyperthyroidism     Lyme disease     last assessed: 9/26/13    Lyme disease     Microscopic hematuria     last assessed: 9/26/13    Microscopic hematuria     Normal delivery     1950 son, 1952 son, 12 daughter, 26 daughter, 65 daughter    Paroxysmal atrial fibrillation ([address] )     last assessed: 9/26/13    Paroxysmal atrial fibrillation ([address] )     Perirectal abscess     last assessed: 9/26/13    Platelet dysfunction (HCC)     PAF    Platelet dysfunction (HCC)     Sinus tachycardia     last assessed: 9/26/13    Sinus tachycardia     Spinal stenosis     lumbar; last assessed: 10/4/13    Stage 3 chronic kidney disease ([address] ) 6/3/2019    Stage 3 chronic kidney disease ([address] )     Tachycardia     unspecified; last assessed: 9/26/13    Trigger finger, acquired     last assessed: 6/30/15    Trigger finger, acquired      Past Surgical History:   Procedure Laterality Date    KNEE ARTHROSCOPY Bilateral     2008    KNEE ARTHROSCOPY      LUMBAR LAMINECTOMY      5/09    LUMBAR LAMINECTOMY      NEUROPLASTY / TRANSPOSITION MEDIAN NERVE AT CARPAL TUNNEL Bilateral     2011    NEUROPLASTY / TRANSPOSITION MEDIAN NERVE AT CARPAL TUNNEL      TONSILLECTOMY AND ADENOIDECTOMY      TOTAL HIP ARTHROPLASTY      joint replacement; L hip; 2/2/10    TOTAL HIP ARTHROPLASTY        Family History:     Family History   Problem Relation Age of Onset    Rheumatic fever Mother     Heart disease Father     Crohn's disease Brother         (Granulomatous) Golitis    Psoriasis Daughter     Heart disease Son       Social History:     Social History     Socioeconomic History    Marital status:       Spouse name: Not on file    Number of children: Not on file    Years of education: Not on file    Highest education level: Not on file   Occupational History    Occupation: Retired teacher   Tobacco Use    Smoking status: Never Smoker    Smokeless tobacco: Never Used   Vaping Use    Vaping Use: Never used   Substance and Sexual Activity    Alcohol use: Yes     Comment: rare on special occassions only    Drug use: Never    Sexual activity: Not on file   Other Topics Concern    Not on file   Social History Narrative    ** Merged History Encounter **         Caffeine use: 2 servings of coffee a day         Social Determinants of Health     Financial Resource Strain: Not on file   Food Insecurity: Not on file   Transportation Needs: Not on file   Physical Activity: Not on file   Stress: Not on file   Social Connections: Not on file   Intimate Partner Violence: Not on file   Housing Stability: Not on file      Medications and Allergies:     Current Outpatient Medications   Medication Sig Dispense Refill    Diclofenac Sodium (VOLTAREN) 1 % Apply 2 g topically 4 (four) times a day 150 g 1    ergocalciferol (VITAMIN D2) 50,000 units Take 1 capsule (50,000 Units total) by mouth once a week 8 capsule 0    gabapentin (NEURONTIN) 100 mg capsule Take 100 mg by mouth 3 (three) times a day       HYDROcodone-acetaminophen (XODOL) 7 5-300 MG per tablet Take 1 tablet by mouth every 6 (six) hours as needed for severe pain Max Daily Amount: 4 tablets 120 tablet 0    levothyroxine 50 mcg tablet Take 1 tablet (50 mcg total) by mouth daily 90 tablet 1    losartan (COZAAR) 100 MG tablet Take 1 tablet (100 mg total) by mouth daily 90 tablet 1    metoprolol tartrate (LOPRESSOR) 100 mg tablet Take 1 tablet (100 mg total) by mouth 2 (two) times a day 180 tablet 1    ondansetron (ZOFRAN) 4 mg tablet Take 1 tablet (4 mg total) by mouth every 6 (six) hours as needed for nausea or vomiting 20 tablet 1    polyethylene glycol (MIRALAX) 17 g packet Take 17 g by mouth daily      senna-docusate sodium (SENOKOT S) 8 6-50 mg per tablet Take 1 tablet by mouth as needed for constipation      simvastatin (ZOCOR) 20 mg tablet Take 1 tablet (20 mg total) by mouth daily at bedtime 90 tablet 1    warfarin (COUMADIN) 2 mg tablet Take by mouth daily As directed      warfarin (COUMADIN) 5 mg tablet TAKE 5 MG ON TUESDAY, WEDNESDAY , SATURDAY AND 2 5 MG ALL OTHER DAYS 30 tablet 4     No current facility-administered medications for this visit  Allergies   Allergen Reactions    Cortisone     Oxaprozin Hives    Penicillins Hives      Immunizations:     Immunization History   Administered Date(s) Administered    COVID-19 PFIZER VACCINE 0 3 ML IM 03/08/2021, 04/01/2021    Pneumococcal Conjugate 13-Valent 10/01/2018    Pneumococcal Polysaccharide PPV23 07/09/2013    Zoster 07/12/2013      Health Maintenance:         Topic Date Due    DXA SCAN  03/25/2021         Topic Date Due    DTaP,Tdap,and Td Vaccines (1 - Tdap) Never done    Influenza Vaccine (1) Never done    COVID-19 Vaccine (3 - Booster for Pfizer series) 10/01/2021      Medicare Health Risk Assessment:     There were no vitals taken for this visit  Tricia Bowers is here for her Subsequent Wellness visit  Last Medicare Wellness visit information reviewed, patient interviewed and updates made to the record today  Health Risk Assessment:   Patient rates overall health as good  Patient feels that their physical health rating is same  Patient is very satisfied with their life  Eyesight was rated as same  Hearing was rated as slightly worse  Patient feels that their emotional and mental health rating is same  Patients states they are never, rarely angry  Patient states they are sometimes unusually tired/fatigued  Pain experienced in the last 7 days has been none   Patient states that she has experienced no weight loss or gain in last 6 months  Depression Screening:   PHQ-2 Score: 0      Fall Risk Screening: In the past year, patient has experienced: no history of falling in past year      Urinary Incontinence Screening:   Patient has leaked urine accidently in the last six months  Home Safety:  Patient does not have trouble with stairs inside or outside of their home  Patient has no working smoke alarms and has no working carbon monoxide detector  Home safety hazards include: none  Nutrition:   Current diet is Regular  Medications:   Patient is not currently taking any over-the-counter supplements  Patient is able to manage medications  Activities of Daily Living (ADLs)/Instrumental Activities of Daily Living (IADLs):   Walk and transfer into and out of bed and chair?: Yes  Dress and groom yourself?: Yes    Bathe or shower yourself?: Yes    Feed yourself?  Yes  Do your laundry/housekeeping?: No  Manage your money, pay your bills and track your expenses?: Yes  Make your own meals?: Yes    Do your own shopping?: No    Previous Hospitalizations:   Any hospitalizations or ED visits within the last 12 months?: No      Advance Care Planning:   Living will: Yes    Advanced directive: Yes    Advanced directive counseling given: Yes      Cognitive Screening:   Provider or family/friend/caregiver concerned regarding cognition?: No    PREVENTIVE SCREENINGS      Cardiovascular Screening:    General: Screening Not Indicated and History Lipid Disorder      Diabetes Screening:     General: Screening Current      Colorectal Cancer Screening:     General: Screening Not Indicated      Breast Cancer Screening:     General: Screening Not Indicated      Cervical Cancer Screening:    General: Screening Not Indicated      Osteoporosis Screening:    General: Screening Not Indicated      Abdominal Aortic Aneurysm (AAA) Screening:        General: Screening Not Indicated      Lung Cancer Screening:     General: Screening Not Indicated      Hepatitis C Screening:    General: Screening Not Indicated    Screening, Brief Intervention, and Referral to Treatment (SBIRT)    Screening    Typical number of drinks in a week: 0    AUDIT-C Screenin) How often did you have a drink containing alcohol in the past year? monthly or less  2) How many drinks did you have on a typical day when you were drinking in the past year? 1 to 2  3) How often did you have 6 or more drinks on one occasion in the past year? never    AUDIT-C Score: 1  Interpretation: Score 0-2 (female): Negative screen for alcohol misuse    Single Item Drug Screening:  How often have you used an illegal drug (including marijuana) or a prescription medication for non-medical reasons in the past year? never    Single Item Drug Screen Score: 0  Interpretation: Negative screen for possible drug use disorder    Review of Current Opioid Use  Opioid Risk Tool (ORT) Score: 0  Opioid Risk Tool (ORT) Interpretation: Score 0-3: Low risk for opioid misuse    Other Counseling Topics:   Calcium and vitamin D intake and regular weightbearing exercise         Montse Maher MD

## 2022-01-10 NOTE — PATIENT INSTRUCTIONS
Medicare Preventive Visit Patient Instructions  Thank you for completing your Welcome to Medicare Visit or Medicare Annual Wellness Visit today  Your next wellness visit will be due in one year (1/11/2023)  The screening/preventive services that you may require over the next 5-10 years are detailed below  Some tests may not apply to you based off risk factors and/or age  Screening tests ordered at today's visit but not completed yet may show as past due  Also, please note that scanned in results may not display below  Preventive Screenings:  Service Recommendations Previous Testing/Comments   Colorectal Cancer Screening  * Colonoscopy    * Fecal Occult Blood Test (FOBT)/Fecal Immunochemical Test (FIT)  * Fecal DNA/Cologuard Test  * Flexible Sigmoidoscopy Age: 54-65 years old   Colonoscopy: every 10 years (may be performed more frequently if at higher risk)  OR  FOBT/FIT: every 1 year  OR  Cologuard: every 3 years  OR  Sigmoidoscopy: every 5 years  Screening may be recommended earlier than age 48 if at higher risk for colorectal cancer  Also, an individualized decision between you and your healthcare provider will decide whether screening between the ages of 74-80 would be appropriate  Colonoscopy: 01/22/2008  FOBT/FIT: Not on file  Cologuard: Not on file  Sigmoidoscopy: Not on file    Screening Not Indicated     Breast Cancer Screening Age: 36 years old  Frequency: every 1-2 years  Not required if history of left and right mastectomy Mammogram: 07/18/2003        Cervical Cancer Screening Between the ages of 21-29, pap smear recommended once every 3 years  Between the ages of 33-67, can perform pap smear with HPV co-testing every 5 years     Recommendations may differ for women with a history of total hysterectomy, cervical cancer, or abnormal pap smears in past  Pap Smear: Not on file    Screening Not Indicated   Hepatitis C Screening Once for adults born between 1945 and 1965  More frequently in patients at high risk for Hepatitis C Hep C Antibody: Not on file        Diabetes Screening 1-2 times per year if you're at risk for diabetes or have pre-diabetes Fasting glucose: 97 mg/dL   A1C: 6 1 %    Screening Current   Cholesterol Screening Once every 5 years if you don't have a lipid disorder  May order more often based on risk factors  Lipid panel: 09/09/2020    Screening Not Indicated  History Lipid Disorder     Other Preventive Screenings Covered by Medicare:  1  Abdominal Aortic Aneurysm (AAA) Screening: covered once if your at risk  You're considered to be at risk if you have a family history of AAA  2  Lung Cancer Screening: covers low dose CT scan once per year if you meet all of the following conditions: (1) Age 50-69; (2) No signs or symptoms of lung cancer; (3) Current smoker or have quit smoking within the last 15 years; (4) You have a tobacco smoking history of at least 30 pack years (packs per day multiplied by number of years you smoked); (5) You get a written order from a healthcare provider  3  Glaucoma Screening: covered annually if you're considered high risk: (1) You have diabetes OR (2) Family history of glaucoma OR (3)  aged 48 and older OR (3)  American aged 72 and older  3  Osteoporosis Screening: covered every 2 years if you meet one of the following conditions: (1) You're estrogen deficient and at risk for osteoporosis based off medical history and other findings; (2) Have a vertebral abnormality; (3) On glucocorticoid therapy for more than 3 months; (4) Have primary hyperparathyroidism; (5) On osteoporosis medications and need to assess response to drug therapy  · Last bone density test (DXA Scan): 03/25/2019   5  HIV Screening: covered annually if you're between the age of 15-65  Also covered annually if you are younger than 13 and older than 72 with risk factors for HIV infection   For pregnant patients, it is covered up to 3 times per pregnancy  Immunizations:  Immunization Recommendations   Influenza Vaccine Annual influenza vaccination during flu season is recommended for all persons aged >= 6 months who do not have contraindications   Pneumococcal Vaccine (Prevnar and Pneumovax)  * Prevnar = PCV13  * Pneumovax = PPSV23   Adults 25-60 years old: 1-3 doses may be recommended based on certain risk factors  Adults 72 years old: Prevnar (PCV13) vaccine recommended followed by Pneumovax (PPSV23) vaccine  If already received PPSV23 since turning 65, then PCV13 recommended at least one year after PPSV23 dose  Hepatitis B Vaccine 3 dose series if at intermediate or high risk (ex: diabetes, end stage renal disease, liver disease)   Tetanus (Td) Vaccine - COST NOT COVERED BY MEDICARE PART B Following completion of primary series, a booster dose should be given every 10 years to maintain immunity against tetanus  Td may also be given as tetanus wound prophylaxis  Tdap Vaccine - COST NOT COVERED BY MEDICARE PART B Recommended at least once for all adults  For pregnant patients, recommended with each pregnancy  Shingles Vaccine (Shingrix) - COST NOT COVERED BY MEDICARE PART B  2 shot series recommended in those aged 48 and above     Health Maintenance Due:      Topic Date Due    DXA SCAN  03/25/2021     Immunizations Due:      Topic Date Due    DTaP,Tdap,and Td Vaccines (1 - Tdap) Never done    Influenza Vaccine (1) Never done    COVID-19 Vaccine (3 - Booster for Pfizer series) 10/01/2021     Advance Directives   What are advance directives? Advance directives are legal documents that state your wishes and plans for medical care  These plans are made ahead of time in case you lose your ability to make decisions for yourself  Advance directives can apply to any medical decision, such as the treatments you want, and if you want to donate organs  What are the types of advance directives?   There are many types of advance directives, and each state has rules about how to use them  You may choose a combination of any of the following:  · Living will: This is a written record of the treatment you want  You can also choose which treatments you do not want, which to limit, and which to stop at a certain time  This includes surgery, medicine, IV fluid, and tube feedings  · Durable power of  for healthcare [facility]): This is a written record that states who you want to make healthcare choices for you when you are unable to make them for yourself  This person, called a proxy, is usually a family member or a friend  You may choose more than 1 proxy  · Do not resuscitate (DNR) order:  A DNR order is used in case your heart stops beating or you stop breathing  It is a request not to have certain forms of treatment, such as CPR  A DNR order may be included in other types of advance directives  · Medical directive: This covers the care that you want if you are in a coma, near death, or unable to make decisions for yourself  You can list the treatments you want for each condition  Treatment may include pain medicine, surgery, blood transfusions, dialysis, IV or tube feedings, and a ventilator (breathing machine)  · Values history: This document has questions about your views, beliefs, and how you feel and think about life  This information can help others choose the care that you would choose  Why are advance directives important? An advance directive helps you control your care  Although spoken wishes may be used, it is better to have your wishes written down  Spoken wishes can be misunderstood, or not followed  Treatments may be given even if you do not want them  An advance directive may make it easier for your family to make difficult choices about your care  Urinary Incontinence   Urinary incontinence (UI)  is when you lose control of your bladder  UI develops because your bladder cannot store or empty urine properly   The 3 most common types of UI are stress incontinence, urge incontinence, or both  Medicines:   · May be given to help strengthen your bladder control  Report any side effects of medication to your healthcare provider  Do pelvic muscle exercises often:  Your pelvic muscles help you stop urinating  Squeeze these muscles tight for 5 seconds, then relax for 5 seconds  Gradually work up to squeezing for 10 seconds  Do 3 sets of 15 repetitions a day, or as directed  This will help strengthen your pelvic muscles and improve bladder control  Train your bladder:  Go to the bathroom at set times, such as every 2 hours, even if you do not feel the urge to go  You can also try to hold your urine when you feel the urge to go  For example, hold your urine for 5 minutes when you feel the urge to go  As that becomes easier, hold your urine for 10 minutes  Self-care:   · Keep a UI record  Write down how often you leak urine and how much you leak  Make a note of what you were doing when you leaked urine  · Drink liquids as directed  You may need to limit the amount of liquid you drink to help control your urine leakage  Do not drink any liquid right before you go to bed  Limit or do not have drinks that contain caffeine or alcohol  · Prevent constipation  Eat a variety of high-fiber foods  Good examples are high-fiber cereals, beans, vegetables, and whole-grain breads  Walking is the best way to trigger your intestines to have a bowel movement  · Exercise regularly and maintain a healthy weight  Weight loss and exercise will decrease pressure on your bladder and help you control your leakage  · Use a catheter as directed  to help empty your bladder  A catheter is a tiny, plastic tube that is put into your bladder to drain your urine  · Go to behavior therapy as directed  Behavior therapy may be used to help you learn to control your urge to urinate      Weight Management   Why it is important to manage your weight:  Being overweight increases your risk of health conditions such as heart disease, high blood pressure, type 2 diabetes, and certain types of cancer  It can also increase your risk for osteoarthritis, sleep apnea, and other respiratory problems  Aim for a slow, steady weight loss  Even a small amount of weight loss can lower your risk of health problems  How to lose weight safely:  A safe and healthy way to lose weight is to eat fewer calories and get regular exercise  You can lose up about 1 pound a week by decreasing the number of calories you eat by 500 calories each day  Healthy meal plan for weight management:  A healthy meal plan includes a variety of foods, contains fewer calories, and helps you stay healthy  A healthy meal plan includes the following:  · Eat whole-grain foods more often  A healthy meal plan should contain fiber  Fiber is the part of grains, fruits, and vegetables that is not broken down by your body  Whole-grain foods are healthy and provide extra fiber in your diet  Some examples of whole-grain foods are whole-wheat breads and pastas, oatmeal, brown rice, and bulgur  · Eat a variety of vegetables every day  Include dark, leafy greens such as spinach, kale, gail greens, and mustard greens  Eat yellow and orange vegetables such as carrots, sweet potatoes, and winter squash  · Eat a variety of fruits every day  Choose fresh or canned fruit (canned in its own juice or light syrup) instead of juice  Fruit juice has very little or no fiber  · Eat low-fat dairy foods  Drink fat-free (skim) milk or 1% milk  Eat fat-free yogurt and low-fat cottage cheese  Try low-fat cheeses such as mozzarella and other reduced-fat cheeses  · Choose meat and other protein foods that are low in fat  Choose beans or other legumes such as split peas or lentils  Choose fish, skinless poultry (chicken or turkey), or lean cuts of red meat (beef or pork)  Before you cook meat or poultry, cut off any visible fat     · Use less fat and oil  Try baking foods instead of frying them  Add less fat, such as margarine, sour cream, regular salad dressing and mayonnaise to foods  Eat fewer high-fat foods  Some examples of high-fat foods include french fries, doughnuts, ice cream, and cakes  · Eat fewer sweets  Limit foods and drinks that are high in sugar  This includes candy, cookies, regular soda, and sweetened drinks  Exercise:  Exercise at least 30 minutes per day on most days of the week  Some examples of exercise include walking, biking, dancing, and swimming  You can also fit in more physical activity by taking the stairs instead of the elevator or parking farther away from stores  Ask your healthcare provider about the best exercise plan for you  Narcotic (Opioid) Safety    Use narcotics safely:  · Take prescribed narcotics exactly as directed  · Do not give narcotics to others or take narcotics that belong to someone else  · Do not mix narcotics without medicines or alcohol  · Do not drive or operate heavy machinery after you take the narcotic  · Monitor for side effects and notify your healthcare provider if you experienced side effects such as nausea, sleepiness, itching, or trouble thinking clearly  Manage constipation:    Constipation is the most common side effect of narcotic medicine  Constipation is when you have hard, dry bowel movements, or you go longer than usual between bowel movements  Tell your healthcare provider about all changes in your bowel movements while you are taking narcotics  He or she may recommend laxative medicine to help you have a bowel movement  He or she may also change the kind of narcotic you are taking, or change when you take it  The following are more ways you can prevent or relieve constipation:    · Drink liquids as directed  You may need to drink extra liquids to help soften and move your bowels  Ask how much liquid to drink each day and which liquids are best for you    · Eat high-fiber foods   This may help decrease constipation by adding bulk to your bowel movements  High-fiber foods include fruits, vegetables, whole-grain breads and cereals, and beans  Your healthcare provider or dietitian can help you create a high-fiber meal plan  Your provider may also recommend a fiber supplement if you cannot get enough fiber from food  · Exercise regularly  Regular physical activity can help stimulate your intestines  Walking is a good exercise to prevent or relieve constipation  Ask which exercises are best for you  · Schedule a time each day to have a bowel movement  This may help train your body to have regular bowel movements  Bend forward while you are on the toilet to help move the bowel movement out  Sit on the toilet for at least 10 minutes, even if you do not have a bowel movement  Store narcotics safely:   · Store narcotics where others cannot easily get them  Keep them in a locked cabinet or secure area  Do not  keep them in a purse or other bag you carry with you  A person may be looking for something else and find the narcotics  · Make sure narcotics are stored out of the reach of children  A child can easily overdose on narcotics  Narcotics may look like candy to a small child  The best way to dispose of narcotics: The laws vary by country and area  In the [place], the best way is to return the narcotics through a take-back program  This program is offered by the [employer] ([employer])  The following are options for using the program:  · Take the narcotics to a SU collection site  The site is often a law enforcement center  Call your local law enforcement center for scheduled take-back days in your area  You will be given information on where to go if the collection site is in a different location  · Take the narcotics to an approved pharmacy or hospital   A pharmacy or hospital may be set up as a collection site   You will need to ask if it is a SU collection site if you were not directed there  A pharmacy or doctor's office may not be able to take back narcotics unless it is a SU site  · Use a mail-back system  This means you are given containers to put the narcotics into  You will then mail them in the containers  · Use a take-back drop box  This is a place to leave the narcotics at any time  People and animals will not be able to get into the box  Your local law enforcement agency can tell you where to find a drop box in your area  Other ways to manage pain:   · Ask your healthcare provider about non-narcotic medicines to control pain  Nonprescription medicines include NSAIDs (such as ibuprofen) and acetaminophen  Prescription medicines include muscle relaxers, antidepressants, and steroids  · Pain may be managed without any medicines  Some ways to relieve pain include massage, aromatherapy, or meditation  Physical or occupational therapy may also help  For more information:   · Drug Enforcement Administration  [address] [place] [address]  [address]  Phone: 9- 701 - [phone]  Web Address: [facility]/drug_disposal/    · [address]  and Drug Administration  [facility] , [address] [facility]  Phone: 1- 916 - 476-5005  Web Address: http://[employer]/     © Copyright [employer] 2018 Information is for End User's use only and may not be sold, redistributed or otherwise used for commercial purposes   All illustrations and images included in CareNotes® are the copyrighted property of [employer]  or [address] [place] [address]

## 2022-01-10 NOTE — PROGRESS NOTES
Virtual Brief Visit    Patient is located in the following state in which I hold an active license [place]      Assessment/Plan:    Problem List Items Addressed This Visit        Endocrine    Hypothyroidism       Cardiovascular and Mediastinum    Atrial fibrillation ([address] )    Benign essential hypertension       Musculoskeletal and Integument    Osteoarthrosis of knee       Other    Chronic pain of left knee    Polycythemia      Other Visit Diagnoses     Vitamin D deficiency    -  Primary    Screening for osteoporosis           virtual visit done by her  No new complaints except for the knee pain for which she went to the orthopedic surgeon they did the injection draw some fluid out and then injected the cortisone she feels better in her knee right now back pain is better than before  denying any signs or symptoms of CHF   hypertension is well controlled at home and also when it was done in the orthopedic office      Recent Visits  Date Type Provider Dept   01/04/22 Telephone Christal Crespo [address] recent visits within past 7 days and meeting all other requirements  Today's Visits  Date Type Provider Dept   01/10/22 Telemedicine Christal Crespo MD [address] today's visits and meeting all other requirements  Future Appointments  No visits were found meeting these conditions    Showing future appointments within next 150 days and meeting all other requirements         I spent 15 minutes directly with the patient during this visit

## 2022-01-13 ENCOUNTER — TELEPHONE (OUTPATIENT)
Dept: INTERNAL MEDICINE CLINIC | Age: 87
End: 2022-01-13

## 2022-01-13 NOTE — TELEPHONE ENCOUNTER
Patient called requesting a home draw as she is having transportation problems due to [place]  I gaver her the phone number for [facility] home draw and she will call to schedule

## 2022-01-18 ENCOUNTER — TELEPHONE (OUTPATIENT)
Dept: INTERNAL MEDICINE CLINIC | Age: 87
End: 2022-01-18

## 2022-01-19 ENCOUNTER — APPOINTMENT (OUTPATIENT)
Dept: LAB | Facility: HOSPITAL | Age: 87
End: 2022-01-19
Attending: INTERNAL MEDICINE
Payer: MEDICARE

## 2022-01-19 ENCOUNTER — ANTICOAG VISIT (OUTPATIENT)
Dept: INTERNAL MEDICINE CLINIC | Age: 87
End: 2022-01-19

## 2022-01-19 LAB
INR PPP: 3.45 (ref 0.84–1.19)
PROTHROMBIN TIME: 32.9 SECONDS (ref 11.6–14.5)

## 2022-01-19 PROCEDURE — 36415 COLL VENOUS BLD VENIPUNCTURE: CPT | Performed by: INTERNAL MEDICINE

## 2022-01-19 PROCEDURE — 85610 PROTHROMBIN TIME: CPT | Performed by: INTERNAL MEDICINE

## 2022-01-20 ENCOUNTER — TELEPHONE (OUTPATIENT)
Dept: LAB | Facility: HOSPITAL | Age: 87
End: 2022-01-20

## 2022-01-26 ENCOUNTER — APPOINTMENT (OUTPATIENT)
Dept: LAB | Facility: HOSPITAL | Age: 87
End: 2022-01-26
Attending: INTERNAL MEDICINE
Payer: MEDICARE

## 2022-01-26 DIAGNOSIS — I48.21 PERMANENT ATRIAL FIBRILLATION (HCC): ICD-10-CM

## 2022-01-26 LAB
INR PPP: 2.87 (ref 0.84–1.19)
PROTHROMBIN TIME: 28.6 SECONDS (ref 11.6–14.5)

## 2022-01-26 PROCEDURE — 85610 PROTHROMBIN TIME: CPT

## 2022-01-26 PROCEDURE — 36415 COLL VENOUS BLD VENIPUNCTURE: CPT

## 2022-01-28 ENCOUNTER — ANTICOAG VISIT (OUTPATIENT)
Dept: OTHER | Facility: HOSPITAL | Age: 87
End: 2022-01-28

## 2022-02-14 DIAGNOSIS — I10 HYPERTENSION, UNSPECIFIED TYPE: ICD-10-CM

## 2022-02-14 DIAGNOSIS — I48.91 ATRIAL FIBRILLATION, UNSPECIFIED TYPE (HCC): ICD-10-CM

## 2022-02-14 RX ORDER — METOPROLOL TARTRATE 100 MG/1
100 TABLET ORAL 2 TIMES DAILY
Qty: 180 TABLET | Refills: 1 | Status: SHIPPED | OUTPATIENT
Start: 2022-02-14

## 2022-02-14 RX ORDER — WARFARIN SODIUM 5 MG/1
TABLET ORAL
Qty: 30 TABLET | Refills: 5 | Status: SHIPPED | OUTPATIENT
Start: 2022-02-14 | End: 2022-05-11 | Stop reason: SDUPTHER

## 2022-02-16 DIAGNOSIS — M17.10 OSTEOARTHRITIS OF KNEE, UNSPECIFIED LATERALITY, UNSPECIFIED OSTEOARTHRITIS TYPE: ICD-10-CM

## 2022-02-16 DIAGNOSIS — R11.0 NAUSEA: ICD-10-CM

## 2022-02-17 RX ORDER — HYDROCODONE BITARTRATE AND ACETAMINOPHEN 7.5; 3 MG/1; MG/1
1 TABLET ORAL EVERY 6 HOURS PRN
Qty: 120 TABLET | Refills: 0 | Status: SHIPPED | OUTPATIENT
Start: 2022-02-17 | End: 2022-03-31 | Stop reason: SDUPTHER

## 2022-02-17 RX ORDER — ONDANSETRON 4 MG/1
4 TABLET, FILM COATED ORAL EVERY 6 HOURS PRN
Qty: 20 TABLET | Refills: 1 | Status: SHIPPED | OUTPATIENT
Start: 2022-02-17

## 2022-02-23 ENCOUNTER — TELEPHONE (OUTPATIENT)
Dept: LAB | Facility: HOSPITAL | Age: 87
End: 2022-02-23

## 2022-03-02 ENCOUNTER — APPOINTMENT (OUTPATIENT)
Dept: LAB | Facility: HOSPITAL | Age: 87
End: 2022-03-02
Attending: INTERNAL MEDICINE
Payer: MEDICARE

## 2022-03-03 ENCOUNTER — ANTICOAG VISIT (OUTPATIENT)
Dept: INTERNAL MEDICINE CLINIC | Age: 87
End: 2022-03-03

## 2022-03-31 DIAGNOSIS — M17.0 BILATERAL PRIMARY OSTEOARTHRITIS OF KNEE: Primary | ICD-10-CM

## 2022-03-31 DIAGNOSIS — G89.29 CHRONIC PAIN OF LEFT KNEE: ICD-10-CM

## 2022-03-31 DIAGNOSIS — M54.40 CHRONIC LEFT-SIDED LOW BACK PAIN WITH SCIATICA, SCIATICA LATERALITY UNSPECIFIED: ICD-10-CM

## 2022-03-31 DIAGNOSIS — M25.562 CHRONIC PAIN OF LEFT KNEE: ICD-10-CM

## 2022-03-31 DIAGNOSIS — M17.10 OSTEOARTHRITIS OF KNEE, UNSPECIFIED LATERALITY, UNSPECIFIED OSTEOARTHRITIS TYPE: ICD-10-CM

## 2022-03-31 DIAGNOSIS — G89.29 CHRONIC LEFT-SIDED LOW BACK PAIN WITH SCIATICA, SCIATICA LATERALITY UNSPECIFIED: ICD-10-CM

## 2022-03-31 RX ORDER — GABAPENTIN 100 MG/1
100 CAPSULE ORAL 3 TIMES DAILY
Qty: 90 CAPSULE | Refills: 1 | Status: SHIPPED | OUTPATIENT
Start: 2022-03-31

## 2022-03-31 RX ORDER — HYDROCODONE BITARTRATE AND ACETAMINOPHEN 7.5; 3 MG/1; MG/1
1 TABLET ORAL EVERY 6 HOURS PRN
Qty: 120 TABLET | Refills: 0 | Status: SHIPPED | OUTPATIENT
Start: 2022-03-31 | End: 2022-05-16 | Stop reason: SDUPTHER

## 2022-04-05 ENCOUNTER — RA CDI HCC (OUTPATIENT)
Dept: OTHER | Facility: HOSPITAL | Age: 87
End: 2022-04-05

## 2022-04-05 NOTE — PROGRESS NOTES
[address]  coding opportunities       Chart reviewed, no opportunity found: CHART REVIEWED, NO OPPORTUNITY FOUND        Patients Insurance     Medicare Insurance: Medicare

## 2022-04-11 ENCOUNTER — OFFICE VISIT (OUTPATIENT)
Dept: INTERNAL MEDICINE CLINIC | Age: 87
End: 2022-04-11
Payer: MEDICARE

## 2022-04-11 VITALS
DIASTOLIC BLOOD PRESSURE: 90 MMHG | HEART RATE: 64 BPM | BODY MASS INDEX: 34.26 KG/M2 | TEMPERATURE: 98.5 F | HEIGHT: 60 IN | WEIGHT: 174.5 LBS | SYSTOLIC BLOOD PRESSURE: 158 MMHG | OXYGEN SATURATION: 98 %

## 2022-04-11 DIAGNOSIS — N18.31 STAGE 3A CHRONIC KIDNEY DISEASE (HCC): ICD-10-CM

## 2022-04-11 DIAGNOSIS — I10 HYPERTENSION, UNSPECIFIED TYPE: ICD-10-CM

## 2022-04-11 DIAGNOSIS — E78.00 HYPERCHOLESTEROLEMIA: ICD-10-CM

## 2022-04-11 DIAGNOSIS — I48.21 PERMANENT ATRIAL FIBRILLATION (HCC): ICD-10-CM

## 2022-04-11 DIAGNOSIS — F11.20 CONTINUOUS OPIOID DEPENDENCE (HCC): Primary | ICD-10-CM

## 2022-04-11 DIAGNOSIS — E03.9 HYPOTHYROIDISM, UNSPECIFIED TYPE: ICD-10-CM

## 2022-04-11 PROCEDURE — 99214 OFFICE O/P EST MOD 30 MIN: CPT | Performed by: INTERNAL MEDICINE

## 2022-04-11 RX ORDER — LOSARTAN POTASSIUM 100 MG/1
100 TABLET ORAL DAILY
Qty: 90 TABLET | Refills: 1 | Status: SHIPPED | OUTPATIENT
Start: 2022-04-11

## 2022-04-11 RX ORDER — LEVOTHYROXINE SODIUM 0.05 MG/1
50 TABLET ORAL DAILY
Qty: 90 TABLET | Refills: 1 | Status: SHIPPED | OUTPATIENT
Start: 2022-04-11

## 2022-04-11 RX ORDER — SIMVASTATIN 20 MG
20 TABLET ORAL
Qty: 90 TABLET | Refills: 1 | Status: SHIPPED | OUTPATIENT
Start: 2022-04-11

## 2022-04-11 NOTE — PROGRESS NOTES
Assessment/Plan:      Diagnoses and all orders for this visit:    Continuous opioid dependence ([facility] [address] )  For the back hip and the knee pain she is doing it for a long period of time and she is pretty much stable and no signs of abuse  Hypercholesterolemia  -     simvastatin (ZOCOR) 20 mg tablet; Take 1 tablet (20 mg total) by mouth daily at bedtime    Hypertension, unspecified type  -     losartan (COZAAR) 100 MG tablet; Take 1 tablet (100 mg total) by mouth daily  -     CBC and differential; Future  -     Comprehensive metabolic panel; Future  -     UA w Reflex to Microscopic w Reflex to Culture    Hypothyroidism, unspecified type  -     levothyroxine 50 mcg tablet; Take 1 tablet (50 mcg total) by mouth daily  -     TSH, 3rd generation with Free T4 reflex; Future    Permanent atrial fibrillation (HCC)    Stage 3a chronic kidney disease ([facility] [address] )             M*Modal software was used to dictate this note  It may contain errors with dictating incorrect words or incorrect spelling  Please contact the provider directly with any questions  Subjective:   Chief Complaint   Patient presents with    Follow-up     vitamin D deficiency  Patient ID: Merline Pack is a 80 y o  female  This is a very pleasant 80 years young lady who is here today for the regular follow-up she is doing well no new complaints she is ambulating she is active she is using her walker no recent fall she is alert oriented memory is appropriate for her age    Hypertension is poor controlled she said that the blood pressure at home is mostly normal and she always get the blood pressure high at the doctor's offices  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 she is doing well right now  Atrial fibrillation with controlled ventricular response no signs or symptoms of heart failure        The following portions of the patient's history were reviewed and updated as appropriate: allergies, current medications, past family history, past medical history, past social history, past surgical history and problem list     Review of Systems   Constitutional: Positive for fatigue  Negative for chills  HENT: Negative for congestion, ear pain, hearing loss, postnasal drip, sinus pressure, sore throat and voice change  Eyes: Negative for pain, discharge and visual disturbance  Respiratory: Negative for cough, chest tightness and shortness of breath  Cardiovascular: Negative for chest pain, palpitations and leg swelling  Gastrointestinal: Negative for abdominal pain, blood in stool, diarrhea, nausea and rectal pain  Genitourinary: Negative for difficulty urinating, dysuria and urgency  Musculoskeletal: Positive for back pain  Negative for arthralgias and joint swelling  Skin: Negative for rash  Allergic/Immunologic: Negative for environmental allergies and food allergies  Neurological: Negative for dizziness, tremors, weakness, numbness and headaches  Hematological: Negative for adenopathy  Psychiatric/Behavioral: Negative for behavioral problems and hallucinations           Past Medical History:   Diagnosis Date    , spontaneous complete     Carcinoma of skin     Cataract     last assessed: 17    Cataract     Cellulitis     Effusion of both knee joints     resolved: 3/25/15    Effusion of both knee joints     Enteritis     Female stress incontinence     last assessed: 13    Female stress incontinence     Gastric ulcer     last assessed: 14    Gastric ulcer     GERD (gastroesophageal reflux disease)     Hyperlipidemia     Hypertension     Hyperthyroidism     Lyme disease     last assessed: 13    Lyme disease     Microscopic hematuria     last assessed: 13    Microscopic hematuria     Normal delivery     1950 son, 1952 son, 12 daughter, 26 daughter, 65 daughter    Paroxysmal atrial fibrillation ([address] )     last assessed: 13    Paroxysmal atrial fibrillation ([address] )     Perirectal abscess     last assessed: 9/26/13    Platelet dysfunction (HCC)     PAF    Platelet dysfunction (HCC)     Sinus tachycardia     last assessed: 9/26/13    Sinus tachycardia     Spinal stenosis     lumbar; last assessed: 10/4/13    Stage 3 chronic kidney disease ([facility] [address] ) 6/3/2019    Stage 3 chronic kidney disease (HCC)     Tachycardia     unspecified; last assessed: 9/26/13    Trigger finger, acquired     last assessed: 6/30/15    Trigger finger, acquired          Current Outpatient Medications:     gabapentin (NEURONTIN) 100 mg capsule, Take 1 capsule (100 mg total) by mouth 3 (three) times a day, Disp: 90 capsule, Rfl: 1    HYDROcodone-acetaminophen (XODOL) 7 5-300 MG per tablet, Take 1 tablet by mouth every 6 (six) hours as needed for severe pain Max Daily Amount: 4 tablets, Disp: 120 tablet, Rfl: 0    levothyroxine 50 mcg tablet, Take 1 tablet (50 mcg total) by mouth daily, Disp: 90 tablet, Rfl: 1    losartan (COZAAR) 100 MG tablet, Take 1 tablet (100 mg total) by mouth daily, Disp: 90 tablet, Rfl: 1    metoprolol tartrate (LOPRESSOR) 100 mg tablet, Take 1 tablet (100 mg total) by mouth 2 (two) times a day, Disp: 180 tablet, Rfl: 1    ondansetron (ZOFRAN) 4 mg tablet, Take 1 tablet (4 mg total) by mouth every 6 (six) hours as needed for nausea or vomiting, Disp: 20 tablet, Rfl: 1    polyethylene glycol (MIRALAX) 17 g packet, Take 17 g by mouth as needed  , Disp: , Rfl:     senna-docusate sodium (SENOKOT S) 8 6-50 mg per tablet, Take 1 tablet by mouth as needed for constipation, Disp: , Rfl:     simvastatin (ZOCOR) 20 mg tablet, Take 1 tablet (20 mg total) by mouth daily at bedtime, Disp: 90 tablet, Rfl: 1    warfarin (COUMADIN) 2 mg tablet, Take by mouth daily As directed, Disp: , Rfl:     warfarin (COUMADIN) 5 mg tablet, TAKE 5 MG ON TUESDAY, WEDNESDAY , SATURDAY AND 2 5 MG ALL OTHER DAYS, Disp: 30 tablet, Rfl: 5    Diclofenac Sodium (VOLTAREN) 1 %, Apply 2 g topically 4 (four) times a day (Patient not taking: Reported on 1/10/2022 ), Disp: 150 g, Rfl: 1    ergocalciferol (VITAMIN D2) 50,000 units, Take 1 capsule (50,000 Units total) by mouth once a week, Disp: 8 capsule, Rfl: 0    Allergies   Allergen Reactions    Cortisone     Oxaprozin Hives    Penicillins Hives       Social History   Past Surgical History:   Procedure Laterality Date    KNEE ARTHROSCOPY Bilateral     2008    KNEE ARTHROSCOPY      LUMBAR LAMINECTOMY      5/09    LUMBAR LAMINECTOMY      NEUROPLASTY / TRANSPOSITION MEDIAN NERVE AT CARPAL TUNNEL Bilateral     2011    NEUROPLASTY / TRANSPOSITION MEDIAN NERVE AT CARPAL TUNNEL      TONSILLECTOMY AND ADENOIDECTOMY      TOTAL HIP ARTHROPLASTY      joint replacement; L hip; 2/2/10    TOTAL HIP ARTHROPLASTY       Family History   Problem Relation Age of Onset    Rheumatic fever Mother     Heart disease Father     Crohn's disease Brother         (Granulomatous) Golitis    Psoriasis Daughter     Heart disease Son        Objective:  /90 (BP Location: Left arm, Patient Position: Sitting, Cuff Size: Standard)   Pulse 64   Temp 98 5 °F (36 9 °C) (Temporal)   Ht 5' (1 524 m)   Wt 79 2 kg (174 lb 8 oz)   SpO2 98%   BMI 34 08 kg/m²        Physical Exam  Constitutional:       Appearance: She is well-developed  HENT:      Right Ear: External ear normal    Eyes:      Conjunctiva/sclera: Conjunctivae normal       Pupils: Pupils are equal, round, and reactive to light  Neck:      Thyroid: No thyromegaly  Vascular: No JVD  Cardiovascular:      Rate and Rhythm: Normal rate  Rhythm irregularly irregular  Heart sounds: Murmur heard  Systolic murmur is present with a grade of 1/6  Diastolic murmur is present  Pulmonary:      Breath sounds: Normal breath sounds  Abdominal:      General: Bowel sounds are normal       Palpations: Abdomen is soft  Musculoskeletal:         General: Normal range of motion  Cervical back: Normal range of motion  Right lower le+ Pitting Edema present  Left lower le+ Pitting Edema present  Lymphadenopathy:      Cervical: No cervical adenopathy  Skin:     General: Skin is dry  Neurological:      Mental Status: She is alert and oriented to person, place, and time  Deep Tendon Reflexes: Reflexes are normal and symmetric     Psychiatric:         Behavior: Behavior normal

## 2022-05-09 ENCOUNTER — TELEPHONE (OUTPATIENT)
Dept: INTERNAL MEDICINE CLINIC | Age: 87
End: 2022-05-09

## 2022-05-09 ENCOUNTER — TELEPHONE (OUTPATIENT)
Dept: LAB | Facility: HOSPITAL | Age: 87
End: 2022-05-09

## 2022-05-09 NOTE — TELEPHONE ENCOUNTER
LM for patient  INR was due 4/2/22  I asked that she call the office with any difficulties getting this testing done

## 2022-05-11 ENCOUNTER — ANTICOAG VISIT (OUTPATIENT)
Dept: INTERNAL MEDICINE CLINIC | Age: 87
End: 2022-05-11

## 2022-05-11 ENCOUNTER — APPOINTMENT (OUTPATIENT)
Dept: LAB | Facility: HOSPITAL | Age: 87
End: 2022-05-11
Attending: INTERNAL MEDICINE
Payer: MEDICARE

## 2022-05-11 DIAGNOSIS — E03.9 HYPOTHYROIDISM, UNSPECIFIED TYPE: ICD-10-CM

## 2022-05-11 DIAGNOSIS — I48.91 ATRIAL FIBRILLATION, UNSPECIFIED TYPE (HCC): ICD-10-CM

## 2022-05-11 DIAGNOSIS — I10 HYPERTENSION, UNSPECIFIED TYPE: ICD-10-CM

## 2022-05-11 LAB
ALBUMIN SERPL BCP-MCNC: 3.5 G/DL (ref 3.5–5)
ALP SERPL-CCNC: 130 U/L (ref 46–116)
ALT SERPL W P-5'-P-CCNC: 13 U/L (ref 12–78)
ANION GAP SERPL CALCULATED.3IONS-SCNC: 6 MMOL/L (ref 4–13)
AST SERPL W P-5'-P-CCNC: 20 U/L (ref 5–45)
BASOPHILS # BLD AUTO: 0.06 THOUSANDS/ΜL (ref 0–0.1)
BASOPHILS NFR BLD AUTO: 1 % (ref 0–1)
BILIRUB SERPL-MCNC: 0.58 MG/DL (ref 0.2–1)
BUN SERPL-MCNC: 22 MG/DL (ref 5–25)
CALCIUM SERPL-MCNC: 8.8 MG/DL (ref 8.3–10.1)
CHLORIDE SERPL-SCNC: 106 MMOL/L (ref 100–108)
CO2 SERPL-SCNC: 27 MMOL/L (ref 21–32)
CREAT SERPL-MCNC: 1.38 MG/DL (ref 0.6–1.3)
EOSINOPHIL # BLD AUTO: 0.53 THOUSAND/ΜL (ref 0–0.61)
EOSINOPHIL NFR BLD AUTO: 7 % (ref 0–6)
ERYTHROCYTE [DISTWIDTH] IN BLOOD BY AUTOMATED COUNT: 15 % (ref 11.6–15.1)
GFR SERPL CREATININE-BSD FRML MDRD: 32 ML/MIN/1.73SQ M
GLUCOSE SERPL-MCNC: 60 MG/DL (ref 65–140)
HCT VFR BLD AUTO: 43.2 % (ref 34.8–46.1)
HGB BLD-MCNC: 13.6 G/DL (ref 11.5–15.4)
IMM GRANULOCYTES # BLD AUTO: 0.02 THOUSAND/UL (ref 0–0.2)
IMM GRANULOCYTES NFR BLD AUTO: 0 % (ref 0–2)
LYMPHOCYTES # BLD AUTO: 1.72 THOUSANDS/ΜL (ref 0.6–4.47)
LYMPHOCYTES NFR BLD AUTO: 24 % (ref 14–44)
MCH RBC QN AUTO: 28 PG (ref 26.8–34.3)
MCHC RBC AUTO-ENTMCNC: 31.5 G/DL (ref 31.4–37.4)
MCV RBC AUTO: 89 FL (ref 82–98)
MONOCYTES # BLD AUTO: 0.71 THOUSAND/ΜL (ref 0.17–1.22)
MONOCYTES NFR BLD AUTO: 10 % (ref 4–12)
NEUTROPHILS # BLD AUTO: 4.11 THOUSANDS/ΜL (ref 1.85–7.62)
NEUTS SEG NFR BLD AUTO: 58 % (ref 43–75)
NRBC BLD AUTO-RTO: 0 /100 WBCS
PLATELET # BLD AUTO: 207 THOUSANDS/UL (ref 149–390)
PMV BLD AUTO: 11.4 FL (ref 8.9–12.7)
POTASSIUM SERPL-SCNC: 4.7 MMOL/L (ref 3.5–5.3)
PROT SERPL-MCNC: 6.9 G/DL (ref 6.4–8.2)
RBC # BLD AUTO: 4.86 MILLION/UL (ref 3.81–5.12)
SODIUM SERPL-SCNC: 139 MMOL/L (ref 136–145)
TSH SERPL DL<=0.05 MIU/L-ACNC: 3.86 UIU/ML (ref 0.45–4.5)
WBC # BLD AUTO: 7.15 THOUSAND/UL (ref 4.31–10.16)

## 2022-05-11 PROCEDURE — 36415 COLL VENOUS BLD VENIPUNCTURE: CPT

## 2022-05-11 PROCEDURE — 80053 COMPREHEN METABOLIC PANEL: CPT

## 2022-05-11 PROCEDURE — 85025 COMPLETE CBC W/AUTO DIFF WBC: CPT

## 2022-05-11 PROCEDURE — 84443 ASSAY THYROID STIM HORMONE: CPT

## 2022-05-11 RX ORDER — WARFARIN SODIUM 5 MG/1
TABLET ORAL
Qty: 30 TABLET | Refills: 5
Start: 2022-05-11

## 2022-05-16 DIAGNOSIS — M17.10 OSTEOARTHRITIS OF KNEE, UNSPECIFIED LATERALITY, UNSPECIFIED OSTEOARTHRITIS TYPE: ICD-10-CM

## 2022-05-17 RX ORDER — HYDROCODONE BITARTRATE AND ACETAMINOPHEN 7.5; 3 MG/1; MG/1
1 TABLET ORAL EVERY 6 HOURS PRN
Qty: 120 TABLET | Refills: 0 | Status: SHIPPED | OUTPATIENT
Start: 2022-05-17 | End: 2022-06-23 | Stop reason: SDUPTHER

## 2022-06-23 DIAGNOSIS — M17.10 OSTEOARTHRITIS OF KNEE, UNSPECIFIED LATERALITY, UNSPECIFIED OSTEOARTHRITIS TYPE: ICD-10-CM

## 2022-06-25 RX ORDER — HYDROCODONE BITARTRATE AND ACETAMINOPHEN 7.5; 3 MG/1; MG/1
1 TABLET ORAL EVERY 6 HOURS PRN
Qty: 120 TABLET | Refills: 0 | Status: SHIPPED | OUTPATIENT
Start: 2022-06-25 | End: 2022-08-08 | Stop reason: SDUPTHER

## 2022-07-12 ENCOUNTER — TELEPHONE (OUTPATIENT)
Dept: LAB | Facility: HOSPITAL | Age: 87
End: 2022-07-12

## 2022-07-19 ENCOUNTER — OFFICE VISIT (OUTPATIENT)
Dept: INTERNAL MEDICINE CLINIC | Age: 87
End: 2022-07-19
Payer: MEDICARE

## 2022-07-19 VITALS
TEMPERATURE: 97.7 F | WEIGHT: 174.7 LBS | OXYGEN SATURATION: 97 % | SYSTOLIC BLOOD PRESSURE: 142 MMHG | HEART RATE: 69 BPM | HEIGHT: 60 IN | DIASTOLIC BLOOD PRESSURE: 86 MMHG | BODY MASS INDEX: 34.3 KG/M2

## 2022-07-19 DIAGNOSIS — Z13.820 ENCOUNTER FOR SCREENING FOR OSTEOPOROSIS: ICD-10-CM

## 2022-07-19 DIAGNOSIS — N18.31 STAGE 3A CHRONIC KIDNEY DISEASE (HCC): ICD-10-CM

## 2022-07-19 DIAGNOSIS — M81.0 OSTEOPOROSIS WITHOUT CURRENT PATHOLOGICAL FRACTURE, UNSPECIFIED OSTEOPOROSIS TYPE: ICD-10-CM

## 2022-07-19 DIAGNOSIS — I48.21 PERMANENT ATRIAL FIBRILLATION (HCC): ICD-10-CM

## 2022-07-19 DIAGNOSIS — I10 BENIGN ESSENTIAL HYPERTENSION: ICD-10-CM

## 2022-07-19 DIAGNOSIS — M17.0 PRIMARY OSTEOARTHRITIS OF BOTH KNEES: ICD-10-CM

## 2022-07-19 DIAGNOSIS — E03.9 HYPOTHYROIDISM, UNSPECIFIED TYPE: ICD-10-CM

## 2022-07-19 DIAGNOSIS — F11.20 CONTINUOUS OPIOID DEPENDENCE (HCC): Primary | ICD-10-CM

## 2022-07-19 PROBLEM — D75.1 POLYCYTHEMIA: Status: RESOLVED | Noted: 2019-09-03 | Resolved: 2022-07-19

## 2022-07-19 PROBLEM — S09.90XA CLOSED HEAD INJURY: Status: RESOLVED | Noted: 2019-06-24 | Resolved: 2022-07-19

## 2022-07-19 PROBLEM — R29.6 UNWITNESSED FALL: Status: RESOLVED | Noted: 2019-06-24 | Resolved: 2022-07-19

## 2022-07-19 PROBLEM — M54.2 NECK PAIN: Chronic | Status: RESOLVED | Noted: 2019-06-24 | Resolved: 2022-07-19

## 2022-07-19 PROCEDURE — 99214 OFFICE O/P EST MOD 30 MIN: CPT | Performed by: INTERNAL MEDICINE

## 2022-07-19 NOTE — PROGRESS NOTES
Assessment/Plan:     Diagnoses and all orders for this visit:    Continuous opioid dependence ([facility] [address] )  No abuse of the opioid medication unable to wean off  Encounter for screening for osteoporosis  Check the DEXA scan  Osteoporosis without current pathological fracture, unspecified osteoporosis type  -     DXA bone density spine hip and pelvis; Future    Benign essential hypertension  Hypertension is better controlled as compared to before  Stage 3a chronic kidney disease ([facility] [address] )  -     Basic metabolic panel; Future  Stage 3 chronic kidney disease will check the blood work  Permanent atrial fibrillation [facility])  Atrial fibrillation right now she was in normal sinus rhythm and the rate was regular no signs of CHF  Hypothyroidism, unspecified type  Hypothyroidism TSH was normal  Primary osteoarthritis of both knees          Depression Screening and Follow-up Plan: Patient was screened for depression during today's encounter  They screened negative with a PHQ-2 score of 0  M*Devicescape software was used to dictate this note  It may contain errors with dictating incorrect words or incorrect spelling  Please contact the provider directly with any questions  Subjective:   Chief Complaint   Patient presents with    Follow-up     3 month-  BMI FOLLOW UP-         Patient ID: Leroy Hagen is a 80 y o  female      This is very pleasant 80 years young lady who is still driving and also still doing all her ADLs is here today for the regular follow-up except for the pain in the knees and the back she does not have much problems also complaining of some dry skin she is using the moisturizer and she cannot tolerate the heat she walk with the walker she has a history of fall but recently she did not fall she does not have any problem with coordination or the gait she denying any chest pain or shortness of breath review of system is essentially unremarkable otherwise atrial fibrillation with controlled ventricular response right now she is in normal sinus rhythm she is on anticoagulation using Coumadin  Hypertension is very well controlled it is better than before still systolic is in 340G  Gastroesophageal reflux disease is stable  Polycythemia hemoglobin hematocrit is in normal range right now      The following portions of the patient's history were reviewed and updated as appropriate: allergies, current medications, past family history, past medical history, past social history, past surgical history and problem list     Review of Systems   Constitutional: Negative for chills and fatigue  HENT: Negative for congestion, ear pain, hearing loss, postnasal drip, sinus pressure, sore throat and voice change  Eyes: Negative for pain, discharge and visual disturbance  Respiratory: Negative for cough, chest tightness and shortness of breath  Cardiovascular: Negative for chest pain, palpitations and leg swelling  Gastrointestinal: Negative for abdominal pain, blood in stool, diarrhea, nausea and rectal pain  Genitourinary: Negative for difficulty urinating, dysuria and urgency  Musculoskeletal: Negative for arthralgias and joint swelling  Skin: Negative for rash  Allergic/Immunologic: Negative for environmental allergies and food allergies  Neurological: Negative for dizziness, tremors, weakness, numbness and headaches  Hematological: Negative for adenopathy  Psychiatric/Behavioral: Negative for behavioral problems and hallucinations           Past Medical History:   Diagnosis Date    , spontaneous complete     Carcinoma of skin     Cataract     last assessed: 17    Cataract     Cellulitis     Effusion of both knee joints     resolved: 3/25/15    Effusion of both knee joints     Enteritis     Female stress incontinence     last assessed: 13    Female stress incontinence     Gastric ulcer     last assessed: 14    Gastric ulcer     GERD (gastroesophageal reflux disease)     Hyperlipidemia     Hypertension     Hyperthyroidism     Lyme disease     last assessed: 9/26/13    Lyme disease     Microscopic hematuria     last assessed: 9/26/13    Microscopic hematuria     Normal delivery     1950 son, 1952 son, 12 daughter, 26 daughter, 65 daughter    Paroxysmal atrial fibrillation ([facility] [address] )     last assessed: 9/26/13    Paroxysmal atrial fibrillation ([facility] [address] )     Perirectal abscess     last assessed: 9/26/13    Platelet dysfunction (HCC)     PAF    Platelet dysfunction (HCC)     Sinus tachycardia     last assessed: 9/26/13    Sinus tachycardia     Spinal stenosis     lumbar; last assessed: 10/4/13    Stage 3 chronic kidney disease ([facility] [address] ) 6/3/2019    Stage 3 chronic kidney disease ([facility] [address] )     Tachycardia     unspecified; last assessed: 9/26/13    Trigger finger, acquired     last assessed: 6/30/15    Trigger finger, acquired          Current Outpatient Medications:     Diclofenac Sodium (VOLTAREN) 1 %, Apply 2 g topically 4 (four) times a day, Disp: 150 g, Rfl: 1    ergocalciferol (VITAMIN D2) 50,000 units, Take 1 capsule (50,000 Units total) by mouth once a week, Disp: 8 capsule, Rfl: 0    gabapentin (NEURONTIN) 100 mg capsule, Take 1 capsule (100 mg total) by mouth 3 (three) times a day, Disp: 90 capsule, Rfl: 1    HYDROcodone-acetaminophen (XODOL) 7 5-300 MG per tablet, Take 1 tablet by mouth every 6 (six) hours as needed for severe pain Max Daily Amount: 4 tablets, Disp: 120 tablet, Rfl: 0    levothyroxine 50 mcg tablet, Take 1 tablet (50 mcg total) by mouth daily, Disp: 90 tablet, Rfl: 1    losartan (COZAAR) 100 MG tablet, Take 1 tablet (100 mg total) by mouth daily, Disp: 90 tablet, Rfl: 1    metoprolol tartrate (LOPRESSOR) 100 mg tablet, Take 1 tablet (100 mg total) by mouth 2 (two) times a day, Disp: 180 tablet, Rfl: 1    ondansetron (ZOFRAN) 4 mg tablet, Take 1 tablet (4 mg total) by mouth every 6 (six) hours as needed for nausea or vomiting, Disp: 20 tablet, Rfl: 1    polyethylene glycol (MIRALAX) 17 g packet, Take 17 g by mouth as needed  , Disp: , Rfl:     senna-docusate sodium (SENOKOT S) 8 6-50 mg per tablet, Take 1 tablet by mouth as needed for constipation, Disp: , Rfl:     simvastatin (ZOCOR) 20 mg tablet, Take 1 tablet (20 mg total) by mouth daily at bedtime, Disp: 90 tablet, Rfl: 1    warfarin (COUMADIN) 5 mg tablet, TAKE 5 MG ON MONDAY AND SATURDAY AND 2 5 MG ALL OTHER DAYS, Disp: 30 tablet, Rfl: 5    Allergies   Allergen Reactions    Cortisone     Oxaprozin Hives    Penicillins Hives       Social History   Past Surgical History:   Procedure Laterality Date    KNEE ARTHROSCOPY Bilateral     2008    KNEE ARTHROSCOPY      LUMBAR LAMINECTOMY      5/09    LUMBAR LAMINECTOMY      NEUROPLASTY / TRANSPOSITION MEDIAN NERVE AT CARPAL TUNNEL Bilateral     2011    NEUROPLASTY / TRANSPOSITION MEDIAN NERVE AT CARPAL TUNNEL      TONSILLECTOMY AND ADENOIDECTOMY      TOTAL HIP ARTHROPLASTY      joint replacement; L hip; 2/2/10    TOTAL HIP ARTHROPLASTY       Family History   Problem Relation Age of Onset    Rheumatic fever Mother     Heart disease Father     Crohn's disease Brother         (Granulomatous) Golitis    Psoriasis Daughter     Heart disease Son        Objective:  /86 (BP Location: Left arm, Patient Position: Sitting, Cuff Size: Standard)   Pulse 69   Temp 97 7 °F (36 5 °C) (Temporal)   Ht 5' (1 524 m)   Wt 79 2 kg (174 lb 11 2 oz)   SpO2 97% Comment: room air  BMI 34 12 kg/m²        Physical Exam  Constitutional:       Appearance: She is well-developed  HENT:      Right Ear: External ear normal    Eyes:      Conjunctiva/sclera: Conjunctivae normal       Pupils: Pupils are equal, round, and reactive to light  Neck:      Thyroid: No thyromegaly  Vascular: No JVD  Cardiovascular:      Rate and Rhythm: Normal rate and regular rhythm  Heart sounds: Normal heart sounds     Pulmonary:      Breath sounds: Normal breath sounds  Abdominal:      General: Bowel sounds are normal       Palpations: Abdomen is soft  Musculoskeletal:         General: Normal range of motion  Cervical back: Normal range of motion  Lymphadenopathy:      Cervical: No cervical adenopathy  Skin:     General: Skin is dry  Neurological:      Mental Status: She is alert and oriented to person, place, and time  Deep Tendon Reflexes: Reflexes are normal and symmetric     Psychiatric:         Behavior: Behavior normal

## 2022-07-20 ENCOUNTER — ANTICOAG VISIT (OUTPATIENT)
Dept: INTERNAL MEDICINE CLINIC | Facility: OTHER | Age: 87
End: 2022-07-20

## 2022-07-20 ENCOUNTER — APPOINTMENT (OUTPATIENT)
Dept: LAB | Facility: HOSPITAL | Age: 87
End: 2022-07-20
Attending: INTERNAL MEDICINE
Payer: MEDICARE

## 2022-07-20 DIAGNOSIS — N18.31 STAGE 3A CHRONIC KIDNEY DISEASE (HCC): ICD-10-CM

## 2022-07-20 LAB
ANION GAP SERPL CALCULATED.3IONS-SCNC: 7 MMOL/L (ref 4–13)
BUN SERPL-MCNC: 28 MG/DL (ref 5–25)
CALCIUM SERPL-MCNC: 8.7 MG/DL (ref 8.3–10.1)
CHLORIDE SERPL-SCNC: 106 MMOL/L (ref 96–108)
CO2 SERPL-SCNC: 29 MMOL/L (ref 21–32)
CREAT SERPL-MCNC: 1.7 MG/DL (ref 0.6–1.3)
GFR SERPL CREATININE-BSD FRML MDRD: 25 ML/MIN/1.73SQ M
GLUCOSE SERPL-MCNC: 54 MG/DL (ref 65–140)
POTASSIUM SERPL-SCNC: 4.3 MMOL/L (ref 3.5–5.3)
SODIUM SERPL-SCNC: 142 MMOL/L (ref 135–147)

## 2022-07-20 PROCEDURE — 80048 BASIC METABOLIC PNL TOTAL CA: CPT

## 2022-07-20 NOTE — PROGRESS NOTES
current dose 5 mg every Mon, Sat and 2 5 mg all the other days       Please have pt change to 5mg on Thursday, mon, sat and 2 5mg all other days, pt/inr 2 weeks

## 2022-08-01 ENCOUNTER — TELEPHONE (OUTPATIENT)
Dept: LAB | Facility: HOSPITAL | Age: 87
End: 2022-08-01

## 2022-08-03 ENCOUNTER — APPOINTMENT (OUTPATIENT)
Dept: LAB | Facility: HOSPITAL | Age: 87
End: 2022-08-03
Payer: MEDICARE

## 2022-08-03 ENCOUNTER — ANTICOAG VISIT (OUTPATIENT)
Dept: INTERNAL MEDICINE CLINIC | Age: 87
End: 2022-08-03

## 2022-08-08 DIAGNOSIS — M17.9 OSTEOARTHRITIS OF KNEE, UNSPECIFIED LATERALITY, UNSPECIFIED OSTEOARTHRITIS TYPE: ICD-10-CM

## 2022-08-09 RX ORDER — HYDROCODONE BITARTRATE AND ACETAMINOPHEN 7.5; 3 MG/1; MG/1
1 TABLET ORAL EVERY 6 HOURS PRN
Qty: 120 TABLET | Refills: 0 | Status: SHIPPED | OUTPATIENT
Start: 2022-08-09 | End: 2022-09-20 | Stop reason: SDUPTHER

## 2022-08-26 ENCOUNTER — TELEPHONE (OUTPATIENT)
Dept: INTERNAL MEDICINE CLINIC | Facility: CLINIC | Age: 87
End: 2022-08-26

## 2022-09-02 ENCOUNTER — TELEPHONE (OUTPATIENT)
Dept: INTERNAL MEDICINE CLINIC | Age: 87
End: 2022-09-02

## 2022-09-02 DIAGNOSIS — I10 HYPERTENSION, UNSPECIFIED TYPE: ICD-10-CM

## 2022-09-02 DIAGNOSIS — I48.91 ATRIAL FIBRILLATION, UNSPECIFIED TYPE (HCC): ICD-10-CM

## 2022-09-02 RX ORDER — METOPROLOL TARTRATE 100 MG/1
100 TABLET ORAL 2 TIMES DAILY
Qty: 180 TABLET | Refills: 1 | Status: SHIPPED | OUTPATIENT
Start: 2022-09-02

## 2022-09-02 RX ORDER — WARFARIN SODIUM 5 MG/1
TABLET ORAL
Qty: 30 TABLET | Refills: 5 | Status: SHIPPED | OUTPATIENT
Start: 2022-09-02

## 2022-09-02 NOTE — TELEPHONE ENCOUNTER
Patient is requesting the following medications the following medication     Metoprolol Tartrate 100 mg one tablet bid      180/1    Warfarin 5 mg one tablet Monday and Saturday and 2 5 mg all other days    En

## 2022-09-20 DIAGNOSIS — M17.9 OSTEOARTHRITIS OF KNEE, UNSPECIFIED LATERALITY, UNSPECIFIED OSTEOARTHRITIS TYPE: ICD-10-CM

## 2022-09-20 RX ORDER — HYDROCODONE BITARTRATE AND ACETAMINOPHEN 7.5; 3 MG/1; MG/1
1 TABLET ORAL EVERY 6 HOURS PRN
Qty: 120 TABLET | Refills: 0 | Status: SHIPPED | OUTPATIENT
Start: 2022-09-20 | End: 2022-10-19 | Stop reason: SDUPTHER

## 2022-10-19 ENCOUNTER — OFFICE VISIT (OUTPATIENT)
Dept: INTERNAL MEDICINE CLINIC | Age: 87
End: 2022-10-19
Payer: MEDICARE

## 2022-10-19 VITALS
WEIGHT: 176 LBS | DIASTOLIC BLOOD PRESSURE: 88 MMHG | SYSTOLIC BLOOD PRESSURE: 150 MMHG | BODY MASS INDEX: 34.55 KG/M2 | OXYGEN SATURATION: 94 % | TEMPERATURE: 97.8 F | HEART RATE: 76 BPM | HEIGHT: 60 IN

## 2022-10-19 DIAGNOSIS — M17.0 BILATERAL PRIMARY OSTEOARTHRITIS OF KNEE: ICD-10-CM

## 2022-10-19 DIAGNOSIS — M17.9 OSTEOARTHRITIS OF KNEE, UNSPECIFIED LATERALITY, UNSPECIFIED OSTEOARTHRITIS TYPE: ICD-10-CM

## 2022-10-19 DIAGNOSIS — N18.31 STAGE 3A CHRONIC KIDNEY DISEASE (HCC): ICD-10-CM

## 2022-10-19 DIAGNOSIS — I10 BENIGN ESSENTIAL HYPERTENSION: ICD-10-CM

## 2022-10-19 DIAGNOSIS — N39.492 POSTURAL URINARY INCONTINENCE: ICD-10-CM

## 2022-10-19 DIAGNOSIS — Z23 FLU VACCINE NEED: ICD-10-CM

## 2022-10-19 DIAGNOSIS — I48.21 PERMANENT ATRIAL FIBRILLATION (HCC): Primary | ICD-10-CM

## 2022-10-19 DIAGNOSIS — E78.00 HYPERCHOLESTEROLEMIA: ICD-10-CM

## 2022-10-19 PROCEDURE — 90662 IIV NO PRSV INCREASED AG IM: CPT

## 2022-10-19 PROCEDURE — 99214 OFFICE O/P EST MOD 30 MIN: CPT | Performed by: INTERNAL MEDICINE

## 2022-10-19 PROCEDURE — G0008 ADMIN INFLUENZA VIRUS VAC: HCPCS

## 2022-10-19 RX ORDER — HYDROCODONE BITARTRATE AND ACETAMINOPHEN 7.5; 3 MG/1; MG/1
1 TABLET ORAL EVERY 8 HOURS PRN
Qty: 90 TABLET | Refills: 0 | Status: SHIPPED | OUTPATIENT
Start: 2022-10-19

## 2022-10-19 NOTE — PROGRESS NOTES
Assessment/Plan:     Diagnoses and all orders for this visit:    Osteoarthritis of knee, unspecified laterality, unspecified osteoarthritis type  -     HYDROcodone-acetaminophen (XODOL) 7 5-300 MG per tablet; Take 1 tablet by mouth every 8 (eight) hours as needed for severe pain Max Daily Amount: 3 tablets    hypertension is very well controlled  Hypercholesterolemia follow-up the blood      hypothyroidism patient is doing well TSH was normal    Doing well with the urinary incontinence  Atrial fibrillation with controlled ventricular response right now it is regular  M*Modal software was used to dictate this note  It may contain errors with dictating incorrect words or incorrect spelling  Please contact the provider directly with any questions  Subjective:   Chief Complaint   Patient presents with   • Follow-up     3 month f/u    • Flu Vaccine   • Hypertension        Patient ID: Sabrina Mccullough is a 80 y o  female      HPI  This is a very pleasant 80 years young lady who is here today for the regular follow-up she is doing well she is using the walker because of her problems with the knee and problem with the balance because of the poor back she complaining of the knee pain and also the deformity of the knee and the bowing of the knee specially on the left side,  Hypertension is very well controlled could be better controlled patient get little bit more anxious when she comes to the office home blood pressure readings are better  Hypercholesterolemia follow-up with the blood workup continue simvastatin  Hypothyroidism follow-up TSH  Chronic use of the narcotics secondary to knee and back pain she has the not abusing the medication she is taking 2 Percocets a day and sometimes 3    The following portions of the patient's history were reviewed and updated as appropriate: allergies, current medications, past family history, past medical history, past social history, past surgical history and problem list     Review of Systems   Constitutional: Positive for fatigue  Negative for chills  HENT: Negative for congestion, ear pain, hearing loss, postnasal drip, sinus pressure, sore throat and voice change  Eyes: Negative for pain, discharge and visual disturbance  Respiratory: Negative for cough, chest tightness and shortness of breath  Cardiovascular: Negative for chest pain, palpitations and leg swelling  Gastrointestinal: Negative for abdominal pain, blood in stool, diarrhea, nausea and rectal pain  Genitourinary: Negative for difficulty urinating, dysuria and urgency  Musculoskeletal: Positive for arthralgias (Bilateral knee pain she is using the walker) and back pain  Negative for joint swelling  Skin: Negative for rash  Allergic/Immunologic: Negative for environmental allergies and food allergies  Neurological: Negative for dizziness, tremors, weakness, numbness and headaches  Hematological: Negative for adenopathy  Psychiatric/Behavioral: Negative for behavioral problems and hallucinations           Past Medical History:   Diagnosis Date   • , spontaneous complete    • Carcinoma of skin    • Cataract     last assessed: 17   • Cataract    • Cellulitis    • Effusion of both knee joints     resolved: 3/25/15   • Effusion of both knee joints    • Enteritis    • Female stress incontinence     last assessed: 13   • Female stress incontinence    • Gastric ulcer     last assessed: 14   • Gastric ulcer    • GERD (gastroesophageal reflux disease)    • Hyperlipidemia    • Hypertension    • Hyperthyroidism    • Lyme disease     last assessed: 13   • Lyme disease    • Microscopic hematuria     last assessed: 13   • Microscopic hematuria    • Normal delivery     1950 son, 1952 son, 12 daughter, 26 daughter, 65 daughter   • Paroxysmal atrial fibrillation ([address] )     last assessed: 13   • Paroxysmal atrial fibrillation ([address] )    • Perirectal abscess     last assessed: 9/26/13   • Platelet dysfunction (HCC)     PAF   • Platelet dysfunction (HCC)    • Sinus tachycardia     last assessed: 9/26/13   • Sinus tachycardia    • Spinal stenosis     lumbar; last assessed: 10/4/13   • Stage 3 chronic kidney disease ([facility] [address] ) 6/3/2019   • Stage 3 chronic kidney disease (HCC)    • Tachycardia     unspecified; last assessed: 9/26/13   • Trigger finger, acquired     last assessed: 6/30/15   • Trigger finger, acquired          Current Outpatient Medications:   •  Diclofenac Sodium (VOLTAREN) 1 %, Apply 2 g topically 4 (four) times a day, Disp: 150 g, Rfl: 1  •  ergocalciferol (VITAMIN D2) 50,000 units, Take 1 capsule (50,000 Units total) by mouth once a week, Disp: 8 capsule, Rfl: 0  •  gabapentin (NEURONTIN) 100 mg capsule, Take 1 capsule (100 mg total) by mouth 3 (three) times a day, Disp: 90 capsule, Rfl: 1  •  HYDROcodone-acetaminophen (XODOL) 7 5-300 MG per tablet, Take 1 tablet by mouth every 6 (six) hours as needed for severe pain Max Daily Amount: 4 tablets, Disp: 120 tablet, Rfl: 0  •  levothyroxine 50 mcg tablet, Take 1 tablet (50 mcg total) by mouth daily, Disp: 90 tablet, Rfl: 1  •  losartan (COZAAR) 100 MG tablet, Take 1 tablet (100 mg total) by mouth daily, Disp: 90 tablet, Rfl: 1  •  metoprolol tartrate (LOPRESSOR) 100 mg tablet, Take 1 tablet (100 mg total) by mouth 2 (two) times a day, Disp: 180 tablet, Rfl: 1  •  ondansetron (ZOFRAN) 4 mg tablet, Take 1 tablet (4 mg total) by mouth every 6 (six) hours as needed for nausea or vomiting, Disp: 20 tablet, Rfl: 1  •  polyethylene glycol (MIRALAX) 17 g packet, Take 17 g by mouth as needed  , Disp: , Rfl:   •  senna-docusate sodium (SENOKOT S) 8 6-50 mg per tablet, Take 1 tablet by mouth as needed for constipation, Disp: , Rfl:   •  simvastatin (ZOCOR) 20 mg tablet, Take 1 tablet (20 mg total) by mouth daily at bedtime, Disp: 90 tablet, Rfl: 1  •  warfarin (COUMADIN) 5 mg tablet, TAKE 5 MG ON MONDAY AND SATURDAY AND 2 5 MG ALL OTHER DAYS, Disp: 30 tablet, Rfl: 5    Allergies   Allergen Reactions   • Cortisone    • Oxaprozin Hives   • Penicillins Hives       Social History   Past Surgical History:   Procedure Laterality Date   • KNEE ARTHROSCOPY Bilateral     2008   • KNEE ARTHROSCOPY     • LUMBAR LAMINECTOMY      5/09   • LUMBAR LAMINECTOMY     • NEUROPLASTY / TRANSPOSITION MEDIAN NERVE AT CARPAL TUNNEL Bilateral     2011   • NEUROPLASTY / TRANSPOSITION MEDIAN NERVE AT CARPAL TUNNEL     • TONSILLECTOMY AND ADENOIDECTOMY     • TOTAL HIP ARTHROPLASTY      joint replacement; L hip; 2/2/10   • TOTAL HIP ARTHROPLASTY       Family History   Problem Relation Age of Onset   • Rheumatic fever Mother    • Heart disease Father    • Crohn's disease Brother         (Granulomatous) Golitis   • Psoriasis Daughter    • Heart disease Son        Objective:  /88 (BP Location: Left arm, Patient Position: Sitting, Cuff Size: Adult)   Pulse 76   Temp 97 8 °F (36 6 °C) (Temporal)   Ht 5' (1 524 m)   Wt 79 8 kg (176 lb)   SpO2 94%   BMI 34 37 kg/m²        Physical Exam  Constitutional:       Appearance: She is well-developed  HENT:      Right Ear: External ear normal    Eyes:      Conjunctiva/sclera: Conjunctivae normal       Pupils: Pupils are equal, round, and reactive to light  Neck:      Thyroid: No thyromegaly  Vascular: No JVD  Cardiovascular:      Rate and Rhythm: Normal rate and regular rhythm  Heart sounds: Murmur heard  Systolic murmur is present with a grade of 2/6  Pulmonary:      Breath sounds: Normal breath sounds  Abdominal:      General: Bowel sounds are normal       Palpations: Abdomen is soft  Musculoskeletal:         General: Swelling (Bilateral knee) present  Cervical back: Normal range of motion  Right knee: Swelling, deformity and effusion present  Decreased range of motion  Tenderness present over the medial joint line  Left knee: Swelling present  Decreased range of motion   Tenderness present over the medial joint line  Lymphadenopathy:      Cervical: No cervical adenopathy  Skin:     General: Skin is dry  Neurological:      Mental Status: She is alert and oriented to person, place, and time  Deep Tendon Reflexes: Reflexes are normal and symmetric     Psychiatric:         Behavior: Behavior normal

## 2022-11-03 ENCOUNTER — TELEPHONE (OUTPATIENT)
Dept: INTERNAL MEDICINE CLINIC | Facility: OTHER | Age: 87
End: 2022-11-03

## 2022-11-03 NOTE — TELEPHONE ENCOUNTER
In-basket message shows patient is overdue for PT/INR  Called patient left a detailed message that she is overdue and to please go to get lab work done to make sure her level is WNL  Asked patient to please call the office to confirm she did in fact get my message

## 2022-11-09 ENCOUNTER — TELEPHONE (OUTPATIENT)
Dept: LAB | Facility: HOSPITAL | Age: 87
End: 2022-11-09

## 2022-11-09 NOTE — TELEPHONE ENCOUNTER
Patient called back and stated to her to get her PT/INR done ASAP if they can do this week would be great      Gave her the phone number to [facility] [place] them now to get her in

## 2022-11-15 DIAGNOSIS — E03.9 HYPOTHYROIDISM, UNSPECIFIED TYPE: ICD-10-CM

## 2022-11-15 DIAGNOSIS — E78.00 HYPERCHOLESTEROLEMIA: ICD-10-CM

## 2022-11-15 DIAGNOSIS — I48.91 ATRIAL FIBRILLATION, UNSPECIFIED TYPE (HCC): ICD-10-CM

## 2022-11-15 DIAGNOSIS — E55.9 VITAMIN D DEFICIENCY: ICD-10-CM

## 2022-11-15 DIAGNOSIS — I10 HYPERTENSION, UNSPECIFIED TYPE: ICD-10-CM

## 2022-11-15 DIAGNOSIS — M17.9 OSTEOARTHRITIS OF KNEE, UNSPECIFIED LATERALITY, UNSPECIFIED OSTEOARTHRITIS TYPE: ICD-10-CM

## 2022-11-15 RX ORDER — WARFARIN SODIUM 5 MG/1
TABLET ORAL
Qty: 30 TABLET | Refills: 5 | Status: CANCELLED | OUTPATIENT
Start: 2022-11-15

## 2022-11-16 ENCOUNTER — APPOINTMENT (OUTPATIENT)
Dept: LAB | Facility: HOSPITAL | Age: 87
End: 2022-11-16
Attending: INTERNAL MEDICINE

## 2022-11-16 LAB
BACTERIA UR QL AUTO: ABNORMAL /HPF
BILIRUB UR QL STRIP: NEGATIVE
CLARITY UR: CLEAR
COLOR UR: ABNORMAL
GLUCOSE UR STRIP-MCNC: NEGATIVE MG/DL
HGB UR QL STRIP.AUTO: NEGATIVE
KETONES UR STRIP-MCNC: NEGATIVE MG/DL
LEUKOCYTE ESTERASE UR QL STRIP: ABNORMAL
NITRITE UR QL STRIP: NEGATIVE
NON-SQ EPI CELLS URNS QL MICRO: ABNORMAL /HPF
PH UR STRIP.AUTO: 7.5 [PH]
PROT UR STRIP-MCNC: ABNORMAL MG/DL
RBC #/AREA URNS AUTO: ABNORMAL /HPF
SP GR UR STRIP.AUTO: 1.01 (ref 1–1.03)
TRI-PHOS CRY URNS QL MICRO: ABNORMAL /HPF
UROBILINOGEN UR STRIP-ACNC: <2 MG/DL
WBC #/AREA URNS AUTO: ABNORMAL /HPF

## 2022-11-17 ENCOUNTER — ANTICOAG VISIT (OUTPATIENT)
Dept: INTERNAL MEDICINE CLINIC | Facility: OTHER | Age: 87
End: 2022-11-17

## 2022-11-17 RX ORDER — SIMVASTATIN 20 MG
20 TABLET ORAL
Qty: 90 TABLET | Refills: 1 | Status: SHIPPED | OUTPATIENT
Start: 2022-11-17

## 2022-11-17 RX ORDER — LOSARTAN POTASSIUM 100 MG/1
100 TABLET ORAL DAILY
Qty: 90 TABLET | Refills: 1 | Status: SHIPPED | OUTPATIENT
Start: 2022-11-17

## 2022-11-17 RX ORDER — HYDROCODONE BITARTRATE AND ACETAMINOPHEN 7.5; 3 MG/1; MG/1
1 TABLET ORAL EVERY 8 HOURS PRN
Qty: 90 TABLET | Refills: 0 | Status: SHIPPED | OUTPATIENT
Start: 2022-11-17

## 2022-11-17 RX ORDER — LEVOTHYROXINE SODIUM 0.05 MG/1
50 TABLET ORAL DAILY
Qty: 90 TABLET | Refills: 1 | Status: SHIPPED | OUTPATIENT
Start: 2022-11-17

## 2022-11-17 RX ORDER — ERGOCALCIFEROL 1.25 MG/1
50000 CAPSULE ORAL WEEKLY
Qty: 8 CAPSULE | Refills: 0 | Status: SHIPPED | OUTPATIENT
Start: 2022-11-17

## 2022-11-17 NOTE — PROGRESS NOTES
Noted, please have her cut down to 5mg on mondays only and 2 5mg all other days, pt/inr 2 weeks, thanks

## 2022-12-11 DIAGNOSIS — M17.9 OSTEOARTHRITIS OF KNEE, UNSPECIFIED LATERALITY, UNSPECIFIED OSTEOARTHRITIS TYPE: ICD-10-CM

## 2022-12-11 NOTE — TELEPHONE ENCOUNTER
Medication Refill Request     Name Hydrocodone-acetaminophen  Dose/Frequency 7 5-300mg 1 tablet every 8hrs prn   Quantity 90 tablets  Verified pharmacy   [x]  Verified ordering Provider   [x]  Does patient have enough for the next 3 days?  Yes [] No [x]

## 2022-12-12 RX ORDER — HYDROCODONE BITARTRATE AND ACETAMINOPHEN 7.5; 3 MG/1; MG/1
1 TABLET ORAL EVERY 8 HOURS PRN
Qty: 90 TABLET | Refills: 0 | Status: SHIPPED | OUTPATIENT
Start: 2022-12-12

## 2022-12-13 ENCOUNTER — TELEPHONE (OUTPATIENT)
Dept: LAB | Facility: HOSPITAL | Age: 87
End: 2022-12-13

## 2022-12-14 NOTE — TELEPHONE ENCOUNTER
12/14/22 Received a call from [facility] asking questions prior to refilling patients Hydrocodone

## 2022-12-21 ENCOUNTER — APPOINTMENT (OUTPATIENT)
Dept: LAB | Facility: HOSPITAL | Age: 87
End: 2022-12-21
Attending: INTERNAL MEDICINE

## 2022-12-23 ENCOUNTER — TELEPHONE (OUTPATIENT)
Dept: INTERNAL MEDICINE CLINIC | Age: 87
End: 2022-12-23

## 2022-12-23 NOTE — TELEPHONE ENCOUNTER
Patient called office back  Patient aware to continue current dose and check PT/INR in 1 month per result note from Dr Emelyn Cox

## 2022-12-27 ENCOUNTER — ANTICOAG VISIT (OUTPATIENT)
Dept: INTERNAL MEDICINE CLINIC | Facility: OTHER | Age: 87
End: 2022-12-27

## 2022-12-27 ENCOUNTER — TELEPHONE (OUTPATIENT)
Dept: LAB | Facility: HOSPITAL | Age: 87
End: 2022-12-27

## 2023-01-05 DIAGNOSIS — E55.9 VITAMIN D DEFICIENCY: ICD-10-CM

## 2023-01-05 DIAGNOSIS — M17.9 OSTEOARTHRITIS OF KNEE, UNSPECIFIED LATERALITY, UNSPECIFIED OSTEOARTHRITIS TYPE: ICD-10-CM

## 2023-01-05 RX ORDER — ERGOCALCIFEROL 1.25 MG/1
50000 CAPSULE ORAL WEEKLY
Qty: 8 CAPSULE | Refills: 0 | Status: SHIPPED | OUTPATIENT
Start: 2023-01-05

## 2023-01-12 RX ORDER — HYDROCODONE BITARTRATE AND ACETAMINOPHEN 7.5; 3 MG/1; MG/1
1 TABLET ORAL EVERY 8 HOURS PRN
Qty: 90 TABLET | Refills: 0 | Status: SHIPPED | OUTPATIENT
Start: 2023-01-12

## 2023-01-30 ENCOUNTER — HOSPITAL ENCOUNTER (OUTPATIENT)
Dept: RADIOLOGY | Facility: MEDICAL CENTER | Age: 88
Discharge: HOME | End: 2023-01-30

## 2023-01-30 DIAGNOSIS — M81.0 OSTEOPOROSIS WITHOUT CURRENT PATHOLOGICAL FRACTURE, UNSPECIFIED OSTEOPOROSIS TYPE: ICD-10-CM

## 2023-01-30 DIAGNOSIS — Z13.820 SCREENING FOR OSTEOPOROSIS: ICD-10-CM

## 2023-02-07 DIAGNOSIS — M17.9 OSTEOARTHRITIS OF KNEE, UNSPECIFIED LATERALITY, UNSPECIFIED OSTEOARTHRITIS TYPE: ICD-10-CM

## 2023-02-07 RX ORDER — HYDROCODONE BITARTRATE AND ACETAMINOPHEN 7.5; 3 MG/1; MG/1
1 TABLET ORAL EVERY 8 HOURS PRN
Qty: 90 TABLET | Refills: 0 | Status: SHIPPED | OUTPATIENT
Start: 2023-02-07

## 2023-02-07 NOTE — TELEPHONE ENCOUNTER
Patient called for refill on Hydrocodone be sent to [facility]      Last appt 10-  Next appt 4-

## 2023-02-08 ENCOUNTER — TELEPHONE (OUTPATIENT)
Dept: LAB | Facility: HOSPITAL | Age: 88
End: 2023-02-08

## 2023-02-28 ENCOUNTER — TELEPHONE (OUTPATIENT)
Dept: INTERNAL MEDICINE CLINIC | Facility: CLINIC | Age: 88
End: 2023-02-28

## 2023-02-28 NOTE — TELEPHONE ENCOUNTER
Called and left message for patient letting her know she is overdue for a PT/INR  Mobile lab has contacted her multiple times and had to leave messages  Asked for patient to call me back at the office

## 2023-03-08 DIAGNOSIS — M17.9 OSTEOARTHRITIS OF KNEE, UNSPECIFIED LATERALITY, UNSPECIFIED OSTEOARTHRITIS TYPE: ICD-10-CM

## 2023-03-08 DIAGNOSIS — I10 HYPERTENSION, UNSPECIFIED TYPE: ICD-10-CM

## 2023-03-08 DIAGNOSIS — E55.9 VITAMIN D DEFICIENCY: ICD-10-CM

## 2023-03-08 RX ORDER — METOPROLOL TARTRATE 100 MG/1
100 TABLET ORAL 2 TIMES DAILY
Qty: 180 TABLET | Refills: 1 | Status: SHIPPED | OUTPATIENT
Start: 2023-03-08

## 2023-03-08 RX ORDER — ERGOCALCIFEROL 1.25 MG/1
50000 CAPSULE ORAL WEEKLY
Qty: 8 CAPSULE | Refills: 0 | Status: SHIPPED | OUTPATIENT
Start: 2023-03-08

## 2023-03-08 RX ORDER — HYDROCODONE BITARTRATE AND ACETAMINOPHEN 7.5; 3 MG/1; MG/1
1 TABLET ORAL EVERY 8 HOURS PRN
Qty: 90 TABLET | Refills: 0 | Status: SHIPPED | OUTPATIENT
Start: 2023-03-08

## 2023-03-22 ENCOUNTER — TELEPHONE (OUTPATIENT)
Dept: INTERNAL MEDICINE CLINIC | Facility: OTHER | Age: 88
End: 2023-03-22

## 2023-03-22 ENCOUNTER — TELEPHONE (OUTPATIENT)
Dept: LAB | Facility: HOSPITAL | Age: 88
End: 2023-03-22

## 2023-03-22 DIAGNOSIS — I48.21 PERMANENT ATRIAL FIBRILLATION (HCC): Primary | ICD-10-CM

## 2023-03-22 NOTE — TELEPHONE ENCOUNTER
3/22 - pt was just inquiring about bloodwork - says she hasnt had it done in a while - I told her there were no orders in the system - she also was inquiring about medications - I advised her to reach out to her PCP regarding that

## 2023-03-22 NOTE — TELEPHONE ENCOUNTER
Patient will continue on same dose and I told her she should be expecting a call from the mobile lab team to make an appointment to come to her home  (0) independent I have reviewed and confirmed nurses' notes for patient's medications, allergies, medical history, and surgical history.

## 2023-03-22 NOTE — TELEPHONE ENCOUNTER
Patient called and stated that she missed her coumadin dose last night and was not sure what to do  She confirmed that she normally takes 5mg on a Mon and 2 5mg all other days  I did tell her that she has not had her PT/INR drawn since 12/27/23 and it was highly important for her to get this done  The mobile lab has reached out to her on multiple occasions  I called and spoke to Charissa to get appointment set up for patient ASAP  Any instruction for patient since she missed a dose last night? Please advise

## 2023-04-05 ENCOUNTER — ANTICOAG VISIT (OUTPATIENT)
Dept: INTERNAL MEDICINE CLINIC | Age: 88
End: 2023-04-05

## 2023-04-05 ENCOUNTER — TELEPHONE (OUTPATIENT)
Dept: LAB | Facility: HOSPITAL | Age: 88
End: 2023-04-05

## 2023-04-07 DIAGNOSIS — M17.9 OSTEOARTHRITIS OF KNEE, UNSPECIFIED LATERALITY, UNSPECIFIED OSTEOARTHRITIS TYPE: ICD-10-CM

## 2023-04-07 RX ORDER — HYDROCODONE BITARTRATE AND ACETAMINOPHEN 7.5; 3 MG/1; MG/1
1 TABLET ORAL EVERY 8 HOURS PRN
Qty: 90 TABLET | Refills: 0 | Status: SHIPPED | OUTPATIENT
Start: 2023-04-07

## 2023-05-03 ENCOUNTER — ANTICOAG VISIT (OUTPATIENT)
Dept: INTERNAL MEDICINE CLINIC | Facility: CLINIC | Age: 88
End: 2023-05-03

## 2023-05-03 ENCOUNTER — APPOINTMENT (OUTPATIENT)
Dept: LAB | Facility: HOSPITAL | Age: 88
End: 2023-05-03
Attending: INTERNAL MEDICINE

## 2023-05-03 ENCOUNTER — TELEPHONE (OUTPATIENT)
Dept: LAB | Facility: HOSPITAL | Age: 88
End: 2023-05-03

## 2023-05-04 DIAGNOSIS — M17.9 OSTEOARTHRITIS OF KNEE, UNSPECIFIED LATERALITY, UNSPECIFIED OSTEOARTHRITIS TYPE: ICD-10-CM

## 2023-05-04 RX ORDER — HYDROCODONE BITARTRATE AND ACETAMINOPHEN 7.5; 3 MG/1; MG/1
1 TABLET ORAL EVERY 8 HOURS PRN
Qty: 90 TABLET | Refills: 0 | Status: SHIPPED | OUTPATIENT
Start: 2023-05-04

## 2023-05-05 DIAGNOSIS — E55.9 VITAMIN D DEFICIENCY: ICD-10-CM

## 2023-05-05 RX ORDER — ERGOCALCIFEROL 1.25 MG/1
50000 CAPSULE ORAL WEEKLY
Qty: 8 CAPSULE | Refills: 2 | Status: SHIPPED | OUTPATIENT
Start: 2023-05-05

## 2023-05-05 RX ORDER — ERGOCALCIFEROL 1.25 MG/1
50000 CAPSULE ORAL WEEKLY
Qty: 8 CAPSULE | Refills: 0 | OUTPATIENT
Start: 2023-05-05

## 2023-05-05 NOTE — TELEPHONE ENCOUNTER
Patient's daughter Franklin Mckeon called to get the Vitamin D 50,000 units once a week sent over to [facility] today for them to deliver it to her today    NOV: 05/23/2023

## 2023-05-10 ENCOUNTER — ANTICOAG VISIT (OUTPATIENT)
Dept: INTERNAL MEDICINE CLINIC | Facility: OTHER | Age: 88
End: 2023-05-10

## 2023-05-10 ENCOUNTER — TELEPHONE (OUTPATIENT)
Dept: LAB | Facility: HOSPITAL | Age: 88
End: 2023-05-10

## 2023-05-10 ENCOUNTER — APPOINTMENT (OUTPATIENT)
Dept: LAB | Facility: HOSPITAL | Age: 88
End: 2023-05-10
Attending: INTERNAL MEDICINE

## 2023-05-10 DIAGNOSIS — I10 HYPERTENSION, UNSPECIFIED TYPE: ICD-10-CM

## 2023-05-10 LAB
ALBUMIN SERPL BCP-MCNC: 4.2 G/DL (ref 3.5–5)
ALP SERPL-CCNC: 114 U/L (ref 34–104)
ALT SERPL W P-5'-P-CCNC: 6 U/L (ref 7–52)
ANION GAP SERPL CALCULATED.3IONS-SCNC: 9 MMOL/L (ref 4–13)
AST SERPL W P-5'-P-CCNC: 14 U/L (ref 13–39)
BASOPHILS # BLD AUTO: 0.06 THOUSANDS/ÂΜL (ref 0–0.1)
BASOPHILS NFR BLD AUTO: 1 % (ref 0–1)
BILIRUB SERPL-MCNC: 0.52 MG/DL (ref 0.2–1)
BUN SERPL-MCNC: 26 MG/DL (ref 5–25)
CALCIUM SERPL-MCNC: 9.3 MG/DL (ref 8.4–10.2)
CHLORIDE SERPL-SCNC: 103 MMOL/L (ref 96–108)
CHOLEST SERPL-MCNC: 130 MG/DL
CO2 SERPL-SCNC: 29 MMOL/L (ref 21–32)
CREAT SERPL-MCNC: 1.53 MG/DL (ref 0.6–1.3)
EOSINOPHIL # BLD AUTO: 0.39 THOUSAND/ÂΜL (ref 0–0.61)
EOSINOPHIL NFR BLD AUTO: 5 % (ref 0–6)
ERYTHROCYTE [DISTWIDTH] IN BLOOD BY AUTOMATED COUNT: 15.4 % (ref 11.6–15.1)
GFR SERPL CREATININE-BSD FRML MDRD: 28 ML/MIN/1.73SQ M
GLUCOSE P FAST SERPL-MCNC: 82 MG/DL (ref 65–99)
HCT VFR BLD AUTO: 43.4 % (ref 34.8–46.1)
HDLC SERPL-MCNC: 52 MG/DL
HGB BLD-MCNC: 13.3 G/DL (ref 11.5–15.4)
IMM GRANULOCYTES # BLD AUTO: 0.01 THOUSAND/UL (ref 0–0.2)
IMM GRANULOCYTES NFR BLD AUTO: 0 % (ref 0–2)
LDLC SERPL CALC-MCNC: 57 MG/DL (ref 0–100)
LYMPHOCYTES # BLD AUTO: 1.21 THOUSANDS/ÂΜL (ref 0.6–4.47)
LYMPHOCYTES NFR BLD AUTO: 16 % (ref 14–44)
MCH RBC QN AUTO: 27.6 PG (ref 26.8–34.3)
MCHC RBC AUTO-ENTMCNC: 30.6 G/DL (ref 31.4–37.4)
MCV RBC AUTO: 90 FL (ref 82–98)
MONOCYTES # BLD AUTO: 0.63 THOUSAND/ÂΜL (ref 0.17–1.22)
MONOCYTES NFR BLD AUTO: 8 % (ref 4–12)
NEUTROPHILS # BLD AUTO: 5.35 THOUSANDS/ÂΜL (ref 1.85–7.62)
NEUTS SEG NFR BLD AUTO: 70 % (ref 43–75)
NONHDLC SERPL-MCNC: 78 MG/DL
NRBC BLD AUTO-RTO: 0 /100 WBCS
PLATELET # BLD AUTO: 230 THOUSANDS/UL (ref 149–390)
PMV BLD AUTO: 11 FL (ref 8.9–12.7)
POTASSIUM SERPL-SCNC: 4.5 MMOL/L (ref 3.5–5.3)
PROT SERPL-MCNC: 7.3 G/DL (ref 6.4–8.4)
RBC # BLD AUTO: 4.82 MILLION/UL (ref 3.81–5.12)
SODIUM SERPL-SCNC: 141 MMOL/L (ref 135–147)
TRIGL SERPL-MCNC: 107 MG/DL
TSH SERPL DL<=0.05 MIU/L-ACNC: 3.58 UIU/ML (ref 0.45–4.5)
WBC # BLD AUTO: 7.65 THOUSAND/UL (ref 4.31–10.16)

## 2023-05-18 ENCOUNTER — TELEPHONE (OUTPATIENT)
Dept: LAB | Facility: HOSPITAL | Age: 88
End: 2023-05-18

## 2023-05-22 ENCOUNTER — APPOINTMENT (EMERGENCY)
Dept: CT IMAGING | Facility: HOSPITAL | Age: 88
End: 2023-05-22

## 2023-05-22 ENCOUNTER — APPOINTMENT (OUTPATIENT)
Dept: LAB | Facility: HOSPITAL | Age: 88
End: 2023-05-22
Attending: INTERNAL MEDICINE

## 2023-05-22 ENCOUNTER — HOSPITAL ENCOUNTER (INPATIENT)
Facility: HOSPITAL | Age: 88
LOS: 1 days | Discharge: HOME | End: 2023-05-24
Attending: EMERGENCY MEDICINE | Admitting: INTERNAL MEDICINE

## 2023-05-22 ENCOUNTER — NURSE TRIAGE (OUTPATIENT)
Dept: OTHER | Facility: OTHER | Age: 88
End: 2023-05-22

## 2023-05-22 ENCOUNTER — APPOINTMENT (EMERGENCY)
Dept: RADIOLOGY | Facility: HOSPITAL | Age: 88
End: 2023-05-22

## 2023-05-22 DIAGNOSIS — N18.31 STAGE 3A CHRONIC KIDNEY DISEASE (HCC): ICD-10-CM

## 2023-05-22 DIAGNOSIS — R41.82 ALTERED MENTAL STATUS: Primary | ICD-10-CM

## 2023-05-22 DIAGNOSIS — R21 GROIN RASH: ICD-10-CM

## 2023-05-22 DIAGNOSIS — E86.0 DEHYDRATION: ICD-10-CM

## 2023-05-22 DIAGNOSIS — R60.9 PERIPHERAL EDEMA: ICD-10-CM

## 2023-05-22 LAB
ALBUMIN SERPL BCP-MCNC: 4.1 G/DL (ref 3.5–5)
ALP SERPL-CCNC: 111 U/L (ref 34–104)
ALT SERPL W P-5'-P-CCNC: 5 U/L (ref 7–52)
ANION GAP SERPL CALCULATED.3IONS-SCNC: 8 MMOL/L (ref 4–13)
APTT PPP: 27 SECONDS (ref 23–37)
AST SERPL W P-5'-P-CCNC: 17 U/L (ref 13–39)
BASE EX.OXY STD BLDV CALC-SCNC: 80 % (ref 60–80)
BASE EXCESS BLDV CALC-SCNC: 1.6 MMOL/L
BASOPHILS # BLD AUTO: 0.07 THOUSANDS/ÂΜL (ref 0–0.1)
BASOPHILS NFR BLD AUTO: 1 % (ref 0–1)
BILIRUB SERPL-MCNC: 0.76 MG/DL (ref 0.2–1)
BNP SERPL-MCNC: 519 PG/ML (ref 0–100)
BUN SERPL-MCNC: 23 MG/DL (ref 5–25)
CALCIUM SERPL-MCNC: 9 MG/DL (ref 8.4–10.2)
CARDIAC TROPONIN I PNL SERPL HS: 91 NG/L (ref 8–18)
CHLORIDE SERPL-SCNC: 104 MMOL/L (ref 96–108)
CO2 SERPL-SCNC: 26 MMOL/L (ref 21–32)
CREAT SERPL-MCNC: 1.28 MG/DL (ref 0.6–1.3)
EOSINOPHIL # BLD AUTO: 0.05 THOUSAND/ÂΜL (ref 0–0.61)
EOSINOPHIL NFR BLD AUTO: 1 % (ref 0–6)
ERYTHROCYTE [DISTWIDTH] IN BLOOD BY AUTOMATED COUNT: 15 % (ref 11.6–15.1)
GFR SERPL CREATININE-BSD FRML MDRD: 35 ML/MIN/1.73SQ M
GLUCOSE SERPL-MCNC: 110 MG/DL (ref 65–140)
HCO3 BLDV-SCNC: 27.9 MMOL/L (ref 24–30)
HCT VFR BLD AUTO: 45.2 % (ref 34.8–46.1)
HGB BLD-MCNC: 14.5 G/DL (ref 11.5–15.4)
IMM GRANULOCYTES # BLD AUTO: 0.04 THOUSAND/UL (ref 0–0.2)
IMM GRANULOCYTES NFR BLD AUTO: 0 % (ref 0–2)
INR PPP: 2 (ref 0.84–1.19)
LACTATE SERPL-SCNC: 1.1 MMOL/L (ref 0.5–2)
LYMPHOCYTES # BLD AUTO: 0.89 THOUSANDS/ÂΜL (ref 0.6–4.47)
LYMPHOCYTES NFR BLD AUTO: 10 % (ref 14–44)
MCH RBC QN AUTO: 27.9 PG (ref 26.8–34.3)
MCHC RBC AUTO-ENTMCNC: 32.1 G/DL (ref 31.4–37.4)
MCV RBC AUTO: 87 FL (ref 82–98)
MONOCYTES # BLD AUTO: 0.57 THOUSAND/ÂΜL (ref 0.17–1.22)
MONOCYTES NFR BLD AUTO: 6 % (ref 4–12)
NEUTROPHILS # BLD AUTO: 7.63 THOUSANDS/ÂΜL (ref 1.85–7.62)
NEUTS SEG NFR BLD AUTO: 82 % (ref 43–75)
NRBC BLD AUTO-RTO: 0 /100 WBCS
O2 CT BLDV-SCNC: 16 ML/DL
PCO2 BLDV: 50 MM HG (ref 42–50)
PH BLDV: 7.36 [PH] (ref 7.3–7.4)
PLATELET # BLD AUTO: 210 THOUSANDS/UL (ref 149–390)
PMV BLD AUTO: 10.8 FL (ref 8.9–12.7)
PO2 BLDV: 45.9 MM HG (ref 35–45)
POTASSIUM SERPL-SCNC: 4.6 MMOL/L (ref 3.5–5.3)
PROCALCITONIN SERPL-MCNC: <0.05 NG/ML
PROT SERPL-MCNC: 7.1 G/DL (ref 6.4–8.4)
PROTHROMBIN TIME: 22.9 SECONDS (ref 11.6–14.5)
RBC # BLD AUTO: 5.2 MILLION/UL (ref 3.81–5.12)
SODIUM SERPL-SCNC: 138 MMOL/L (ref 135–147)
WBC # BLD AUTO: 9.25 THOUSAND/UL (ref 4.31–10.16)

## 2023-05-22 RX ADMIN — IOHEXOL 100 ML: 350 INJECTION, SOLUTION INTRAVENOUS at 22:59

## 2023-05-22 NOTE — TELEPHONE ENCOUNTER
Patient's daughter is calling stating that her mothers blood pressure is 185/100 she has been complaining of a headache all day as well as now acting confused and unable to get off the couch  Patients daughter will call 911 at this time

## 2023-05-22 NOTE — TELEPHONE ENCOUNTER
"  Reason for Disposition  • [1] Difficult to awaken or acting confused (e g , disoriented, slurred speech) AND [2] present now AND [3] new-onset    Answer Assessment - Initial Assessment Questions  1  LEVEL OF CONSCIOUSNESS: \"How is he (she, the patient) acting right now? \" (e g , alert-oriented, confused, lethargic, stuporous, comatose)      Sleepy laying on couch \"not acting herself\"  2  ONSET: \"When did the confusion start? \"  (minutes, hours, days)      This evening  3  PATTERN \"Does this come and go, or has it been constant since it started? \"  \"Is it present now? \"      continous  4  ALCOHOL or DRUGS: \"Has he been drinking alcohol or taking any drugs? \"       no  5  NARCOTIC MEDICATIONS: \"Has he been receiving any narcotic medications? \" (e g , morphine, Vicodin)      no  6  CAUSE: \"What do you think is causing the confusion? \"       Headache/high blood pressure  7  OTHER SYMPTOMS: Tom Henderson there any other symptoms? \" (e g , difficulty breathing, headache, fever, weakness)      Has not eaten all day and BP is 185/100    Protocols used: CONFUSION - DELIRIUM-ADULT-AH    "

## 2023-05-22 NOTE — TELEPHONE ENCOUNTER
"Regarding: headache/not eating/just laying down  ----- Message from Raul Martínez sent at 5/22/2023  6:54 PM EDT -----  Pt daughter called \"My mom has had a headache today and has not eaten  She has also just been laying down  \"    "

## 2023-05-23 ENCOUNTER — TELEPHONE (OUTPATIENT)
Dept: INTERNAL MEDICINE CLINIC | Facility: OTHER | Age: 88
End: 2023-05-23

## 2023-05-23 PROBLEM — R06.02 SHORTNESS OF BREATH: Status: RESOLVED | Noted: 2023-05-23 | Resolved: 2023-05-23

## 2023-05-23 PROBLEM — R41.82 ALTERED MENTAL STATUS: Status: ACTIVE | Noted: 2023-05-23

## 2023-05-23 PROBLEM — E86.0 DEHYDRATION: Status: ACTIVE | Noted: 2023-05-23

## 2023-05-23 PROBLEM — R06.02 SHORTNESS OF BREATH: Status: ACTIVE | Noted: 2023-05-23

## 2023-05-23 LAB
ALBUMIN SERPL BCP-MCNC: 3.9 G/DL (ref 3.5–5)
ALP SERPL-CCNC: 109 U/L (ref 34–104)
ALT SERPL W P-5'-P-CCNC: 5 U/L (ref 7–52)
ANION GAP SERPL CALCULATED.3IONS-SCNC: 9 MMOL/L (ref 4–13)
AST SERPL W P-5'-P-CCNC: 20 U/L (ref 13–39)
BACTERIA UR QL AUTO: ABNORMAL /HPF
BASOPHILS # BLD AUTO: 0.05 THOUSANDS/ÂΜL (ref 0–0.1)
BASOPHILS NFR BLD AUTO: 0 % (ref 0–1)
BILIRUB SERPL-MCNC: 0.74 MG/DL (ref 0.2–1)
BILIRUB UR QL STRIP: NEGATIVE
BUN SERPL-MCNC: 23 MG/DL (ref 5–25)
CALCIUM SERPL-MCNC: 9 MG/DL (ref 8.4–10.2)
CARDIAC TROPONIN I PNL SERPL HS: 125 NG/L (ref 8–18)
CHLORIDE SERPL-SCNC: 103 MMOL/L (ref 96–108)
CLARITY UR: CLEAR
CO2 SERPL-SCNC: 27 MMOL/L (ref 21–32)
COLOR UR: ABNORMAL
CREAT SERPL-MCNC: 1.19 MG/DL (ref 0.6–1.3)
EOSINOPHIL # BLD AUTO: 0.04 THOUSAND/ÂΜL (ref 0–0.61)
EOSINOPHIL NFR BLD AUTO: 0 % (ref 0–6)
ERYTHROCYTE [DISTWIDTH] IN BLOOD BY AUTOMATED COUNT: 15 % (ref 11.6–15.1)
GFR SERPL CREATININE-BSD FRML MDRD: 38 ML/MIN/1.73SQ M
GLUCOSE P FAST SERPL-MCNC: 100 MG/DL (ref 65–99)
GLUCOSE SERPL-MCNC: 100 MG/DL (ref 65–140)
GLUCOSE UR STRIP-MCNC: NEGATIVE MG/DL
HCT VFR BLD AUTO: 41.7 % (ref 34.8–46.1)
HGB BLD-MCNC: 13.5 G/DL (ref 11.5–15.4)
HGB UR QL STRIP.AUTO: NEGATIVE
IMM GRANULOCYTES # BLD AUTO: 0.07 THOUSAND/UL (ref 0–0.2)
IMM GRANULOCYTES NFR BLD AUTO: 1 % (ref 0–2)
KETONES UR STRIP-MCNC: ABNORMAL MG/DL
LEUKOCYTE ESTERASE UR QL STRIP: NEGATIVE
LYMPHOCYTES # BLD AUTO: 1.11 THOUSANDS/ÂΜL (ref 0.6–4.47)
LYMPHOCYTES NFR BLD AUTO: 9 % (ref 14–44)
MCH RBC QN AUTO: 28.1 PG (ref 26.8–34.3)
MCHC RBC AUTO-ENTMCNC: 32.4 G/DL (ref 31.4–37.4)
MCV RBC AUTO: 87 FL (ref 82–98)
MONOCYTES # BLD AUTO: 0.99 THOUSAND/ÂΜL (ref 0.17–1.22)
MONOCYTES NFR BLD AUTO: 8 % (ref 4–12)
NEUTROPHILS # BLD AUTO: 9.6 THOUSANDS/ÂΜL (ref 1.85–7.62)
NEUTS SEG NFR BLD AUTO: 82 % (ref 43–75)
NITRITE UR QL STRIP: NEGATIVE
NON-SQ EPI CELLS URNS QL MICRO: ABNORMAL /HPF
NRBC BLD AUTO-RTO: 0 /100 WBCS
PH UR STRIP.AUTO: 7 [PH]
PLATELET # BLD AUTO: 190 THOUSANDS/UL (ref 149–390)
PMV BLD AUTO: 11 FL (ref 8.9–12.7)
POTASSIUM SERPL-SCNC: 3.8 MMOL/L (ref 3.5–5.3)
PROT SERPL-MCNC: 6.7 G/DL (ref 6.4–8.4)
PROT UR STRIP-MCNC: ABNORMAL MG/DL
RBC # BLD AUTO: 4.8 MILLION/UL (ref 3.81–5.12)
RBC #/AREA URNS AUTO: ABNORMAL /HPF
SODIUM SERPL-SCNC: 139 MMOL/L (ref 135–147)
SP GR UR STRIP.AUTO: 1.02 (ref 1–1.03)
UROBILINOGEN UR STRIP-ACNC: <2 MG/DL
WBC # BLD AUTO: 11.86 THOUSAND/UL (ref 4.31–10.16)
WBC #/AREA URNS AUTO: ABNORMAL /HPF

## 2023-05-23 RX ORDER — AMOXICILLIN 250 MG
1 CAPSULE ORAL
Status: DISCONTINUED | OUTPATIENT
Start: 2023-05-23 | End: 2023-05-24 | Stop reason: HOSPADM

## 2023-05-23 RX ORDER — PRAVASTATIN SODIUM 40 MG
40 TABLET ORAL
Status: DISCONTINUED | OUTPATIENT
Start: 2023-05-23 | End: 2023-05-24 | Stop reason: HOSPADM

## 2023-05-23 RX ORDER — WARFARIN SODIUM 2.5 MG/1
2.5 TABLET ORAL
Status: DISCONTINUED | OUTPATIENT
Start: 2023-05-23 | End: 2023-05-24 | Stop reason: HOSPADM

## 2023-05-23 RX ORDER — ACETAMINOPHEN 325 MG/1
650 TABLET ORAL EVERY 6 HOURS PRN
Status: DISCONTINUED | OUTPATIENT
Start: 2023-05-23 | End: 2023-05-24 | Stop reason: HOSPADM

## 2023-05-23 RX ORDER — METOPROLOL TARTRATE 100 MG/1
100 TABLET ORAL 2 TIMES DAILY
Status: DISCONTINUED | OUTPATIENT
Start: 2023-05-23 | End: 2023-05-24 | Stop reason: HOSPADM

## 2023-05-23 RX ORDER — LABETALOL HYDROCHLORIDE 5 MG/ML
10 INJECTION, SOLUTION INTRAVENOUS ONCE
Status: COMPLETED | OUTPATIENT
Start: 2023-05-23 | End: 2023-05-23

## 2023-05-23 RX ORDER — LOSARTAN POTASSIUM 50 MG/1
100 TABLET ORAL DAILY
Status: DISCONTINUED | OUTPATIENT
Start: 2023-05-23 | End: 2023-05-24 | Stop reason: HOSPADM

## 2023-05-23 RX ORDER — ONDANSETRON 4 MG/1
4 TABLET, ORALLY DISINTEGRATING ORAL EVERY 6 HOURS PRN
Status: DISCONTINUED | OUTPATIENT
Start: 2023-05-23 | End: 2023-05-24 | Stop reason: HOSPADM

## 2023-05-23 RX ORDER — POLYETHYLENE GLYCOL 3350 17 G/17G
17 POWDER, FOR SOLUTION ORAL DAILY PRN
Status: DISCONTINUED | OUTPATIENT
Start: 2023-05-23 | End: 2023-05-24 | Stop reason: HOSPADM

## 2023-05-23 RX ORDER — WARFARIN SODIUM 2.5 MG/1
2.5 TABLET ORAL 2 TIMES WEEKLY
Status: DISCONTINUED | OUTPATIENT
Start: 2023-05-27 | End: 2023-05-24 | Stop reason: HOSPADM

## 2023-05-23 RX ORDER — TORSEMIDE 10 MG/1
5 TABLET ORAL DAILY
Status: DISCONTINUED | OUTPATIENT
Start: 2023-05-23 | End: 2023-05-24

## 2023-05-23 RX ORDER — GABAPENTIN 100 MG/1
100 CAPSULE ORAL 3 TIMES DAILY
Status: DISCONTINUED | OUTPATIENT
Start: 2023-05-23 | End: 2023-05-24 | Stop reason: HOSPADM

## 2023-05-23 RX ORDER — LEVOTHYROXINE SODIUM 0.05 MG/1
50 TABLET ORAL
Status: DISCONTINUED | OUTPATIENT
Start: 2023-05-23 | End: 2023-05-24 | Stop reason: HOSPADM

## 2023-05-23 RX ORDER — NYSTATIN 100000 [USP'U]/G
POWDER TOPICAL 2 TIMES DAILY
Status: DISCONTINUED | OUTPATIENT
Start: 2023-05-23 | End: 2023-05-24 | Stop reason: HOSPADM

## 2023-05-23 RX ADMIN — SODIUM CHLORIDE, SODIUM LACTATE, POTASSIUM CHLORIDE, AND CALCIUM CHLORIDE 500 ML: .6; .31; .03; .02 INJECTION, SOLUTION INTRAVENOUS at 01:15

## 2023-05-23 RX ADMIN — GABAPENTIN 100 MG: 100 CAPSULE ORAL at 09:04

## 2023-05-23 RX ADMIN — LABETALOL HYDROCHLORIDE 10 MG: 5 INJECTION, SOLUTION INTRAVENOUS at 01:12

## 2023-05-23 RX ADMIN — METOPROLOL TARTRATE 100 MG: 100 TABLET, FILM COATED ORAL at 17:14

## 2023-05-23 RX ADMIN — LEVOTHYROXINE SODIUM 50 MCG: 50 TABLET ORAL at 06:16

## 2023-05-23 RX ADMIN — GABAPENTIN 100 MG: 100 CAPSULE ORAL at 21:52

## 2023-05-23 RX ADMIN — PRAVASTATIN SODIUM 40 MG: 40 TABLET ORAL at 17:15

## 2023-05-23 RX ADMIN — WARFARIN SODIUM 2.5 MG: 2.5 TABLET ORAL at 17:15

## 2023-05-23 RX ADMIN — METOPROLOL TARTRATE 100 MG: 100 TABLET, FILM COATED ORAL at 09:03

## 2023-05-23 RX ADMIN — TORSEMIDE 5 MG: 10 TABLET ORAL at 09:03

## 2023-05-23 RX ADMIN — LOSARTAN POTASSIUM 100 MG: 50 TABLET, FILM COATED ORAL at 09:03

## 2023-05-23 RX ADMIN — GABAPENTIN 100 MG: 100 CAPSULE ORAL at 17:15

## 2023-05-23 NOTE — ASSESSMENT & PLAN NOTE
Blood Pressure: 112/75    Patient hypertensive while in the ED to 213/92 requiring IV labetalol 10mg x1     - Continue home losartan 100mg qd, lopressor 100mg BID, and torsemide 5mg qd

## 2023-05-23 NOTE — ASSESSMENT & PLAN NOTE
POA, patient with oxygen saturations in the 80s via pulse ox and requiring 3L of supplemental oxygen  At the time of my last assessment, she denied SOB and was breathing comfortably on room air, satting in the upper 90s   CT PE study negative

## 2023-05-23 NOTE — ASSESSMENT & PLAN NOTE
Lab Results   Component Value Date    EGFR 35 05/22/2023    EGFR 28 05/10/2023    EGFR 25 07/20/2022    CREATININE 1 28 05/22/2023    CREATININE 1 53 (H) 05/10/2023    CREATININE 1 70 (H) 07/20/2022     Per chart review, patient's baseline appears to be approx 1 1-1 3   Currently at baseline     - Trend Cr, daily BMP

## 2023-05-23 NOTE — H&P
[facility]  H&P  Name: Dave Etienne 80 y o  female I MRN: [id number]  Unit/Bed#: W -01 I Date of Admission: 5/22/2023   Date of Service: 5/23/2023 I Hospital Day: 0      Assessment/Plan   * Altered mental status  Assessment & Plan  Patient presented to ED, per family she has been altered recently from baseline (hx dementia) and has been having poor PO intake  She was reportedly hypoxic to [de-identified] with EMS requiring supplemental oxygen via nasal cannula  CT head negative, CT PE study negative, UA and electrolytes grossly unremarkable  Etiology at this time remains unclear      - F/u CXR final read  - Attempt to reorient patient when necessary  - Limit restraints and sedating medications  - Urinary retention protocol   - Monitor I/Os  - F/u bcx    Shortness of breath-resolved as of 5/23/2023  Assessment & Plan  POA, patient with oxygen saturations in the 80s via pulse ox and requiring 3L of supplemental oxygen  At the time of my last assessment, she denied SOB and was breathing comfortably on room air, satting in the upper 90s  CT PE study negative    Benign essential hypertension  Assessment & Plan  Blood Pressure: 112/75    Patient hypertensive while in the ED to 213/92 requiring IV labetalol 10mg x1     - Continue home losartan 100mg qd, lopressor 100mg BID, and torsemide 5mg qd    Atrial fibrillation [facility])  Assessment & Plan  Continue home lopressor 100mg BID  Continue home warfarin dosing, 5mg Monday and Saturday, 2 5mg all other days    Stage 3 chronic kidney disease [facility])  Assessment & Plan  Lab Results   Component Value Date    EGFR 35 05/22/2023    EGFR 28 05/10/2023    EGFR 25 07/20/2022    CREATININE 1 28 05/22/2023    CREATININE 1 53 (H) 05/10/2023    CREATININE 1 70 (H) 07/20/2022     Per chart review, patient's baseline appears to be approx 1 1-1 3   Currently at baseline     - Trend Cr, daily BMP       VTE Pharmacologic Prophylaxis: VTE Score: 4 Moderate Risk (Score 3-4) - "Pharmacological DVT Prophylaxis Ordered: warfarin (Coumadin)  Code Status: Level 3 - DNAR and DNI per [facility] docs  Discussion with family: deferred; patient would like daughter updated later today  Anticipated Length of Stay: Patient will be admitted on an observation basis with an anticipated length of stay of less than 2 midnights secondary to altered mental status  Chief Complaint: altered mental status    History of Present Illness:  Afia Koenig is a 80 y o  female with a PMH of afib on warfarin, HTN, CKD, and dementia who presents with altered mental status  She was originally brought to the hospital by EMS due to family concerns that she was altered from her baseline, not eating well, and short of breath  She was reportedly found to be satting in the 80s and required supplemental oxygen  In speaking with her to obtain a history, she is alert and oriented to self, place, and month, although not to the year  Her only complaints for me are back pain and headache, she otherwise denies acute concerns and states she is here in the hospital because \"they don't know what they're doing,\" but is unable to elaborate on this with me  While in the ED, CBC and CMP were grossly unremarkable  Lactic and procal were within normal limits  BNP was noted to be elevated, but within normal limits for age-adjusted threshold  Due to concern for possible AMS 2/2 sepsis, blood cultures were drawn and urinalysis was collected; culture results are pending at this time, urinalysis grossly unremarkable as well  Review of Systems:  Review of Systems   Constitutional: Negative for chills and fever  HENT: Negative for congestion and rhinorrhea  Respiratory: Negative for cough and shortness of breath  Cardiovascular: Negative for chest pain and leg swelling  Gastrointestinal: Negative for abdominal pain, constipation and diarrhea  Genitourinary: Negative for dysuria and hematuria     Musculoskeletal: Positive for " back pain  Skin: Negative for rash  Allergic/Immunologic: Negative for environmental allergies and food allergies  Neurological: Positive for headaches  Negative for dizziness  All other systems reviewed and are negative        Past Medical and Surgical History:   Past Medical History:   Diagnosis Date   • , spontaneous complete    • Carcinoma of skin    • Cataract     last assessed: 17   • Cataract    • Cellulitis    • Effusion of both knee joints     resolved: 3/25/15   • Effusion of both knee joints    • Enteritis    • Female stress incontinence     last assessed: 13   • Female stress incontinence    • Gastric ulcer     last assessed: 14   • Gastric ulcer    • GERD (gastroesophageal reflux disease)    • Hyperlipidemia    • Hypertension    • Hyperthyroidism    • Lyme disease     last assessed: 13   • Lyme disease    • Microscopic hematuria     last assessed: 13   • Microscopic hematuria    • Normal delivery     1950 son, 1952 son, 12 daughter, 26 daughter, 65 daughter   • Paroxysmal atrial fibrillation ([address] )     last assessed: 13   • Paroxysmal atrial fibrillation ([address] )    • Perirectal abscess     last assessed: 13   • Platelet dysfunction (HCC)     PAF   • Platelet dysfunction (HCC)    • Sinus tachycardia     last assessed: 13   • Sinus tachycardia    • Spinal stenosis     lumbar; last assessed: 10/4/13   • Stage 3 chronic kidney disease ([address] ) 6/3/2019   • Stage 3 chronic kidney disease ([address] )    • Tachycardia     unspecified; last assessed: 13   • Trigger finger, acquired     last assessed: 6/30/15   • Trigger finger, acquired        Past Surgical History:   Procedure Laterality Date   • KNEE ARTHROSCOPY Bilateral        • KNEE ARTHROSCOPY     • LUMBAR LAMINECTOMY         • LUMBAR LAMINECTOMY     • NEUROPLASTY / TRANSPOSITION MEDIAN NERVE AT CARPAL TUNNEL Bilateral        • NEUROPLASTY / TRANSPOSITION MEDIAN NERVE AT CARPAL TUNNEL     • TONSILLECTOMY AND ADENOIDECTOMY     • TOTAL HIP ARTHROPLASTY      joint replacement; L hip; 2/2/10   • TOTAL HIP ARTHROPLASTY         Meds/Allergies:  Prior to Admission medications    Medication Sig Start Date End Date Taking?  Authorizing Provider   Diclofenac Sodium (VOLTAREN) 1 % Apply 2 g topically 4 (four) times a day 8/26/21   Cecy Ornelas MD   ergocalciferol (VITAMIN D2) 50,000 units Take 1 capsule (50,000 Units total) by mouth once a week 5/5/23   Cecy Ornelas MD   gabapentin (NEURONTIN) 100 mg capsule Take 1 capsule (100 mg total) by mouth 3 (three) times a day 3/31/22   Cecy Ornelas MD   HYDROcodone-acetaminophen ([place]) 7 5-300 MG per tablet Take 1 tablet by mouth every 8 (eight) hours as needed for severe pain Max Daily Amount: 3 tablets 5/4/23   Liban Canada MD   levothyroxine 50 mcg tablet Take 1 tablet (50 mcg total) by mouth daily 4/18/23   Cecy Ornelas MD   losartan (COZAAR) 100 MG tablet Take 1 tablet (100 mg total) by mouth daily 4/18/23   Cecy Ornelas MD   metoprolol tartrate (LOPRESSOR) 100 mg tablet Take 1 tablet (100 mg total) by mouth 2 (two) times a day 3/8/23   Cecy Ornelas MD   ondansetron (ZOFRAN) 4 mg tablet Take 1 tablet (4 mg total) by mouth every 6 (six) hours as needed for nausea or vomiting 2/17/22   Cecy Ornelas MD   polyethylene glycol (MIRALAX) 17 g packet Take 17 g by mouth as needed      Historical Provider, MD   senna-docusate sodium (SENOKOT S) 8 6-50 mg per tablet Take 1 tablet by mouth as needed for constipation    Historical Provider, MD   simvastatin (ZOCOR) 20 mg tablet Take 1 tablet (20 mg total) by mouth daily at bedtime 4/18/23   Cecy Ornelas MD   torsemide (DEMADEX) 5 MG tablet Take 1 tablet (5 mg total) by mouth daily 4/18/23   Cecy Ornelas MD   warfarin (COUMADIN) 5 mg tablet TAKE 5 MG ON MONDAY AND SATURDAY AND 2 5 MG ALL OTHER DAYS 9/2/22   Cecy Ornelas MD     I have reviewed home medications using recent Epic encounter  Allergies: Allergies   Allergen Reactions   • Cortisone    • Oxaprozin Hives   • Penicillins Hives       Social History:  Marital Status:    Occupation: retired  Patient Pre-hospital Living Situation: Home  Patient Pre-hospital Level of Mobility: unable to be assessed at time of evaluation  Patient Pre-hospital Diet Restrictions: none  Substance Use History:   Social History     Substance and Sexual Activity   Alcohol Use Yes    Comment: rare on special occassions only     Social History     Tobacco Use   Smoking Status Never   Smokeless Tobacco Never     Social History     Substance and Sexual Activity   Drug Use Never       Family History:  Family History   Problem Relation Age of Onset   • Rheumatic fever Mother    • Heart disease Father    • Crohn's disease Brother         (Granulomatous) Golitis   • Psoriasis Daughter    • Heart disease Son        Physical Exam:     Vitals:   Blood Pressure: 112/75 (05/23/23 0245)  Pulse: 78 (05/23/23 0245)  Temperature: 98 5 °F (36 9 °C) (05/23/23 0245)  Temp Source: Rectal (05/22/23 2045)  Respirations: 18 (05/23/23 0245)  Height: 5' (152 4 cm) (05/23/23 0246)  Weight - Scale: 80 6 kg (177 lb 11 1 oz) (05/23/23 0246)  SpO2: 97 % (05/23/23 0245)    Physical Exam  Vitals reviewed  Constitutional:       General: She is not in acute distress  Appearance: She is not ill-appearing, toxic-appearing or diaphoretic  HENT:      Nose: Nose normal  No congestion or rhinorrhea  Mouth/Throat:      Mouth: Mucous membranes are moist       Pharynx: Oropharynx is clear  No oropharyngeal exudate or posterior oropharyngeal erythema  Eyes:      Extraocular Movements: Extraocular movements intact  Pupils: Pupils are equal, round, and reactive to light  Cardiovascular:      Rate and Rhythm: Normal rate and regular rhythm  Heart sounds: Normal heart sounds  Pulmonary:      Effort: Pulmonary effort is normal  No respiratory distress        Breath sounds: Normal breath sounds  No stridor  No wheezing, rhonchi or rales  Abdominal:      General: Abdomen is flat  Bowel sounds are normal  There is no distension  Palpations: Abdomen is soft  Tenderness: There is no abdominal tenderness  There is no guarding  Musculoskeletal:         General: Normal range of motion  Cervical back: Normal range of motion  Right lower leg: Edema present  Left lower leg: Edema present  Skin:     General: Skin is warm and dry  Coloration: Skin is not jaundiced  Findings: No erythema  Neurological:      Mental Status: She is alert  Sensory: No sensory deficit  Motor: No weakness  Comments: Oriented to person, place, and current month, but not to current year  Baseline unknown     Psychiatric:         Mood and Affect: Mood normal          Behavior: Behavior normal           Additional Data:     Lab Results:  Results from last 7 days   Lab Units 05/22/23 2112   WBC Thousand/uL 9 25   HEMOGLOBIN g/dL 14 5   HEMATOCRIT % 45 2   PLATELETS Thousands/uL 210   NEUTROS PCT % 82*   LYMPHS PCT % 10*   MONOS PCT % 6   EOS PCT % 1     Results from last 7 days   Lab Units 05/22/23 2112   SODIUM mmol/L 138   POTASSIUM mmol/L 4 6   CHLORIDE mmol/L 104   CO2 mmol/L 26   BUN mg/dL 23   CREATININE mg/dL 1 28   ANION GAP mmol/L 8   CALCIUM mg/dL 9 0   ALBUMIN g/dL 4 1   TOTAL BILIRUBIN mg/dL 0 76   ALK PHOS U/L 111*   ALT U/L 5*   AST U/L 17   GLUCOSE RANDOM mg/dL 110     Results from last 7 days   Lab Units 05/22/23 2112   INR  2 00*             Results from last 7 days   Lab Units 05/22/23 2112   LACTIC ACID mmol/L 1 1   PROCALCITONIN ng/ml <0 05       Lines/Drains:  Invasive Devices     Peripheral Intravenous Line  Duration           Peripheral IV 05/22/23 Left Antecubital <1 day    Peripheral IV 05/22/23 Left;Upper;Ventral (anterior) Arm <1 day                    Imaging: Reviewed radiology reports from this admission including: CT head and CT PE  CT pe study w abdomen pelvis w contrast   Final Result by Nita Hopper MD (05/22 3178)         1  No evidence of acute pulmonary embolus, thoracic aortic aneurysm or dissection  No acute cardiopulmonary process  2  No acute intra-abdominal or pelvic process  Workstation performed: [id number]         CT head without contrast   Final Result by Darius Najera DO (05/22 2157)      No acute intracranial abnormality  Workstation performed: [id number]         XR chest portable    (Results Pending)       EKG and Other Studies Reviewed on Admission:   · EKG: No EKG obtained

## 2023-05-23 NOTE — ASSESSMENT & PLAN NOTE
Patient presented to ED, per family she has been altered recently from baseline (hx dementia) and has been having poor PO intake  She was reportedly hypoxic to [de-identified] with EMS requiring supplemental oxygen via nasal cannula  CT head negative, CT PE study negative, UA and electrolytes grossly unremarkable   Etiology at this time remains unclear      - F/u CXR final read  - Attempt to reorient patient when necessary  - Limit restraints and sedating medications  - Urinary retention protocol   - Monitor I/Os  - F/u bcx

## 2023-05-23 NOTE — ED PROVIDER NOTES
History  Chief Complaint   Patient presents with   • Altered Mental Status     Pt to ED via EMS with c/o altered mental status per family  Oxygen found to be in low 80s at home  GCS 15    80year-old female with previous medical history of A-fib, hypertension, diabetes, dementia presents for evaluation of generalized weakness  Patient was complaining of shortness of breath earlier  Going to the family patient has not been acting herself lately  Generalized weakness  No numbness  No nausea vomiting  No headache  No abdominal pain or distention  No rash  Altered Mental Status  Context: not alcohol use, not dementia, not drug use, not head injury and not homeless    Associated symptoms: no abdominal pain, no fever, no palpitations, no rash, no seizures and no vomiting        Prior to Admission Medications   Prescriptions Last Dose Informant Patient Reported? Taking?    Diclofenac Sodium (VOLTAREN) 1 %   No No   Sig: Apply 2 g topically 4 (four) times a day   HYDROcodone-acetaminophen (XODOL) 7 5-300 MG per tablet   No No   Sig: Take 1 tablet by mouth every 8 (eight) hours as needed for severe pain Max Daily Amount: 3 tablets   ergocalciferol (VITAMIN D2) 50,000 units   No No   Sig: Take 1 capsule (50,000 Units total) by mouth once a week   gabapentin (NEURONTIN) 100 mg capsule   No No   Sig: Take 1 capsule (100 mg total) by mouth 3 (three) times a day   levothyroxine 50 mcg tablet   No No   Sig: Take 1 tablet (50 mcg total) by mouth daily   losartan (COZAAR) 100 MG tablet   No No   Sig: Take 1 tablet (100 mg total) by mouth daily   metoprolol tartrate (LOPRESSOR) 100 mg tablet   No No   Sig: Take 1 tablet (100 mg total) by mouth 2 (two) times a day   ondansetron (ZOFRAN) 4 mg tablet   No No   Sig: Take 1 tablet (4 mg total) by mouth every 6 (six) hours as needed for nausea or vomiting   polyethylene glycol (MIRALAX) 17 g packet   Yes No   Sig: Take 17 g by mouth as needed     senna-docusate sodium (SENOKOT S) 8 6-50 mg per tablet   Yes No   Sig: Take 1 tablet by mouth as needed for constipation   simvastatin (ZOCOR) 20 mg tablet   No No   Sig: Take 1 tablet (20 mg total) by mouth daily at bedtime   torsemide (DEMADEX) 5 MG tablet   No No   Sig: Take 1 tablet (5 mg total) by mouth daily   warfarin (COUMADIN) 5 mg tablet   No No   Sig: TAKE 5 MG ON MONDAY AND SATURDAY AND 2 5 MG ALL OTHER DAYS      Facility-Administered Medications: None       Past Medical History:   Diagnosis Date   • , spontaneous complete    • Carcinoma of skin    • Cataract     last assessed: 17   • Cataract    • Cellulitis    • Effusion of both knee joints     resolved: 3/25/15   • Effusion of both knee joints    • Enteritis    • Female stress incontinence     last assessed: 13   • Female stress incontinence    • Gastric ulcer     last assessed: 14   • Gastric ulcer    • GERD (gastroesophageal reflux disease)    • Hyperlipidemia    • Hypertension    • Hyperthyroidism    • Lyme disease     last assessed: 13   • Lyme disease    • Microscopic hematuria     last assessed: 13   • Microscopic hematuria    • Normal delivery     1950 son, 1952 son, 12 daughter, 26 daughter, 65 daughter   • Paroxysmal atrial fibrillation ([address] )     last assessed: 13   • Paroxysmal atrial fibrillation ([address] )    • Perirectal abscess     last assessed: 13   • Platelet dysfunction (HCC)     PAF   • Platelet dysfunction (HCC)    • Sinus tachycardia     last assessed: 13   • Sinus tachycardia    • Spinal stenosis     lumbar; last assessed: 10/4/13   • Stage 3 chronic kidney disease ([address] ) 6/3/2019   • Stage 3 chronic kidney disease ([address] )    • Tachycardia     unspecified; last assessed: 13   • Trigger finger, acquired     last assessed: 6/30/15   • Trigger finger, acquired        Past Surgical History:   Procedure Laterality Date   • KNEE ARTHROSCOPY Bilateral        • KNEE ARTHROSCOPY     • LUMBAR LAMINECTOMY       • LUMBAR LAMINECTOMY     • NEUROPLASTY / TRANSPOSITION MEDIAN NERVE AT CARPAL TUNNEL Bilateral     2011   • NEUROPLASTY / TRANSPOSITION MEDIAN NERVE AT CARPAL TUNNEL     • TONSILLECTOMY AND ADENOIDECTOMY     • TOTAL HIP ARTHROPLASTY      joint replacement; L hip; 2/2/10   • TOTAL HIP ARTHROPLASTY         Family History   Problem Relation Age of Onset   • Rheumatic fever Mother    • Heart disease Father    • Crohn's disease Brother         (Granulomatous) Golitis   • Psoriasis Daughter    • Heart disease Son      I have reviewed and agree with the history as documented  E-Cigarette/Vaping   • E-Cigarette Use Never User      E-Cigarette/Vaping Substances   • Nicotine No    • THC No    • CBD No    • Flavoring No    • Other No    • Unknown No      Social History     Tobacco Use   • Smoking status: Never   • Smokeless tobacco: Never   Vaping Use   • Vaping Use: Never used   Substance Use Topics   • Alcohol use: Yes     Comment: rare on special occassions only   • Drug use: Never       Review of Systems   Unable to perform ROS: Dementia   Constitutional: Negative for chills and fever  HENT: Negative for ear pain and sore throat  Eyes: Negative for pain and visual disturbance  Respiratory: Negative for cough and shortness of breath  Cardiovascular: Negative for chest pain and palpitations  Gastrointestinal: Negative for abdominal pain and vomiting  Genitourinary: Negative for dysuria and hematuria  Musculoskeletal: Negative for arthralgias and back pain  Skin: Negative for color change and rash  Neurological: Negative for seizures and syncope  All other systems reviewed and are negative  Physical Exam  Physical Exam  Vitals and nursing note reviewed  Constitutional:       General: She is not in acute distress  Appearance: She is well-developed  HENT:      Head: Normocephalic and atraumatic     Eyes:      Conjunctiva/sclera: Conjunctivae normal    Cardiovascular:      Rate and Rhythm: Normal rate and regular rhythm  Heart sounds: No murmur heard  Pulmonary:      Effort: Pulmonary effort is normal  No respiratory distress  Breath sounds: Normal breath sounds  Abdominal:      Palpations: Abdomen is soft  Tenderness: There is no abdominal tenderness  Musculoskeletal:         General: No swelling  Cervical back: Neck supple  Skin:     General: Skin is warm and dry  Capillary Refill: Capillary refill takes less than 2 seconds  Neurological:      Mental Status: She is lethargic  Psychiatric:         Mood and Affect: Mood normal          Vital Signs  ED Triage Vitals   Temperature Pulse Respirations Blood Pressure SpO2   05/22/23 2045 05/22/23 2045 05/22/23 2045 05/22/23 2045 05/22/23 2045   99 3 °F (37 4 °C) 64 18 (!) 197/99 94 %      Temp Source Heart Rate Source Patient Position - Orthostatic VS BP Location FiO2 (%)   05/22/23 2045 05/22/23 2200 05/22/23 2200 05/22/23 2200 --   Rectal Monitor Lying Right arm       Pain Score       --                  Vitals:    05/23/23 0030 05/23/23 0111 05/23/23 0115 05/23/23 0130   BP: (!) 194/85 (!) 194/91 131/60 132/61   Pulse: 70 69 76 80   Patient Position - Orthostatic VS:             Visual Acuity  Visual Acuity    Flowsheet Row Most Recent Value   L Pupil Size (mm) 2   R Pupil Size (mm) 2          ED Medications  Medications   lactated ringers bolus 500 mL (500 mL Intravenous New Bag 5/23/23 0115)   iohexol (OMNIPAQUE) 350 MG/ML injection (SINGLE-DOSE) 100 mL (100 mL Intravenous Given 5/22/23 2259)   labetalol (NORMODYNE) injection 10 mg (10 mg Intravenous Given 5/23/23 0112)       Diagnostic Studies  Results Reviewed     Procedure Component Value Units Date/Time    High Sensitivity Troponin I Random [[id number]] Collected: 05/23/23 0119    Lab Status:  In process Specimen: Blood from Arm, Left Updated: 05/23/23 0120    Urine Microscopic [[id number]]  (Abnormal) Collected: 05/22/23 2322    Lab Status: Final result Specimen: Urine, Clean Catch Updated: 05/23/23 0003     RBC, UA 2-4 /hpf      WBC, UA None Seen /hpf      Epithelial Cells None Seen /hpf      Bacteria, UA None Seen /hpf     Blood culture #1 [[id number]] Collected: 05/22/23 2113    Lab Status: Preliminary result Specimen: Blood from Arm, Left Updated: 05/23/23 0001     Blood Culture Received in Microbiology Lab  Culture in Progress  Blood culture #2 [[id number]] Collected: 05/22/23 2113    Lab Status: Preliminary result Specimen: Blood from Arm, Left Updated: 05/23/23 0001     Blood Culture Received in Microbiology Lab  Culture in Progress      UA w Reflex to Microscopic w Reflex to Culture [[id number]]  (Abnormal) Collected: 05/22/23 2322    Lab Status: Final result Specimen: Urine, Clean Catch Updated: 05/23/23 0001     Color, UA Light Yellow     Clarity, UA Clear     Specific Gravity, UA 1 017     pH, UA 7 0     Leukocytes, UA Negative     Nitrite, UA Negative     Protein, UA Trace mg/dl      Glucose, UA Negative mg/dl      Ketones, UA Trace mg/dl      Urobilinogen, UA <2 0 mg/dl      Bilirubin, UA Negative     Occult Blood, UA Negative    Procalcitonin [[id number]]  (Normal) Collected: 05/22/23 2112    Lab Status: Final result Specimen: Blood from Arm, Left Updated: 05/22/23 2212     Procalcitonin <0 05 ng/ml     Blood gas, venous [[id number]]  (Abnormal) Collected: 05/22/23 2144    Lab Status: Final result Specimen: Blood from Arm, Left Updated: 05/22/23 2202     pH, Iban 7 364     pCO2, Iban 50 0 mm Hg      pO2, Iban 45 9 mm Hg      HCO3, Iban 27 9 mmol/L      Base Excess, Iban 1 6 mmol/L      O2 Content, Iban 16 0 ml/dL      O2 HGB, VENOUS 80 0 %     Protime-INR [[id number]]  (Abnormal) Collected: 05/22/23 2112    Lab Status: Final result Specimen: Blood from Arm, Left Updated: 05/22/23 2151     Protime 22 9 seconds      INR 2 00    APTT [[id number]]  (Normal) Collected: 05/22/23 2112    Lab Status: Final result Specimen: Blood from Arm, Left Updated: 05/22/23 2151     PTT 27 seconds     High Sensitivity Troponin I Random [[id number]]  (Abnormal) Collected: 05/22/23 2112    Lab Status: Final result Specimen: Blood from Arm, Left Updated: 05/22/23 2148     HS TnI random 91 ng/L     Comprehensive metabolic panel [[id number]]  (Abnormal) Collected: 05/22/23 2112    Lab Status: Final result Specimen: Blood from Arm, Left Updated: 05/22/23 2147     Sodium 138 mmol/L      Potassium 4 6 mmol/L      Chloride 104 mmol/L      CO2 26 mmol/L      ANION GAP 8 mmol/L      BUN 23 mg/dL      Creatinine 1 28 mg/dL      Glucose 110 mg/dL      Calcium 9 0 mg/dL      AST 17 U/L      ALT 5 U/L      Alkaline Phosphatase 111 U/L      Total Protein 7 1 g/dL      Albumin 4 1 g/dL      Total Bilirubin 0 76 mg/dL      eGFR 35 ml/min/1 73sq m     Narrative:      [facility] guidelines for Chronic Kidney Disease (CKD):   •  Stage 1 with normal or high GFR (GFR > 90 mL/min/1 73 square meters)  •  Stage 2 Mild CKD (GFR = 60-89 mL/min/1 73 square meters)  •  Stage 3A Moderate CKD (GFR = 45-59 mL/min/1 73 square meters)  •  Stage 3B Moderate CKD (GFR = 30-44 mL/min/1 73 square meters)  •  Stage 4 Severe CKD (GFR = 15-29 mL/min/1 73 square meters)  •  Stage 5 End Stage CKD (GFR <15 mL/min/1 73 square meters)  Note: GFR calculation is accurate only with a steady state creatinine    B-Type Natriuretic Peptide(BNP) [[id number]]  (Abnormal) Collected: 05/22/23 2112    Lab Status: Final result Specimen: Blood from Arm, Left Updated: 05/22/23 2147      pg/mL     Lactic acid [[id number]]  (Normal) Collected: 05/22/23 2112    Lab Status: Final result Specimen: Blood from Arm, Left Updated: 05/22/23 2144     LACTIC ACID 1 1 mmol/L     Narrative:      Result may be elevated if tourniquet was used during collection      CBC and differential [[id number]]  (Abnormal) Collected: 05/22/23 2112    Lab Status: Final result Specimen: Blood from Arm, Left Updated: 05/22/23 2125     WBC 9 25 Thousand/uL      RBC 5 20 Million/uL      Hemoglobin 14 5 g/dL      Hematocrit 45 2 %      MCV 87 fL      MCH 27 9 pg      MCHC 32 1 g/dL      RDW 15 0 %      MPV 10 8 fL      Platelets 035 Thousands/uL      nRBC 0 /100 WBCs      Neutrophils Relative 82 %      Immat GRANS % 0 %      Lymphocytes Relative 10 %      Monocytes Relative 6 %      Eosinophils Relative 1 %      Basophils Relative 1 %      Neutrophils Absolute 7 63 Thousands/µL      Immature Grans Absolute 0 04 Thousand/uL      Lymphocytes Absolute 0 89 Thousands/µL      Monocytes Absolute 0 57 Thousand/µL      Eosinophils Absolute 0 05 Thousand/µL      Basophils Absolute 0 07 Thousands/µL                  CT pe study w abdomen pelvis w contrast   Final Result by Parris Reynolds MD (05/22 2348)         1  No evidence of acute pulmonary embolus, thoracic aortic aneurysm or dissection  No acute cardiopulmonary process  2  No acute intra-abdominal or pelvic process  Workstation performed: [id number]         CT head without contrast   Final Result by Jacey Evans DO (05/22 2157)      No acute intracranial abnormality  Workstation performed: [id number]         XR chest portable    (Results Pending)              Procedures  Procedures         ED Course      Patient presents with altered mental status  CT head is unremarkable  CT chest abdomen pelvis does not show any signs of pneumonia or intra-abdominal process  Patient was started on IV fluids  Plan is to admit for encephalopathy, and IV hydration  Patient admitted for further management and care to medicine service  Medical Decision Making  Patient presents with altered mental status  CT head is unremarkable  CT chest abdomen pelvis does not show any signs of pneumonia or intra-abdominal process  Patient was started on IV fluids  Plan is to admit for encephalopathy, and IV hydration    Patient admitted for further management and care to medicine service  Altered mental status: acute illness or injury  Dehydration: acute illness or injury  Amount and/or Complexity of Data Reviewed  Labs: ordered  Radiology: ordered  Risk  Prescription drug management  Decision regarding hospitalization  Disposition  Final diagnoses: Altered mental status   Dehydration     Time reflects when diagnosis was documented in both MDM as applicable and the Disposition within this note     Time User Action Codes Description Comment    5/23/2023  1:42 AM Isaiah [place] Add [R41 [id number]] Altered mental status     5/23/2023  1:42 AM Isaiah Claudia Add [E86 [id number]] Dehydration       ED Disposition     ED Disposition   Admit    Condition   Stable    Date/Time   Tue May 23, 2023  1:42 AM    Comment   Case was discussed with Dr Maritza Leslie and the patient's admission status was agreed to be Admission Status: observation status to the service of Dr Maritza Leslie   Follow-up Information    None         Patient's Medications   Discharge Prescriptions    No medications on file       No discharge procedures on file      PDMP Review     None          ED Provider  Electronically Signed by           Lauryn Mcmanus DO  05/23/23 0150

## 2023-05-23 NOTE — ASSESSMENT & PLAN NOTE
Continue home lopressor 100mg BID  Continue home warfarin dosing, 5mg Monday and Saturday, 2 5mg all other days

## 2023-05-24 VITALS
BODY MASS INDEX: 34.89 KG/M2 | DIASTOLIC BLOOD PRESSURE: 92 MMHG | RESPIRATION RATE: 14 BRPM | WEIGHT: 177.69 LBS | SYSTOLIC BLOOD PRESSURE: 180 MMHG | HEIGHT: 60 IN | TEMPERATURE: 97.8 F | HEART RATE: 69 BPM | OXYGEN SATURATION: 96 %

## 2023-05-24 PROBLEM — R21 GROIN RASH: Status: ACTIVE | Noted: 2023-05-24

## 2023-05-24 PROBLEM — N17.9 AKI (ACUTE KIDNEY INJURY) (HCC): Status: ACTIVE | Noted: 2023-05-24

## 2023-05-24 LAB
ANION GAP SERPL CALCULATED.3IONS-SCNC: 9 MMOL/L (ref 4–13)
ATRIAL RATE: 66 BPM
BUN SERPL-MCNC: 27 MG/DL (ref 5–25)
CALCIUM SERPL-MCNC: 9 MG/DL (ref 8.4–10.2)
CHLORIDE SERPL-SCNC: 105 MMOL/L (ref 96–108)
CO2 SERPL-SCNC: 27 MMOL/L (ref 21–32)
CREAT SERPL-MCNC: 1.41 MG/DL (ref 0.6–1.3)
ERYTHROCYTE [DISTWIDTH] IN BLOOD BY AUTOMATED COUNT: 15.3 % (ref 11.6–15.1)
GFR SERPL CREATININE-BSD FRML MDRD: 31 ML/MIN/1.73SQ M
GLUCOSE SERPL-MCNC: 86 MG/DL (ref 65–140)
HCT VFR BLD AUTO: 42.7 % (ref 34.8–46.1)
HGB BLD-MCNC: 13.9 G/DL (ref 11.5–15.4)
MCH RBC QN AUTO: 28 PG (ref 26.8–34.3)
MCHC RBC AUTO-ENTMCNC: 32.6 G/DL (ref 31.4–37.4)
MCV RBC AUTO: 86 FL (ref 82–98)
PLATELET # BLD AUTO: 202 THOUSANDS/UL (ref 149–390)
PMV BLD AUTO: 11.1 FL (ref 8.9–12.7)
POTASSIUM SERPL-SCNC: 4 MMOL/L (ref 3.5–5.3)
PR INTERVAL: 196 MS
QRS AXIS: 62 DEGREES
QRSD INTERVAL: 104 MS
QT INTERVAL: 442 MS
QTC INTERVAL: 463 MS
RBC # BLD AUTO: 4.96 MILLION/UL (ref 3.81–5.12)
SODIUM SERPL-SCNC: 141 MMOL/L (ref 135–147)
T WAVE AXIS: -1 DEGREES
VENTRICULAR RATE: 66 BPM
WBC # BLD AUTO: 10.04 THOUSAND/UL (ref 4.31–10.16)

## 2023-05-24 RX ORDER — TORSEMIDE 5 MG/1
5 TABLET ORAL DAILY PRN
Qty: 30 TABLET | Refills: 0 | Status: SHIPPED | OUTPATIENT
Start: 2023-05-24 | End: 2023-05-31 | Stop reason: SINTOL

## 2023-05-24 RX ORDER — LABETALOL HYDROCHLORIDE 5 MG/ML
10 INJECTION, SOLUTION INTRAVENOUS EVERY 6 HOURS PRN
Status: DISCONTINUED | OUTPATIENT
Start: 2023-05-24 | End: 2023-05-24 | Stop reason: HOSPADM

## 2023-05-24 RX ORDER — SODIUM CHLORIDE, SODIUM GLUCONATE, SODIUM ACETATE, POTASSIUM CHLORIDE, MAGNESIUM CHLORIDE, SODIUM PHOSPHATE, DIBASIC, AND POTASSIUM PHOSPHATE .53; .5; .37; .037; .03; .012; .00082 G/100ML; G/100ML; G/100ML; G/100ML; G/100ML; G/100ML; G/100ML
75 INJECTION, SOLUTION INTRAVENOUS CONTINUOUS
Status: DISCONTINUED | OUTPATIENT
Start: 2023-05-24 | End: 2023-05-24 | Stop reason: HOSPADM

## 2023-05-24 RX ORDER — NYSTATIN 100000 [USP'U]/G
POWDER TOPICAL 2 TIMES DAILY
Qty: 15 G | Refills: 0 | Status: SHIPPED | OUTPATIENT
Start: 2023-05-24

## 2023-05-24 RX ADMIN — METOPROLOL TARTRATE 100 MG: 100 TABLET, FILM COATED ORAL at 08:00

## 2023-05-24 RX ADMIN — SODIUM CHLORIDE, SODIUM GLUCONATE, SODIUM ACETATE, POTASSIUM CHLORIDE, MAGNESIUM CHLORIDE, SODIUM PHOSPHATE, DIBASIC, AND POTASSIUM PHOSPHATE 75 ML/HR: .53; .5; .37; .037; .03; .012; .00082 INJECTION, SOLUTION INTRAVENOUS at 07:09

## 2023-05-24 RX ADMIN — NYSTATIN 1 APPLICATION.: 100000 POWDER TOPICAL at 08:01

## 2023-05-24 RX ADMIN — GABAPENTIN 100 MG: 100 CAPSULE ORAL at 08:00

## 2023-05-24 RX ADMIN — TORSEMIDE 5 MG: 10 TABLET ORAL at 08:00

## 2023-05-24 RX ADMIN — NYSTATIN: 100000 POWDER TOPICAL at 06:16

## 2023-05-24 RX ADMIN — LEVOTHYROXINE SODIUM 50 MCG: 50 TABLET ORAL at 06:07

## 2023-05-24 RX ADMIN — LOSARTAN POTASSIUM 100 MG: 50 TABLET, FILM COATED ORAL at 08:00

## 2023-05-24 NOTE — PLAN OF CARE
Problem: MOBILITY - ADULT  Goal: Maintain or return to baseline ADL function  Description: INTERVENTIONS:  -  Assess patient's ability to carry out ADLs; assess patient's baseline for ADL function and identify physical deficits which impact ability to perform ADLs (bathing, care of mouth/teeth, toileting, grooming, dressing, etc )  - Assess/evaluate cause of self-care deficits   - Assess range of motion  - Assess patient's mobility; develop plan if impaired  - Assess patient's need for assistive devices and provide as appropriate  - Encourage maximum independence but intervene and supervise when necessary  - Involve family in performance of ADLs  - Assess for home care needs following discharge   - Consider OT consult to assist with ADL evaluation and planning for discharge  - Provide patient education as appropriate  Outcome: Progressing     Problem: MOBILITY - ADULT  Goal: Maintains/Returns to pre admission functional level  Description: INTERVENTIONS:  - Perform BMAT or MOVE assessment daily    - Set and communicate daily mobility goal to care team and patient/family/caregiver  - Collaborate with rehabilitation services on mobility goals if consulted  - Perform Range of Motion 3 times a day  - Reposition patient every 2 hours    - Dangle patient 3 times a day  - Stand patient 3 times a day  - Out of bed for toileting  - Record patient progress and toleration of activity level   Outcome: Progressing

## 2023-05-24 NOTE — DISCHARGE INSTR - AVS FIRST PAGE
Dear Ildefonso Hill,     It was our pleasure to care for you here at [facility], [employer]  It is our hope that we were always able to exceed the expected standards for your care during your stay  You were hospitalized due to altered mental status  You were cared for on the last fourth floor by Annita Zafar MD under the service of Tam Ley MD with the [facility] who covers for your primary care physician (PCP), Neymar Farias MD, while you were hospitalized  If you have any questions or concerns related to this hospitalization, you may contact us at [phone]  For follow up as well as any medication refills, we recommend that you follow up with your primary care physician  A registered nurse will reach out to you by phone within a few days after your discharge to answer any additional questions that you may have after going home    However, at this time we provide for you here, the most important instructions / recommendations at discharge:     Notable Medication Adjustments -   Please only use torsemide 5 mg as needed for weight gain and lower extremity swelling  Please continue to apply nystatin powder to the bilateral groin regions for probable fungal infection of the cutaneous skin  Testing Required after Discharge -   Please complete a repeat BMP to monitor your creatinine level  ** Please contact your PCP to request testing orders for any of the testing recommended here **  Important follow up information -   Please follow-up with your primary care physician  Other Instructions -   Please do not hesitate to return to the emergency department if you continue to experience changes in your mentation/altered mental status  Please review this entire after visit summary as additional general instructions including medication list, appointments, activity, diet, any pertinent wound care, and other additional recommendations from your care team that may be provided for you        Sincerely,     Zach Guevara MD

## 2023-05-24 NOTE — ASSESSMENT & PLAN NOTE
Blood Pressure: 143/64    Patient hypertensive while in the ED to 213/92 requiring IV labetalol 10mg x1     - Continue home losartan 100mg qd, lopressor 100mg BID, and torsemide 5mg qd

## 2023-05-24 NOTE — ASSESSMENT & PLAN NOTE
Patient presented to ED, per family she has been altered recently from baseline (hx dementia) and has been having poor PO intake  She was reportedly hypoxic to [de-identified] with EMS requiring supplemental oxygen via nasal cannula  CT head negative, CT PE study negative, UA and electrolytes grossly unremarkable  Etiology at this time remains unclear    Chest x-ray demonstrating no acute cardiopulmonary disease  Patient did return to her baseline on the morning of 5/23  Blood cultures currently growing 1 results of gram-positive cocci in clusters, coagulase negative Staph aureus, second vial is no growth at 24 hours    Plan:  - Attempt to reorient patient when necessary  - Limit restraints and sedating medications  - Urinary retention protocol   - Monitor I/Os  - F/u bcx

## 2023-05-24 NOTE — CASE MANAGEMENT
Case Management Assessment & Discharge Planning Note    Patient name Víctor Engel  Location W /W -54 MRN [id number]  : 2/3/1927 Date 2023       Current Admission Date: 2023  Current Admission Diagnosis:Altered mental status   Patient Active Problem List    Diagnosis Date Noted   • Groin rash 2023   • Altered mental status 2023   • Dehydration 2023   • Continuous opioid dependence ([facility] [address] ) 2022   • Chronic low back pain 2019   • Stage 3 chronic kidney disease ([facility] [address] ) 2019   • Chronic pain of left knee 2019   • Effusion of right knee 2019   • Loss of bladder control 2017   • Atrial fibrillation ([facility] [address] ) 2017   • Urinary incontinence 2017   • Bladder prolapse, female, acquired 2015   • Benign essential hypertension 2015   • Bilateral primary osteoarthritis of knee 2014   • Hypercholesterolemia 2013   • Hypothyroidism 2013   • Osteoarthrosis of knee 2013      LOS (days): 1  Geometric Mean LOS (GMLOS) (days): 2 60  Days to GMLOS:1 6     OBJECTIVE:    Risk of Unplanned Readmission Score: 19 07         Current admission status: Inpatient       Preferred Pharmacy:   [facility] [address] [place] [address]  Phone: [phone] Fax: [phone]    Primary Care Provider: Narciso Collins MD    Primary Insurance: MEDICARE  Secondary Insurance: [facility] HEALTH OPTIONS PROGRAM    ASSESSMENT:  Jaciel 26 Proxies    There are no active Health Care Proxies on file  Readmission Root Cause  30 Day Readmission: No    Patient Information  Admitted from[de-identified] Home  Mental Status: Other (Comment) (N/A)  During Assessment patient was accompanied by:  Other-Comment (N/A)  Assessment information provided by[de-identified] Daughter  Primary Caregiver: Self  Support Systems: Children, Adriel 46 of Residence: [address] [place] [address] do you live in?: [address] entry access options   Select all that apply : Stairs  Number of steps to enter home : 1  Type of Current Residence: Ranch  In the last 12 months, was there a time when you were not able to pay the mortgage or rent on time?: No  In the last 12 months, how many places have you lived?: 1  In the last 12 months, was there a time when you did not have a steady place to sleep or slept in a shelter (including now)?: No  Homeless/housing insecurity resource given?: N/A  Living Arrangements: Lives w/ Daughter    Activities of Daily Living Prior to Admission  Functional Status: Independent  Completes ADLs independently?: Yes  Ambulates independently?: Yes  Does patient use assisted devices?: Yes  Assisted Devices (DME) used: Melanie Padilla  Does patient currently own DME?: Yes  What DME does the patient currently own?: Other (Comment) (transport chair)  Does patient have a history of Outpatient Therapy (PT/OT)?: Yes  Does the patient have a history of Short-Term Rehab?: Yes  Does patient have a history of [facility]?: No  Does patient currently have [facility]?: No    Patient Information Continued  Income Source: Pension/senior living  Does patient have prescription coverage?: Yes  Within the past 12 months, you worried that your food would run out before you got the money to buy more : Never true  Within the past 12 months, the food you bought just didn't last and you didn't have money to get more : Never true  Food insecurity resource given?: N/A  Does patient receive dialysis treatments?: No  Does patient have a history of substance abuse?: No  Does patient have a history of Mental Health Diagnosis?: No    Means of Transportation  Means of Transport to Appts[de-identified] Family transport  In the past 12 months, has lack of transportation kept you from medical appointments or from getting medications?: No  In the past 12 months, has lack of transportation kept you from meetings, work, or from getting things needed for daily living?: No  Was application for public transport provided?: N/A        DISCHARGE DETAILS:    Discharge planning discussed with[de-identified] daughterElliott over phone  Freedom of Choice: Yes  Comments - Freedom of Choice: daughter okay with patient returning home, stating she has all needed DME and does not want [facility] set up  CM contacted family/caregiver?: Yes  Were Treatment Team discharge recommendations reviewed with patient/caregiver?: Yes  Did patient/caregiver verbalize understanding of patient care needs?: Yes  Were patient/caregiver advised of the risks associated with not following Treatment Team discharge recommendations?: Yes    Contacts  Patient Contacts: Elliott Naranjo (daughter)  Relationship to Patient[de-identified] Family  Contact Method: Phone  Phone Number: [phone]  Reason/Outcome: Continuity of Care, Emergency Contact, Discharge [address]         Is the patient interested in [facility] at discharge?: No    DME Referral Provided  Referral made for DME?: No    Other Referral/Resources/Interventions Provided:  Referral Comments: CM discussed patient in 11:30 AM SLIM rounds, asking if patient needed to be seen by PT/OT prior to discharge- per resident this is not needed  Call made to patients daughter Elliott Naranjo to complete assessment  Patient lives with her daughter in a ranch style home, [address]  Patient has a history of OP therapy and per daughter has been to [facility]  Daughter own a walker, wheelchair, transport chair and shower chair  CM offered to set up VNA for patient- daughter declining stating she is a retired CNA and has everything taken care of  No further CM needs anticipated at this time      Would you like to participate in our [address] service program?  : No - Declined    Treatment Team Recommendation: Home  Discharge Destination Plan[de-identified] Home  Transport at Discharge : Family      IMM Given (Date):: 05/23/23  IMM Given to[de-identified] Patient

## 2023-05-24 NOTE — ASSESSMENT & PLAN NOTE
Patient has a rash in the bilateral inguinal/groin folds, that is erythematous and demonstrating granulated skin    Plan:  Nystatin powder to the affected areas

## 2023-05-24 NOTE — ASSESSMENT & PLAN NOTE
Lab Results   Component Value Date    CREATININE 1 41 (H) 05/24/2023    CREATININE 1 19 05/23/2023    CREATININE 1 28 05/22/2023    EGFR 31 05/24/2023    EGFR 38 05/23/2023    EGFR 35 05/22/2023     Per chart review, patient's baseline appears to be approx 1 1-1 3  Currently at baseline  Creatinine elevation above patient's baseline 1 41 on a m   BMP 5/24    - Trend Cr, daily BMP

## 2023-05-24 NOTE — DISCHARGE SUMMARY
[facility]  Discharge- Minerva Bailon 2/3/1927, 80 y o  female MRN: [id number]  Unit/Bed#: W -01 Encounter: [id number]  Primary Care Provider: Modesto Leung MD   Date and time admitted to hospital: 5/22/2023  8:31 PM    * Altered mental status  Assessment & Plan  Patient presented to ED, per family she has been altered recently from baseline (hx dementia) and has been having poor PO intake  She was reportedly hypoxic to [de-identified] with EMS requiring supplemental oxygen via nasal cannula  CT head negative, CT PE study negative, UA and electrolytes grossly unremarkable  Etiology at this time remains unclear  Chest x-ray demonstrating no acute cardiopulmonary disease  Patient did return to her baseline on the morning of 5/23  Blood cultures currently growing 1 results of gram-positive cocci in clusters, coagulase negative Staph aureus, second vial is no growth at 24 hours    Plan:  - Attempt to reorient patient when necessary  - Limit restraints and sedating medications  - Urinary retention protocol   - Monitor I/Os  - F/u bcx    Groin rash  Assessment & Plan  Patient has a rash in the bilateral inguinal/groin folds, that is erythematous and demonstrating granulated skin    Plan:  Nystatin powder to the affected areas    Atrial fibrillation [facility])  Assessment & Plan  Continue home lopressor 100mg BID  Continue home warfarin dosing, 5mg Monday and Saturday, 2 5mg all other days    Stage 3 chronic kidney disease [facility])  Assessment & Plan  Lab Results   Component Value Date    CREATININE 1 41 (H) 05/24/2023    CREATININE 1 19 05/23/2023    CREATININE 1 28 05/22/2023    EGFR 31 05/24/2023    EGFR 38 05/23/2023    EGFR 35 05/22/2023     Per chart review, patient's baseline appears to be approx 1 1-1 3  Currently at baseline  Creatinine elevation above patient's baseline 1 41 on a m   BMP 5/24    - Trend Cr, daily BMP    Benign essential hypertension  Assessment & Plan  Blood Pressure: 143/64    Patient hypertensive while in the ED to 213/92 requiring IV labetalol 10mg x1     - Continue home losartan 100mg qd, lopressor 100mg BID, and torsemide 5mg qd      Medical Problems     Resolved Problems  Date Reviewed: 5/24/2023          Resolved    Shortness of breath 5/23/2023     Resolved by  Demetrio Frances DO        Discharging Resident: Chris Ruelas MD  Discharging Attending: Yury Melendez MD  PCP: Bobetta Boas, MD  Admission Date:   Admission Orders (From admission, onward)     Ordered        05/23/23 1340  Inpatient Admission  Once            05/23/23 0143  Place in Observation  Once                      Discharge Date: 05/24/23    Consultations During Hospital Stay:  · Physical therapy    Procedures Performed:   · None    Significant Findings / Test Results:   · Creatinine elevation from 1 19-1 41    Incidental Findings:   · None    Test Results Pending at Discharge (will require follow up): · None     Outpatient Tests Requested:  · Repeat BMP    Complications: None    Reason for Admission: Altered mental status    Hospital Course:   Eliza Booth is a 80 y o  female patient who originally presented to the hospital on 5/22/2023 due to altered mental status  Patient also presented with shortness of breath and poor p o  intake, requiring 3 L nasal cannula to maintain SPO2 greater than 92%  A CT head and a CT scan of the chest were both unremarkable, urinalysis was within normal limits, patient was afebrile, and laboratory work-up was unremarkable except for mild elevation in troponins  in the setting of hypoxia  Patient was recently started on torsemide a week earlier, and the thought processes that patient experienced acute onset delirium from medication secondary to hypovolemia  Torsemide was held and patient was fluid resuscitated, with improvement in her cognition    Her blood cultures did demonstrate growth of gram-positive cocci in clusters in 1 bottle, deemed to be coagulase-negative Staphylococcus aureus and contamination  Patient's WBC count did normalize on the second day of admission, however her creatinine did elevate from 1 19-1 41, most likely reflecting her hypovolemia in the setting of torsemide medications over the past several days  Patient was discharged in good health in the care of her daughter who reported they have a good support system at home, they were not interested in pursuing rehabilitation  The decision was made to make patient's torsemide on a as needed basis for weight gain and swelling, and to follow-up with her primary care physician  Of note patient did have modestly elevated blood pressures during this admission despite maintaining her home regimen, albeit she was not receiving torsemide here in the hospital     Please see above list of diagnoses and related plan for additional information  Condition at Discharge: fair    Discharge Day Visit / Exam:   Subjective: Patient seen and examined this morning, she was doing well  She was much more alert and oriented to herself, location, and time then the day prior  She had no major complaints for me today, she reported that she felt more like her normal self  However her legs were significantly tender to palpation, suspect that this was secondary to the SCD pumps and they were removed  Patient denies nausea, vomiting, chest pain, shortness of breath, abdominal pain, constipation, diarrhea  Vitals: Blood Pressure: (!) 180/92 (05/24/23 0747)  Pulse: 69 (05/24/23 0743)  Temperature: 97 8 °F (36 6 °C) (05/24/23 0743)  Temp Source: Oral (05/23/23 2217)  Respirations: 14 (05/23/23 2217)  Height: 5' (152 4 cm) (05/23/23 0246)  Weight - Scale: 80 6 kg (177 lb 11 1 oz) (05/23/23 0246)  SpO2: 96 % (05/24/23 0800)  Exam:   Physical Exam  Vitals and nursing note reviewed  Constitutional:       General: She is not in acute distress  Appearance: She is well-developed     HENT:      Head: Normocephalic and atraumatic  Mouth/Throat:      Mouth: Mucous membranes are dry  Eyes:      Extraocular Movements: Extraocular movements intact  Conjunctiva/sclera: Conjunctivae normal       Pupils: Pupils are equal, round, and reactive to light  Cardiovascular:      Rate and Rhythm: Normal rate  Rhythm irregular  Heart sounds: No murmur heard  Pulmonary:      Effort: Pulmonary effort is normal  No respiratory distress  Breath sounds: Normal breath sounds  Abdominal:      Palpations: Abdomen is soft  Tenderness: There is no abdominal tenderness  Musculoskeletal:         General: Tenderness (Tenderness to palpation of the bilateral lower extremities) present  No swelling  Cervical back: Neck supple  Skin:     General: Skin is warm and dry  Capillary Refill: Capillary refill takes less than 2 seconds  Neurological:      Mental Status: She is alert and oriented to person, place, and time  Psychiatric:         Mood and Affect: Mood normal           Discussion with Family: Updated  (daughter) via phone  Discharge instructions/Information to patient and family:   See after visit summary for information provided to patient and family  Provisions for Follow-Up Care:  See after visit summary for information related to follow-up care and any pertinent home health orders  Disposition:   Home    Planned Readmission: None    Discharge Medications:  See after visit summary for reconciled discharge medications provided to patient and/or family        **Please Note: This note may have been constructed using a voice recognition system**

## 2023-05-25 ENCOUNTER — TRANSITIONAL CARE MANAGEMENT (OUTPATIENT)
Dept: INTERNAL MEDICINE CLINIC | Facility: OTHER | Age: 88
End: 2023-05-25

## 2023-05-26 LAB
BACTERIA BLD CULT: ABNORMAL
GRAM STN SPEC: ABNORMAL
S AUREUS+CONS DNA BLD POS NAA+NON-PROBE: DETECTED

## 2023-05-27 LAB — BACTERIA BLD CULT: NORMAL

## 2023-05-31 ENCOUNTER — OFFICE VISIT (OUTPATIENT)
Dept: INTERNAL MEDICINE CLINIC | Facility: OTHER | Age: 88
End: 2023-05-31

## 2023-05-31 VITALS
WEIGHT: 174 LBS | HEIGHT: 61 IN | HEART RATE: 58 BPM | TEMPERATURE: 98.3 F | BODY MASS INDEX: 32.85 KG/M2 | SYSTOLIC BLOOD PRESSURE: 158 MMHG | DIASTOLIC BLOOD PRESSURE: 88 MMHG | OXYGEN SATURATION: 96 %

## 2023-05-31 DIAGNOSIS — I10 BENIGN ESSENTIAL HYPERTENSION: ICD-10-CM

## 2023-05-31 DIAGNOSIS — Z76.89 ENCOUNTER FOR SUPPORT AND COORDINATION OF TRANSITION OF CARE: Primary | ICD-10-CM

## 2023-05-31 DIAGNOSIS — E03.9 HYPOTHYROIDISM, UNSPECIFIED TYPE: ICD-10-CM

## 2023-05-31 DIAGNOSIS — I48.91 ATRIAL FIBRILLATION, UNSPECIFIED TYPE (HCC): ICD-10-CM

## 2023-05-31 DIAGNOSIS — N18.31 STAGE 3A CHRONIC KIDNEY DISEASE (HCC): ICD-10-CM

## 2023-05-31 RX ORDER — AMLODIPINE BESYLATE 2.5 MG/1
2.5 TABLET ORAL DAILY
Qty: 30 TABLET | Refills: 2 | Status: SHIPPED | OUTPATIENT
Start: 2023-05-31 | End: 2023-06-30

## 2023-05-31 NOTE — PROGRESS NOTES
Assessment & Plan     1  Encounter for support and coordination of transition of care  Medications reviewed  2  Hypothyroidism, unspecified type  TSH 3 5    · Continue levothyroxine 50 mcg    3  Benign essential hypertension  Noted /92 on presentation to the hospital requiring IV labetalol 10 mg   /88 in office today  · Start amlodipine 2 5 mg  · Discontinue torsemide  · Continue losartan 100 mg    4  Stage 3a chronic kidney disease (HCC)  Creatinine 1 41 on discharge  Baseline 1 2-1 3  · Repeat BMP  · Discontinue torsemide    5  Atrial fibrillation, unspecified type ([address] )  Rate controlled  · Continue metoprolol tartrate 100 mg twice daily, warfarin 5 mg on Monday, 2 5 mg on all other days  · Continue weekly INR with goal 2-3         Subjective     Transitional Care Management Review:   Jose Martinez is a 80 y o  female here for TCM follow up  During the TCM phone call patient stated:  TCM Call     Date and time call was made  5/25/2023  3:03 PM    Hospital care reviewed  Records reviewed    Patient was hospitialized at  [facility]    Date of Admission  05/22/23    Date of discharge  05/24/23    Diagnosis  altered mental status    Disposition  Home    Current Symptoms  Fatigue; Dizziness    Episode pattern  Intermittent      TCM Call     Post hospital issues  None    Scheduled for follow up? Yes    Did you obtain your prescribed medications  Yes    Do you need help managing your prescriptions or medications  Yes    Why type of assitance do you need  daughters    Is transportation to your appointment needed  No    I have advised the patient to call PCP with any new or worsening symptoms  Riana Thompson CMA        80year-old female with history of hypothyroidism, atrial fibrillation on warfarin, hypertension, CKD 3, chronic opioid use who presents after hospitalization from 5/22 to 5/24 for altered mental status  Patient found to be hypoxic in the 80s when EMS arrived    Blood "pressure 213/92 in the ED  Given labetalol IV 10 mg with response to blood pressure  CT PE with abdomen and pelvis largely unremarkable  CT head showed no acute pathology  Given IV fluids and held torsemide  Patient's mental status improved  1 of 2 blood cultures positive for coagulase-negative staph  Antibiotics held as it was felt to be a contaminant  Patient states she was not taking extra doses of her hydrocodone prior to admission  She normally takes 2 doses  Denies urinary symptoms prior to admission  Not taking torsemide since discharge as advised by hospitalist   Patient states her legs are starting to swell again  No falls  Accompanied by her daughter, Deacon Everett, who states she is back to her baseline  Uses a walker at baseline  She has a commode in her bedroom  Lives with Deacon Everett  Review of Systems   Constitutional: Negative for appetite change, chills, diaphoresis and fever  Eyes: Negative for visual disturbance  Respiratory: Negative for cough and shortness of breath  Cardiovascular: Negative for chest pain, palpitations and leg swelling  Gastrointestinal: Negative for abdominal pain, constipation, diarrhea, nausea and vomiting  Genitourinary: Negative for difficulty urinating, dysuria and hematuria  Neurological: Negative for dizziness, weakness, light-headedness, numbness and headaches  Objective     /88 (BP Location: Left arm, Patient Position: Sitting, Cuff Size: Standard)   Pulse 58   Temp 98 3 °F (36 8 °C) (Temporal)   Ht 5' 1\" (1 549 m)   Wt 78 9 kg (174 lb)   SpO2 96% Comment: room air  BMI 32 88 kg/m²      Physical Exam  Vitals reviewed  Constitutional:       General: She is not in acute distress  HENT:      Head: Normocephalic and atraumatic  Nose: No rhinorrhea  Eyes:      General: No scleral icterus  Cardiovascular:      Rate and Rhythm: Regular rhythm  Bradycardia present  Pulses: Normal pulses        Heart sounds: Normal " heart sounds  No murmur heard  No friction rub  No gallop  Pulmonary:      Effort: Pulmonary effort is normal  No respiratory distress  Breath sounds: Normal breath sounds  No wheezing, rhonchi or rales  Abdominal:      General: Bowel sounds are normal  There is no distension  Palpations: Abdomen is soft  Tenderness: There is no abdominal tenderness  There is no guarding or rebound  Musculoskeletal:      Right lower leg: Edema (1+) present  Left lower leg: Edema (1+) present  Skin:     General: Skin is warm and dry  Capillary Refill: Capillary refill takes less than 2 seconds  Coloration: Skin is not jaundiced  Neurological:      Mental Status: She is alert  Cranial Nerves: No dysarthria        Comments: CN 2-12 grossly intact, moves all 4 extremities   Psychiatric:         Mood and Affect: Mood normal          Behavior: Behavior normal        Medications have been reviewed by provider in current encounter    Minerva Cooney DO

## 2023-06-08 ENCOUNTER — TELEPHONE (OUTPATIENT)
Dept: LAB | Facility: HOSPITAL | Age: 88
End: 2023-06-08

## 2023-06-08 DIAGNOSIS — M17.9 OSTEOARTHRITIS OF KNEE, UNSPECIFIED LATERALITY, UNSPECIFIED OSTEOARTHRITIS TYPE: ICD-10-CM

## 2023-06-08 DIAGNOSIS — I10 HYPERTENSION, UNSPECIFIED TYPE: ICD-10-CM

## 2023-06-08 DIAGNOSIS — I48.91 ATRIAL FIBRILLATION, UNSPECIFIED TYPE (HCC): ICD-10-CM

## 2023-06-08 RX ORDER — HYDROCODONE BITARTRATE AND ACETAMINOPHEN 7.5; 3 MG/1; MG/1
1 TABLET ORAL EVERY 8 HOURS PRN
Qty: 90 TABLET | Refills: 0 | Status: SHIPPED | OUTPATIENT
Start: 2023-06-08

## 2023-06-08 RX ORDER — METOPROLOL TARTRATE 100 MG/1
100 TABLET ORAL 2 TIMES DAILY
Qty: 180 TABLET | Refills: 1 | Status: SHIPPED | OUTPATIENT
Start: 2023-06-08

## 2023-06-08 RX ORDER — WARFARIN SODIUM 5 MG/1
TABLET ORAL
Qty: 30 TABLET | Refills: 5 | Status: SHIPPED | OUTPATIENT
Start: 2023-06-08

## 2023-07-13 ENCOUNTER — APPOINTMENT (EMERGENCY)
Dept: CT IMAGING | Facility: HOSPITAL | Age: 88
DRG: 690 | End: 2023-07-13
Payer: MEDICARE

## 2023-07-13 ENCOUNTER — HOSPITAL ENCOUNTER (INPATIENT)
Facility: HOSPITAL | Age: 88
LOS: 2 days | Discharge: HOME | DRG: 690 | End: 2023-07-16
Attending: EMERGENCY MEDICINE | Admitting: INTERNAL MEDICINE
Payer: MEDICARE

## 2023-07-13 ENCOUNTER — TELEPHONE (OUTPATIENT)
Dept: LAB | Facility: HOSPITAL | Age: 88
End: 2023-07-13

## 2023-07-13 DIAGNOSIS — I10 BENIGN ESSENTIAL HYPERTENSION: ICD-10-CM

## 2023-07-13 DIAGNOSIS — N39.0 URINARY TRACT INFECTION WITHOUT HEMATURIA, SITE UNSPECIFIED: ICD-10-CM

## 2023-07-13 DIAGNOSIS — K83.8 BILIARY SLUDGE: ICD-10-CM

## 2023-07-13 DIAGNOSIS — R10.10 PAIN OF UPPER ABDOMEN: Primary | ICD-10-CM

## 2023-07-13 DIAGNOSIS — B35.4 TINEA CORPORIS: ICD-10-CM

## 2023-07-13 DIAGNOSIS — N39.0 UTI (URINARY TRACT INFECTION): ICD-10-CM

## 2023-07-13 LAB
ALBUMIN SERPL BCP-MCNC: 4.2 G/DL (ref 3.5–5)
ALP SERPL-CCNC: 112 U/L (ref 34–104)
ALT SERPL W P-5'-P-CCNC: 29 U/L (ref 7–52)
ANION GAP SERPL CALCULATED.3IONS-SCNC: 9 MMOL/L
AST SERPL W P-5'-P-CCNC: 58 U/L (ref 13–39)
ATRIAL RATE: 79 BPM
BASOPHILS # BLD AUTO: 0.07 THOUSANDS/ÂΜL (ref 0–0.1)
BASOPHILS NFR BLD AUTO: 1 % (ref 0–1)
BILIRUB SERPL-MCNC: 1.13 MG/DL (ref 0.2–1)
BUN SERPL-MCNC: 36 MG/DL (ref 5–25)
CALCIUM SERPL-MCNC: 9.1 MG/DL (ref 8.4–10.2)
CARDIAC TROPONIN I PNL SERPL HS: 6 NG/L
CHLORIDE SERPL-SCNC: 105 MMOL/L (ref 96–108)
CO2 SERPL-SCNC: 25 MMOL/L (ref 21–32)
CREAT SERPL-MCNC: 1.47 MG/DL (ref 0.6–1.3)
EOSINOPHIL # BLD AUTO: 0.11 THOUSAND/ÂΜL (ref 0–0.61)
EOSINOPHIL NFR BLD AUTO: 1 % (ref 0–6)
ERYTHROCYTE [DISTWIDTH] IN BLOOD BY AUTOMATED COUNT: 15.7 % (ref 11.6–15.1)
GFR SERPL CREATININE-BSD FRML MDRD: 29 ML/MIN/1.73SQ M
GLUCOSE SERPL-MCNC: 138 MG/DL (ref 65–140)
HCT VFR BLD AUTO: 40.6 % (ref 34.8–46.1)
HGB BLD-MCNC: 13.2 G/DL (ref 11.5–15.4)
IMM GRANULOCYTES # BLD AUTO: 0.06 THOUSAND/UL (ref 0–0.2)
IMM GRANULOCYTES NFR BLD AUTO: 1 % (ref 0–2)
INR PPP: 2.06 (ref 0.84–1.19)
LACTATE SERPL-SCNC: 1.5 MMOL/L (ref 0.5–2)
LIPASE SERPL-CCNC: 21 U/L (ref 11–82)
LYMPHOCYTES # BLD AUTO: 1.03 THOUSANDS/ÂΜL (ref 0.6–4.47)
LYMPHOCYTES NFR BLD AUTO: 8 % (ref 14–44)
MCH RBC QN AUTO: 28.3 PG (ref 26.8–34.3)
MCHC RBC AUTO-ENTMCNC: 32.5 G/DL (ref 31.4–37.4)
MCV RBC AUTO: 87 FL (ref 82–98)
MONOCYTES # BLD AUTO: 0.65 THOUSAND/ÂΜL (ref 0.17–1.22)
MONOCYTES NFR BLD AUTO: 5 % (ref 4–12)
NEUTROPHILS # BLD AUTO: 10.35 THOUSANDS/ÂΜL (ref 1.85–7.62)
NEUTS SEG NFR BLD AUTO: 84 % (ref 43–75)
NRBC BLD AUTO-RTO: 0 /100 WBCS
P AXIS: 73 DEGREES
PLATELET # BLD AUTO: 215 THOUSANDS/UL (ref 149–390)
PMV BLD AUTO: 10.2 FL (ref 8.9–12.7)
POTASSIUM SERPL-SCNC: 4.3 MMOL/L (ref 3.5–5.3)
PR INTERVAL: 214 MS
PROT SERPL-MCNC: 7.4 G/DL (ref 6.4–8.4)
PROTHROMBIN TIME: 23.4 SECONDS (ref 11.6–14.5)
QRS AXIS: 83 DEGREES
QRSD INTERVAL: 108 MS
QT INTERVAL: 424 MS
QTC INTERVAL: 486 MS
RBC # BLD AUTO: 4.67 MILLION/UL (ref 3.81–5.12)
SODIUM SERPL-SCNC: 139 MMOL/L (ref 135–147)
T WAVE AXIS: 47 DEGREES
VENTRICULAR RATE: 79 BPM
WBC # BLD AUTO: 12.27 THOUSAND/UL (ref 4.31–10.16)

## 2023-07-13 PROCEDURE — 96375 TX/PRO/DX INJ NEW DRUG ADDON: CPT

## 2023-07-13 PROCEDURE — 83605 ASSAY OF LACTIC ACID: CPT | Performed by: EMERGENCY MEDICINE

## 2023-07-13 PROCEDURE — 80053 COMPREHEN METABOLIC PANEL: CPT | Performed by: EMERGENCY MEDICINE

## 2023-07-13 PROCEDURE — 85025 COMPLETE CBC W/AUTO DIFF WBC: CPT | Performed by: EMERGENCY MEDICINE

## 2023-07-13 PROCEDURE — 99285 EMERGENCY DEPT VISIT HI MDM: CPT

## 2023-07-13 PROCEDURE — 93005 ELECTROCARDIOGRAM TRACING: CPT

## 2023-07-13 PROCEDURE — 96365 THER/PROPH/DIAG IV INF INIT: CPT

## 2023-07-13 PROCEDURE — 36415 COLL VENOUS BLD VENIPUNCTURE: CPT

## 2023-07-13 PROCEDURE — 85610 PROTHROMBIN TIME: CPT | Performed by: EMERGENCY MEDICINE

## 2023-07-13 PROCEDURE — 83690 ASSAY OF LIPASE: CPT | Performed by: EMERGENCY MEDICINE

## 2023-07-13 PROCEDURE — 74177 CT ABD & PELVIS W/CONTRAST: CPT

## 2023-07-13 PROCEDURE — 84484 ASSAY OF TROPONIN QUANT: CPT | Performed by: EMERGENCY MEDICINE

## 2023-07-13 PROCEDURE — 81001 URINALYSIS AUTO W/SCOPE: CPT | Performed by: EMERGENCY MEDICINE

## 2023-07-13 RX ORDER — HYDROMORPHONE HCL/PF 1 MG/ML
0.5 SYRINGE (ML) INJECTION ONCE
Status: COMPLETED | OUTPATIENT
Start: 2023-07-13 | End: 2023-07-13

## 2023-07-13 RX ADMIN — SODIUM CHLORIDE, SODIUM LACTATE, POTASSIUM CHLORIDE, AND CALCIUM CHLORIDE 500 ML: .6; .31; .03; .02 INJECTION, SOLUTION INTRAVENOUS at 22:52

## 2023-07-13 RX ADMIN — HYDROMORPHONE HYDROCHLORIDE 0.5 MG: 1 INJECTION, SOLUTION INTRAMUSCULAR; INTRAVENOUS; SUBCUTANEOUS at 22:52

## 2023-07-13 RX ADMIN — IOHEXOL 100 ML: 350 INJECTION, SOLUTION INTRAVENOUS at 22:34

## 2023-07-14 ENCOUNTER — APPOINTMENT (EMERGENCY)
Dept: ULTRASOUND IMAGING | Facility: HOSPITAL | Age: 88
DRG: 690 | End: 2023-07-14
Payer: MEDICARE

## 2023-07-14 PROBLEM — K82.8 THICKENING OF WALL OF GALLBLADDER: Status: ACTIVE | Noted: 2023-07-14

## 2023-07-14 PROBLEM — N39.0 UTI (URINARY TRACT INFECTION): Status: ACTIVE | Noted: 2023-07-14

## 2023-07-14 LAB
2HR DELTA HS TROPONIN: -1 NG/L
ALBUMIN SERPL BCP-MCNC: 3.8 G/DL (ref 3.5–5)
ALP SERPL-CCNC: 106 U/L (ref 34–104)
ALT SERPL W P-5'-P-CCNC: 41 U/L (ref 7–52)
ANION GAP SERPL CALCULATED.3IONS-SCNC: 5 MMOL/L
AST SERPL W P-5'-P-CCNC: 48 U/L (ref 13–39)
BACTERIA UR QL AUTO: ABNORMAL /HPF
BASOPHILS # BLD AUTO: 0.06 THOUSANDS/ÂΜL (ref 0–0.1)
BASOPHILS NFR BLD AUTO: 1 % (ref 0–1)
BILIRUB SERPL-MCNC: 0.86 MG/DL (ref 0.2–1)
BILIRUB UR QL STRIP: NEGATIVE
BUN SERPL-MCNC: 29 MG/DL (ref 5–25)
CALCIUM SERPL-MCNC: 8.6 MG/DL (ref 8.4–10.2)
CARDIAC TROPONIN I PNL SERPL HS: 5 NG/L
CHLORIDE SERPL-SCNC: 107 MMOL/L (ref 96–108)
CLARITY UR: CLEAR
CO2 SERPL-SCNC: 25 MMOL/L (ref 21–32)
COLOR UR: ABNORMAL
CREAT SERPL-MCNC: 1.27 MG/DL (ref 0.6–1.3)
EOSINOPHIL # BLD AUTO: 0.16 THOUSAND/ÂΜL (ref 0–0.61)
EOSINOPHIL NFR BLD AUTO: 1 % (ref 0–6)
ERYTHROCYTE [DISTWIDTH] IN BLOOD BY AUTOMATED COUNT: 15.9 % (ref 11.6–15.1)
GFR SERPL CREATININE-BSD FRML MDRD: 35 ML/MIN/1.73SQ M
GLUCOSE SERPL-MCNC: 89 MG/DL (ref 65–140)
GLUCOSE UR STRIP-MCNC: NEGATIVE MG/DL
HCT VFR BLD AUTO: 37.5 % (ref 34.8–46.1)
HGB BLD-MCNC: 12.1 G/DL (ref 11.5–15.4)
HGB UR QL STRIP.AUTO: NEGATIVE
IMM GRANULOCYTES # BLD AUTO: 0.04 THOUSAND/UL (ref 0–0.2)
IMM GRANULOCYTES NFR BLD AUTO: 0 % (ref 0–2)
KETONES UR STRIP-MCNC: NEGATIVE MG/DL
LEUKOCYTE ESTERASE UR QL STRIP: ABNORMAL
LYMPHOCYTES # BLD AUTO: 1.1 THOUSANDS/ÂΜL (ref 0.6–4.47)
LYMPHOCYTES NFR BLD AUTO: 10 % (ref 14–44)
MCH RBC QN AUTO: 28.1 PG (ref 26.8–34.3)
MCHC RBC AUTO-ENTMCNC: 32.3 G/DL (ref 31.4–37.4)
MCV RBC AUTO: 87 FL (ref 82–98)
MONOCYTES # BLD AUTO: 0.87 THOUSAND/ÂΜL (ref 0.17–1.22)
MONOCYTES NFR BLD AUTO: 8 % (ref 4–12)
NEUTROPHILS # BLD AUTO: 8.95 THOUSANDS/ÂΜL (ref 1.85–7.62)
NEUTS SEG NFR BLD AUTO: 80 % (ref 43–75)
NITRITE UR QL STRIP: POSITIVE
NON-SQ EPI CELLS URNS QL MICRO: ABNORMAL /HPF
NRBC BLD AUTO-RTO: 0 /100 WBCS
PH UR STRIP.AUTO: 7.5 [PH]
PLATELET # BLD AUTO: 199 THOUSANDS/UL (ref 149–390)
PMV BLD AUTO: 10 FL (ref 8.9–12.7)
POTASSIUM SERPL-SCNC: 3.7 MMOL/L (ref 3.5–5.3)
PROT SERPL-MCNC: 6.8 G/DL (ref 6.4–8.4)
PROT UR STRIP-MCNC: ABNORMAL MG/DL
RBC # BLD AUTO: 4.31 MILLION/UL (ref 3.81–5.12)
RBC #/AREA URNS AUTO: ABNORMAL /HPF
SODIUM SERPL-SCNC: 137 MMOL/L (ref 135–147)
SP GR UR STRIP.AUTO: 1.03 (ref 1–1.03)
UROBILINOGEN UR STRIP-ACNC: <2 MG/DL
WBC # BLD AUTO: 11.18 THOUSAND/UL (ref 4.31–10.16)
WBC #/AREA URNS AUTO: ABNORMAL /HPF

## 2023-07-14 PROCEDURE — 36415 COLL VENOUS BLD VENIPUNCTURE: CPT | Performed by: EMERGENCY MEDICINE

## 2023-07-14 PROCEDURE — 87040 BLOOD CULTURE FOR BACTERIA: CPT | Performed by: EMERGENCY MEDICINE

## 2023-07-14 PROCEDURE — 99223 1ST HOSP IP/OBS HIGH 75: CPT | Performed by: INTERNAL MEDICINE

## 2023-07-14 PROCEDURE — 87086 URINE CULTURE/COLONY COUNT: CPT | Performed by: EMERGENCY MEDICINE

## 2023-07-14 PROCEDURE — 99285 EMERGENCY DEPT VISIT HI MDM: CPT | Performed by: EMERGENCY MEDICINE

## 2023-07-14 PROCEDURE — 80053 COMPREHEN METABOLIC PANEL: CPT | Performed by: EMERGENCY MEDICINE

## 2023-07-14 PROCEDURE — 87077 CULTURE AEROBIC IDENTIFY: CPT | Performed by: EMERGENCY MEDICINE

## 2023-07-14 PROCEDURE — 87186 SC STD MICRODIL/AGAR DIL: CPT | Performed by: EMERGENCY MEDICINE

## 2023-07-14 PROCEDURE — 85025 COMPLETE CBC W/AUTO DIFF WBC: CPT | Performed by: EMERGENCY MEDICINE

## 2023-07-14 PROCEDURE — 76705 ECHO EXAM OF ABDOMEN: CPT

## 2023-07-14 PROCEDURE — 99284 EMERGENCY DEPT VISIT MOD MDM: CPT | Performed by: SURGERY

## 2023-07-14 RX ORDER — POLYETHYLENE GLYCOL 3350 17 G/17G
17 POWDER, FOR SOLUTION ORAL DAILY PRN
Status: DISCONTINUED | OUTPATIENT
Start: 2023-07-14 | End: 2023-07-16 | Stop reason: HOSPADM

## 2023-07-14 RX ORDER — CEFAZOLIN SODIUM 1 G/50ML
1000 SOLUTION INTRAVENOUS ONCE
Status: DISCONTINUED | OUTPATIENT
Start: 2023-07-14 | End: 2023-07-14

## 2023-07-14 RX ORDER — LOSARTAN POTASSIUM 50 MG/1
100 TABLET ORAL DAILY
Status: DISCONTINUED | OUTPATIENT
Start: 2023-07-15 | End: 2023-07-16 | Stop reason: HOSPADM

## 2023-07-14 RX ORDER — LEVOTHYROXINE SODIUM 0.05 MG/1
50 TABLET ORAL DAILY
Status: DISCONTINUED | OUTPATIENT
Start: 2023-07-15 | End: 2023-07-16 | Stop reason: HOSPADM

## 2023-07-14 RX ORDER — ACETAMINOPHEN 325 MG/1
650 TABLET ORAL EVERY 8 HOURS PRN
Status: DISCONTINUED | OUTPATIENT
Start: 2023-07-14 | End: 2023-07-16 | Stop reason: HOSPADM

## 2023-07-14 RX ORDER — MAGNESIUM HYDROXIDE/ALUMINUM HYDROXICE/SIMETHICONE 120; 1200; 1200 MG/30ML; MG/30ML; MG/30ML
30 SUSPENSION ORAL EVERY 4 HOURS PRN
Status: DISCONTINUED | OUTPATIENT
Start: 2023-07-14 | End: 2023-07-16 | Stop reason: HOSPADM

## 2023-07-14 RX ORDER — FAMOTIDINE 20 MG/1
10 TABLET, FILM COATED ORAL DAILY
Status: DISCONTINUED | OUTPATIENT
Start: 2023-07-14 | End: 2023-07-16 | Stop reason: HOSPADM

## 2023-07-14 RX ORDER — PRAVASTATIN SODIUM 40 MG
40 TABLET ORAL
Status: DISCONTINUED | OUTPATIENT
Start: 2023-07-14 | End: 2023-07-16 | Stop reason: HOSPADM

## 2023-07-14 RX ORDER — METOPROLOL TARTRATE 100 MG/1
100 TABLET ORAL 2 TIMES DAILY
Status: DISCONTINUED | OUTPATIENT
Start: 2023-07-14 | End: 2023-07-16 | Stop reason: HOSPADM

## 2023-07-14 RX ORDER — ACETAMINOPHEN 325 MG/1
650 TABLET ORAL ONCE
Status: COMPLETED | OUTPATIENT
Start: 2023-07-14 | End: 2023-07-14

## 2023-07-14 RX ORDER — ONDANSETRON 4 MG/1
8 TABLET, FILM COATED ORAL EVERY 8 HOURS PRN
Qty: 15 TABLET | Refills: 3 | Status: SHIPPED | OUTPATIENT
Start: 2023-07-14 | End: 2023-07-16

## 2023-07-14 RX ORDER — ONDANSETRON 2 MG/ML
4 INJECTION INTRAMUSCULAR; INTRAVENOUS EVERY 6 HOURS PRN
Status: DISCONTINUED | OUTPATIENT
Start: 2023-07-14 | End: 2023-07-16 | Stop reason: HOSPADM

## 2023-07-14 RX ORDER — HYDROCODONE BITARTRATE AND ACETAMINOPHEN 5; 325 MG/1; MG/1
1 TABLET ORAL EVERY 8 HOURS PRN
Status: DISCONTINUED | OUTPATIENT
Start: 2023-07-14 | End: 2023-07-16 | Stop reason: HOSPADM

## 2023-07-14 RX ORDER — CLOTRIMAZOLE 1 %
1 CREAM (GRAM) TOPICAL 2 TIMES DAILY
Qty: 60 G | Refills: 3 | Status: SHIPPED | OUTPATIENT
Start: 2023-07-14 | End: 2023-07-28

## 2023-07-14 RX ORDER — WARFARIN SODIUM 5 MG/1
5 TABLET ORAL
Status: DISCONTINUED | OUTPATIENT
Start: 2023-07-15 | End: 2023-07-16

## 2023-07-14 RX ORDER — CEFAZOLIN SODIUM 1 G/50ML
1000 SOLUTION INTRAVENOUS EVERY 12 HOURS
Status: DISCONTINUED | OUTPATIENT
Start: 2023-07-14 | End: 2023-07-16 | Stop reason: HOSPADM

## 2023-07-14 RX ORDER — WARFARIN SODIUM 2.5 MG/1
2.5 TABLET ORAL
Status: DISCONTINUED | OUTPATIENT
Start: 2023-07-16 | End: 2023-07-16

## 2023-07-14 RX ORDER — AMOXICILLIN 250 MG
1 CAPSULE ORAL DAILY PRN
Status: DISCONTINUED | OUTPATIENT
Start: 2023-07-14 | End: 2023-07-16 | Stop reason: HOSPADM

## 2023-07-14 RX ORDER — AMLODIPINE BESYLATE 2.5 MG/1
2.5 TABLET ORAL DAILY
Status: DISCONTINUED | OUTPATIENT
Start: 2023-07-15 | End: 2023-07-15

## 2023-07-14 RX ADMIN — FAMOTIDINE 10 MG: 20 TABLET, FILM COATED ORAL at 17:13

## 2023-07-14 RX ADMIN — HYDROCODONE BITARTRATE AND ACETAMINOPHEN 1 TABLET: 5; 325 TABLET ORAL at 17:13

## 2023-07-14 RX ADMIN — METOPROLOL TARTRATE 100 MG: 100 TABLET, FILM COATED ORAL at 17:13

## 2023-07-14 RX ADMIN — ACETAMINOPHEN 650 MG: 325 TABLET, FILM COATED ORAL at 09:50

## 2023-07-14 RX ADMIN — PRAVASTATIN SODIUM 40 MG: 40 TABLET ORAL at 17:13

## 2023-07-14 RX ADMIN — CEFAZOLIN SODIUM 1000 MG: 1 SOLUTION INTRAVENOUS at 17:16

## 2023-07-14 NOTE — ED PROVIDER NOTES
History  Chief Complaint   Patient presents with   • Weakness - Generalized     Patient presents via EMS. Patient states she is weak, unable to eat + nausea. General pain throughout but more concentrated in stomach. 80 YR FEMALE C/O  ONSET OF MID ABD PAIN AT AROUND 5 PM- UNRELATED TO EATING- WITH NAUSEA- NO RADIATION- NO CP/SOB- NO V/D- NO MELENA/BRBPR- NO RECENT RUQ PAIN / INTESTINAL ANGINAL SYMPTOMS AFTER MEALS- NO RECENT GERD / DYSPEPSIA/ PROGRESSIVE DYSPHAGIA      History provided by:  Patient and relative   used: No        Prior to Admission Medications   Prescriptions Last Dose Informant Patient Reported? Taking?    Diclofenac Sodium (VOLTAREN) 1 %  Self, Child No No   Sig: Apply 2 g topically 4 (four) times a day   Patient not taking: Reported on 5/31/2023   HYDROcodone-acetaminophen (Maradiaga Altes) 7.5-300 MG per tablet   No No   Sig: Take 1 tablet by mouth every 8 (eight) hours as needed for severe pain Max Daily Amount: 3 tablets   amLODIPine (NORVASC) 2.5 mg tablet   No No   Sig: Take 1 tablet (2.5 mg total) by mouth daily   ergocalciferol (VITAMIN D2) 50,000 units  Self, Child No No   Sig: Take 1 capsule (50,000 Units total) by mouth once a week   levothyroxine 50 mcg tablet  Self, Child No No   Sig: Take 1 tablet (50 mcg total) by mouth daily   losartan (COZAAR) 100 MG tablet  Self, Child No No   Sig: Take 1 tablet (100 mg total) by mouth daily   metoprolol tartrate (LOPRESSOR) 100 mg tablet   No No   Sig: Take 1 tablet (100 mg total) by mouth 2 (two) times a day   nystatin (MYCOSTATIN) powder  Self, Child No No   Sig: Apply topically 2 (two) times a day   ondansetron (ZOFRAN) 4 mg tablet  Self, Child No No   Sig: Take 1 tablet (4 mg total) by mouth every 6 (six) hours as needed for nausea or vomiting   polyethylene glycol (MIRALAX) 17 g packet  Self, Child Yes No   Sig: Take 17 g by mouth as needed     senna-docusate sodium (SENOKOT S) 8.6-50 mg per tablet  Self, Child Yes No   Sig: Take 1 tablet by mouth as needed for constipation   simvastatin (ZOCOR) 20 mg tablet  Self, Child No No   Sig: Take 1 tablet (20 mg total) by mouth daily at bedtime   warfarin (COUMADIN) 5 mg tablet   No No   Sig: TAKE 5 MG ON MONDAY AND SATURDAY AND 2.5 MG ALL OTHER DAYS      Facility-Administered Medications: None       Past Medical History:   Diagnosis Date   • , spontaneous complete    • Carcinoma of skin    • Cataract     last assessed: 17   • Cataract    • Cellulitis    • Effusion of both knee joints     resolved: 3/25/15   • Effusion of both knee joints    • Enteritis    • Female stress incontinence     last assessed: 13   • Female stress incontinence    • Gastric ulcer     last assessed: 14   • Gastric ulcer    • GERD (gastroesophageal reflux disease)    • Hyperlipidemia    • Hypertension    • Hyperthyroidism    • Lyme disease     last assessed: 13   • Lyme disease    • Microscopic hematuria     last assessed: 13   • Microscopic hematuria    • Normal delivery     1950 son, 1952 son, 12 daughter, 26 daughter, 65 daughter   • Paroxysmal atrial fibrillation ([address])     last assessed: 13   • Paroxysmal atrial fibrillation ([address])    • Perirectal abscess     last assessed: 13   • Platelet dysfunction (HCC)     PAF   • Platelet dysfunction (HCC)    • Sinus tachycardia     last assessed: 13   • Sinus tachycardia    • Spinal stenosis     lumbar; last assessed: 10/4/13   • Stage 3 chronic kidney disease ([address]) 6/3/2019   • Stage 3 chronic kidney disease ([address])    • Tachycardia     unspecified; last assessed: 13   • Trigger finger, acquired     last assessed: 6/30/15   • Trigger finger, acquired        Past Surgical History:   Procedure Laterality Date   • KNEE ARTHROSCOPY Bilateral        • KNEE ARTHROSCOPY     • LUMBAR LAMINECTOMY         • LUMBAR LAMINECTOMY     • NEUROPLASTY / TRANSPOSITION MEDIAN NERVE AT CARPAL TUNNEL Bilateral        • NEUROPLASTY / TRANSPOSITION MEDIAN NERVE AT CARPAL TUNNEL     • TONSILLECTOMY AND ADENOIDECTOMY     • TOTAL HIP ARTHROPLASTY      joint replacement; L hip; 2/2/10   • TOTAL HIP ARTHROPLASTY         Family History   Problem Relation Age of Onset   • Rheumatic fever Mother    • Heart disease Father    • Crohn's disease Brother         (Granulomatous) Golitis   • Psoriasis Daughter    • Heart disease Son      I have reviewed and agree with the history as documented. E-Cigarette/Vaping   • E-Cigarette Use Never User      E-Cigarette/Vaping Substances   • Nicotine No    • THC No    • CBD No    • Flavoring No    • Other No    • Unknown No      Social History     Tobacco Use   • Smoking status: Never   • Smokeless tobacco: Never   Vaping Use   • Vaping Use: Never used   Substance Use Topics   • Alcohol use: Yes     Comment: rare on special occassions only   • Drug use: Never       Review of Systems   Constitutional: Positive for appetite change. Negative for activity change, chills, diaphoresis, fatigue, fever and unexpected weight change. HENT: Negative. Eyes: Negative. Respiratory: Negative. Cardiovascular: Negative. Gastrointestinal: Positive for abdominal pain and nausea. Negative for abdominal distention, anal bleeding, blood in stool, constipation, diarrhea, rectal pain and vomiting. Endocrine: Negative. Genitourinary: Negative. Musculoskeletal: Negative. Skin: Negative. Allergic/Immunologic: Negative. Neurological: Negative. Hematological: Negative. Psychiatric/Behavioral: Negative. Physical Exam  Physical Exam  Vitals and nursing note reviewed. Constitutional:       General: She is in acute distress. Appearance: Normal appearance. She is not ill-appearing, toxic-appearing or diaphoretic. Comments: AVSS- HTNSIVE -- WHICH IMPROVED IN ER- IN ABD PAIN -- - PULSE OX 97 % ON RA- INTERPRETATION IS NORMAL- NO INTERVENTION    HENT:      Head: Normocephalic and atraumatic. Nose: Nose normal. No congestion or rhinorrhea. Mouth/Throat:      Mouth: Mucous membranes are moist.      Pharynx: Oropharynx is clear. No oropharyngeal exudate or posterior oropharyngeal erythema. Eyes:      General: No scleral icterus. Right eye: No discharge. Left eye: No discharge. Extraocular Movements: Extraocular movements intact. Conjunctiva/sclera: Conjunctivae normal.      Pupils: Pupils are equal, round, and reactive to light. Comments: MM PINK   Neck:      Vascular: No carotid bruit. Comments: NO PMT C/T/L/S SPINE   Cardiovascular:      Rate and Rhythm: Normal rate and regular rhythm. Pulses: Normal pulses. Heart sounds: Murmur heard. No friction rub. No gallop. Pulmonary:      Effort: Pulmonary effort is normal. No respiratory distress. Breath sounds: Normal breath sounds. No stridor. No wheezing, rhonchi or rales. Chest:      Chest wall: No tenderness. Abdominal:      General: Bowel sounds are normal. There is no distension. Palpations: Abdomen is soft. There is no mass. Tenderness: There is abdominal tenderness. There is no right CVA tenderness, left CVA tenderness, guarding or rebound. Hernia: No hernia is present. Comments: MILD RUQ/ EPIGASTRIC TENDERNESS-- - REST OF ABD IS SOFT NT/ND- NO HSM - NO CVA TENDERNESS - NO ASCITES- NO PERITONEAL SIGNS- NO PULSATILE ABD MASS/BRUIT/ TENDERNESS   Musculoskeletal:         General: No swelling, tenderness, deformity or signs of injury. Normal range of motion. Cervical back: Normal range of motion and neck supple. No rigidity or tenderness. Right lower leg: Edema present. Left lower leg: Edema present. Comments: EQUAL BILATERAL RADIAL/DP PULSES- TRACE BLE PRETIBIAL EDEMA- NT- NO ASYM/ ERYTHEMA   Lymphadenopathy:      Cervical: No cervical adenopathy. Skin:     Capillary Refill: Capillary refill takes less than 2 seconds.       Coloration: Skin is not jaundiced or pale. Findings: Erythema present. No bruising, lesion or rash. Comments: POS TINEA APPEARING ERYTHEMATOUS RASH IN UMBILICUS AND BILATERAL INGUINAL AREA    Neurological:      General: No focal deficit present. Mental Status: She is alert and oriented to person, place, and time. Mental status is at baseline. Cranial Nerves: No cranial nerve deficit. Sensory: No sensory deficit. Motor: No weakness. Comments: NORMAL NON FOCAL NEURO EXAM    Psychiatric:         Mood and Affect: Mood normal.         Behavior: Behavior normal.         Thought Content:  Thought content normal.         Judgment: Judgment normal.         Vital Signs  ED Triage Vitals   Temperature Pulse Respirations Blood Pressure SpO2   07/13/23 2030 07/13/23 2027 07/13/23 2027 07/13/23 2027 07/13/23 2027   97.6 °F (36.4 °C) 74 16 (!) 191/83 98 %      Temp Source Heart Rate Source Patient Position - Orthostatic VS BP Location FiO2 (%)   07/13/23 2030 07/13/23 2027 07/13/23 2350 07/13/23 2027 --   Oral Monitor Lying Right arm       Pain Score       --                  Vitals:    07/13/23 2027 07/13/23 2350   BP: (!) 191/83 (!) 173/80   Pulse: 74 80   Patient Position - Orthostatic VS:  Lying         Visual Acuity      ED Medications  Medications   HYDROmorphone (DILAUDID) injection 0.5 mg (0.5 mg Intravenous Given 7/13/23 2252)   lactated ringers bolus 500 mL (500 mL Intravenous New Bag 7/13/23 2252)   iohexol (OMNIPAQUE) 350 MG/ML injection (SINGLE-DOSE) 100 mL (100 mL Intravenous Given 7/13/23 2234)       Diagnostic Studies  Results Reviewed     Procedure Component Value Units Date/Time    HS Troponin I 2hr [[id number]]  (Normal) Collected: 07/13/23 2255    Lab Status: Final result Specimen: Blood from Arm, Left Updated: 07/14/23 0044     hs TnI 2hr 5 ng/L      Delta 2hr hsTnI -1 ng/L     Urine culture [[id number]]     Lab Status: No result Specimen: Urine     Urine Microscopic [[id number]]  (Abnormal) Collected: 07/13/23 2351    Lab Status: Final result Specimen: Urine, Clean Catch Updated: 07/14/23 0033     RBC, UA 10-20 /hpf      WBC, UA 10-20 /hpf      Epithelial Cells None Seen /hpf      Bacteria, UA None Seen /hpf     UA (URINE) with reflex to Scope [[id number]]  (Abnormal) Collected: 07/13/23 2351    Lab Status: Final result Specimen: Urine, Clean Catch Updated: 07/14/23 0021     Color, UA Light Yellow     Clarity, UA Clear     Specific Gravity, UA 1.029     pH, UA 7.5     Leukocytes, UA Small     Nitrite, UA Positive     Protein, UA Trace mg/dl      Glucose, UA Negative mg/dl      Ketones, UA Negative mg/dl      Urobilinogen, UA <2.0 mg/dl      Bilirubin, UA Negative     Occult Blood, UA Negative    Protime-INR [[id number]]  (Abnormal) Collected: 07/13/23 2228    Lab Status: Final result Specimen: Blood from Arm, Left Updated: 07/13/23 2258     Protime 23.4 seconds      INR 2.06    Lipase [[id number]]  (Normal) Collected: 07/13/23 2228    Lab Status: Final result Specimen: Blood from Arm, Left Updated: 07/13/23 2256     Lipase 21 u/L     Lactic acid, plasma (w/reflex if result > 2.0) [[id number]]  (Normal) Collected: 07/13/23 2228    Lab Status: Final result Specimen: Blood from Arm, Left Updated: 07/13/23 2254     LACTIC ACID 1.5 mmol/L     Narrative:      Result may be elevated if tourniquet was used during collection.     HS Troponin 0hr (reflex protocol) [[id number]]  (Normal) Collected: 07/13/23 2045    Lab Status: Final result Specimen: Blood from Arm, Right Updated: 07/13/23 2116     hs TnI 0hr 6 ng/L     Comprehensive metabolic panel [[id number]]  (Abnormal) Collected: 07/13/23 2045    Lab Status: Final result Specimen: Blood from Arm, Right Updated: 07/13/23 2109     Sodium 139 mmol/L      Potassium 4.3 mmol/L      Chloride 105 mmol/L      CO2 25 mmol/L      ANION GAP 9 mmol/L      BUN 36 mg/dL      Creatinine 1.47 mg/dL      Glucose 138 mg/dL      Calcium 9.1 mg/dL      AST 58 U/L      ALT 29 U/L      Alkaline Phosphatase 112 U/L      Total Protein 7.4 g/dL      Albumin 4.2 g/dL      Total Bilirubin 1.13 mg/dL      eGFR 29 ml/min/1.73sq m     Narrative:      National Kid[employer] Foundation guidelines for Chronic Kidney Disease (CKD):   •  Stage 1 with normal or high GFR (GFR > 90 mL/min/1.73 square meters)  •  Stage 2 Mild CKD (GFR = 60-89 mL/min/1.73 square meters)  •  Stage 3A Moderate CKD (GFR = 45-59 mL/min/1.73 square meters)  •  Stage 3B Moderate CKD (GFR = 30-44 mL/min/1.73 square meters)  •  Stage 4 Severe CKD (GFR = 15-29 mL/min/1.73 square meters)  •  Stage 5 End Stage CKD (GFR <15 mL/min/1.73 square meters)  Note: GFR calculation is accurate only with a steady state creatinine    CBC and differential [[id number]]  (Abnormal) Collected: 07/13/23 2045    Lab Status: Final result Specimen: Blood from Arm, Right Updated: 07/13/23 2056     WBC 12.27 Thousand/uL      RBC 4.67 Million/uL      Hemoglobin 13.2 g/dL      Hematocrit 40.6 %      MCV 87 fL      MCH 28.3 pg      MCHC 32.5 g/dL      RDW 15.7 %      MPV 10.2 fL      Platelets 737 Thousands/uL      nRBC 0 /100 WBCs      Neutrophils Relative 84 %      Immat GRANS % 1 %      Lymphocytes Relative 8 %      Monocytes Relative 5 %      Eosinophils Relative 1 %      Basophils Relative 1 %      Neutrophils Absolute 10.35 Thousands/µL      Immature Grans Absolute 0.06 Thousand/uL      Lymphocytes Absolute 1.03 Thousands/µL      Monocytes Absolute 0.65 Thousand/µL      Eosinophils Absolute 0.11 Thousand/µL      Basophils Absolute 0.07 Thousands/µL                  CT abdomen pelvis with contrast   Final Result by She Rao MD (07/13 5096)      No evidence of bowel obstruction, colitis or diverticulitis. Normal appendix.             Workstation performed: [id number]         US right upper quadrant    (Results Pending)              Procedures  Procedures         ED Course  ED Course as of 07/16/23 1340   Thu Jul 13, 2023   2211 - er md note- initial er workup wAS FIRST NURSED --   2211 ER MD NOT-E 5/23 LABS/ CT OF CHEST/ABD/PELVIS RESULTS/ REPORT ALL REVIEWED BY ER MD   2302 ER MD NOTE- PT- RE-EVALUATED- SOUND ASLEEP IN AND- VSS PULSE OX 98 % ON RA    2352 ER MD NOTE- CURRENT LABS REVIEWED AND  COMPARED TO PREVIOUS BY JASON HELLER    Fri Jul 14, 2023   0020 ER MD NOTE- PT- RE-EVALUATED- DAUGHTER NOW PRESENT- PT STATES FEELS IMPROVED- ABD NOW SOFT NT/ND- -- DAUGHTER DOES STATE THAT PT C/O   MID AND RIGHT UPPER ABD PAIN  AT 5 PM TONIGHT WITH NAUSEA--  NO PRIOR COMPS-  - WILL KEEP PT UNTIL AM FOR FORMAL RUQ U/S AND EVAL THRU OUT NIGHT -- DAUGHTERS AWARE WILL GO HOME  AND ER MD WILL CONTACT IF ANYTHING CHANGES   0406 Er md note-  pt r-evaluated- sound asleep    0708 ER MD NOTE- PT - RE-EVALUATED- SLEEPING IN AND- ABD SOFT- VERY MINIMAL EPIGASTRIC TENDERNESS ONLY -- REST OF ABD VERY SOFT -NT- - WILL RET RUQ U/S - IF NEG WILL  FEED AND RE-EVAL FOR POSSIBLE D/C- WILL SIGN OUT TO ONCOMING TEAM                                SBIRT 20yo+    Flowsheet Row Most Recent Value   Initial Alcohol Screen: US AUDIT-C     1. How often do you have a drink containing alcohol? 0 Filed at: 07/13/2023 2026   2. How many drinks containing alcohol do you have on a typical day you are drinking? 0 Filed at: 07/13/2023 2026   3a. Male UNDER 65: How often do you have five or more drinks on one occasion? 0 Filed at: 07/13/2023 2026   3b. FEMALE Any Age, or MALE 65+: How often do you have 4 or more drinks on one occassion? 0 Filed at: 07/13/2023 2026   Audit-C Score 0 Filed at: 07/13/2023 2026   MAXIM: How many times in the past year have you. .. Used an illegal drug or used a prescription medication for non-medical reasons?  Never Filed at: 07/13/2023 2026                    Medical Decision Making  Pt with upper abd pain --  With extensive diff dx-- pt  Is on coumadin -- pt will need ecg/ labs and ct wise of abd/pelvis to start -- and symptom control and re-eval --     Biliary sludge: acute illness or injury with systemic symptoms     Details: see chart   Pain of upper abdomen: acute illness or injury with systemic symptoms     Details: see chart   Tinea corporis: acute illness or injury     Details: see chart   Urinary tract infection without hematuria, site unspecified: self-limited or minor problem     Details: pt has no urianry comps   Amount and/or Complexity of Data Reviewed  Independent Historian:      Details: daughters   External Data Reviewed: labs, radiology, ECG and notes. Details: all reviewed by er md   Labs: ordered. Decision-making details documented in ED Course. Details: all reviewed by er md   Radiology: ordered and independent interpretation performed. Decision-making details documented in ED Course. Details: all reviewed y er md   ECG/medicine tests: ordered and independent interpretation performed. Decision-making details documented in ED Course. Details: all reviewed by er md   Discussion of management or test interpretation with external provider(s): Extensive er workup and er md beckgt complexity     Risk  OTC drugs. Prescription drug management. Parenteral controlled substances. Decision regarding hospitalization. Disposition  Final diagnoses:   None     ED Disposition     None      Follow-up Information    None         Patient's Medications   Discharge Prescriptions    No medications on file       No discharge procedures on file.     PDMP Review     None          ED Provider  Electronically Signed by           Meaghan Shafer MD  07/16/23 1908

## 2023-07-14 NOTE — ASSESSMENT & PLAN NOTE
Lab Results   Component Value Date    EGFR 35 07/14/2023    EGFR 29 07/13/2023    EGFR 31 05/24/2023    CREATININE 1.27 07/14/2023    CREATININE 1.47 (H) 07/13/2023    CREATININE 1.41 (H) 05/24/2023     · Creatinine at baseline, 1.2-1.4

## 2023-07-14 NOTE — ASSESSMENT & PLAN NOTE
· Thickening of the gallbladder with sludge noted with findings that question acute cholecystitis  · General surgery following, doubting acute cholecystitis.   Will monitor without surgical intervention  · Pain improved

## 2023-07-14 NOTE — CONSULTS
Consult to Surgery - General  Consult performed by: Wilfredo Sims PA-C  Consult ordered by: Benedicto Mock DO        Consultation -[place] 80 y.o. female MRN: [id number]  Unit/Bed#: ED-31 Encounter: [id number]      ASSESSMENT:  81 yo F with abdominal pain r/o acute cholecystitis  AVSS, WBC 11 (12) without any abx  Tbili 0.8 (1.13)  Cr 1.27 (1.47)    RUQ u/s: somewhat distended GB containing sludge with wall thickening up to 4mm. CBD 7.0 mm. Prominence of right collecting system, new from 7/13, psb 2/2 urinary retention    UA +    Plan:  - low suspicion for acute cholecystitis at this time. Tbili normalized, wbc improved and abdominal pain resolved without abx. Abdomen remains soft, NT. No indication for surgical intervention at this time  - no indication for admission from surgical standpoint. [facility] for diet and dispo per ED  - abx for +UA per ED  - can f/u in office as needed    Rest of care per ED      Reason for Consult / Principal Problem:    HPI: Joleen Burkett is a 80y.o. year old female PMH GERD, HTN, afib (on coumadin), CKD3 who reportedly presented 2/2 weakness and abdominal pain. Per pt, she started having RLQ abdominal pain "a couple days ago", continued to worsen which prompted her ED visit. She admits to some nausea but not vomiting. She denies any worsening of pain with eating however she is unsure of when she ate last. Denies any fevers, chills, changes to bowel or bladder habits. Believes her last BM was yesterday and normal. Denies any dysuria, urgency, frequency but states she wouldn't know because she uses depends. States abdominal pain has completely resolved on the time of my evaluation. Received 0.5 IV dilaudid last night and 650 of tylenol at 0900. With no return of pain. Denies having this pain in the past. Denies any prior abdominal sx in the past.      Review of Systems   Constitutional: Negative for chills and fever. Respiratory: Negative.     Cardiovascular: Negative. Gastrointestinal: Positive for abdominal pain and nausea. Negative for constipation, diarrhea and vomiting. Genitourinary: Negative. Musculoskeletal: Negative. Skin: Negative. Neurological: Negative. All other systems reviewed and are negative.       Historical Information   Past Medical History:   Diagnosis Date   • , spontaneous complete    • Carcinoma of skin    • Cataract     last assessed: 17   • Cataract    • Cellulitis    • Effusion of both knee joints     resolved: 3/25/15   • Effusion of both knee joints    • Enteritis    • Female stress incontinence     last assessed: 13   • Female stress incontinence    • Gastric ulcer     last assessed: 14   • Gastric ulcer    • GERD (gastroesophageal reflux disease)    • Hyperlipidemia    • Hypertension    • Hyperthyroidism    • Lyme disease     last assessed: 13   • Lyme disease    • Microscopic hematuria     last assessed: 13   • Microscopic hematuria    • Normal delivery     1950 son, 1952 son, 12 daughter, 26 daughter, 65 daughter   • Paroxysmal atrial fibrillation ([address])     last assessed: 13   • Paroxysmal atrial fibrillation ([address])    • Perirectal abscess     last assessed: 13   • Platelet dysfunction (HCC)     PAF   • Platelet dysfunction (HCC)    • Sinus tachycardia     last assessed: 13   • Sinus tachycardia    • Spinal stenosis     lumbar; last assessed: 10/4/13   • Stage 3 chronic kidney disease ([address]) 6/3/2019   • Stage 3 chronic kidney disease ([address])    • Tachycardia     unspecified; last assessed: 13   • Trigger finger, acquired     last assessed: 6/30/15   • Trigger finger, acquired      Past Surgical History:   Procedure Laterality Date   • KNEE ARTHROSCOPY Bilateral        • KNEE ARTHROSCOPY     • LUMBAR LAMINECTOMY         • LUMBAR LAMINECTOMY     • NEUROPLASTY / TRANSPOSITION MEDIAN NERVE AT CARPAL TUNNEL Bilateral        • NEUROPLASTY / TRANSPOSITION MEDIAN NERVE AT CARPAL TUNNEL     • TONSILLECTOMY AND ADENOIDECTOMY     • TOTAL HIP ARTHROPLASTY      joint replacement; L hip; 2/2/10   • TOTAL HIP ARTHROPLASTY       Social History   Social History     Substance and Sexual Activity   Alcohol Use Yes    Comment: rare on special occassions only     Social History     Substance and Sexual Activity   Drug Use Never     Social History     Tobacco Use   Smoking Status Never   Smokeless Tobacco Never     Family History   Problem Relation Age of Onset   • Rheumatic fever Mother    • Heart disease Father    • Crohn's disease Brother         (Granulomatous) Golitis   • Psoriasis Daughter    • Heart disease Son        Meds/Allergies     (Not in a hospital admission)    No current facility-administered medications for this encounter. Allergies   Allergen Reactions   • Cortisone    • Oxaprozin Hives   • Penicillins Hives       Objective     Blood pressure (!) 193/79, pulse 76, temperature 97.6 °F (36.4 °C), temperature source Oral, resp. rate 18, SpO2 96 %. Intake/Output Summary (Last 24 hours) at 7/14/2023 1312  Last data filed at 7/13/2023 2352  Gross per 24 hour   Intake 500 ml   Output --   Net 500 ml       PHYSICAL EXAM  Physical Exam  Vitals and nursing note reviewed. Constitutional:       General: She is not in acute distress. Appearance: Normal appearance. She is normal weight. She is not ill-appearing, toxic-appearing or diaphoretic. HENT:      Head: Normocephalic and atraumatic. Eyes:      Extraocular Movements: Extraocular movements intact. Cardiovascular:      Rate and Rhythm: Normal rate. Pulmonary:      Effort: Pulmonary effort is normal. No respiratory distress. Abdominal:      General: Abdomen is flat. There is no distension. Palpations: Abdomen is soft. Tenderness: There is no abdominal tenderness. There is no guarding or rebound. Musculoskeletal:         General: No swelling or tenderness. Skin:     General: Skin is warm. Capillary Refill: Capillary refill takes less than 2 seconds. Neurological:      General: No focal deficit present. Mental Status: She is alert and oriented to person, place, and time. Psychiatric:         Mood and Affect: Mood normal.         Behavior: Behavior normal.           Lab Results:   I have personally reviewed pertinent lab results. , CBC:   Lab Results   Component Value Date    WBC 11.18 (H) 07/14/2023    HGB 12.1 07/14/2023    HCT 37.5 07/14/2023    MCV 87 07/14/2023     07/14/2023    RBC 4.31 07/14/2023    MCH 28.1 07/14/2023    MCHC 32.3 07/14/2023    RDW 15.9 (H) 07/14/2023    MPV 10.0 07/14/2023    NRBC 0 07/14/2023   , CMP:   Lab Results   Component Value Date    SODIUM 137 07/14/2023    K 3.7 07/14/2023     07/14/2023    CO2 25 07/14/2023    BUN 29 (H) 07/14/2023    CREATININE 1.27 07/14/2023    CALCIUM 8.6 07/14/2023    AST 48 (H) 07/14/2023    ALT 41 07/14/2023    ALKPHOS 106 (H) 07/14/2023    EGFR 35 07/14/2023   , Coagulation:   Lab Results   Component Value Date    INR 2.06 (H) 07/13/2023   , Urinalysis:   Lab Results   Component Value Date    COLORU Light Yellow 07/13/2023    CLARITYU Clear 07/13/2023    SPECGRAV 1.029 07/13/2023    PHUR 7.5 07/13/2023    LEUKOCYTESUR Small (A) 07/13/2023    NITRITE Positive (A) 07/13/2023    GLUCOSEU Negative 07/13/2023    KETONESU Negative 07/13/2023    BILIRUBINUR Negative 07/13/2023    BLOODU Negative 07/13/2023   , Lipase:   Lab Results   Component Value Date    LIPASE 21 07/13/2023     Imaging: I have personally reviewed pertinent reports. 7/13/23 CTAP: IMPRESSION:  No evidence of bowel obstruction, colitis or diverticulitis. Normal appendix. EKG, Pathology, and Other Studies: I have personally reviewed pertinent reports. Counseling / Coordination of Care  Total time spent today  30 minutes. Greater than 50% of total time was spent with the patient and / or family counseling and / or coordination of care. Initially contacted about pt 7/14 @1200, awaited repeat labs and saw pt within 30 minutes.       Angelo Leger PA-C  7/14/2023 1:12 PM

## 2023-07-14 NOTE — ED PROCEDURE NOTE
PROCEDURE  ECG 12 Lead Documentation Only    Date/Time: 7/13/2023 10:12 PM    Performed by: Renae Shaffer MD  Authorized by: Renae Shaffer MD    Indications / Diagnosis:  UPPER ABD PAIN   ECG reviewed by me, the ED Provider: yes    Patient location:  ED and bedside  Previous ECG:     Previous ECG:  Compared to current    Comparison ECG info:  5/22/23- I AV BLOCK IS NEW-- POAC S HAVE [id number]- NO OTHER SIGN CHANGES    Similarity:  Changes noted    Comparison to cardiac monitor: Yes    Interpretation:     Interpretation: non-specific    Rate:     ECG rate:  75    ECG rate assessment: normal    Rhythm:     Rhythm: sinus rhythm    Ectopy:     Ectopy: none    QRS:     QRS axis:  Normal    QRS intervals:   Wide  Conduction:     Conduction: abnormal      Abnormal conduction: incomplete RBBB and 1st degree    ST segments:     ST segments:  Normal  T waves:     T waves: flattening      Flattening:  V1  Q waves:     Q waves:  AVL  Comments:      NO ECG SIGNS OF ISCHEMIA/ INJURY          Renae Shaffer MD  07/13/23 3279

## 2023-07-14 NOTE — ASSESSMENT & PLAN NOTE
· Elevated BP in the ER however she had been without her blood pressure medications for 24 hours. · Resumed home regimen of Norvasc 2.5 mg daily, losartan 100 mg daily, Lopressor 100 mg twice daily.   · Monitor BP

## 2023-07-14 NOTE — ASSESSMENT & PLAN NOTE
· 10-20 white blood cells on UA with high PVR. · Treat as acute infection with IV Ancef.   · Follow-up on urine culture

## 2023-07-14 NOTE — DISCHARGE INSTRUCTIONS
DIAGNOSIS: UPPER ABDOMINAL PAIN / TINEA CORPORIS - FUNGAL INFECTION OF UMBILICUS AND BILATERAL GROIN AREA     - ACTIVITY AS TOLERATED -- DIET AS TOLERATED -- PLEASE START WITH  SIPS AND ADVANCE  TO MORE SOLID FOODS AS TOLERATED     - FOR ABDOMINAL PAIN- OVER THE COUNTER GENERIC ACETAMINOPHEN 500 MG  EVERY 4 HRS WHILE AWAKE     - FOR ANY NAUSEA/ VOMITING: ZOFRAN 2 TABLETS DISSOLVE IN THE MOUTH  EVERY 6-8 HRS AS NEEDED     - PLEASE RETURN TO  THE ER FOR ANY FEVERS- TEMP > 1004.  ANY WORSENING/ INTRACTABLE  ABDOMINAL PAIN/ ANY PERSISTENT VOMITING / ANY BLOODY /COFFEE GROUND VOMITING/ ANY BLOODY /BLACK TARRY STOOLS OR ANY NEW/ WORSENING/CONCERNING SYMPTOMS TO YOU      - FOR FUNGAL INFECTION OF UMBILICUS/ BELLY BUTTON - CLOTRIMAZOLE CREAM  APPLY TO AREA OF BELLY BUTTON AND BOTH GROIN CREASES 2 TIMES PER DAY UNTIL GONE SOMETIMES  TAKES 2-3 WEEKS OF TREATMENT

## 2023-07-14 NOTE — H&P
[facility]  H&P  Name: Fitz Nguyen 80 y.o. female I MRN: [id number]  Unit/Bed#: ED-31 I Date of Admission: 7/13/2023   Date of Service: 7/14/2023 I Hospital Day: 0      Assessment/Plan   * UTI (urinary tract infection)  Assessment & Plan  · 10-20 white blood cells on UA with high PVR. · Treat as acute infection with IV Ancef. · Follow-up on urine culture    Thickening of wall of gallbladder  Assessment & Plan  · Thickening of the gallbladder with sludge noted with findings that question acute cholecystitis  · General surgery following, doubting acute cholecystitis. Will monitor without surgical intervention  · Pain improved    Continuous opioid dependence ([address])  Assessment & Plan  · PDMP reviewed  · Continue home hydrocodone    Stage 3 chronic kidney disease [facility])  Assessment & Plan  Lab Results   Component Value Date    EGFR 35 07/14/2023    EGFR 29 07/13/2023    EGFR 31 05/24/2023    CREATININE 1.27 07/14/2023    CREATININE 1.47 (H) 07/13/2023    CREATININE 1.41 (H) 05/24/2023     · Creatinine at baseline, 1.2    Benign essential hypertension  Assessment & Plan  · Elevated BP in the ER however has been without her blood pressure medications for 24 hours. · Resume home regimen of Norvasc 2.5 mg daily, losartan 100 mg daily, Lopressor 100 mg twice daily. · Monitor BP    Atrial fibrillation (HCC)  Assessment & Plan  · Continue Lopressor. · Continue Coumadin. · INR therapeutic       VTE Pharmacologic Prophylaxis: VTE Score: 5 High Risk (Score >/= 5) - Pharmacological DVT Prophylaxis Ordered: warfarin (Coumadin). Sequential Compression Devices Ordered. Code Status: full code  Discussion with family: Patient declined call to . Anticipated Length of Stay: Patient will be admitted on an observation basis with an anticipated length of stay of less than 2 midnights secondary to Abdominal pain with UTI on IV antibiotics.     Total Time for Visit, including Counseling / Coordination of Care: 75 minutes Greater than 50% of this total time spent on direct patient counseling and coordination of care. Chief Complaint: Abdominal pain    History of Present Illness:  Cesar Jeffries is a 80 y.o. female with a PMH of HTN who presents with epigastric abdominal pain for the past 24 hours. Not particularly associated with eating. Has had nausea but no vomiting. No fevers. Reports chronic low back pain. Has been in the ER awaiting surgical evaluation. Pain control at this time, low suspicion for acute cholecystitis however lives independently and needs PT eval prior to safe discharge. Review of Systems:  Review of Systems   Constitutional: Negative for activity change, appetite change, chills, fatigue and fever. HENT: Negative for congestion, rhinorrhea, sinus pressure and sore throat. Eyes: Negative for photophobia, pain and visual disturbance. Respiratory: Negative for cough, shortness of breath and wheezing. Cardiovascular: Negative for chest pain, palpitations and leg swelling. Gastrointestinal: Positive for abdominal pain and nausea. Negative for abdominal distention, constipation, diarrhea and vomiting. Endocrine: Negative for cold intolerance, heat intolerance, polydipsia and polyuria. Genitourinary: Negative for difficulty urinating, dysuria, flank pain, frequency and hematuria. Musculoskeletal: Negative for arthralgias, back pain and joint swelling. Skin: Negative for color change, pallor and rash. Allergic/Immunologic: Negative. Neurological: Negative for dizziness, syncope, weakness, light-headedness and headaches. Hematological: Negative. Psychiatric/Behavioral: Negative.         Past Medical and Surgical History:   Past Medical History:   Diagnosis Date   • , spontaneous complete    • Carcinoma of skin    • Cataract     last assessed: 17   • Cataract    • Cellulitis    • Effusion of both knee joints     resolved: 3/25/15   • Effusion of both knee joints    • Enteritis    • Female stress incontinence     last assessed: 9/26/13   • Female stress incontinence    • Gastric ulcer     last assessed: 1/30/14   • Gastric ulcer    • GERD (gastroesophageal reflux disease)    • Hyperlipidemia    • Hypertension    • Hyperthyroidism    • Lyme disease     last assessed: 9/26/13   • Lyme disease    • Microscopic hematuria     last assessed: 9/26/13   • Microscopic hematuria    • Normal delivery     1950 son, 1952 son, 12 daughter, 26 daughter, 65 daughter   • Paroxysmal atrial fibrillation ([address])     last assessed: 9/26/13   • Paroxysmal atrial fibrillation ([address])    • Perirectal abscess     last assessed: 9/26/13   • Platelet dysfunction (HCC)     PAF   • Platelet dysfunction (HCC)    • Sinus tachycardia     last assessed: 9/26/13   • Sinus tachycardia    • Spinal stenosis     lumbar; last assessed: 10/4/13   • Stage 3 chronic kidney disease ([address]) 6/3/2019   • Stage 3 chronic kidney disease ([address])    • Tachycardia     unspecified; last assessed: 9/26/13   • Trigger finger, acquired     last assessed: 6/30/15   • Trigger finger, acquired        Past Surgical History:   Procedure Laterality Date   • KNEE ARTHROSCOPY Bilateral     2008   • KNEE ARTHROSCOPY     • LUMBAR LAMINECTOMY      5/09   • LUMBAR LAMINECTOMY     • NEUROPLASTY / TRANSPOSITION MEDIAN NERVE AT CARPAL TUNNEL Bilateral     2011   • NEUROPLASTY / TRANSPOSITION MEDIAN NERVE AT CARPAL TUNNEL     • TONSILLECTOMY AND ADENOIDECTOMY     • TOTAL HIP ARTHROPLASTY      joint replacement; L hip; 2/2/10   • TOTAL HIP ARTHROPLASTY         Meds/Allergies:  Prior to Admission medications    Medication Sig Start Date End Date Taking?  Authorizing Provider   clotrimazole (LOTRIMIN) 1 % cream Apply 1 g (1 Application total) topically 2 (two) times a day for 14 days APPLY TO AREA OF BELLY BUTTON AND BOTH GROIN CREASES 2 TIMES PER DAY UNTIL GONE 7/14/23 7/28/23 Yes Sis Blevins MD   ondansetron (Zofran) 4 mg tablet Take 2 tablets (8 mg total) by mouth every 8 (eight) hours as needed for nausea or vomiting for up to 15 doses ZOFRAN ODT 2 TABLETS DISSOLVE IN THE MOUTH  EVERY 8 HRS AS NEEDED 7/14/23  Yes Jo Moy MD   amLODIPine (NORVASC) 2.5 mg tablet Take 1 tablet (2.5 mg total) by mouth daily 5/31/23 6/30/23  Monika Lei DO   Diclofenac Sodium (VOLTAREN) 1 % Apply 2 g topically 4 (four) times a day  Patient not taking: Reported on 5/31/2023 8/26/21   Avinash rWay MD   ergocalciferol (VITAMIN D2) 50,000 units Take 1 capsule (50,000 Units total) by mouth once a week 5/5/23   Avinash Wray MD   HYDROcodone-acetaminophen (Shantal Iha) 7.5-300 MG per tablet Take 1 tablet by mouth every 8 (eight) hours as needed for severe pain Max Daily Amount: 3 tablets 6/8/23   Avinash Wray MD   levothyroxine 50 mcg tablet Take 1 tablet (50 mcg total) by mouth daily 4/18/23   Avinash Wray MD   losartan (COZAAR) 100 MG tablet Take 1 tablet (100 mg total) by mouth daily 4/18/23   Avinash Wray MD   metoprolol tartrate (LOPRESSOR) 100 mg tablet Take 1 tablet (100 mg total) by mouth 2 (two) times a day 6/8/23   Avinash Wray MD   nystatin (MYCOSTATIN) powder Apply topically 2 (two) times a day 5/24/23   Esteban Munson MD   ondansetron (ZOFRAN) 4 mg tablet Take 1 tablet (4 mg total) by mouth every 6 (six) hours as needed for nausea or vomiting 2/17/22   Avinash Wray MD   polyethylene glycol (MIRALAX) 17 g packet Take 17 g by mouth as needed      Historical Provider, MD   senna-docusate sodium (SENOKOT S) 8.6-50 mg per tablet Take 1 tablet by mouth as needed for constipation    Historical Provider, MD   simvastatin (ZOCOR) 20 mg tablet Take 1 tablet (20 mg total) by mouth daily at bedtime 4/18/23   Avinash Wray MD   warfarin (COUMADIN) 5 mg tablet TAKE 5 MG ON MONDAY AND SATURDAY AND 2.5 MG ALL OTHER DAYS 6/8/23   Avinash Wray MD     I have reviewed home medications with patient personally. Allergies: Allergies   Allergen Reactions   • Cortisone    • Oxaprozin Hives   • Penicillins Hives       Social History:  Marital Status:    Occupation: retired  Patient Pre-hospital Living Situation: Home  Patient Pre-hospital Level of Mobility: walks with walker  Patient Pre-hospital Diet Restrictions: none  Substance Use History:   Social History     Substance and Sexual Activity   Alcohol Use Yes    Comment: rare on special occassions only     Social History     Tobacco Use   Smoking Status Never   Smokeless Tobacco Never     Social History     Substance and Sexual Activity   Drug Use Never       Family History:  Family History   Problem Relation Age of Onset   • Rheumatic fever Mother    • Heart disease Father    • Crohn's disease Brother         (Granulomatous) Golitis   • Psoriasis Daughter    • Heart disease Son        Physical Exam:     Vitals:   Blood Pressure: (!) 193/79 (07/14/23 1145)  Pulse: 76 (07/14/23 1145)  Temperature: 97.6 °F (36.4 °C) (07/13/23 2030)  Temp Source: Oral (07/13/23 2030)  Respirations: 18 (07/14/23 1145)  SpO2: 96 % (07/14/23 1145)    Physical Exam  Vitals and nursing note reviewed. Constitutional:       General: She is not in acute distress. Appearance: Normal appearance. HENT:      Head: Normocephalic and atraumatic. Mouth/Throat:      Mouth: Mucous membranes are moist.   Eyes:      General: No scleral icterus. Extraocular Movements: Extraocular movements intact. Pupils: Pupils are equal, round, and reactive to light. Cardiovascular:      Rate and Rhythm: Normal rate and regular rhythm. Heart sounds: Normal heart sounds. No murmur heard. No friction rub. Pulmonary:      Effort: Pulmonary effort is normal.      Breath sounds: Normal breath sounds. No wheezing or rhonchi. Abdominal:      General: Bowel sounds are normal. There is no distension. Palpations: Abdomen is soft. Tenderness:  There is no abdominal tenderness. There is no guarding. Musculoskeletal:         General: No swelling. Cervical back: Neck supple. Right lower leg: No edema. Left lower leg: No edema. Skin:     General: Skin is warm and dry. Capillary Refill: Capillary refill takes less than 2 seconds. Coloration: Skin is not jaundiced or pale. Neurological:      General: No focal deficit present. Mental Status: She is alert and oriented to person, place, and time. Mental status is at baseline. Additional Data:     Lab Results:  Results from last 7 days   Lab Units 07/14/23  1212   WBC Thousand/uL 11.18*   HEMOGLOBIN g/dL 12.1   HEMATOCRIT % 37.5   PLATELETS Thousands/uL 199   NEUTROS PCT % 80*   LYMPHS PCT % 10*   MONOS PCT % 8   EOS PCT % 1     Results from last 7 days   Lab Units 07/14/23  1212   SODIUM mmol/L 137   POTASSIUM mmol/L 3.7   CHLORIDE mmol/L 107   CO2 mmol/L 25   BUN mg/dL 29*   CREATININE mg/dL 1.27   ANION GAP mmol/L 5   CALCIUM mg/dL 8.6   ALBUMIN g/dL 3.8   TOTAL BILIRUBIN mg/dL 0.86   ALK PHOS U/L 106*   ALT U/L 41   AST U/L 48*   GLUCOSE RANDOM mg/dL 89     Results from last 7 days   Lab Units 07/13/23  2228   INR  2.06*             Results from last 7 days   Lab Units 07/13/23  2228   LACTIC ACID mmol/L 1.5       Imaging: Reviewed radiology reports from this admission including: abdominal/pelvic CT and ultrasound(s)  US right upper quadrant   Final Result by Saud Ferrell MD (07/14 1056)      Somewhat distended gallbladder containing sludge with mild circumferential wall thickening. These findings may represent acute cholecystitis. Recommend correlation with HIDA scan if feasible. Visualized CBD within normal limits for patient's age, measuring 7 mm in diameter. No choledocholithiasis visualized. Prominence of the right collecting system, new from 7/13/2023. Recommend correlation for possible urinary retention.       Workstation performed: [id number]         CT abdomen pelvis with contrast   Final Result by Deisi Mancilla MD (07/13 6226)      No evidence of bowel obstruction, colitis or diverticulitis. Normal appendix. Workstation performed: [id number]             EKG and Other Studies Reviewed on Admission:   · Prior pertinent studies and records reviewed in [facility]. ** Please Note: This note has been constructed using a voice recognition system.  **

## 2023-07-14 NOTE — PLAN OF CARE
Problem: Potential for Falls  Goal: Patient will remain free of falls  Description: INTERVENTIONS:  - Educate patient/family on patient safety including physical limitations  - Instruct patient to call for assistance with activity   - Consult OT/PT to assist with strengthening/mobility   - Keep Call bell within reach  - Keep bed low and locked with side rails adjusted as appropriate  - Keep care items and personal belongings within reach  - Initiate and maintain comfort rounds  - Make Fall Risk Sign visible to staff  - Offer Toileting every  Hours, in advance of need  - Initiate/Maintain alarm  - Obtain necessary fall risk management equipment:   - Apply yellow socks and bracelet for high fall risk patients  - Consider moving patient to room near nurses station  7/14/2023 1728 by Gwenda Bloch, RN  Outcome: Progressing  7/14/2023 1727 by Gwenda Bloch, RN  Outcome: Progressing     Problem: MOBILITY - ADULT  Goal: Maintain or return to baseline ADL function  Description: INTERVENTIONS:  -  Assess patient's ability to carry out ADLs; assess patient's baseline for ADL function and identify physical deficits which impact ability to perform ADLs (bathing, care of mouth/teeth, toileting, grooming, dressing, etc.)  - Assess/evaluate cause of self-care deficits   - Assess range of motion  - Assess patient's mobility; develop plan if impaired  - Assess patient's need for assistive devices and provide as appropriate  - Encourage maximum independence but intervene and supervise when necessary  - Involve family in performance of ADLs  - Assess for home care needs following discharge   - Consider OT consult to assist with ADL evaluation and planning for discharge  - Provide patient education as appropriate  7/14/2023 1728 by Gwenda Bloch, RN  Outcome: Progressing  7/14/2023 1727 by Gwenda Bloch, RN  Outcome: Progressing  Goal: Maintains/Returns to pre admission functional level  Description: INTERVENTIONS:  - Perform BMAT or MOVE assessment daily.   - Set and communicate daily mobility goal to care team and patient/family/caregiver. - Collaborate with rehabilitation services on mobility goals if consulted  - Perform Range of Motion  times a day. - Reposition patient every  hours.   - Dangle patient  times a day  - Stand patient  times a day  - Ambulate patient  times a day  - Out of bed to chair  times a day   - Out of bed for meals times a day  - Out of bed for toileting  - Record patient progress and toleration of activity level   7/14/2023 1728 by Ed Rea RN  Outcome: Progressing  7/14/2023 1727 by Ed Rea RN  Outcome: Progressing     Problem: Prexisting or High Potential for Compromised Skin Integrity  Goal: Skin integrity is maintained or improved  Description: INTERVENTIONS:  - Identify patients at risk for skin breakdown  - Assess and monitor skin integrity  - Assess and monitor nutrition and hydration status  - Monitor labs   - Assess for incontinence   - Turn and reposition patient  - Assist with mobility/ambulation  - Relieve pressure over bony prominences  - Avoid friction and shearing  - Provide appropriate hygiene as needed including keeping skin clean and dry  - Evaluate need for skin moisturizer/barrier cream  - Collaborate with interdisciplinary team   - Patient/family teaching  - Consider wound care consult   7/14/2023 1728 by Ed Rea RN  Outcome: Progressing  7/14/2023 1727 by Ed Rea RN  Outcome: Progressing     Problem: PAIN - ADULT  Goal: Verbalizes/displays adequate comfort level or baseline comfort level  Description: Interventions:  - Encourage patient to monitor pain and request assistance  - Assess pain using appropriate pain scale  - Administer analgesics based on type and severity of pain and evaluate response  - Implement non-pharmacological measures as appropriate and evaluate response  - Consider cultural and social influences on pain and pain management  - Notify physician/advanced practitioner if interventions unsuccessful or patient reports new pain  Outcome: Progressing     Problem: INFECTION - ADULT  Goal: Absence or prevention of progression during hospitalization  Description: INTERVENTIONS:  - Assess and monitor for signs and symptoms of infection  - Monitor lab/diagnostic results  - Monitor all insertion sites, i.e. indwelling lines, tubes, and drains  - Monitor endotracheal if appropriate and nasal secretions for changes in amount and color  - [place] appropriate cooling/warming therapies per order  - Administer medications as ordered  - Instruct and encourage patient and family to use good hand hygiene technique  - Identify and instruct in appropriate isolation precautions for identified infection/condition  Outcome: Progressing

## 2023-07-14 NOTE — ASSESSMENT & PLAN NOTE
· Continue Lopressor. · Continue Coumadin.   · INR subtherapeutic today but patient due for higher dose of Coumadin tonight

## 2023-07-14 NOTE — ED CARE HANDOFF
Emergency Department Sign Out Note        Sign out and transfer of care from Dr. Shan Vaughn. See Separate Emergency Department note. The patient, Deacon Mancia, was evaluated by the previous provider for weakness. Workup Completed: Follow up [address], try to eat, walk, if US normal and feels well can go home as per overnight team    ED Course / Workup Pending (followup): Ultrasound concerning for acute cholecystitis. Patient was seen and evaluated by surgical team does not feel this is acute cholecystitis on multiple repeat exams patient has no right upper quadrant tenderness she was able to eat she was able to ambulate without any difficulty. ,  Does not have any urinary symptoms but postvoid showed 300+ cc of urine, will treat with Keflex as she did have nitrite positive urine                                  ED Course as of 07/14/23 1437   Fri Jul 14, 2023   1413 able to ambulate able to eat, seen by surgery cleared for discharge will follow with outpatient providers     Procedures  MDM        Disposition  Final diagnoses:   Pain of upper abdomen   Tinea corporis   Urinary tract infection without hematuria, site unspecified   Biliary sludge     Time reflects when diagnosis was documented in both MDM as applicable and the Disposition within this note     Time User Action Codes Description Comment    7/14/2023  6:01 AM Fred CABEZAS Add [R10.10] Pain of upper abdomen     7/14/2023  7:03 AM Fred CABEZAS Add [B35.4] Tinea corporis     7/14/2023  2:36 PM Phuc Lagos Add [N39.0] Urinary tract infection without hematuria, site unspecified     7/14/2023  2:36 PM Lauryn Gutierrez Add [K83.8] Biliary sludge       ED Disposition     ED Disposition   Discharge    Condition   Stable    Date/Time   Fri Jul 14, 2023  2:36 PM    [address] discharge to home/self care.                Follow-up Information     Follow up With Specialties Details Why Shan Rendon MD Internal Medicine Call  To arrange for the next available appointment, For repeat evaluation and  to ensure resolution Janelle Strickland DO General Surgery Call  To arrange for the next available appointment to further evaluate your gallbladder [address]          Patient's Medications   Discharge Prescriptions    CLOTRIMAZOLE (LOTRIMIN) 1 % CREAM    Apply 1 g (1 Application total) topically 2 (two) times a day for 14 days APPLY TO AREA OF BELLY BUTTON AND BOTH GROIN CREASES 2 TIMES PER DAY UNTIL GONE       Start Date: 7/14/2023 End Date: 7/28/2023       Order Dose: 1 Application       Quantity: 60 g    Refills: 3    ONDANSETRON (ZOFRAN) 4 MG TABLET    Take 2 tablets (8 mg total) by mouth every 8 (eight) hours as needed for nausea or vomiting for up to 15 doses ZOFRAN ODT 2 TABLETS DISSOLVE IN THE MOUTH  EVERY 8 HRS AS NEEDED       Start Date: 7/14/2023 End Date: --       Order Dose: 8 mg       Quantity: 15 tablet    Refills: 3     No discharge procedures on file.        ED Provider  Electronically Signed by     Verona Martins DO  07/14/23 7231

## 2023-07-14 NOTE — ASSESSMENT & PLAN NOTE
· 10-20 white blood cells on UA with high PVR. · Treat as acute infection with IV Ancef. · Still reporting dysuria (not eligible for pyridium d/t CKD)  · >100k staph aureus.  Follow-up on sensitivities

## 2023-07-14 NOTE — ASSESSMENT & PLAN NOTE
Lab Results   Component Value Date    EGFR 35 07/14/2023    EGFR 29 07/13/2023    EGFR 31 05/24/2023    CREATININE 1.27 07/14/2023    CREATININE 1.47 (H) 07/13/2023    CREATININE 1.41 (H) 05/24/2023     · Creatinine at baseline, 1.2

## 2023-07-14 NOTE — ASSESSMENT & PLAN NOTE
· Elevated BP in the ER however has been without her blood pressure medications for 24 hours. · Resume home regimen of Norvasc 2.5 mg daily, losartan 100 mg daily, Lopressor 100 mg twice daily.   · Monitor BP

## 2023-07-15 LAB
ALBUMIN SERPL BCP-MCNC: 4.1 G/DL (ref 3.5–5)
ALP SERPL-CCNC: 105 U/L (ref 34–104)
ALT SERPL W P-5'-P-CCNC: 28 U/L (ref 7–52)
ANION GAP SERPL CALCULATED.3IONS-SCNC: 7 MMOL/L
AST SERPL W P-5'-P-CCNC: 32 U/L (ref 13–39)
BASOPHILS # BLD AUTO: 0.05 THOUSANDS/ÂΜL (ref 0–0.1)
BASOPHILS NFR BLD AUTO: 0 % (ref 0–1)
BILIRUB SERPL-MCNC: 0.75 MG/DL (ref 0.2–1)
BUN SERPL-MCNC: 26 MG/DL (ref 5–25)
CALCIUM SERPL-MCNC: 8.9 MG/DL (ref 8.4–10.2)
CHLORIDE SERPL-SCNC: 106 MMOL/L (ref 96–108)
CO2 SERPL-SCNC: 26 MMOL/L (ref 21–32)
CREAT SERPL-MCNC: 1.41 MG/DL (ref 0.6–1.3)
EOSINOPHIL # BLD AUTO: 0.23 THOUSAND/ÂΜL (ref 0–0.61)
EOSINOPHIL NFR BLD AUTO: 2 % (ref 0–6)
ERYTHROCYTE [DISTWIDTH] IN BLOOD BY AUTOMATED COUNT: 16.1 % (ref 11.6–15.1)
GFR SERPL CREATININE-BSD FRML MDRD: 31 ML/MIN/1.73SQ M
GLUCOSE SERPL-MCNC: 112 MG/DL (ref 65–140)
HCT VFR BLD AUTO: 40 % (ref 34.8–46.1)
HGB BLD-MCNC: 13.4 G/DL (ref 11.5–15.4)
IMM GRANULOCYTES # BLD AUTO: 0.07 THOUSAND/UL (ref 0–0.2)
IMM GRANULOCYTES NFR BLD AUTO: 1 % (ref 0–2)
INR PPP: 1.73 (ref 0.84–1.19)
LYMPHOCYTES # BLD AUTO: 0.74 THOUSANDS/ÂΜL (ref 0.6–4.47)
LYMPHOCYTES NFR BLD AUTO: 6 % (ref 14–44)
MCH RBC QN AUTO: 29.3 PG (ref 26.8–34.3)
MCHC RBC AUTO-ENTMCNC: 33.5 G/DL (ref 31.4–37.4)
MCV RBC AUTO: 87 FL (ref 82–98)
MONOCYTES # BLD AUTO: 0.75 THOUSAND/ÂΜL (ref 0.17–1.22)
MONOCYTES NFR BLD AUTO: 6 % (ref 4–12)
NEUTROPHILS # BLD AUTO: 9.96 THOUSANDS/ÂΜL (ref 1.85–7.62)
NEUTS SEG NFR BLD AUTO: 85 % (ref 43–75)
NRBC BLD AUTO-RTO: 0 /100 WBCS
PLATELET # BLD AUTO: 218 THOUSANDS/UL (ref 149–390)
PMV BLD AUTO: 10.5 FL (ref 8.9–12.7)
POTASSIUM SERPL-SCNC: 4 MMOL/L (ref 3.5–5.3)
PROT SERPL-MCNC: 7 G/DL (ref 6.4–8.4)
PROTHROMBIN TIME: 20.5 SECONDS (ref 11.6–14.5)
RBC # BLD AUTO: 4.58 MILLION/UL (ref 3.81–5.12)
SODIUM SERPL-SCNC: 139 MMOL/L (ref 135–147)
WBC # BLD AUTO: 11.8 THOUSAND/UL (ref 4.31–10.16)

## 2023-07-15 PROCEDURE — 80053 COMPREHEN METABOLIC PANEL: CPT | Performed by: PHYSICIAN ASSISTANT

## 2023-07-15 PROCEDURE — 99232 SBSQ HOSP IP/OBS MODERATE 35: CPT | Performed by: PHYSICIAN ASSISTANT

## 2023-07-15 PROCEDURE — 85025 COMPLETE CBC W/AUTO DIFF WBC: CPT | Performed by: PHYSICIAN ASSISTANT

## 2023-07-15 PROCEDURE — 85610 PROTHROMBIN TIME: CPT | Performed by: PHYSICIAN ASSISTANT

## 2023-07-15 RX ORDER — AMLODIPINE BESYLATE 5 MG/1
5 TABLET ORAL DAILY
Status: DISCONTINUED | OUTPATIENT
Start: 2023-07-16 | End: 2023-07-16 | Stop reason: HOSPADM

## 2023-07-15 RX ORDER — NYSTATIN 100000 [USP'U]/G
POWDER TOPICAL 2 TIMES DAILY
Status: DISCONTINUED | OUTPATIENT
Start: 2023-07-15 | End: 2023-07-16 | Stop reason: HOSPADM

## 2023-07-15 RX ORDER — AMLODIPINE BESYLATE 2.5 MG/1
2.5 TABLET ORAL ONCE
Status: COMPLETED | OUTPATIENT
Start: 2023-07-15 | End: 2023-07-15

## 2023-07-15 RX ADMIN — AMLODIPINE BESYLATE 2.5 MG: 2.5 TABLET ORAL at 12:12

## 2023-07-15 RX ADMIN — AMLODIPINE BESYLATE 2.5 MG: 2.5 TABLET ORAL at 08:38

## 2023-07-15 RX ADMIN — NYSTATIN: 100000 POWDER TOPICAL at 17:45

## 2023-07-15 RX ADMIN — LEVOTHYROXINE SODIUM 50 MCG: 50 TABLET ORAL at 08:38

## 2023-07-15 RX ADMIN — FAMOTIDINE 10 MG: 20 TABLET, FILM COATED ORAL at 08:38

## 2023-07-15 RX ADMIN — WARFARIN SODIUM 5 MG: 5 TABLET ORAL at 15:45

## 2023-07-15 RX ADMIN — CEFAZOLIN SODIUM 1000 MG: 1 SOLUTION INTRAVENOUS at 15:46

## 2023-07-15 RX ADMIN — LOSARTAN POTASSIUM 100 MG: 50 TABLET, FILM COATED ORAL at 08:38

## 2023-07-15 RX ADMIN — METOPROLOL TARTRATE 100 MG: 100 TABLET, FILM COATED ORAL at 17:04

## 2023-07-15 RX ADMIN — METOPROLOL TARTRATE 100 MG: 100 TABLET, FILM COATED ORAL at 08:38

## 2023-07-15 RX ADMIN — PRAVASTATIN SODIUM 40 MG: 40 TABLET ORAL at 15:46

## 2023-07-15 RX ADMIN — HYDROCODONE BITARTRATE AND ACETAMINOPHEN 1 TABLET: 5; 325 TABLET ORAL at 16:04

## 2023-07-15 RX ADMIN — CEFAZOLIN SODIUM 1000 MG: 1 SOLUTION INTRAVENOUS at 04:38

## 2023-07-15 NOTE — PROGRESS NOTES
[facility]  Progress Note  Name: Mary Ellen Wing  MRN: [id number]  Unit/Bed#: W -56 I Date of Admission: 7/13/2023   Date of Service: 7/15/2023 I Hospital Day: 1    Assessment/Plan   * UTI (urinary tract infection)  Assessment & Plan  · 10-20 white blood cells on UA with high PVR. · Treat as acute infection with IV Ancef. · Still reporting dysuria (not eligible for pyridium d/t CKD)  · >100k staph aureus. Follow-up on sensitivities    Atrial fibrillation [facility])  Assessment & Plan  · Continue Lopressor. · Continue Coumadin. · INR subtherapeutic today but patient due for higher dose of Coumadin tonight    Thickening of wall of gallbladder  Assessment & Plan  · Thickening of the gallbladder with sludge noted with findings that question acute cholecystitis  · General surgery following, doubting acute cholecystitis. Will monitor without surgical intervention  · Pain improved    Stage 3 chronic kidney disease [facility])  Assessment & Plan  Lab Results   Component Value Date    EGFR 35 07/14/2023    EGFR 29 07/13/2023    EGFR 31 05/24/2023    CREATININE 1.27 07/14/2023    CREATININE 1.47 (H) 07/13/2023    CREATININE 1.41 (H) 05/24/2023     · Creatinine at baseline, 1.2-1.4    Benign essential hypertension  Assessment & Plan  · Elevated BP in the ER however she had been without her blood pressure medications for 24 hours. · Resumed home regimen of Norvasc 2.5 mg daily, losartan 100 mg daily, Lopressor 100 mg twice daily. · Monitor BP    Continuous opioid dependence (HCC)  Assessment & Plan  · PDMP reviewed  · Continue home hydrocodone       VTE Pharmacologic Prophylaxis: VTE Score: 5 coumadin    Patient Centered Rounds: I performed bedside rounds with nursing staff today. Discussions with Specialists or Other Care Team Provider:     Education and Discussions with Family / Patient: Attempted to update  (daughter) via phone. Left voicemail.      Total Time Spent on Date of Encounter in care of patient: 25 minutes This time was spent on one or more of the following: performing physical exam; counseling and coordination of care; obtaining or reviewing history; documenting in the medical record; reviewing/ordering tests, medications or procedures; communicating with other healthcare professionals and discussing with patient's family/caregivers. Current Length of Stay: 1 day(s)  Current Patient Status: Inpatient   Certification Statement: The patient will continue to require additional inpatient hospital stay due to IV antbx  Discharge Plan: Anticipate discharge in 24-48 hrs to home. Code Status: Level 3 - DNAR and DNI    Subjective:   Patient reports her abdominal pain is much better but she is still having burning with urination. Her meal tray had arrived and she was very excited to eat    Objective:     Vitals:   Temp (24hrs), Av.8 °F (36.6 °C), Min:97.6 °F (36.4 °C), Max:97.9 °F (36.6 °C)    Temp:  [97.6 °F (36.4 °C)-97.9 °F (36.6 °C)] 97.6 °F (36.4 °C)  HR:  [59-89] 82  Resp:  [15-18] 15  BP: (139-189)/(70-98) 166/82  SpO2:  [94 %-98 %] 98 %  Body mass index is 33.99 kg/m². Input and Output Summary (last 24 hours): Intake/Output Summary (Last 24 hours) at 7/15/2023 1147  Last data filed at 7/15/2023 0545  Gross per 24 hour   Intake 0 ml   Output 650 ml   Net -650 ml       Physical Exam:   Physical Exam  Vitals reviewed. Constitutional:       General: She is not in acute distress. Appearance: Normal appearance. She is not ill-appearing, toxic-appearing or diaphoretic. Eyes:      General: No scleral icterus. Right eye: No discharge. Left eye: No discharge. Conjunctiva/sclera: Conjunctivae normal.   Cardiovascular:      Rate and Rhythm: Normal rate and regular rhythm. Heart sounds: No murmur heard. Pulmonary:      Effort: No respiratory distress. Breath sounds: No stridor. No wheezing or rhonchi.    Abdominal:      General: Bowel sounds are normal. There is no distension. Palpations: Abdomen is soft. Tenderness: There is no abdominal tenderness. There is no guarding or rebound. Hernia: No hernia is present. Musculoskeletal:      Right lower leg: No edema. Left lower leg: No edema. Skin:     General: Skin is warm and dry. Coloration: Skin is not jaundiced or pale. Findings: No bruising, erythema, lesion or rash. Neurological:      General: No focal deficit present. Mental Status: She is alert. Mental status is at baseline. Psychiatric:         Mood and Affect: Mood normal.         Thought Content: Thought content normal.          Additional Data:     Labs:  Results from last 7 days   Lab Units 07/15/23  0538   WBC Thousand/uL 11.80*   HEMOGLOBIN g/dL 13.4   HEMATOCRIT % 40.0   PLATELETS Thousands/uL 218   NEUTROS PCT % 85*   LYMPHS PCT % 6*   MONOS PCT % 6   EOS PCT % 2     Results from last 7 days   Lab Units 07/15/23  0538   SODIUM mmol/L 139   POTASSIUM mmol/L 4.0   CHLORIDE mmol/L 106   CO2 mmol/L 26   BUN mg/dL 26*   CREATININE mg/dL 1.41*   ANION GAP mmol/L 7   CALCIUM mg/dL 8.9   ALBUMIN g/dL 4.1   TOTAL BILIRUBIN mg/dL 0.75   ALK PHOS U/L 105*   ALT U/L 28   AST U/L 32   GLUCOSE RANDOM mg/dL 112     Results from last 7 days   Lab Units 07/15/23  0538   INR  1.73*             Results from last 7 days   Lab Units 07/13/23  2228   LACTIC ACID mmol/L 1.5       Lines/Drains:  Invasive Devices     Peripheral Intravenous Line  Duration           Peripheral IV 07/13/23 Right Antecubital 1 day              Imaging:     Recent Cultures (last 7 days):   Results from last 7 days   Lab Units 07/14/23  1611 07/14/23  1541 07/14/23  0109   BLOOD CULTURE  Received in Microbiology Lab. Culture in Progress. Received in Microbiology Lab. Culture in Progress.   --    URINE CULTURE   --   --  >100,000 cfu/ml Staphylococcus species*       Last 24 Hours Medication List:   Current Facility-Administered Medications Medication Dose Route Frequency Provider Last Rate   • acetaminophen  650 mg Oral Q8H PRN Tarun Valerio PA-C     • aluminum-magnesium hydroxide-simethicone  30 mL Oral Q4H PRN Tarun Valerio PA-C     • amLODIPine  2.5 mg Oral Once Taya Zamora PA-C     • [START ON 7/16/2023] amLODIPine  5 mg Oral Daily Taya Zamora PA-C     • cefazolin  1,000 mg Intravenous Q12H Tarun Valerio PA-C 1,000 mg (07/15/23 0438)   • famotidine  10 mg Oral Daily Tarun Valerio PA-C     • HYDROcodone-acetaminophen  1 tablet Oral Q8H PRN Tarun Valerio PA-C     • levothyroxine  50 mcg Oral Daily Tarun Valerio PA-C     • losartan  100 mg Oral Daily Tarun Valerio PA-C     • metoprolol tartrate  100 mg Oral BID Tarun Valerio PA-C     • ondansetron  4 mg Intravenous Q6H PRN Tarun Valerio PA-C     • polyethylene glycol  17 g Oral Daily PRN Tarun Valerio PA-C     • pravastatin  40 mg Oral Daily With Atul Torres PA-C     • senna-docusate sodium  1 tablet Oral Daily PRN Tarun Valerio PA-C     • [START ON 7/16/2023] warfarin  2.5 mg Oral Once per day on Sun Tue Wed Thu Fri Tarun Valerio PA-C     • warfarin  5 mg Oral Once per day on Mon Sat Tarun Valerio PA-C          Today, Patient Was Seen By: Nancy Conti PA-C    **Please Note: This note may have been constructed using a voice recognition system. **

## 2023-07-16 VITALS
RESPIRATION RATE: 20 BRPM | WEIGHT: 179.9 LBS | DIASTOLIC BLOOD PRESSURE: 74 MMHG | TEMPERATURE: 98.2 F | BODY MASS INDEX: 33.99 KG/M2 | SYSTOLIC BLOOD PRESSURE: 153 MMHG | OXYGEN SATURATION: 99 % | HEART RATE: 60 BPM

## 2023-07-16 LAB
ATRIAL RATE: 75 BPM
BACTERIA UR CULT: ABNORMAL
INR PPP: 1.71 (ref 0.84–1.19)
P AXIS: 86 DEGREES
PR INTERVAL: 232 MS
PROTHROMBIN TIME: 20.3 SECONDS (ref 11.6–14.5)
QRS AXIS: 80 DEGREES
QRSD INTERVAL: 112 MS
QT INTERVAL: 434 MS
QTC INTERVAL: 484 MS
T WAVE AXIS: 62 DEGREES
VENTRICULAR RATE: 75 BPM

## 2023-07-16 PROCEDURE — 97116 GAIT TRAINING THERAPY: CPT

## 2023-07-16 PROCEDURE — 97163 PT EVAL HIGH COMPLEX 45 MIN: CPT

## 2023-07-16 PROCEDURE — 97530 THERAPEUTIC ACTIVITIES: CPT

## 2023-07-16 PROCEDURE — 85610 PROTHROMBIN TIME: CPT | Performed by: PHYSICIAN ASSISTANT

## 2023-07-16 PROCEDURE — 99239 HOSP IP/OBS DSCHRG MGMT >30: CPT | Performed by: PHYSICIAN ASSISTANT

## 2023-07-16 RX ORDER — CEPHALEXIN 500 MG/1
500 CAPSULE ORAL EVERY 12 HOURS SCHEDULED
Qty: 2 CAPSULE | Refills: 0 | Status: SHIPPED | OUTPATIENT
Start: 2023-07-16 | End: 2023-07-17

## 2023-07-16 RX ORDER — ACETAMINOPHEN 325 MG/1
650 TABLET ORAL EVERY 8 HOURS PRN
Refills: 0
Start: 2023-07-16

## 2023-07-16 RX ORDER — WARFARIN SODIUM 5 MG/1
5 TABLET ORAL
Status: DISCONTINUED | OUTPATIENT
Start: 2023-07-16 | End: 2023-07-16 | Stop reason: HOSPADM

## 2023-07-16 RX ORDER — AMLODIPINE BESYLATE 5 MG/1
5 TABLET ORAL DAILY
Qty: 30 TABLET | Refills: 0 | Status: SHIPPED | OUTPATIENT
Start: 2023-07-17

## 2023-07-16 RX ADMIN — FAMOTIDINE 10 MG: 20 TABLET, FILM COATED ORAL at 08:17

## 2023-07-16 RX ADMIN — AMLODIPINE BESYLATE 5 MG: 5 TABLET ORAL at 08:17

## 2023-07-16 RX ADMIN — METOPROLOL TARTRATE 100 MG: 100 TABLET, FILM COATED ORAL at 08:17

## 2023-07-16 RX ADMIN — LEVOTHYROXINE SODIUM 50 MCG: 50 TABLET ORAL at 08:17

## 2023-07-16 RX ADMIN — HYDROCODONE BITARTRATE AND ACETAMINOPHEN 1 TABLET: 5; 325 TABLET ORAL at 00:31

## 2023-07-16 RX ADMIN — LOSARTAN POTASSIUM 100 MG: 50 TABLET, FILM COATED ORAL at 08:17

## 2023-07-16 RX ADMIN — CEFAZOLIN SODIUM 1000 MG: 1 SOLUTION INTRAVENOUS at 04:22

## 2023-07-16 RX ADMIN — NYSTATIN: 100000 POWDER TOPICAL at 08:21

## 2023-07-16 NOTE — ASSESSMENT & PLAN NOTE
· Continue Lopressor. · Continue Coumadin. · INR subtherapeutic today.   Recommend continuing 5 mg dose of Coumadin tonight and following up with PCP this week with repeat INR

## 2023-07-16 NOTE — DISCHARGE SUMMARY
[facility]  Discharge- Joleen Burkett 2/3/1927, 80 y.o. female MRN: [id number]  Unit/Bed#: W -77 Encounter: [id number]  Primary Care Provider: Tessy Pena MD   Date and time admitted to hospital: 7/13/2023  8:21 PM    * UTI (urinary tract infection)  Assessment & Plan  · 10-20 white blood cells on UA with high PVR. · received IV Ancef, currently on day 3. Will change to po keflex to complete 3rd day  · No longer reporting dysuria  · >100k staph epi. Atrial fibrillation (HCC)  Assessment & Plan  · Continue Lopressor. · Continue Coumadin. · INR subtherapeutic today. Recommend continuing 5 mg dose of Coumadin tonight and following up with PCP this week with repeat INR    Thickening of wall of gallbladder  Assessment & Plan  · Thickening of the gallbladder with sludge noted with findings that question acute cholecystitis  · General surgery following, doubting acute cholecystitis. Will monitor without surgical intervention  · Pain improved    Stage 3 chronic kidney disease [facility])  Assessment & Plan  Lab Results   Component Value Date    EGFR 31 07/15/2023    EGFR 35 07/14/2023    EGFR 29 07/13/2023    CREATININE 1.41 (H) 07/15/2023    CREATININE 1.27 07/14/2023    CREATININE 1.47 (H) 07/13/2023     · Creatinine at baseline, 1.2-1.4    Benign essential hypertension  Assessment & Plan  · Elevated BP in the ER however she had been without her blood pressure medications for 24 hours. · Resumed home regimen of Norvasc 2.5 mg daily, losartan 100 mg daily, Lopressor 100 mg twice daily. · Monitor BP    Continuous opioid dependence (HCC)  Assessment & Plan  · PDMP reviewed  · Continue home hydrocodone    Bilateral primary osteoarthritis of knee  Assessment & Plan  · With associated ambulatory dysfunction  · Appreciate PT input.   Family believes they can manage her at home      Medical Problems     Resolved Problems  Date Reviewed: 7/16/2023   None       Discharging Physician / Practitioner: Alyce Whittington PA-C  PCP: Gerardo Lopez MD  Admission Date:   Admission Orders (From admission, onward)     Ordered        07/14/23 1443  [address]  Once                      Discharge Date: 07/16/23    Consultations During Hospital Stay:  · gen surg    Procedures Performed:   · RUQ U/S--somewhat distended gallbladder with sludge and mild thickening  · CT A/P--unremarkable    Significant Findings / Test Results:   · As above    Incidental Findings:   · none    Test Results Pending at Discharge (will require follow up):   · none     Outpatient Tests Requested:  · INR    Complications:  none    Reason for Admission: Abdominal pain    Hospital Course:   Ashly Obregon is a 80 y.o. female patient who originally presented to the hospital on 7/13/2023 due to abdominal pain. She was found to have abnormal urinalysis and placed on IV Ancef for suspected urinary tract infection. However, she also had evidence of gallbladder thickening on right upper quadrant ultrasound prompting us to consult surgery team.  They did not feel she had acute cholecystitis and did not recommend any surgical intervention. Patient overall improved clinically. Her blood pressure did remain uncontrolled so we increased her Norvasc from 2.5 mg daily to 5 mg daily. In addition her INR remained subtherapeutic and her Coumadin dose was increased to 5 mg nightly with recommendation for outpatient INR in the next couple days. She was seen by PT and was suggested for rehab but family felt they could care for her in the home setting and declined. Patient was stable and feeling well and was discharged home today      Please see above list of diagnoses and related plan for additional information. Condition at Discharge: stable    Discharge Day Visit / Exam:   Subjective: Patient feels well today. She denies any pain with urination or any abdominal pain.   She stated that she wanted to get out of bed as she had not been out of bed yet. She denies any shortness of breath. Her appetite has been good    Please note that in speaking with patient's nurse she was not hypoxic but rather there was difficulty reading her sats due to nail polish being on  Vitals: Blood Pressure: 153/74 (07/16/23 0746)  Pulse: 60 (07/16/23 0746)  Temperature: 98.2 °F (36.8 °C) (07/16/23 0746)  Temp Source: Oral (07/15/23 2158)  Respirations: 20 (07/16/23 0746)  Weight - Scale: 81.6 kg (179 lb 14.3 oz) (07/14/23 1644)  SpO2: 99 % (07/16/23 0746)  Exam:   Physical Exam  Vitals reviewed. Constitutional:       General: She is not in acute distress. Appearance: Normal appearance. She is not ill-appearing, toxic-appearing or diaphoretic. Eyes:      General: No scleral icterus. Right eye: No discharge. Left eye: No discharge. Conjunctiva/sclera: Conjunctivae normal.   Cardiovascular:      Rate and Rhythm: Normal rate. Heart sounds: No murmur heard. Pulmonary:      Effort: No respiratory distress. Breath sounds: No stridor. No wheezing, rhonchi or rales. Abdominal:      General: Bowel sounds are normal. There is no distension. Palpations: Abdomen is soft. There is no mass. Tenderness: There is no abdominal tenderness. There is no guarding or rebound. Musculoskeletal:      Right lower leg: No edema. Left lower leg: No edema. Skin:     General: Skin is warm and dry. Coloration: Skin is not jaundiced or pale. Findings: No bruising, erythema, lesion or rash. Neurological:      General: No focal deficit present. Mental Status: She is alert. Mental status is at baseline. Psychiatric:         Mood and Affect: Mood normal.         Thought Content: Thought content normal.          Discussion with Family: Updated  (daughter) via phone. Discharge instructions/Information to patient and family:   See after visit summary for information provided to patient and family.       Provisions for Follow-Up Care:  See after visit summary for information related to follow-up care and any pertinent home health orders. Disposition:   Home. Family declines option of rehab    Planned Readmission: None     Discharge Statement:  I spent 35 minutes discharging the patient. This time was spent on the day of discharge. I had direct contact with the patient on the day of discharge. Greater than 50% of the total time was spent examining patient, answering all patient questions, arranging and discussing plan of care with patient as well as directly providing post-discharge instructions. Additional time then spent on discharge activities. Bedside nursing rounds performed. Case discussed with PT and case management    Discharge Medications:  See after visit summary for reconciled discharge medications provided to patient and/or family.       **Please Note: This note may have been constructed using a voice recognition system**

## 2023-07-16 NOTE — PLAN OF CARE
Problem: PHYSICAL THERAPY ADULT  Goal: Performs mobility at highest level of function for planned discharge setting. See evaluation for individualized goals. Description: Treatment/Interventions: Functional transfer training, LE strengthening/ROM, Elevations, Therapeutic exercise, Endurance training, Cognitive reorientation, Patient/family training, Equipment eval/education, Bed mobility, Gait training  Equipment Recommended: Kassi Guerin       See flowsheet documentation for full assessment, interventions and recommendations. 7/16/2023 1114 by Lyndon Blanco PT  Note: Prognosis: Good  Problem List: Decreased strength, Decreased endurance, Impaired balance, Decreased mobility, Decreased cognition, Pain  Assessment: Yuni Simpson is a 80 y.o. Female who [facility] on 7/13[facility]23 due to generalized weakness and diagnosis of UTI. Orders for PT eval and treat received, w/ activity orders of up and OOB as tolerated. Comorbidities affecting pt's functional mobility at time of evaluation include: HTN, CKD, osteoarthritis of knee, chronic L knee pain. Personal factors affecting DC include: ambulating w/ assistive device, stairs to enter home, inability to ambulate household distances and inability to navigate level surfaces w/o external assistance. At baseline, pt mobilizes independently w/ rollator walker, and w/ 0 fall(s) in the previous 6 months. Upon evaluation, pt presents w/ the following deficits: impaired strength, impaired balance, impaired cognition, decreased endurance/activity tolerance, pain affecting mobility and gait deviations. Pt currently requires supervision-mod Ax1 for transfers, min Ax1 w/ RW for ambulation. Pt's clinical presentation is unstable/unpredictable due to need for increased assistance w/ functional mobility compared to baseline, limited to 15' of ambulation w/ RW, pain affecting mobility tolerance, recent drastic decline in mobility status, ongoing medical management.  From a PT/mobility standpoint given the above findings, DC recommendation is: Post-acute inpatient rehabilitation. During current admission, pt will benefit from continued skilled inpatient PT in the acute care setting in order to address the above deficits and to maximize function and mobility prior to DC from acute care        PT Discharge Recommendation: Post acute rehabilitation services    See flowsheet documentation for full assessment.

## 2023-07-16 NOTE — ASSESSMENT & PLAN NOTE
· With associated ambulatory dysfunction  · Appreciate PT input.   Family believes they can manage her at home

## 2023-07-16 NOTE — PLAN OF CARE
Problem: Potential for Falls  Goal: Patient will remain free of falls  Description: INTERVENTIONS:  - Educate patient/family on patient safety including physical limitations  - Instruct patient to call for assistance with activity   - Consult OT/PT to assist with strengthening/mobility   - Keep Call bell within reach  - Keep bed low and locked with side rails adjusted as appropriate  - Keep care items and personal belongings within reach  - Initiate and maintain comfort rounds  - Make Fall Risk Sign visible to staff  - Offer Toileting every  Hours, in advance of need  - Initiate/Maintain alarm  - Obtain necessary fall risk management equipment:   - Apply yellow socks and bracelet for high fall risk patients  - Consider moving patient to room near nurses station  Outcome: Progressing     Problem: MOBILITY - ADULT  Goal: Maintain or return to baseline ADL function  Description: INTERVENTIONS:  -  Assess patient's ability to carry out ADLs; assess patient's baseline for ADL function and identify physical deficits which impact ability to perform ADLs (bathing, care of mouth/teeth, toileting, grooming, dressing, etc.)  - Assess/evaluate cause of self-care deficits   - Assess range of motion  - Assess patient's mobility; develop plan if impaired  - Assess patient's need for assistive devices and provide as appropriate  - Encourage maximum independence but intervene and supervise when necessary  - Involve family in performance of ADLs  - Assess for home care needs following discharge   - Consider OT consult to assist with ADL evaluation and planning for discharge  - Provide patient education as appropriate  Outcome: Progressing  Goal: Maintains/Returns to pre admission functional level  Description: INTERVENTIONS:  - Perform BMAT or MOVE assessment daily.   - Set and communicate daily mobility goal to care team and patient/family/caregiver.    - Collaborate with rehabilitation services on mobility goals if consulted  - Perform Range of Motion  times a day. - Reposition patient every  hours.   - Dangle patient  times a day  - Stand patient  times a day  - Ambulate patient  times a day  - Out of bed to chair  times a day   - Out of bed for fara times a day  - Out of bed for toileting  - Record patient progress and toleration of activity level   Outcome: Progressing     Problem: Prexisting or High Potential for Compromised Skin Integrity  Goal: Skin integrity is maintained or improved  Description: INTERVENTIONS:  - Identify patients at risk for skin breakdown  - Assess and monitor skin integrity  - Assess and monitor nutrition and hydration status  - Monitor labs   - Assess for incontinence   - Turn and reposition patient  - Assist with mobility/ambulation  - Relieve pressure over bony prominences  - Avoid friction and shearing  - Provide appropriate hygiene as needed including keeping skin clean and dry  - Evaluate need for skin moisturizer/barrier cream  - Collaborate with interdisciplinary team   - Patient/family teaching  - Consider wound care consult   Outcome: Progressing     Problem: PAIN - ADULT  Goal: Verbalizes/displays adequate comfort level or baseline comfort level  Description: Interventions:  - Encourage patient to monitor pain and request assistance  - Assess pain using appropriate pain scale  - Administer analgesics based on type and severity of pain and evaluate response  - Implement non-pharmacological measures as appropriate and evaluate response  - Consider cultural and social influences on pain and pain management  - Notify physician/advanced practitioner if interventions unsuccessful or patient reports new pain  Outcome: Progressing     Problem: INFECTION - ADULT  Goal: Absence or prevention of progression during hospitalization  Description: INTERVENTIONS:  - Assess and monitor for signs and symptoms of infection  - Monitor lab/diagnostic results  - Monitor all insertion sites, i.e. indwelling lines, tubes, and drains  - Monitor endotracheal if appropriate and nasal secretions for changes in amount and color  - [place] appropriate cooling/warming therapies per order  - Administer medications as ordered  - Instruct and encourage patient and family to use good hand hygiene technique  - Identify and instruct in appropriate isolation precautions for identified infection/condition  Outcome: Progressing

## 2023-07-16 NOTE — ASSESSMENT & PLAN NOTE
· 10-20 white blood cells on UA with high PVR. · received IV Ancef, currently on day 3. Will change to po keflex to complete 3rd day  · No longer reporting dysuria  · >100k staph epi.

## 2023-07-16 NOTE — ASSESSMENT & PLAN NOTE
· Elevated BP in the ER however she had been without her blood pressure medications for 24 hours. · Resumed home regimen of Norvasc 2.5 mg daily, losartan 100 mg daily, Lopressor 100 mg twice daily. · Blood pressure continue to remain elevated. Therefore Norvasc was increased to 5 mg daily.   Follow-up BP as an outpatient

## 2023-07-16 NOTE — DISCHARGE INSTR - AVS FIRST PAGE
Dear Franci Pineda,     It was our pleasure to care for you here at [facility]. It is our hope that we were always able to exceed the expected standards for your care during your stay. You were hospitalized due to urinary tract infection. You were cared for on the fourth floor by Víctor Hanna PA-C under the service of Azar See MD with the [facility] who covers for your primary care physician (PCP), Josue Dangelo MD, while you were hospitalized. If you have any questions or concerns related to this hospitalization, you may contact us at [phone]. For follow up as well as any medication refills, we recommend that you follow up with your primary care physician. A registered nurse will reach out to you by phone within a few days after your discharge to answer any additional questions that you may have after going home. However, at this time we provide for you here, the most important instructions / recommendations at discharge:     Notable Medication Adjustments -   Your INR level was only 1.7 so you should take Coumadin 5 mg again tonight and call your doctor tomorrow  Take 1 more day of Keflex  Increase norvasc 5 mg daily  Testing Required after Discharge -   Please have your INR rechecked in the next 48 hours  ** Please contact your PCP to request testing orders for any of the testing recommended here **  Important follow up information -   Family doctor  Other Instructions -   Low-fat diet  Please review this entire after visit summary as additional general instructions including medication list, appointments, activity, diet, any pertinent wound care, and other additional recommendations from your care team that may be provided for you.       Sincerely,     Víctor Hanna PA-C

## 2023-07-16 NOTE — CASE MANAGEMENT
Case Management Assessment & Discharge Planning Note    Patient name Roxine Spatz  Location W /W -78 MRN [id number]  : 2/3/1927 Date 2023       Current Admission Date: 2023  Current Admission Diagnosis:UTI (urinary tract infection)   Patient Active Problem List    Diagnosis Date Noted   • Thickening of wall of gallbladder 2023   • UTI (urinary tract infection) 2023   • Groin rash 2023   • Altered mental status 2023   • Dehydration 2023   • Continuous opioid dependence ([address]) 2022   • Chronic low back pain 2019   • Stage 3 chronic kidney disease ([address]) 2019   • Chronic pain of left knee 2019   • Effusion of right knee 2019   • Loss of bladder control 2017   • Atrial fibrillation ([address]) 2017   • Urinary incontinence 2017   • Bladder prolapse, female, acquired 2015   • Benign essential hypertension 2015   • Bilateral primary osteoarthritis of knee 2014   • Hypercholesterolemia 2013   • Hypothyroidism 2013   • Osteoarthrosis of knee 2013      LOS (days): 2  Geometric Mean LOS (GMLOS) (days): 2.80  Days to GMLOS:1     OBJECTIVE:    Risk of Unplanned Readmission Score: 23.8         Current admission status: Inpatient       Preferred Pharmacy:   [id number], [address] [place] [address]  Phone: [phone] Fax: [phone]    Primary Care Provider: Jennifer Saavedra MD    Primary Insurance: MEDICARE  Secondary Insurance: [address]:  [address] [place] [address] Rd, [address] [facility]   Primary Phone: [phone] (Home)                         Readmission Root Cause  30 Day Readmission: No    Patient Information  Admitted from[de-identified] Home  Mental Status: Alert  During Assessment patient was accompanied by: Not accompanied during assessment  Assessment information provided by[de-identified] Patient  Primary Caregiver: Self  Support Systems: Daughter  [place] of Residence: [facility] [address] do you live in?: [address] entry access options. Select all that apply.: Stairs  Number of steps to enter home. : 1  Do the steps have railings?: No  Type of Current Residence: [place]  In the last 12 months, was there a time when you were not able to pay the mortgage or rent on time?: No  In the last 12 months, how many places have you lived?: 1  In the last 12 months, was there a time when you did not have a steady place to sleep or slept in a shelter (including now)?: No  Homeless/housing insecurity resource given?: N/A  Living Arrangements: Lives w/ Daughter  Is patient a ?: No    Activities of Daily Living Prior to Admission  Functional Status: Independent  Completes ADLs independently?: Yes  Ambulates independently?: Yes  Does patient use assisted devices?: Yes  Assisted Devices (DME) used: Jack [employer]oltz  Does patient currently own DME?: Yes  What DME does the patient currently own?: Jack Goltz, Leia (Comment) (Transport chair)  Does patient have a history of Outpatient Therapy (PT/OT)?: Yes  Does the patient have a history of Short-Term Rehab?: Yes  Does patient have a history of HHC?: No  Does patient currently have [facility]?: No         Patient Information Continued  Income Source: SSI/SSD  Within the past 12 months, you worried that your food would run out before you got the money to buy more.: Never true  Within the past 12 months, the food you bought just didn't last and you didn't have money to get more.: Never true  Food insecurity resource given?: N/A  Does patient receive dialysis treatments?: No  Does patient have a history of substance abuse?: No  Does patient have a history of Mental Health Diagnosis?: No    PHQ 2/9 Screening   Reviewed PHQ 2/9 Depression Screening Score?: No    Means of Transportation  Means of Transport to Appts[de-identified] Family transport  In the past 12 months, has lack of transportation kept you from medical appointments or from getting medications?: No  In the past 12 months, has lack of transportation kept you from meetings, work, or from getting things needed for daily living?: No  Was application for public transport provided?: N/A        DISCHARGE DETAILS:    Discharge planning discussed with[de-identified] Patient  Freedom of Choice: Yes       [facility]         Is the patient interested in [facility] [address] at discharge?: No    DME Referral Provided  Referral made for DME?: No         Treatment Team Recommendation: Home  Discharge Destination Plan[de-identified] Home

## 2023-07-16 NOTE — ASSESSMENT & PLAN NOTE
Lab Results   Component Value Date    EGFR 31 07/15/2023    EGFR 35 07/14/2023    EGFR 29 07/13/2023    CREATININE 1.41 (H) 07/15/2023    CREATININE 1.27 07/14/2023    CREATININE 1.47 (H) 07/13/2023     · Creatinine at baseline, 1.2-1.4

## 2023-07-16 NOTE — PHYSICAL THERAPY NOTE
PHYSICAL THERAPY EVALUATION NOTE          Patient Name: Jamie Moran  WJJAK'R Date: 2023          AGE:   80 y.o.   Mrn:   [id number]  ADMIT DX:  Biliary sludge [K83.8]  Tinea corporis [B35.4]  Weakness [R53.1]  Pain of upper abdomen [R10.10]  Urinary tract infection without hematuria, site unspecified [N39.0]    Past Medical History:  Past Medical History:   Diagnosis Date    , spontaneous complete     Carcinoma of skin     Cataract     last assessed: 17    Cataract     Cellulitis     Effusion of both knee joints     resolved: 3/25/15    Effusion of both knee joints     Enteritis     Female stress incontinence     last assessed: 13    Female stress incontinence     Gastric ulcer     last assessed: 14    Gastric ulcer     GERD (gastroesophageal reflux disease)     Hyperlipidemia     Hypertension     Hyperthyroidism     Lyme disease     last assessed: 13    Lyme disease     Microscopic hematuria     last assessed: 13    Microscopic hematuria     Normal delivery     1950 son, 1952 son, 12 daughter, 26 daughter, 65 daughter    Paroxysmal atrial fibrillation ([address])     last assessed: 13    Paroxysmal atrial fibrillation ([address])     Perirectal abscess     last assessed: 13    Platelet dysfunction (HCC)     PAF    Platelet dysfunction (HCC)     Sinus tachycardia     last assessed: 13    Sinus tachycardia     Spinal stenosis     lumbar; last assessed: 10/4/13    Stage 3 chronic kidney disease ([address]) 6/3/2019    Stage 3 chronic kidney disease ([address])     Tachycardia     unspecified; last assessed: 13    Trigger finger, acquired     last assessed: 6/30/15    Trigger finger, acquired        Past Surgical History:  Past Surgical History:   Procedure Laterality Date    KNEE ARTHROSCOPY Bilateral         KNEE ARTHROSCOPY      LUMBAR LAMINECTOMY          LUMBAR LAMINECTOMY      NEUROPLASTY / TRANSPOSITION MEDIAN NERVE AT CARPAL TUNNEL Bilateral     2011    NEUROPLASTY / TRANSPOSITION MEDIAN NERVE AT CARPAL TUNNEL      TONSILLECTOMY AND ADENOIDECTOMY      TOTAL HIP ARTHROPLASTY      joint replacement; L hip; 2/2/10    TOTAL HIP ARTHROPLASTY       Length Of Stay: 2        PHYSICAL THERAPY EVALUATION:    Patient's identity confirmed via 2 patient identifiers (full name and ) at start of session       23 0906   PT Last Visit   PT Visit Date 23   Note Type   Note type Evaluation   Pain Assessment   Pain Assessment Tool 0-10   Pain Score 7   Pain Location/Orientation Orientation: Bilateral;Location: Knee   Pain Onset/Description Onset: Ongoing  (chronic)   Effect of Pain on Daily Activities limits ease of mobility   Hospital Pain Intervention(s) Repositioned; Ambulation/increased activity   Restrictions/Precautions   Weight Bearing Precautions Per Order No   Other Precautions Cognitive; Chair Alarm; Bed Alarm; Fall Risk;Pain   Home Living   Type of [address] [place] [address] One level;Performs ADLs on one level; Able to live on main level with bedroom/bathroom  (1-2 DEBI)   Bathroom Shower/Tub Tub/shower unit   Bathroom Toilet Standard   Bathroom Equipment Grab bars in shower; Shower chair;Grab bars around toilet;Commode  (commode in pt's bedroom for night)   [address] Walker;Cane;Wheelchair-manual  (rollator walker, man WC and transport chair)   Prior Function   Level of [place] Independent with functional mobility; Needs assistance with ADLs; Needs assistance with IADLS  (daughter assists w/ bathing)   Lives With Daughter  (pt reports living w/ her three daughters)   Estelle Osvaldo Help From Family   IADLs Family/Friend/Other provides meals; Family/Friend/Other provides transportation; Family/Friend/Other provides medication management  (family assists w/ medications prn)   Falls in the last 6 months 0  (pt denies)   Comments Pt amb mod I w/ rollator, has assistance w/ ADLs and IADLs General   Family/Caregiver Present No   Cognition   Overall Cognitive Status Impaired   Arousal/Participation Cooperative   Orientation Level Oriented to person;Oriented to place; Disoriented to time;Disoriented to situation   Memory Decreased short term memory;Decreased recall of recent events   Following Commands Follows one step commands without difficulty   Comments Pt ID via name and ; pt agreeable to PT eval and mobility. Pt pleasant throughout, repetitive at times and unable to report how long she has been admitted for (reports about 1 month)   Subjective   Subjective "I think I'm going to fall right through the hole"  (of the toilet)   Strength RLE   RLE Overall Strength 3/5  (grossly assessed w/ functional mobility)   Strength LLE   LLE Overall Strength 3/5  (grossly assessed w/ functional mobility)   Vision-Basic Assessment   Current Vision Wears glasses all the time   Bed Mobility   Additional Comments pt OOB in recliner chair upon arrival and returned to chair at end of session   Transfers   Sit to Stand 4  Minimal assistance   Additional items Assist x 1; Armrests; Increased time required;Verbal cues   Stand to Sit 5  Supervision   Additional items Assist x 1; Increased time required;Verbal cues   Toilet transfer 3  Moderate assistance   Additional items Assist x 1; Increased time required;Standard toilet;Armrests  (use of L grab bar)   Additional Comments VC for hand placement   Ambulation/Elevation   Gait pattern Improper Weight shift; Forward Flexion;Decreased foot clearance; Short stride; Excessively slow;Decreased heel strike;Decreased hip extension;Decreased toe off   Gait Assistance 4  Minimal assist   Additional items Assist x 1;Verbal cues   Assistive Device Rolling walker   Distance 15'  (limited due to fatigue, weakness)   Ambulation/Elevation Additional Comments VC for hand placement on RW   Balance   Static Sitting Fair +   Dynamic Sitting Fair   Static Standing Fair -  (w/ RW)   Dynamic Standing Poor +   Ambulatory Poor +  (w/ RW)   Endurance Deficit   Endurance Deficit Yes   Endurance Deficit Description limited ambulatory endurance/tolerance   Activity Tolerance   Activity Tolerance Patient limited by fatigue;Patient limited by pain   Medical Staff Made Aware [address]   Nurse Made Aware SATYA Poon, SATYA Burton   Assessment   Prognosis Good   Problem List Decreased strength;Decreased endurance; Impaired balance;Decreased mobility; Decreased cognition;Pain   Assessment Radha Yadav is a 80 y.o. Female who [facility] due to generalized weakness and diagnosis of UTI. Orders for PT eval and treat received, w/ activity orders of up and OOB as tolerated. Comorbidities affecting pt's functional mobility at time of evaluation include: HTN, CKD, osteoarthritis of knee, chronic L knee pain. Personal factors affecting DC include: ambulating w/ assistive device, stairs to enter home, inability to ambulate household distances and inability to navigate level surfaces w/o external assistance. At baseline, pt mobilizes independently w/ rollator walker, and w/ 0 fall(s) in the previous 6 months. Upon evaluation, pt presents w/ the following deficits: impaired strength, impaired balance, impaired cognition, decreased endurance/activity tolerance, pain affecting mobility and gait deviations. Pt currently requires supervision-mod Ax1 for transfers, min Ax1 w/ RW for ambulation. Pt's clinical presentation is unstable/unpredictable due to need for increased assistance w/ functional mobility compared to baseline, limited to 15' of ambulation w/ RW, pain affecting mobility tolerance, recent drastic decline in mobility status, ongoing medical management. From a PT/mobility standpoint given the above findings, DC recommendation is: Post-acute inpatient rehabilitation.  During current admission, pt will benefit from continued skilled inpatient PT in the acute care setting in order to address the above deficits and to maximize function and mobility prior to DC from acute care   Goals   Patient Goals to go to the bathroom   STG Expiration Date 07/26/23   Short Term Goal #1 Pt will: perform bed mobility w/ mod I to decrease pt's burden of care and increase pt's independence w/ repositioning in bed; perform transfers w/ mod I to promote increased OOB mobility; ambulate at least 50' w/ LRAD and mod I to increase pt's ambulatory endurance/tolerance; negotiate 2 stair(s) w/ UE support and supervision to facilitate pt returning to previous living environment; increase all balance ratings by at least 1 grade to decrease pt's risk of falls   PT Treatment Day 1  (PT tx note below)   Plan   Treatment/Interventions Functional transfer training;LE strengthening/ROM; Elevations; Therapeutic exercise; Endurance training;Cognitive reorientation;Patient/family training;Equipment eval/education; Bed mobility;Gait training   PT Frequency 3-5x/wk   Recommendation   PT Discharge Recommendation Post acute rehabilitation services   Equipment Recommended [address] Mobility Inpatient   Turning in Flat Bed Without Bedrails 3   Lying on Back to Sitting on Edge of Flat Bed Without Bedrails 2   Moving Bed to Chair 3   Standing Up From Chair Using Arms 2   Walk in Room 3   Climb 3-5 Stairs With Railing 2   Basic Mobility Inpatient Raw Score 15   Basic Mobility Standardized Score 36.97   Highest Level Of Mobility   -[facility] Goal 4: Move to chair/commode   JH-HLM Achieved 6: Walk 10 steps or more   Additional Treatment Session   Start Time 0926   End Time 0949   Treatment Assessment Additional PT intervention provided post eval w/ pt agreeable to participate. Pt performed 2 additional ambulation trials of 15' and 12' w/ min Ax1 and RW w seated rest break between trials requried. Pt required min-mod Ax1 for sit<>stand transfers and was limited in ambulation/activity tolerance due to pain and generalized weakness.  Pt will continue benefit from PT to promote independence w/ functional mobility and progress towards set goals. Continue to recommend DC post acute rehabilitation services when medically cleared. Additional Treatment Day 1   End of Consult   Patient Position at End of Consult Bedside chair;Bed/Chair alarm activated; All needs within reach       The patient's AM-PAC Basic Mobility Inpatient Short Form Raw Score is 15. A Raw score of less than or equal to 16 suggests the patient may benefit from discharge to post-acute rehabilitation services. Please also refer to the recommendation of the Physical Therapist for safe discharge planning.     Pt will benefit from skilled inpatient PT during this admission in order to facilitate progress towards goals and to maximize functional independence prior to [facility] rec: post acute rehab        Brenna Service, PT, DPT  07/16/23

## 2023-07-17 ENCOUNTER — TELEPHONE (OUTPATIENT)
Dept: INTERNAL MEDICINE CLINIC | Facility: OTHER | Age: 88
End: 2023-07-17

## 2023-07-17 ENCOUNTER — TRANSITIONAL CARE MANAGEMENT (OUTPATIENT)
Dept: INTERNAL MEDICINE CLINIC | Facility: OTHER | Age: 88
End: 2023-07-17

## 2023-07-17 ENCOUNTER — TELEPHONE (OUTPATIENT)
Dept: INTERNAL MEDICINE CLINIC | Age: 88
End: 2023-07-17

## 2023-07-17 NOTE — TELEPHONE ENCOUNTER
Voicemail left:    "I'm calling in regards to Ira Oshea. Her YOB: 1927. She was just released from the hospital yesterday and they had called saying she needs an INR rechecked in the next 48 hours. I was calling about that and calling also about the they had said to give her keflex another day and they never send any and the drugstore would closed yesterday. So I don't have that to give it to her. If you could give me a call, the phone number is [phone]. Thank you"        Called above number and left message on machine for further clarification of what is needed.

## 2023-07-17 NOTE — TELEPHONE ENCOUNTER
Gal Berger returning my call. Patient usually gets mobile lab draw to come to home but D/C instructions were to get PT/INR checked in 48 hours from discharge. Patient has appointment tomorrow and will get labs drawn tomorrow prior to appt. Gal Berger was instructed last night to give patient 5mg tablet and then tonight she will take the 2.5mg. Gal Berger said to disregard the portion of the message regarding the keflex. This was taken care of.

## 2023-07-17 NOTE — TELEPHONE ENCOUNTER
Spoke with Jay Briggs in another telephone message. Patient was previously taking 2.5mg daily except for Saturday's she takes 5mg. She was instructed last night to take the 5mg. Per discharge instructions Jay Briggs was told to take Leif Powers for a recheck of  her INR within 48 hours of d/c. She was going to get it rechecked before her TCM appointment tomorrow and give her 2.5mg tonight. Please advise if those instructions were still okay or if you would like something changed.

## 2023-07-17 NOTE — TELEPHONE ENCOUNTER
From discharge summary 7/16. Pt was given 5mg coumadin Sunday night. Please advise what pt should be given today and when next draw should be. Pt is a home draw. TCM appt tomorrow with you in [place].

## 2023-07-18 ENCOUNTER — APPOINTMENT (OUTPATIENT)
Dept: LAB | Facility: MEDICAL CENTER | Age: 88
End: 2023-07-18
Payer: MEDICARE

## 2023-07-18 ENCOUNTER — OFFICE VISIT (OUTPATIENT)
Dept: INTERNAL MEDICINE CLINIC | Age: 88
End: 2023-07-18
Payer: MEDICARE

## 2023-07-18 VITALS
BODY MASS INDEX: 33.99 KG/M2 | SYSTOLIC BLOOD PRESSURE: 138 MMHG | OXYGEN SATURATION: 96 % | DIASTOLIC BLOOD PRESSURE: 74 MMHG | TEMPERATURE: 97.9 F | HEART RATE: 64 BPM | HEIGHT: 61 IN

## 2023-07-18 DIAGNOSIS — K82.8 THICKENING OF WALL OF GALLBLADDER: Primary | ICD-10-CM

## 2023-07-18 DIAGNOSIS — N30.00 ACUTE CYSTITIS WITHOUT HEMATURIA: ICD-10-CM

## 2023-07-18 DIAGNOSIS — N18.31 STAGE 3A CHRONIC KIDNEY DISEASE (HCC): ICD-10-CM

## 2023-07-18 DIAGNOSIS — E86.0 DEHYDRATION: ICD-10-CM

## 2023-07-18 LAB
ANION GAP SERPL CALCULATED.3IONS-SCNC: 4 MMOL/L
BUN SERPL-MCNC: 35 MG/DL (ref 5–25)
CALCIUM SERPL-MCNC: 8.8 MG/DL (ref 8.3–10.1)
CHLORIDE SERPL-SCNC: 107 MMOL/L (ref 96–108)
CO2 SERPL-SCNC: 27 MMOL/L (ref 21–32)
CREAT SERPL-MCNC: 1.64 MG/DL (ref 0.6–1.3)
GFR SERPL CREATININE-BSD FRML MDRD: 26 ML/MIN/1.73SQ M
GLUCOSE SERPL-MCNC: 86 MG/DL (ref 65–140)
POTASSIUM SERPL-SCNC: 4.7 MMOL/L (ref 3.5–5.3)
SODIUM SERPL-SCNC: 138 MMOL/L (ref 135–147)

## 2023-07-18 PROCEDURE — 99496 TRANSJ CARE MGMT HIGH F2F 7D: CPT | Performed by: INTERNAL MEDICINE

## 2023-07-18 PROCEDURE — 80048 BASIC METABOLIC PNL TOTAL CA: CPT

## 2023-07-18 NOTE — PROGRESS NOTES
Assessment & Plan     1. Thickening of wall of gallbladder  Patient was seen in the hospital with the sludge in the gallbladder and possible small stone he is going to see the general surgery for evaluation her CBC was slightly elevated WBC count and LFTs were also slightly elevated  2. Acute cystitis without hematuria  He was diagnosed with a staph epi urinary tract infection  3. Dehydration  Chronic renal insufficiency and dehydration was improved during the course of hospitalized       Subjective     Transitional Care Management Review:   Hood Thomas is a 80 y.o. female here for TCM follow up. During the TCM phone call patient stated:  TCM Call     Date and time call was made  7/17/2023  [facility] care reviewed  Records reviewed    Patient was hospitialized at  [address] [place] [address]    Date of Admission  07/13/23    Date of discharge  07/16/23    Diagnosis  UTI    Disposition  Home    Current Symptoms  Fatigue    Fatigue severity  Mild    Episode pattern  Intermittent      TCM Call     Post hospital issues  None    Scheduled for follow up? Yes    Did you obtain your prescribed medications  No    Why were you unable to obtain your medications  waiting for [facility] to deliver    Do you need help managing your prescriptions or medications  Yes    Why type of assitance do you need  Brendan Dye    Is transportation to your appointment needed  No    I have advised the patient to call PCP with any new or worsening symptoms  Michaelanderson Mart CMA        80 years young lady who was seen in the emergency room and then admitted to the hospital for abdominal pain CT scan of the abdomen and pelvis and ultrasound of the gallbladder was done although she was complaining of the pain in the lower part of her abdominal, she denying any vomiting or diarrhea with that. The complaint was abdominal pain and some nausea feeling he did not complain of the urinary tract infection too. In the emergency room CT scan of the abdomen and pelvis was done and also the followed up by ultrasound and a surgical consultation for acute cholecystitis her liver enzymes were slightly elevated and her WBC was elevated to she did not have any urinary symptoms at the time of her admission although the urine shows a staph epi and she was treated for that she does not have any symptoms she does not have any complaints. Said that she is feeling fine except for she cannot walk because of her knees. Denying any chest pain or shortness of breath. I reviewed her hospital records hospital ultrasound and CAT scan and the urine cultures. We will follow her in about 6 to 8 weeks after she is done with her surgical consultation also her PT/INR was subtherapeutic she was supposed to get the PT/INR done today she will go today and then we will follow-up for tomorrow    Review of Systems    Objective     /74 (BP Location: Left arm, Patient Position: Sitting, Cuff Size: Standard)   Pulse 64   Temp 97.9 °F (36.6 °C) (Temporal)   Ht 5' 1" (1.549 m)   SpO2 96% Comment: room air  BMI 33.99 kg/m²       Physical Exam  Vitals and nursing note reviewed. Constitutional:       General: She is not in acute distress. Appearance: Normal appearance. She is well-developed. She is obese. HENT:      Head: Normocephalic and atraumatic. Right Ear: Tympanic membrane normal.      Left Ear: Tympanic membrane normal.   Eyes:      Conjunctiva/sclera: Conjunctivae normal.   Cardiovascular:      Rate and Rhythm: Normal rate and regular rhythm. Heart sounds: No murmur heard. Pulmonary:      Effort: Pulmonary effort is normal. No respiratory distress. Breath sounds: Normal breath sounds. Abdominal:      Palpations: Abdomen is soft. Tenderness: There is no abdominal tenderness. Musculoskeletal:         General: No swelling. Cervical back: Neck supple. Skin:     General: Skin is warm and dry.       Capillary Refill: Capillary refill takes less than 2 seconds. Neurological:      Mental Status: She is alert.    Psychiatric:         Mood and Affect: Mood normal.         Behavior: Behavior normal.         Fortino Clemons MD

## 2023-07-19 ENCOUNTER — ANTICOAG VISIT (OUTPATIENT)
Dept: INTERNAL MEDICINE CLINIC | Facility: OTHER | Age: 88
End: 2023-07-19

## 2023-07-19 ENCOUNTER — TELEPHONE (OUTPATIENT)
Dept: LAB | Facility: HOSPITAL | Age: 88
End: 2023-07-19

## 2023-07-19 LAB
BACTERIA BLD CULT: NORMAL
BACTERIA BLD CULT: NORMAL

## 2023-07-19 NOTE — PROGRESS NOTES
ORLIN Conde, RN  Pt discharged from hospital 7/16, please confirm pt had resumed usual dose- 5mg on a Saturday and 2.5mg all other days.  continue same regimen and repeat PT/INR in 1 week

## 2023-07-20 ENCOUNTER — OFFICE VISIT (OUTPATIENT)
Dept: SURGERY | Facility: CLINIC | Age: 88
End: 2023-07-20
Payer: MEDICARE

## 2023-07-20 DIAGNOSIS — K82.8 GALLBLADDER SLUDGE: Primary | ICD-10-CM

## 2023-07-20 PROCEDURE — 99203 OFFICE O/P NEW LOW 30 MIN: CPT | Performed by: SURGERY

## 2023-07-20 NOTE — PROGRESS NOTES
Assessment/Plan:    Diagnoses and all orders for this visit:    Gallbladder sludge    Presently asymptomatic. No role for surgical intervention. And that she would not want to proceed with any surgical intervention anyways. May resume diet as tolerated. Subjective:      Patient ID: Aminata Cooper is a 80 y.o. female. Presents for gallbladder consult. Incidental bladder sludge found on imaging while in [place] for abdominal pain. Treated for UTI, denies further abdominal pain. Denies bloating, belching and other GI symptoms. She went to emergency room with complaints of right lower quadrant pain. She had some nausea at that time. She was treated for UTI. Presently she has no complaints. She is eager to get back into her garden and harvest her tomatoes. The following portions of the patient's history were reviewed and updated as appropriate:     She  has a past medical history of , spontaneous complete, Carcinoma of skin, Cataract, Cataract, Cellulitis, Effusion of both knee joints, Effusion of both knee joints, Enteritis, Female stress incontinence, Female stress incontinence, Gastric ulcer, Gastric ulcer, GERD (gastroesophageal reflux disease), Hyperlipidemia, Hypertension, Hyperthyroidism, Lyme disease, Lyme disease, Microscopic hematuria, Microscopic hematuria, Normal delivery, Paroxysmal atrial fibrillation ([address]), Paroxysmal atrial fibrillation ([address]), Perirectal abscess, 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 She  has a past surgical history that includes Total hip arthroplasty; Knee arthroscopy (Bilateral); Lumbar laminectomy; Neuroplasty / transposition median nerve at carpal tunnel (Bilateral); Total hip arthroplasty; Knee arthroscopy;  Lumbar laminectomy; Neuroplasty / transposition median nerve at carpal tunnel; and Tonsillectomy and adenoidectomy. Her family history includes Crohn's disease in her brother; Heart disease in her father and son; Psoriasis in her daughter; Rheumatic fever in her mother. She  reports that she has never smoked. She has never used smokeless tobacco. She reports current alcohol use. She reports that she does not use drugs.   Current Outpatient Medications   Medication Sig Dispense Refill   • acetaminophen (TYLENOL) 325 mg tablet Take 2 tablets (650 mg total) by mouth every 8 (eight) hours as needed for mild pain, headaches or fever  0   • amLODIPine (NORVASC) 5 mg tablet Take 1 tablet (5 mg total) by mouth daily Do not start before July 17, 2023. 30 tablet 0   • clotrimazole (LOTRIMIN) 1 % cream Apply 1 g (1 Application total) topically 2 (two) times a day for 14 days APPLY TO AREA OF BELLY BUTTON AND BOTH GROIN CREASES 2 TIMES PER DAY UNTIL GONE 60 g 3   • HYDROcodone-acetaminophen (XODOL) 7.5-300 MG per tablet Take 1 tablet by mouth every 8 (eight) hours as needed for severe pain Max Daily Amount: 3 tablets 90 tablet 0   • levothyroxine 50 mcg tablet Take 1 tablet (50 mcg total) by mouth daily 90 tablet 1   • losartan (COZAAR) 100 MG tablet Take 1 tablet (100 mg total) by mouth daily 90 tablet 1   • metoprolol tartrate (LOPRESSOR) 100 mg tablet Take 1 tablet (100 mg total) by mouth 2 (two) times a day 180 tablet 1   • nystatin (MYCOSTATIN) powder Apply topically 2 (two) times a day 15 g 0   • polyethylene glycol (MIRALAX) 17 g packet Take 17 g by mouth as needed       • senna-docusate sodium (SENOKOT S) 8.6-50 mg per tablet Take 1 tablet by mouth as needed for constipation     • simvastatin (ZOCOR) 20 mg tablet Take 1 tablet (20 mg total) by mouth daily at bedtime 90 tablet 1   • warfarin (COUMADIN) 5 mg tablet TAKE 5 MG ON MONDAY AND SATURDAY AND 2.5 MG ALL OTHER DAYS (Patient taking differently: TAKE 5 MG SATURDAY AND 2.5 MG ALL OTHER DAYS) 30 tablet 5     No current facility-administered medications for this visit. She is allergic to cortisone, oxaprozin, and penicillins. .    Review of Systems   All other systems reviewed and are negative. Objective: There were no vitals taken for this visit. Physical Exam  Constitutional:       General: She is not in acute distress. Appearance: She is well-developed. Abdominal:      General: Abdomen is flat. Palpations: Abdomen is soft. There is no mass. Tenderness: There is no abdominal tenderness. Hernia: No hernia is present.

## 2023-07-20 NOTE — LETTER
2023     [address] [address]    Patient: Payal Richardson   YOB: 1927   Date of Visit: 2023       Dear Dr. Eduardo Cevallos:    Thank you for referring Víctor Paul to me for evaluation. Below are my notes for this consultation. If you have questions, please do not hesitate to call me. I look forward to following your patient along with you. Sincerely,        Toribio Briones DO        CC: No Recipients    Toribio salcedo DO  2023  2:44 PM  Sign when Signing Visit  Assessment/Plan:    Diagnoses and all orders for this visit:    Gallbladder sludge    Presently asymptomatic. No role for surgical intervention. And that she would not want to proceed with any surgical intervention anyways. May resume diet as tolerated. Subjective:     Patient ID: Payal Richardson is a 80 y.o. female. Presents for gallbladder consult. Incidental bladder sludge found on imaging while in [place] for abdominal pain. Treated for UTI, denies further abdominal pain. Denies bloating, belching and other GI symptoms. She went to emergency room with complaints of right lower quadrant pain. She had some nausea at that time. She was treated for UTI. Presently she has no complaints. She is eager to get back into her garden and harvest her tomatoes.       The following portions of the patient's history were reviewed and updated as appropriate:     She  has a past medical history of , spontaneous complete, Carcinoma of skin, Cataract, Cataract, Cellulitis, Effusion of both knee joints, Effusion of both knee joints, Enteritis, Female stress incontinence, Female stress incontinence, Gastric ulcer, Gastric ulcer, GERD (gastroesophageal reflux disease), Hyperlipidemia, Hypertension, Hyperthyroidism, Lyme disease, Lyme disease, Microscopic hematuria, Microscopic hematuria, Normal delivery, Paroxysmal atrial fibrillation ([address]), Paroxysmal atrial fibrillation ([address]), Perirectal abscess, 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 She  has a past surgical history that includes Total hip arthroplasty; Knee arthroscopy (Bilateral); Lumbar laminectomy; Neuroplasty / transposition median nerve at carpal tunnel (Bilateral); Total hip arthroplasty; Knee arthroscopy; Lumbar laminectomy; Neuroplasty / transposition median nerve at carpal tunnel; and Tonsillectomy and adenoidectomy. Her family history includes Crohn's disease in her brother; Heart disease in her father and son; Psoriasis in her daughter; Rheumatic fever in her mother. She  reports that she has never smoked. She has never used smokeless tobacco. She reports current alcohol use. She reports that she does not use drugs.   Current Outpatient Medications   Medication Sig Dispense Refill   • acetaminophen (TYLENOL) 325 mg tablet Take 2 tablets (650 mg total) by mouth every 8 (eight) hours as needed for mild pain, headaches or fever  0   • amLODIPine (NORVASC) 5 mg tablet Take 1 tablet (5 mg total) by mouth daily Do not start before July 17, 2023. 30 tablet 0   • clotrimazole (LOTRIMIN) 1 % cream Apply 1 g (1 Application total) topically 2 (two) times a day for 14 days APPLY TO AREA OF BELLY BUTTON AND BOTH GROIN CREASES 2 TIMES PER DAY UNTIL GONE 60 g 3   • HYDROcodone-acetaminophen (XODOL) 7.5-300 MG per tablet Take 1 tablet by mouth every 8 (eight) hours as needed for severe pain Max Daily Amount: 3 tablets 90 tablet 0   • levothyroxine 50 mcg tablet Take 1 tablet (50 mcg total) by mouth daily 90 tablet 1   • losartan (COZAAR) 100 MG tablet Take 1 tablet (100 mg total) by mouth daily 90 tablet 1   • metoprolol tartrate (LOPRESSOR) 100 mg tablet Take 1 tablet (100 mg total) by mouth 2 (two) times a day 180 tablet 1   • nystatin (MYCOSTATIN) powder Apply topically 2 (two) times a day 15 g 0   • polyethylene glycol (MIRALAX) 17 g packet Take 17 g by mouth as needed       • senna-docusate sodium (SENOKOT S) 8.6-50 mg per tablet Take 1 tablet by mouth as needed for constipation     • simvastatin (ZOCOR) 20 mg tablet Take 1 tablet (20 mg total) by mouth daily at bedtime 90 tablet 1   • warfarin (COUMADIN) 5 mg tablet TAKE 5 MG ON MONDAY AND SATURDAY AND 2.5 MG ALL OTHER DAYS (Patient taking differently: TAKE 5 MG SATURDAY AND 2.5 MG ALL OTHER DAYS) 30 tablet 5     No current facility-administered medications for this visit. She is allergic to cortisone, oxaprozin, and penicillins. .    Review of Systems   All other systems reviewed and are negative. Objective: There were no vitals taken for this visit. Physical Exam  Constitutional:       General: She is not in acute distress. Appearance: She is well-developed. Abdominal:      General: Abdomen is flat. Palpations: Abdomen is soft. There is no mass. Tenderness: There is no abdominal tenderness. Hernia: No hernia is present.

## 2023-07-22 PROBLEM — E86.0 DEHYDRATION: Status: RESOLVED | Noted: 2023-05-23 | Resolved: 2023-07-22

## 2023-07-26 ENCOUNTER — APPOINTMENT (OUTPATIENT)
Dept: LAB | Facility: HOSPITAL | Age: 88
End: 2023-07-26
Attending: INTERNAL MEDICINE
Payer: MEDICARE

## 2023-07-27 ENCOUNTER — ANTICOAG VISIT (OUTPATIENT)
Dept: INTERNAL MEDICINE CLINIC | Age: 88
End: 2023-07-27

## 2023-07-27 ENCOUNTER — TELEPHONE (OUTPATIENT)
Dept: LAB | Facility: HOSPITAL | Age: 88
End: 2023-07-27

## 2023-07-27 NOTE — PROGRESS NOTES
ROLIN Burgess, RN  current coumadin schedule is 2.5mg everyday except Saturday -5mg.  Please have pt increase to 5mg on thurs, Sunday and 2.5mg all other days, pt/inr 2 week

## 2023-08-02 ENCOUNTER — TELEPHONE (OUTPATIENT)
Dept: LAB | Facility: HOSPITAL | Age: 88
End: 2023-08-02

## 2023-08-02 DIAGNOSIS — M17.9 OSTEOARTHRITIS OF KNEE, UNSPECIFIED LATERALITY, UNSPECIFIED OSTEOARTHRITIS TYPE: ICD-10-CM

## 2023-08-04 RX ORDER — HYDROCODONE BITARTRATE AND ACETAMINOPHEN 7.5; 3 MG/1; MG/1
1 TABLET ORAL EVERY 8 HOURS PRN
Qty: 90 TABLET | Refills: 0 | Status: SHIPPED | OUTPATIENT
Start: 2023-08-04

## 2023-08-09 ENCOUNTER — APPOINTMENT (OUTPATIENT)
Dept: LAB | Facility: HOSPITAL | Age: 88
End: 2023-08-09
Attending: INTERNAL MEDICINE
Payer: MEDICARE

## 2023-08-10 ENCOUNTER — ANTICOAG VISIT (OUTPATIENT)
Dept: INTERNAL MEDICINE CLINIC | Facility: OTHER | Age: 88
End: 2023-08-10

## 2023-08-10 ENCOUNTER — TELEPHONE (OUTPATIENT)
Dept: LAB | Facility: HOSPITAL | Age: 88
End: 2023-08-10

## 2023-08-17 DIAGNOSIS — E78.00 HYPERCHOLESTEROLEMIA: ICD-10-CM

## 2023-08-17 DIAGNOSIS — I10 HYPERTENSION, UNSPECIFIED TYPE: ICD-10-CM

## 2023-08-17 DIAGNOSIS — E03.9 HYPOTHYROIDISM, UNSPECIFIED TYPE: ICD-10-CM

## 2023-08-17 RX ORDER — LEVOTHYROXINE SODIUM 0.05 MG/1
50 TABLET ORAL DAILY
Qty: 90 TABLET | Refills: 0 | OUTPATIENT
Start: 2023-08-17

## 2023-08-17 RX ORDER — LOSARTAN POTASSIUM 100 MG/1
100 TABLET ORAL DAILY
Qty: 90 TABLET | Refills: 0 | OUTPATIENT
Start: 2023-08-17

## 2023-08-17 RX ORDER — SIMVASTATIN 20 MG
20 TABLET ORAL
Qty: 90 TABLET | Refills: 0 | OUTPATIENT
Start: 2023-08-17

## 2023-08-22 DIAGNOSIS — I10 BENIGN ESSENTIAL HYPERTENSION: ICD-10-CM

## 2023-08-22 RX ORDER — AMLODIPINE BESYLATE 5 MG/1
5 TABLET ORAL DAILY
Qty: 30 TABLET | Refills: 0 | Status: CANCELLED | OUTPATIENT
Start: 2023-08-22

## 2023-08-22 NOTE — TELEPHONE ENCOUNTER
Next Office Visit 9/15/23          Patient was in the hospital and the medication was upped from 2.5 mg to 5mg. Asking if she should continue with the 5 or should she be dropped back to the 2.5? Either way, patient needs a refill of whichever mg she should be taking.

## 2023-08-24 ENCOUNTER — TELEPHONE (OUTPATIENT)
Dept: INTERNAL MEDICINE CLINIC | Facility: OTHER | Age: 88
End: 2023-08-24

## 2023-08-24 DIAGNOSIS — I10 BENIGN ESSENTIAL HYPERTENSION: ICD-10-CM

## 2023-08-24 RX ORDER — AMLODIPINE BESYLATE 5 MG/1
5 TABLET ORAL DAILY
Qty: 30 TABLET | Refills: 0 | Status: SHIPPED | OUTPATIENT
Start: 2023-08-24

## 2023-08-24 NOTE — TELEPHONE ENCOUNTER
rx refill for amLODIPine (NORVASC) 5 mg tablet    Send to [address] [place] [facility] [place]   [place], [address] [facility] [place] [address]   Phone:  [phone]  Fax:  [phone]     Nov- 9/15/2023

## 2023-08-30 ENCOUNTER — TELEPHONE (OUTPATIENT)
Dept: LAB | Facility: HOSPITAL | Age: 88
End: 2023-08-30

## 2023-08-30 ENCOUNTER — ANTICOAG VISIT (OUTPATIENT)
Dept: INTERNAL MEDICINE CLINIC | Facility: OTHER | Age: 88
End: 2023-08-30

## 2023-08-30 ENCOUNTER — APPOINTMENT (OUTPATIENT)
Dept: LAB | Facility: HOSPITAL | Age: 88
End: 2023-08-30
Attending: INTERNAL MEDICINE
Payer: MEDICARE

## 2023-09-12 DIAGNOSIS — M17.9 OSTEOARTHRITIS OF KNEE, UNSPECIFIED LATERALITY, UNSPECIFIED OSTEOARTHRITIS TYPE: ICD-10-CM

## 2023-09-12 NOTE — TELEPHONE ENCOUNTER
Next Office Visit 9/15/23      Daughter is also requesting a refill of Vitamin d-- it looks like it was d/c when patient was in hospital in July.

## 2023-09-13 PROBLEM — N39.0 UTI (URINARY TRACT INFECTION): Status: RESOLVED | Noted: 2023-07-14 | Resolved: 2023-09-13

## 2023-09-13 RX ORDER — HYDROCODONE BITARTRATE AND ACETAMINOPHEN 7.5; 3 MG/1; MG/1
1 TABLET ORAL EVERY 8 HOURS PRN
Qty: 90 TABLET | Refills: 0 | Status: SHIPPED | OUTPATIENT
Start: 2023-09-13

## 2023-09-15 ENCOUNTER — OFFICE VISIT (OUTPATIENT)
Dept: INTERNAL MEDICINE CLINIC | Age: 88
End: 2023-09-15
Payer: MEDICARE

## 2023-09-15 ENCOUNTER — TELEPHONE (OUTPATIENT)
Dept: INTERNAL MEDICINE CLINIC | Age: 88
End: 2023-09-15

## 2023-09-15 VITALS
HEART RATE: 59 BPM | SYSTOLIC BLOOD PRESSURE: 124 MMHG | DIASTOLIC BLOOD PRESSURE: 66 MMHG | OXYGEN SATURATION: 95 % | TEMPERATURE: 97.1 F

## 2023-09-15 DIAGNOSIS — I10 BENIGN ESSENTIAL HYPERTENSION: ICD-10-CM

## 2023-09-15 DIAGNOSIS — I48.91 ATRIAL FIBRILLATION, UNSPECIFIED TYPE (HCC): Primary | ICD-10-CM

## 2023-09-15 DIAGNOSIS — M17.0 BILATERAL PRIMARY OSTEOARTHRITIS OF KNEE: ICD-10-CM

## 2023-09-15 DIAGNOSIS — E03.9 HYPOTHYROIDISM, UNSPECIFIED TYPE: ICD-10-CM

## 2023-09-15 PROCEDURE — 99214 OFFICE O/P EST MOD 30 MIN: CPT | Performed by: INTERNAL MEDICINE

## 2023-09-15 RX ORDER — ERGOCALCIFEROL 1.25 MG/1
CAPSULE ORAL WEEKLY
COMMUNITY
Start: 2023-09-12

## 2023-09-15 NOTE — TELEPHONE ENCOUNTER
Enedina Drew the daughter is calling back with the mg for the Torsemide medication    Torsemide  5mg is what she has at home    How would you like her to be taking this medication    Please call Enedina Drew back at [phone]

## 2023-09-15 NOTE — PROGRESS NOTES
Name: Ivy Bruner      : 2/3/1927      MRN: [id number]  Encounter Provider: Sabino Vásquez MD  Encounter Date: 9/15/2023   Encounter department: [facility]    Assessment & Plan   Patient is here today for the follow-up. Hypertension. I reviewed antihypertensive medication, patient does not have any side effects of  medications, no signs or symptoms of hypertension ,hypotension or orthostatic hypotension. Patient is compliant with medications. Blood workup related to hypertensive diagnosis reviewed. Plan is to continue with the present management. We will follow-up as a scheduled and adjust the doses of the medication as indicated. And is to continue with the present medications  Osteoarthritis of bilateral knee patient is walking with the walker or on the wheelchair  Bilateral lower extremity edema with no signs or symptoms of CHF  Atrial fibrillation with a controlled ventricular response patient is on anticoagulation she is on Coumadin  Chronic osteoarthritis for which she takes      Subjective     Chief Complaint   Patient presents with   • Follow-up     2 month- no labs due-  Patient has rash in groin- using nystatin powder   • Edema     Both legs are swelling- has not had water pill in fears to dehydration     79 y/o female with PMHx of htn, a-fib, hypothyroidism, osteoarthritis, CKD, presents to the office for a f/u. She reports of no complaints. Her daughter is here today reports increased b/l edema in the lower extremities. She states she has "water pills" at home and believes its torsemide and was wondering if her mother should take it due to swelling. She did not give it because pt becomes dehydrated easily. She is currently using her walking in the house at home but is mainly sedentary. She denies SOB, chest pain, dizziness, lightheadedness, abdominal pain, N/V/C/D, numbness, tingling, changes in urination.      Daughter also reports the fungal rash is back under her panus but started to use the cream and powder again. She reports a good appetite. She is not sleeping well and reports being up all night and she sleeps often during the day. Review of Systems   Constitutional: Negative for appetite change, fatigue and fever. HENT: Positive for congestion. Negative for sinus pressure and sinus pain. Eyes: Negative for visual disturbance. Respiratory: Negative for cough, chest tightness and shortness of breath. Cardiovascular: Positive for leg swelling. Negative for chest pain and palpitations. Gastrointestinal: Negative for abdominal pain, constipation, diarrhea, nausea and vomiting. Endocrine: Negative for polyphagia and polyuria. Genitourinary: Negative for dysuria and enuresis. Musculoskeletal: Positive for arthralgias and back pain. Neurological: Negative for dizziness, light-headedness, numbness and headaches.        Current Outpatient Medications on File Prior to Visit   Medication Sig   • acetaminophen (TYLENOL) 325 mg tablet Take 2 tablets (650 mg total) by mouth every 8 (eight) hours as needed for mild pain, headaches or fever   • amLODIPine (NORVASC) 5 mg tablet Take 1 tablet (5 mg total) by mouth daily   • clotrimazole (LOTRIMIN) 1 % cream Apply 1 g (1 Application total) topically 2 (two) times a day for 14 days APPLY TO AREA OF BELLY BUTTON AND BOTH GROIN CREASES 2 TIMES PER DAY UNTIL GONE   • HYDROcodone-acetaminophen (XODOL) 7.5-300 MG per tablet Take 1 tablet by mouth every 8 (eight) hours as needed for severe pain Max Daily Amount: 3 tablets   • levothyroxine 50 mcg tablet Take 1 tablet (50 mcg total) by mouth daily   • losartan (COZAAR) 100 MG tablet Take 1 tablet (100 mg total) by mouth daily   • metoprolol tartrate (LOPRESSOR) 100 mg tablet Take 1 tablet (100 mg total) by mouth 2 (two) times a day   • nystatin (MYCOSTATIN) powder Apply topically 2 (two) times a day   • polyethylene glycol (MIRALAX) 17 g packet Take 17 g by mouth as needed     • senna-docusate sodium (SENOKOT S) 8.6-50 mg per tablet Take 1 tablet by mouth as needed for constipation   • simvastatin (ZOCOR) 20 mg tablet Take 1 tablet (20 mg total) by mouth daily at bedtime   • warfarin (COUMADIN) 5 mg tablet TAKE 5 MG ON MONDAY AND SATURDAY AND 2.5 MG ALL OTHER DAYS (Patient taking differently: TAKE 5 MG SATURDAY AND 2.5 MG ALL OTHER DAYS)   • ergocalciferol (VITAMIN D2) 50,000 units Take by mouth once a week       Objective     /66 (BP Location: Left arm, Patient Position: Sitting, Cuff Size: Large)   Pulse 59   Temp (!) 97.1 °F (36.2 °C) (Temporal)   SpO2 95% Comment: room air    Physical Exam  Constitutional:       Appearance: She is well-developed. She is obese. HENT:      Right Ear: External ear normal.   Eyes:      Conjunctiva/sclera: Conjunctivae normal.      Pupils: Pupils are equal, round, and reactive to light. Neck:      Thyroid: No thyromegaly. Vascular: No JVD. Cardiovascular:      Rate and Rhythm: Normal rate and regular rhythm. Heart sounds: Normal heart sounds, S1 normal and S2 normal.   Pulmonary:      Breath sounds: Normal breath sounds. Abdominal:      General: Bowel sounds are normal.      Palpations: Abdomen is soft. Musculoskeletal:      Cervical back: Normal range of motion. Right knee: Swelling, deformity and effusion present. No erythema, ecchymosis or lacerations. Decreased range of motion. Left knee: Swelling, deformity and effusion present. No erythema, ecchymosis or lacerations. Decreased range of motion. Right lower le+ Edema present. Left lower le+ Edema present. Lymphadenopathy:      Cervical: No cervical adenopathy. Skin:     General: Skin is dry. Neurological:      Mental Status: She is alert and oriented to person, place, and time. Deep Tendon Reflexes: Reflexes are normal and symmetric.    Psychiatric:         Behavior: Behavior normal.       Rubina Flores MD

## 2023-09-19 DIAGNOSIS — I10 BENIGN ESSENTIAL HYPERTENSION: ICD-10-CM

## 2023-09-19 RX ORDER — AMLODIPINE BESYLATE 5 MG/1
5 TABLET ORAL DAILY
Qty: 30 TABLET | Refills: 0 | Status: SHIPPED | OUTPATIENT
Start: 2023-09-19

## 2023-09-20 ENCOUNTER — APPOINTMENT (OUTPATIENT)
Dept: LAB | Facility: HOSPITAL | Age: 88
End: 2023-09-20
Payer: MEDICARE

## 2023-09-21 ENCOUNTER — ANTICOAG VISIT (OUTPATIENT)
Dept: INTERNAL MEDICINE CLINIC | Facility: OTHER | Age: 88
End: 2023-09-21

## 2023-09-21 ENCOUNTER — TELEPHONE (OUTPATIENT)
Dept: LAB | Facility: HOSPITAL | Age: 88
End: 2023-09-21

## 2023-09-21 NOTE — PROGRESS NOTES
ORLIN Fenton, SATYA  Please confirm coumadin dose of 5mg on a Thursday/Sunday and 2.5mg all other days.  Pt to repeat pt/inr 1 month

## 2023-10-17 ENCOUNTER — TELEPHONE (OUTPATIENT)
Dept: INTERNAL MEDICINE CLINIC | Age: 88
End: 2023-10-17

## 2023-10-17 DIAGNOSIS — I10 BENIGN ESSENTIAL HYPERTENSION: ICD-10-CM

## 2023-10-17 RX ORDER — AMLODIPINE BESYLATE 5 MG/1
5 TABLET ORAL DAILY
Qty: 30 TABLET | Refills: 5 | Status: SHIPPED | OUTPATIENT
Start: 2023-10-17

## 2023-10-18 ENCOUNTER — APPOINTMENT (OUTPATIENT)
Dept: LAB | Facility: HOSPITAL | Age: 88
End: 2023-10-18
Attending: INTERNAL MEDICINE
Payer: MEDICARE

## 2023-10-18 ENCOUNTER — ANTICOAG VISIT (OUTPATIENT)
Dept: INTERNAL MEDICINE CLINIC | Facility: OTHER | Age: 88
End: 2023-10-18

## 2023-10-18 ENCOUNTER — TELEPHONE (OUTPATIENT)
Dept: LAB | Facility: HOSPITAL | Age: 88
End: 2023-10-18

## 2023-10-18 NOTE — PROGRESS NOTES
Daun Burkitt, PA-C  10/18/2023  2:00 PM EDT       Current coumadin dose of 5mg on a Thursday/Sunday and 2.5mg all other days.  Please have her change to 5mg daily except Sunday take 2.5mg - pt/inr 2 weeks
Home

## 2023-10-24 DIAGNOSIS — M17.9 OSTEOARTHRITIS OF KNEE, UNSPECIFIED LATERALITY, UNSPECIFIED OSTEOARTHRITIS TYPE: ICD-10-CM

## 2023-10-24 RX ORDER — HYDROCODONE BITARTRATE AND ACETAMINOPHEN 7.5; 3 MG/1; MG/1
1 TABLET ORAL EVERY 8 HOURS PRN
Qty: 90 TABLET | Refills: 0 | Status: SHIPPED | OUTPATIENT
Start: 2023-10-24

## 2023-11-01 ENCOUNTER — TELEPHONE (OUTPATIENT)
Dept: LAB | Facility: HOSPITAL | Age: 88
End: 2023-11-01

## 2023-11-01 ENCOUNTER — APPOINTMENT (OUTPATIENT)
Dept: LAB | Facility: HOSPITAL | Age: 88
End: 2023-11-01
Attending: INTERNAL MEDICINE
Payer: MEDICARE

## 2023-11-01 ENCOUNTER — ANTICOAG VISIT (OUTPATIENT)
Dept: INTERNAL MEDICINE CLINIC | Facility: OTHER | Age: 88
End: 2023-11-01

## 2023-11-01 NOTE — PROGRESS NOTES
Please confirm current coumadin dose is  5mg on a Sunday and 2.5mg all other days.  Continue this regimen, recheck INR in 2 weeks

## 2023-11-02 ENCOUNTER — TELEPHONE (OUTPATIENT)
Dept: LAB | Facility: HOSPITAL | Age: 88
End: 2023-11-02

## 2023-11-08 ENCOUNTER — HOSPITAL ENCOUNTER (EMERGENCY)
Facility: HOSPITAL | Age: 88
Discharge: HOME | End: 2023-11-08
Attending: EMERGENCY MEDICINE
Payer: MEDICARE

## 2023-11-08 ENCOUNTER — APPOINTMENT (EMERGENCY)
Dept: RADIOLOGY | Facility: HOSPITAL | Age: 88
End: 2023-11-08
Payer: MEDICARE

## 2023-11-08 VITALS
SYSTOLIC BLOOD PRESSURE: 139 MMHG | HEART RATE: 70 BPM | RESPIRATION RATE: 20 BRPM | BODY MASS INDEX: 36.95 KG/M2 | DIASTOLIC BLOOD PRESSURE: 63 MMHG | TEMPERATURE: 97.3 F | OXYGEN SATURATION: 95 % | WEIGHT: 195.55 LBS

## 2023-11-08 DIAGNOSIS — R60.9 PERIPHERAL EDEMA: Primary | ICD-10-CM

## 2023-11-08 DIAGNOSIS — R26.2 AMBULATORY DYSFUNCTION: ICD-10-CM

## 2023-11-08 DIAGNOSIS — Z99.3 WHEELCHAIR DEPENDENCE: ICD-10-CM

## 2023-11-08 LAB
ALBUMIN SERPL BCP-MCNC: 4.5 G/DL (ref 3.5–5)
ALP SERPL-CCNC: 91 U/L (ref 34–104)
ALT SERPL W P-5'-P-CCNC: 9 U/L (ref 7–52)
ANION GAP SERPL CALCULATED.3IONS-SCNC: 6 MMOL/L
AST SERPL W P-5'-P-CCNC: 17 U/L (ref 13–39)
ATRIAL RATE: 61 BPM
BASOPHILS # BLD AUTO: 0.07 THOUSANDS/ÂΜL (ref 0–0.1)
BASOPHILS NFR BLD AUTO: 1 % (ref 0–1)
BILIRUB SERPL-MCNC: 0.78 MG/DL (ref 0.2–1)
BILIRUB UR QL STRIP: NEGATIVE
BNP SERPL-MCNC: 597 PG/ML (ref 0–100)
BUN SERPL-MCNC: 34 MG/DL (ref 5–25)
CALCIUM SERPL-MCNC: 9.2 MG/DL (ref 8.4–10.2)
CHLORIDE SERPL-SCNC: 105 MMOL/L (ref 96–108)
CLARITY UR: CLEAR
CO2 SERPL-SCNC: 25 MMOL/L (ref 21–32)
COLOR UR: NORMAL
CREAT SERPL-MCNC: 1.51 MG/DL (ref 0.6–1.3)
EOSINOPHIL # BLD AUTO: 0.61 THOUSAND/ÂΜL (ref 0–0.61)
EOSINOPHIL NFR BLD AUTO: 7 % (ref 0–6)
ERYTHROCYTE [DISTWIDTH] IN BLOOD BY AUTOMATED COUNT: 15.8 % (ref 11.6–15.1)
GFR SERPL CREATININE-BSD FRML MDRD: 28 ML/MIN/1.73SQ M
GLUCOSE SERPL-MCNC: 114 MG/DL (ref 65–140)
GLUCOSE UR STRIP-MCNC: NEGATIVE MG/DL
HCT VFR BLD AUTO: 37.7 % (ref 34.8–46.1)
HGB BLD-MCNC: 12 G/DL (ref 11.5–15.4)
HGB UR QL STRIP.AUTO: NEGATIVE
IMM GRANULOCYTES # BLD AUTO: 0.04 THOUSAND/UL (ref 0–0.2)
IMM GRANULOCYTES NFR BLD AUTO: 0 % (ref 0–2)
KETONES UR STRIP-MCNC: NEGATIVE MG/DL
LEUKOCYTE ESTERASE UR QL STRIP: NEGATIVE
LYMPHOCYTES # BLD AUTO: 1.32 THOUSANDS/ÂΜL (ref 0.6–4.47)
LYMPHOCYTES NFR BLD AUTO: 15 % (ref 14–44)
MCH RBC QN AUTO: 28.2 PG (ref 26.8–34.3)
MCHC RBC AUTO-ENTMCNC: 31.8 G/DL (ref 31.4–37.4)
MCV RBC AUTO: 89 FL (ref 82–98)
MONOCYTES # BLD AUTO: 0.81 THOUSAND/ÂΜL (ref 0.17–1.22)
MONOCYTES NFR BLD AUTO: 9 % (ref 4–12)
NEUTROPHILS # BLD AUTO: 6.18 THOUSANDS/ÂΜL (ref 1.85–7.62)
NEUTS SEG NFR BLD AUTO: 68 % (ref 43–75)
NITRITE UR QL STRIP: NEGATIVE
NRBC BLD AUTO-RTO: 0 /100 WBCS
PH UR STRIP.AUTO: 6 [PH]
PLATELET # BLD AUTO: 206 THOUSANDS/UL (ref 149–390)
PMV BLD AUTO: 10.7 FL (ref 8.9–12.7)
POTASSIUM SERPL-SCNC: 4.7 MMOL/L (ref 3.5–5.3)
PR INTERVAL: 150 MS
PROT SERPL-MCNC: 7.6 G/DL (ref 6.4–8.4)
PROT UR STRIP-MCNC: NEGATIVE MG/DL
QRS AXIS: 73 DEGREES
QRSD INTERVAL: 104 MS
QT INTERVAL: 460 MS
QTC INTERVAL: 463 MS
RBC # BLD AUTO: 4.25 MILLION/UL (ref 3.81–5.12)
SODIUM SERPL-SCNC: 136 MMOL/L (ref 135–147)
SP GR UR STRIP.AUTO: 1.01 (ref 1–1.03)
T WAVE AXIS: 15 DEGREES
UROBILINOGEN UR STRIP-ACNC: <2 MG/DL
VENTRICULAR RATE: 61 BPM
WBC # BLD AUTO: 9.03 THOUSAND/UL (ref 4.31–10.16)

## 2023-11-08 PROCEDURE — 83880 ASSAY OF NATRIURETIC PEPTIDE: CPT

## 2023-11-08 PROCEDURE — 80053 COMPREHEN METABOLIC PANEL: CPT

## 2023-11-08 PROCEDURE — 71045 X-RAY EXAM CHEST 1 VIEW: CPT

## 2023-11-08 PROCEDURE — 36415 COLL VENOUS BLD VENIPUNCTURE: CPT

## 2023-11-08 PROCEDURE — 81003 URINALYSIS AUTO W/O SCOPE: CPT

## 2023-11-08 PROCEDURE — 93005 ELECTROCARDIOGRAM TRACING: CPT

## 2023-11-08 PROCEDURE — 99284 EMERGENCY DEPT VISIT MOD MDM: CPT

## 2023-11-08 PROCEDURE — 96374 THER/PROPH/DIAG INJ IV PUSH: CPT

## 2023-11-08 PROCEDURE — 93010 ELECTROCARDIOGRAM REPORT: CPT | Performed by: INTERNAL MEDICINE

## 2023-11-08 PROCEDURE — 85025 COMPLETE CBC W/AUTO DIFF WBC: CPT

## 2023-11-08 PROCEDURE — 99285 EMERGENCY DEPT VISIT HI MDM: CPT

## 2023-11-08 RX ORDER — ACETAMINOPHEN 325 MG/1
975 TABLET ORAL ONCE
Status: COMPLETED | OUTPATIENT
Start: 2023-11-08 | End: 2023-11-08

## 2023-11-08 RX ORDER — FENTANYL CITRATE 50 UG/ML
25 INJECTION, SOLUTION INTRAMUSCULAR; INTRAVENOUS ONCE
Status: COMPLETED | OUTPATIENT
Start: 2023-11-08 | End: 2023-11-08

## 2023-11-08 RX ADMIN — ACETAMINOPHEN 975 MG: 325 TABLET, FILM COATED ORAL at 14:12

## 2023-11-08 RX ADMIN — FENTANYL CITRATE 25 MCG: 50 INJECTION INTRAMUSCULAR; INTRAVENOUS at 15:37

## 2023-11-08 NOTE — ED NOTES
Patient requesting pain medication at this time. Provider notified.       Jonathan Valerio RN  11/08/23 [id number]

## 2023-11-08 NOTE — DISCHARGE INSTRUCTIONS
Keep legs elevated at home when possible. Consider using compression stockings. Continue torsemide to treat edema. Follow up with PCP. Return to the ER if you develop difficulty breathing, shortness of breath or increased leg pain.

## 2023-11-08 NOTE — ED PROVIDER NOTES
History  Chief Complaint   Patient presents with    Leg Swelling     Patient started with LB leg swelling two weeks ago per visiting nurse. Increased today and now redness noted. Denies CP/SOB     Frandy Abbott is a 80year old female with a PMH of HTN, afib on warfarin, osteoarthritis in bilateral knees and edema in lower extremities without signs of CHF. Patient came to the ER via ambulance as she was unable to walk today. Her daughter Comfort Self came with her and helped provide the history. They state that she has had edema on and off for the last two weeks and called the ambulance today because she was concerned her mother could have cellulitis and wanted to get it looked at. She states the patient has seemed more short of breath recently and that she often finds her sleeping sitting up with her feet on the floor. The patient denies feeling short of breath but does say she has been having trouble walking due to leg swelling and pain. She takes torsemide MWF for the edema in her legs. She does not wear compression stocking at home and often sits with her feet on the floor. She has been taking amlodipine 5mg for the last five months. Prior to Admission Medications   Prescriptions Last Dose Informant Patient Reported? Taking?    HYDROcodone-acetaminophen (XODOL) 7.5-300 MG per tablet   No No   Sig: Take 1 tablet by mouth every 8 (eight) hours as needed for severe pain Max Daily Amount: 3 tablets   acetaminophen (TYLENOL) 325 mg tablet  Self, Child No No   Sig: Take 2 tablets (650 mg total) by mouth every 8 (eight) hours as needed for mild pain, headaches or fever   amLODIPine (NORVASC) 5 mg tablet   No No   Sig: Take 1 tablet (5 mg total) by mouth daily   clotrimazole (LOTRIMIN) 1 % cream  Self, Child No No   Sig: Apply 1 g (1 Application total) topically 2 (two) times a day for 14 days APPLY TO AREA OF BELLY BUTTON AND BOTH GROIN CREASES 2 TIMES PER DAY UNTIL GONE   ergocalciferol (VITAMIN D2) 50,000 units  Self, Child Yes No   Sig: Take by mouth once a week   levothyroxine 50 mcg tablet  Self, Child No No   Sig: Take 1 tablet (50 mcg total) by mouth daily   losartan (COZAAR) 100 MG tablet  Self, Child No No   Sig: Take 1 tablet (100 mg total) by mouth daily   metoprolol tartrate (LOPRESSOR) 100 mg tablet  Self, Child No No   Sig: Take 1 tablet (100 mg total) by mouth 2 (two) times a day   nystatin (MYCOSTATIN) powder  Self, Child No No   Sig: Apply topically 2 (two) times a day   polyethylene glycol (MIRALAX) 17 g packet  Self, Child Yes No   Sig: Take 17 g by mouth as needed     senna-docusate sodium (SENOKOT S) 8.6-50 mg per tablet  Self, Child Yes No   Sig: Take 1 tablet by mouth as needed for constipation   simvastatin (ZOCOR) 20 mg tablet  Self, Child No No   Sig: Take 1 tablet (20 mg total) by mouth daily at bedtime   warfarin (COUMADIN) 5 mg tablet  Self, Child No No   Sig: TAKE 5 MG ON MONDAY AND SATURDAY AND 2.5 MG ALL OTHER DAYS   Patient taking differently: TAKE 5 MG SATURDAY AND 2.5 MG ALL OTHER DAYS      Facility-Administered Medications: None       Past Medical History:   Diagnosis Date    , spontaneous complete     Carcinoma of skin     Cataract     last assessed: 17    Cataract     Cellulitis     Effusion of both knee joints     resolved: 3/25/15    Effusion of both knee joints     Enteritis     Female stress incontinence     last assessed: 13    Female stress incontinence     Gastric ulcer     last assessed: 14    Gastric ulcer     GERD (gastroesophageal reflux disease)     Hyperlipidemia     Hypertension     Hyperthyroidism     Lyme disease     last assessed: 13    Lyme disease     Microscopic hematuria     last assessed: 13    Microscopic hematuria     Normal delivery     1950 son, 1952 son, 12 daughter, 26 daughter, 65 daughter    Paroxysmal atrial fibrillation ([address])     last assessed: 13    Paroxysmal atrial fibrillation (HCC)     Perirectal abscess     last assessed: 9/26/13    Platelet dysfunction (HCC)     PAF    Platelet dysfunction (HCC)     Sinus tachycardia     last assessed: 9/26/13    Sinus tachycardia     Spinal stenosis     lumbar; last assessed: 10/4/13    Stage 3 chronic kidney disease ([address]) 6/3/2019    Stage 3 chronic kidney disease (HCC)     Tachycardia     unspecified; last assessed: 9/26/13    Trigger finger, acquired     last assessed: 6/30/15    Trigger finger, acquired        Past Surgical History:   Procedure Laterality Date    KNEE ARTHROSCOPY Bilateral     2008    KNEE ARTHROSCOPY      LUMBAR LAMINECTOMY      5/09    LUMBAR LAMINECTOMY      NEUROPLASTY / TRANSPOSITION MEDIAN NERVE AT CARPAL TUNNEL Bilateral     2011    NEUROPLASTY / TRANSPOSITION MEDIAN NERVE AT CARPAL TUNNEL      TONSILLECTOMY AND ADENOIDECTOMY      TOTAL HIP ARTHROPLASTY      joint replacement; L hip; 2/2/10    TOTAL HIP ARTHROPLASTY         Family History   Problem Relation Age of Onset    Rheumatic fever Mother     Heart disease Father     Crohn's disease Brother         (Granulomatous) Golitis    Psoriasis Daughter     Heart disease Son      I have reviewed and agree with the history as documented. E-Cigarette/Vaping    E-Cigarette Use Never User      E-Cigarette/Vaping Substances    Nicotine No     THC No     CBD No     Flavoring No     Other No     Unknown No      Social History     Tobacco Use    Smoking status: Never    Smokeless tobacco: Never   Vaping Use    Vaping Use: Never used   Substance Use Topics    Alcohol use: Yes     Comment: rare on special occassions only    Drug use: Never       Review of Systems   Constitutional:  Positive for activity change. Negative for appetite change, chills, fatigue, fever and unexpected weight change. Respiratory:  Negative for cough, choking, chest tightness, shortness of breath and wheezing. Cardiovascular:  Positive for leg swelling. Negative for chest pain and palpitations.         2+ pitting edema bilaterally Gastrointestinal:  Negative for abdominal distention, abdominal pain, blood in stool, constipation, diarrhea, nausea and vomiting. Genitourinary:  Negative for hematuria. Musculoskeletal:  Positive for arthralgias. Bilateral knee pain   Skin:  Positive for rash. Fungal infection under pannus and in navel    Neurological:  Negative for dizziness, weakness, light-headedness, numbness and headaches. Physical Exam  Physical Exam  Constitutional:       General: She is not in acute distress. Appearance: Normal appearance. She is normal weight. She is not ill-appearing, toxic-appearing or diaphoretic. HENT:      Head: Normocephalic and atraumatic. Right Ear: External ear normal.      Left Ear: External ear normal.      Nose: Nose normal.      Mouth/Throat:      Mouth: Mucous membranes are moist.   Eyes:      Extraocular Movements: Extraocular movements intact. Conjunctiva/sclera: Conjunctivae normal.   Cardiovascular:      Rate and Rhythm: Normal rate and regular rhythm. Pulses: Normal pulses. Heart sounds: Normal heart sounds. No murmur heard. No friction rub. No gallop. Pulmonary:      Effort: Pulmonary effort is normal. No respiratory distress. Breath sounds: Normal breath sounds. No wheezing, rhonchi or rales. Chest:      Chest wall: No tenderness. Abdominal:      General: Abdomen is flat. Bowel sounds are normal. There is no distension. Palpations: Abdomen is soft. Tenderness: There is no abdominal tenderness. There is no guarding or rebound. Musculoskeletal:         General: Normal range of motion. Cervical back: Normal range of motion. Right lower leg: Edema present. Left lower leg: Edema present. Comments: 2+ pitting edema in bilateral legs   Skin:     General: Skin is warm and dry. Capillary Refill: Capillary refill takes less than 2 seconds.       Comments: Fungal infection in navel and under pannus with fungal powder covering. Neurological:      General: No focal deficit present. Mental Status: She is alert and oriented to person, place, and time. Psychiatric:         Mood and Affect: Mood normal.         Behavior: Behavior normal.         Vital Signs  ED Triage Vitals [11/08/23 1139]   Temperature Pulse Respirations Blood Pressure SpO2   (!) 97.3 °F (36.3 °C) 64 20 165/72 97 %      Temp Source Heart Rate Source Patient Position - Orthostatic VS BP Location FiO2 (%)   Oral Monitor Sitting Right arm --      Pain Score       5           Vitals:    11/08/23 1139   BP: 165/72   Pulse: 64   Patient Position - Orthostatic VS: Sitting         Visual Acuity      ED Medications  Medications - No data to display    Diagnostic Studies  Results Reviewed       Procedure Component Value Units Date/Time    CBC and differential [[id number]]  (Abnormal) Collected: 11/08/23 1216    Lab Status: Final result Specimen: Blood from Arm, Right Updated: 11/08/23 1232     WBC 9.03 Thousand/uL      RBC 4.25 Million/uL      Hemoglobin 12.0 g/dL      Hematocrit 37.7 %      MCV 89 fL      MCH 28.2 pg      MCHC 31.8 g/dL      RDW 15.8 %      MPV 10.7 fL      Platelets 118 Thousands/uL      nRBC 0 /100 WBCs      Neutrophils Relative 68 %      Immat GRANS % 0 %      Lymphocytes Relative 15 %      Monocytes Relative 9 %      Eosinophils Relative 7 %      Basophils Relative 1 %      Neutrophils Absolute 6.18 Thousands/µL      Immature Grans Absolute 0.04 Thousand/uL      Lymphocytes Absolute 1.32 Thousands/µL      Monocytes Absolute 0.81 Thousand/µL      Eosinophils Absolute 0.61 Thousand/µL      Basophils Absolute 0.07 Thousands/µL     Comprehensive metabolic panel [[id number]] Collected: 11/08/23 1216    Lab Status: In process Specimen: Blood from Arm, Right Updated: 11/08/23 1228    B-Type Natriuretic Peptide(BNP) [[id number]] Collected: 11/08/23 1216    Lab Status:  In process Specimen: Blood from Arm, Right Updated: 11/08/23 1228    UA (URINE) with reflex to Scope [[id number]]     Lab Status: No result Specimen: Urine                    X-ray chest 1 view portable    (Results Pending)              Procedures  ECG 12 Lead Documentation Only    Date/Time: 11/8/2023 12:35 PM    Performed by: Tanner Saavedra PA-C  Authorized by: Tanner Saavedra PA-C    Indications / Diagnosis:  Leg swelling  ECG reviewed by me, the ED Provider: yes    Patient location:  ED  Previous ECG:     Previous ECG:  Compared to current    Similarity:  No change  Interpretation:     Interpretation: normal    Rate:     ECG rate:  61    ECG rate assessment: normal    Rhythm:     Rhythm: sinus rhythm    Ectopy:     Ectopy: none    QRS:     QRS axis:  Normal  Conduction:     Conduction: abnormal      Abnormal conduction: incomplete RBBB      Abnormal conduction comment:  Chronic  ST segments:     ST segments:  Normal  T waves:     T waves: normal    Comments:      My interpretation is that this is a normal EKG with RBBB consistent with prior           ED Course  ED Course as of 11/08/23 1606   Wed Nov 08, 2023   1254 BNP(!): 597  BNP unchanged from 5 months ago     1254 BUN(!): 34  Improved from 3 months ago   1254 Creatinine(!): 1.51  Improved from 3 months ago   1524 Patient intermittently complaining of chronic hip pain. Gave tylenol 975 at                                [employer] [employer] Most Recent Value   Initial Alcohol Screen: US AUDIT-C     1. How often do you have a drink containing alcohol? 0 Filed at: 11/08/2023 1141   2. How many drinks containing alcohol do you have on a typical day you are drinking? 0 Filed at: 11/08/2023 1141   3a. Male UNDER 65: How often do you have five or more drinks on one occasion? 0 Filed at: 11/08/2023 1141   3b. FEMALE Any Age, or MALE 65+: How often do you have 4 or more drinks on one occassion? 0 Filed at: 11/08/2023 1141   Audit-C Score 0 Filed at: 11/08/2023 1141   MAXIM: How many times in the past year have you. ..     Used an illegal drug or used a prescription medication for non-medical reasons? Never Filed at: 11/08/2023 1141                      Medical Decision Making  Differential diagnosis includes but is not limited to CHF, peripheral edema, medication side effect or infection. Ordered EKG, BNP, CBC, CMP, CXR to assess for heart failure. Ordered UA to assess renal function, ordered CBC to assess for infection. Amount and/or Complexity of Data Reviewed  Labs: ordered. Decision-making details documented in ED Course. Radiology: ordered. Risk  OTC drugs. Prescription drug management. Disposition  Final diagnoses:   None     ED Disposition       None          Follow-up Information    None         Patient's Medications   Discharge Prescriptions    No medications on file       No discharge procedures on file.     PDMP Review         Value Time User    PDMP Reviewed  Yes 7/14/2023  3:12 PM Thalia Issa PA-C            ED Provider  Electronically Signed by             Cassandra Bauer PA-C  11/08/23 5050

## 2023-11-09 ENCOUNTER — VBI (OUTPATIENT)
Dept: INTERNAL MEDICINE CLINIC | Age: 88
End: 2023-11-09

## 2023-11-09 DIAGNOSIS — Z71.89 COMPLEX CARE COORDINATION: Primary | ICD-10-CM

## 2023-11-09 NOTE — TELEPHONE ENCOUNTER
11/09/23 9:07 AM    Patient contacted post ED visit, first outreach attempt made. Message was left for patient to return a call to the [facility] Department at [facility]: Phone [phone]. Thank you.   REBA PEDERSEN  [facility]

## 2023-11-10 ENCOUNTER — TELEPHONE (OUTPATIENT)
Dept: INTERNAL MEDICINE CLINIC | Age: 88
End: 2023-11-10

## 2023-11-13 ENCOUNTER — TELEPHONE (OUTPATIENT)
Dept: INTERNAL MEDICINE CLINIC | Age: 88
End: 2023-11-13

## 2023-11-13 NOTE — TELEPHONE ENCOUNTER
Dony Lanza the daughter called and left a message asking if we can change her mother's Hydrocodone/Acetaminophen from every eight hours to every 6 hours. She is stating that the 8 hours are starting to not hold this long for her.       Please call her back and let her know this at the home number [phone]

## 2023-11-14 ENCOUNTER — APPOINTMENT (EMERGENCY)
Dept: CT IMAGING | Facility: HOSPITAL | Age: 88
End: 2023-11-14
Payer: MEDICARE

## 2023-11-14 ENCOUNTER — HOSPITAL ENCOUNTER (INPATIENT)
Facility: HOSPITAL | Age: 88
LOS: 6 days | Discharge: HOSPICE | End: 2023-11-21
Attending: EMERGENCY MEDICINE | Admitting: INTERNAL MEDICINE
Payer: MEDICARE

## 2023-11-14 ENCOUNTER — PATIENT OUTREACH (OUTPATIENT)
Dept: INTERNAL MEDICINE CLINIC | Age: 88
End: 2023-11-14

## 2023-11-14 DIAGNOSIS — M54.50 ACUTE LOW BACK PAIN: Primary | ICD-10-CM

## 2023-11-14 DIAGNOSIS — R16.0 HEPATOMEGALY: ICD-10-CM

## 2023-11-14 DIAGNOSIS — K80.20 GALLSTONES: ICD-10-CM

## 2023-11-14 DIAGNOSIS — K57.90 DIVERTICULOSIS: ICD-10-CM

## 2023-11-14 DIAGNOSIS — R29.810 FACIAL DROOP: ICD-10-CM

## 2023-11-14 DIAGNOSIS — R41.82 ALTERED MENTAL STATUS: ICD-10-CM

## 2023-11-14 DIAGNOSIS — M51.9 LUMBAR DISC DISEASE: ICD-10-CM

## 2023-11-14 DIAGNOSIS — K44.9 HIATAL HERNIA: ICD-10-CM

## 2023-11-14 DIAGNOSIS — Z51.5 HOSPICE CARE: ICD-10-CM

## 2023-11-14 LAB
ALBUMIN SERPL BCP-MCNC: 4.1 G/DL (ref 3.5–5)
ALP SERPL-CCNC: 87 U/L (ref 34–104)
ALT SERPL W P-5'-P-CCNC: 18 U/L (ref 7–52)
ANION GAP SERPL CALCULATED.3IONS-SCNC: 7 MMOL/L
APTT PPP: 38 SECONDS (ref 23–37)
AST SERPL W P-5'-P-CCNC: 29 U/L (ref 13–39)
BASOPHILS # BLD AUTO: 0.06 THOUSANDS/ÂΜL (ref 0–0.1)
BASOPHILS NFR BLD AUTO: 1 % (ref 0–1)
BILIRUB SERPL-MCNC: 0.71 MG/DL (ref 0.2–1)
BUN SERPL-MCNC: 44 MG/DL (ref 5–25)
CALCIUM SERPL-MCNC: 8.6 MG/DL (ref 8.4–10.2)
CHLORIDE SERPL-SCNC: 105 MMOL/L (ref 96–108)
CO2 SERPL-SCNC: 25 MMOL/L (ref 21–32)
CREAT SERPL-MCNC: 1.68 MG/DL (ref 0.6–1.3)
EOSINOPHIL # BLD AUTO: 0.54 THOUSAND/ÂΜL (ref 0–0.61)
EOSINOPHIL NFR BLD AUTO: 5 % (ref 0–6)
ERYTHROCYTE [DISTWIDTH] IN BLOOD BY AUTOMATED COUNT: 15.9 % (ref 11.6–15.1)
GFR SERPL CREATININE-BSD FRML MDRD: 25 ML/MIN/1.73SQ M
GLUCOSE SERPL-MCNC: 120 MG/DL (ref 65–140)
HCT VFR BLD AUTO: 35.7 % (ref 34.8–46.1)
HGB BLD-MCNC: 11.4 G/DL (ref 11.5–15.4)
IMM GRANULOCYTES # BLD AUTO: 0.05 THOUSAND/UL (ref 0–0.2)
IMM GRANULOCYTES NFR BLD AUTO: 1 % (ref 0–2)
INR PPP: 3.35 (ref 0.84–1.19)
LIPASE SERPL-CCNC: 12 U/L (ref 11–82)
LYMPHOCYTES # BLD AUTO: 1.04 THOUSANDS/ÂΜL (ref 0.6–4.47)
LYMPHOCYTES NFR BLD AUTO: 10 % (ref 14–44)
MAGNESIUM SERPL-MCNC: 2.2 MG/DL (ref 1.9–2.7)
MCH RBC QN AUTO: 28.5 PG (ref 26.8–34.3)
MCHC RBC AUTO-ENTMCNC: 31.9 G/DL (ref 31.4–37.4)
MCV RBC AUTO: 89 FL (ref 82–98)
MONOCYTES # BLD AUTO: 1 THOUSAND/ÂΜL (ref 0.17–1.22)
MONOCYTES NFR BLD AUTO: 10 % (ref 4–12)
NEUTROPHILS # BLD AUTO: 7.8 THOUSANDS/ÂΜL (ref 1.85–7.62)
NEUTS SEG NFR BLD AUTO: 73 % (ref 43–75)
NRBC BLD AUTO-RTO: 0 /100 WBCS
PLATELET # BLD AUTO: 256 THOUSANDS/UL (ref 149–390)
PMV BLD AUTO: 9.7 FL (ref 8.9–12.7)
POTASSIUM SERPL-SCNC: 4.9 MMOL/L (ref 3.5–5.3)
PROT SERPL-MCNC: 7.1 G/DL (ref 6.4–8.4)
PROTHROMBIN TIME: 35 SECONDS (ref 11.6–14.5)
RBC # BLD AUTO: 4 MILLION/UL (ref 3.81–5.12)
SODIUM SERPL-SCNC: 137 MMOL/L (ref 135–147)
WBC # BLD AUTO: 10.49 THOUSAND/UL (ref 4.31–10.16)

## 2023-11-14 PROCEDURE — 99285 EMERGENCY DEPT VISIT HI MDM: CPT

## 2023-11-14 PROCEDURE — 99285 EMERGENCY DEPT VISIT HI MDM: CPT | Performed by: EMERGENCY MEDICINE

## 2023-11-14 PROCEDURE — 85730 THROMBOPLASTIN TIME PARTIAL: CPT

## 2023-11-14 PROCEDURE — 85025 COMPLETE CBC W/AUTO DIFF WBC: CPT

## 2023-11-14 PROCEDURE — 99222 1ST HOSP IP/OBS MODERATE 55: CPT | Performed by: NURSE PRACTITIONER

## 2023-11-14 PROCEDURE — 80053 COMPREHEN METABOLIC PANEL: CPT

## 2023-11-14 PROCEDURE — G1004 CDSM NDSC: HCPCS

## 2023-11-14 PROCEDURE — 85610 PROTHROMBIN TIME: CPT

## 2023-11-14 PROCEDURE — 83735 ASSAY OF MAGNESIUM: CPT

## 2023-11-14 PROCEDURE — 83690 ASSAY OF LIPASE: CPT

## 2023-11-14 PROCEDURE — 99152 MOD SED SAME PHYS/QHP 5/>YRS: CPT | Performed by: EMERGENCY MEDICINE

## 2023-11-14 PROCEDURE — 96376 TX/PRO/DX INJ SAME DRUG ADON: CPT

## 2023-11-14 PROCEDURE — 36415 COLL VENOUS BLD VENIPUNCTURE: CPT

## 2023-11-14 PROCEDURE — 74176 CT ABD & PELVIS W/O CONTRAST: CPT

## 2023-11-14 PROCEDURE — 96374 THER/PROPH/DIAG INJ IV PUSH: CPT

## 2023-11-14 RX ORDER — WARFARIN SODIUM 2.5 MG/1
5 TABLET ORAL
Status: DISCONTINUED | OUTPATIENT
Start: 2023-11-15 | End: 2023-11-14

## 2023-11-14 RX ORDER — HYDROMORPHONE HCL IN WATER/PF 6 MG/30 ML
0.2 PATIENT CONTROLLED ANALGESIA SYRINGE INTRAVENOUS EVERY 6 HOURS PRN
Status: DISCONTINUED | OUTPATIENT
Start: 2023-11-14 | End: 2023-11-15

## 2023-11-14 RX ORDER — FENTANYL CITRATE 50 UG/ML
50 INJECTION, SOLUTION INTRAMUSCULAR; INTRAVENOUS ONCE
Status: COMPLETED | OUTPATIENT
Start: 2023-11-14 | End: 2023-11-14

## 2023-11-14 RX ORDER — AMOXICILLIN 250 MG
2 CAPSULE ORAL
Status: DISCONTINUED | OUTPATIENT
Start: 2023-11-14 | End: 2023-11-15

## 2023-11-14 RX ORDER — LIDOCAINE 50 MG/G
2 PATCH TOPICAL DAILY
Status: DISCONTINUED | OUTPATIENT
Start: 2023-11-14 | End: 2023-11-15

## 2023-11-14 RX ORDER — LIDOCAINE 50 MG/G
2 PATCH TOPICAL DAILY
Status: DISCONTINUED | OUTPATIENT
Start: 2023-11-15 | End: 2023-11-14

## 2023-11-14 RX ORDER — KETAMINE HCL IN NACL, ISO-OSM 100MG/10ML
50 SYRINGE (ML) INJECTION ONCE
Status: COMPLETED | OUTPATIENT
Start: 2023-11-14 | End: 2023-11-14

## 2023-11-14 RX ORDER — WARFARIN SODIUM 2.5 MG/1
2.5 TABLET ORAL
Status: DISCONTINUED | OUTPATIENT
Start: 2023-11-15 | End: 2023-11-14

## 2023-11-14 RX ORDER — OXYCODONE HYDROCHLORIDE 5 MG/1
5 TABLET ORAL EVERY 8 HOURS PRN
Status: DISCONTINUED | OUTPATIENT
Start: 2023-11-14 | End: 2023-11-15

## 2023-11-14 RX ORDER — KETAMINE HCL IN NACL, ISO-OSM 100MG/10ML
25 SYRINGE (ML) INJECTION ONCE AS NEEDED
Status: DISCONTINUED | OUTPATIENT
Start: 2023-11-14 | End: 2023-11-14

## 2023-11-14 RX ORDER — KETAMINE HCL IN NACL, ISO-OSM 100MG/10ML
0.2 SYRINGE (ML) INJECTION ONCE
Status: COMPLETED | OUTPATIENT
Start: 2023-11-14 | End: 2023-11-14

## 2023-11-14 RX ORDER — HYDROMORPHONE HCL IN WATER/PF 6 MG/30 ML
0.2 PATIENT CONTROLLED ANALGESIA SYRINGE INTRAVENOUS EVERY 6 HOURS PRN
Status: DISCONTINUED | OUTPATIENT
Start: 2023-11-14 | End: 2023-11-14

## 2023-11-14 RX ORDER — FENTANYL CITRATE 50 UG/ML
50 INJECTION, SOLUTION INTRAMUSCULAR; INTRAVENOUS
Status: DISCONTINUED | OUTPATIENT
Start: 2023-11-14 | End: 2023-11-14

## 2023-11-14 RX ORDER — GABAPENTIN 100 MG/1
100 CAPSULE ORAL 3 TIMES DAILY
Status: DISCONTINUED | OUTPATIENT
Start: 2023-11-14 | End: 2023-11-15

## 2023-11-14 RX ADMIN — GABAPENTIN 100 MG: 100 CAPSULE ORAL at 23:54

## 2023-11-14 RX ADMIN — HYDROMORPHONE HYDROCHLORIDE 0.2 MG: 0.2 INJECTION, SOLUTION INTRAMUSCULAR; INTRAVENOUS; SUBCUTANEOUS at 22:12

## 2023-11-14 RX ADMIN — Medication 50 MG: at 19:18

## 2023-11-14 RX ADMIN — FENTANYL CITRATE 50 MCG: 50 INJECTION INTRAMUSCULAR; INTRAVENOUS at 16:22

## 2023-11-14 RX ADMIN — SENNOSIDES, DOCUSATE SODIUM 2 TABLET: 8.6; 5 TABLET ORAL at 23:54

## 2023-11-14 RX ADMIN — FENTANYL CITRATE 50 MCG: 50 INJECTION INTRAMUSCULAR; INTRAVENOUS at 15:19

## 2023-11-14 RX ADMIN — FENTANYL CITRATE 50 MCG: 50 INJECTION INTRAMUSCULAR; INTRAVENOUS at 20:37

## 2023-11-14 RX ADMIN — Medication 18 MG: at 18:46

## 2023-11-14 RX ADMIN — LIDOCAINE 5% 2 PATCH: 700 PATCH TOPICAL at 22:41

## 2023-11-14 NOTE — ED NOTES
CT tech informed this RN unable to complete scan d/t unable to lie flat.       Pernell Cohn, SATYA  11/14/23 0866

## 2023-11-14 NOTE — Clinical Note
Case was discussed with MARIPOSA and the patient's admission status was agreed to be Admission Status: observation status to the service of Dr. Iveth Franks .

## 2023-11-14 NOTE — PROGRESS NOTES
Called to discuss interest in care management after receiving referral for high ED risk. Pt's daughter states that pt is in terrible pain in her hip since her recent ED visit and can only pivot since coming home from hospital. She states that the pain is there a lot of the time. There is an ambulance on their way to take her to the hospital at this time. Will follow.

## 2023-11-14 NOTE — ED ATTENDING ATTESTATION
11/14/2023  IEj MD, saw and evaluated the patient. I have discussed the patient with the resident/non-physician practitioner and agree with the resident's/non-physician practitioner's findings, Plan of Care, and MDM as documented in the resident's/non-physician practitioner's note, except where noted. All available labs and Radiology studies were reviewed. I was present for key portions of any procedure(s) performed by the resident/non-physician practitioner and I was immediately available to provide assistance. At this point I agree with the current assessment done in the Emergency Department. I have conducted an independent evaluation of this patient a history and physical is as follows:    80-year-old female with history of atrial fibrillation on Coumadin, chronic osteoarthritis, hypertension, history of lumbar degenerative disc disease. Patient presents for evaluation of severe low back pain. History is provided by the patient and supplemented by her daughter, Frankey Fermo, by phone. The patient was evaluated here in the emergency department 6 days ago for lower extremity edema. At that time her BNP was seen to be unchanged from 5 months ago. She was discharged home with follow-up with her PCP. Per the patient's daughter, while in the emergency department she began complaining of pain in her right hip and right lower back radiating down the back of her right leg. They thought that it was due to her lying recumbent on the stretcher. She was discharged home. Since discharge home, the pain has gotten progressively worse. Over the last several days, the patient has been unable to assist with normal transfers in her wheelchair like she typically does by standing and pivoting. She typically will take several steps very slowly with a walker, but has been unable to do so. The patient reports difficulty moving her right leg due to severe pain. Denies numbness.   She has chronic bladder incontinence, denies bowel continence and is able to feel when she needs to urinate. Denies saddle anesthesia. Patient denies chest pain, abdominal pain, or shortness of breath. She is noted to have an area of ecchymosis over the right hip but denies falls and patient's daughter confirms that she has not fallen recently. Physical Exam  Vitals reviewed. Constitutional:       General: She is in acute distress (crying in pain, reporting severe pain in her R hip and R lower back). Appearance: She is well-developed. She is not diaphoretic. HENT:      Head: Normocephalic and atraumatic. Right Ear: External ear normal.      Left Ear: External ear normal.      Nose: Nose normal.   Eyes:      Conjunctiva/sclera: Conjunctivae normal.   Cardiovascular:      Rate and Rhythm: Normal rate and regular rhythm. Pulses: Normal pulses. Heart sounds: Normal heart sounds. No murmur heard. No friction rub. No gallop. Pulmonary:      Effort: Pulmonary effort is normal. No respiratory distress. Breath sounds: Normal breath sounds. No wheezing or rales. Chest:      Chest wall: No tenderness. Abdominal:      General: Bowel sounds are normal. There is no distension. Palpations: Abdomen is soft. Tenderness: There is abdominal tenderness (mild tenderness to deep palpation in the RLQ). There is no guarding. Musculoskeletal:         General: No deformity. Normal range of motion. Cervical back: Normal range of motion and neck supple. Comments: 2+pitting edema to the bilateral lower extremities, symmetric. No midline tenderness to palpation over the lumbar spine. Tenderness to palpation to the right paraspinous muscles over the lower lumbar spine/sacrum. Ecchymosis noted over the right hip that is not tender to palpation. Severe pain in the lower back when patient attempts to move her right leg. Skin:     General: Skin is warm and dry.    Neurological:      Mental Status: She is alert and oriented to person, place, and time. Motor: No abnormal muscle tone. Comments: 5 out of 5 strength in the distal aspect of the right lower extremity. Patient is unable to lift her right leg off the bed due to pain, but thigh musculature is seen to contract when she attempts to move the leg. 3+ out of 5 strength in the left lower extremity that patient states is at baseline. Intact sensation to light touch in the bilateral lower extremities with hyperesthesia on the right. No saddle anesthesia. ED Course  ED Course as of 11/15/23 1522   Tue Nov 14, 2023   1605 Both the patient and her daughter deny any recent trauma though she does have some ecchymosis over her right hip. Patient is unable to lift her right leg off the bed, though when muscle groups are isolated there is appropriate contraction of the right thigh with attempted movement and strength is normal distally. I have a low suspicion for cauda equina at this time based on her exam.  Suspect lumbar radiculopathy but will obtain CT scan of the abdomen pelvis with lumbar recons for further evaluation. Based on the patient's low GFR at baseline, will not be able to scan with IV contrast.  She has no chest pain and denies abdominal pain. She has 2+ pedal pulses bilaterally. Doubt vascular catastrophe as the cause of her symptoms. Timi Echols pt signed out to Dr. Estefany Bernabe at 1600 while awaiting results of CT scan. Patient given IV fentanyl for pain control. Anticipate admission for pain control.           Critical Care Time  Procedures

## 2023-11-14 NOTE — ED PROVIDER NOTES
History  Chief Complaint   Patient presents with    Back Pain     To ED via EMS with c/o lower back pain radiating down right hip, pt denies recent falls. Denies trauma. Denies bladder/bowel. 63-year-old female history of A-fib on Coumadin, hypothyroidism, lumbar disc disease presenting with acute worsening of right lower back pain. Patient was here a week ago with bilateral peripheral edema, no history of CHF, was evaluated and ultimately discharged. While here developed pain in right lower back has been getting steadily worse over the past week. Now having severe sharp stabbing pain in right lower back rating down the leg. Normally is able to ambulate slowly using a walker though mostly uses a wheelchair. Is now unable to even use a walker due to inability to lift leg which is unsure whether it is weakness or pain that is limiting left over the leg. No signs of cauda equina including no pain/numbness/tingling going down his legs, no saddle anesthesia, no urinary/fecal incontinence/retention. Has good dorsiflexion of right foot by having hard time flexing right hip which patient thinks is combination of pain and actual weakness. Does have a history of lumbar spine issues but never had pain like this before. Has tried using hydrocodone-acetaminophen intermittently over the past few days with mild to moderate improvement. Prior to Admission Medications   Prescriptions Last Dose Informant Patient Reported? Taking?    HYDROcodone-acetaminophen (XODOL) 7.5-300 MG per tablet   No No   Sig: Take 1 tablet by mouth every 8 (eight) hours as needed for severe pain Max Daily Amount: 3 tablets   acetaminophen (TYLENOL) 325 mg tablet  Self, Child No No   Sig: Take 2 tablets (650 mg total) by mouth every 8 (eight) hours as needed for mild pain, headaches or fever   amLODIPine (NORVASC) 5 mg tablet   No No   Sig: Take 1 tablet (5 mg total) by mouth daily   clotrimazole (LOTRIMIN) 1 % cream  Self, Child No No Sig: Apply 1 g (1 Application total) topically 2 (two) times a day for 14 days APPLY TO AREA OF BELLY BUTTON AND BOTH GROIN CREASES 2 TIMES PER DAY UNTIL GONE   ergocalciferol (VITAMIN D2) 50,000 units  Self, Child Yes No   Sig: Take by mouth once a week   levothyroxine 50 mcg tablet  Self, Child No No   Sig: Take 1 tablet (50 mcg total) by mouth daily   losartan (COZAAR) 100 MG tablet  Self, Child No No   Sig: Take 1 tablet (100 mg total) by mouth daily   metoprolol tartrate (LOPRESSOR) 100 mg tablet  Self, Child No No   Sig: Take 1 tablet (100 mg total) by mouth 2 (two) times a day   nystatin (MYCOSTATIN) powder  Self, Child No No   Sig: Apply topically 2 (two) times a day   polyethylene glycol (MIRALAX) 17 g packet  Self, Child Yes No   Sig: Take 17 g by mouth as needed     senna-docusate sodium (SENOKOT S) 8.6-50 mg per tablet  Self, Child Yes No   Sig: Take 1 tablet by mouth as needed for constipation   simvastatin (ZOCOR) 20 mg tablet  Self, Child No No   Sig: Take 1 tablet (20 mg total) by mouth daily at bedtime   warfarin (COUMADIN) 5 mg tablet  Self, Child No No   Sig: TAKE 5 MG ON MONDAY AND SATURDAY AND 2.5 MG ALL OTHER DAYS   Patient taking differently: TAKE 5 MG SATURDAY AND 2.5 MG ALL OTHER DAYS      Facility-Administered Medications: None       Past Medical History:   Diagnosis Date    , spontaneous complete     Carcinoma of skin     Cataract     last assessed: 17    Cataract     Cellulitis     Effusion of both knee joints     resolved: 3/25/15    Effusion of both knee joints     Enteritis     Female stress incontinence     last assessed: 13    Female stress incontinence     Gastric ulcer     last assessed: 14    Gastric ulcer     GERD (gastroesophageal reflux disease)     Hyperlipidemia     Hypertension     Hyperthyroidism     Lyme disease     last assessed: 13    Lyme disease     Microscopic hematuria     last assessed: 13    Microscopic hematuria     Normal delivery     1950 son, 200 son, 12 daughter, 26 daughter, 65 daughter    Paroxysmal atrial fibrillation ([address])     last assessed: 9/26/13    Paroxysmal atrial fibrillation ([address])     Perirectal abscess     last assessed: 9/26/13    Platelet dysfunction (HCC)     PAF    Platelet dysfunction (HCC)     Sinus tachycardia     last assessed: 9/26/13    Sinus tachycardia     Spinal stenosis     lumbar; last assessed: 10/4/13    Stage 3 chronic kidney disease ([address]) 6/3/2019    Stage 3 chronic kidney disease ([address])     Tachycardia     unspecified; last assessed: 9/26/13    Trigger finger, acquired     last assessed: 6/30/15    Trigger finger, acquired        Past Surgical History:   Procedure Laterality Date    KNEE ARTHROSCOPY Bilateral     2008    KNEE ARTHROSCOPY      LUMBAR LAMINECTOMY      5/09    LUMBAR LAMINECTOMY      NEUROPLASTY / TRANSPOSITION MEDIAN NERVE AT CARPAL TUNNEL Bilateral     2011    NEUROPLASTY / TRANSPOSITION MEDIAN NERVE AT CARPAL TUNNEL      TONSILLECTOMY AND ADENOIDECTOMY      TOTAL HIP ARTHROPLASTY      joint replacement; L hip; 2/2/10    TOTAL HIP ARTHROPLASTY         Family History   Problem Relation Age of Onset    Rheumatic fever Mother     Heart disease Father     Crohn's disease Brother         (Granulomatous) Golitis    Psoriasis Daughter     Heart disease Son      I have reviewed and agree with the history as documented. E-Cigarette/Vaping    E-Cigarette Use Never User      E-Cigarette/Vaping Substances    Nicotine No     THC No     CBD No     Flavoring No     Other No     Unknown No      Social History     Tobacco Use    Smoking status: Never    Smokeless tobacco: Never   Vaping Use    Vaping Use: Never used   Substance Use Topics    Alcohol use: Yes     Comment: rare on special occassions only    Drug use: Never        Review of Systems   Constitutional:  Negative for activity change, fever and unexpected weight change.    Respiratory:  Negative for cough, chest tightness and shortness of breath. Cardiovascular:  Positive for leg swelling. Negative for chest pain and palpitations. Gastrointestinal:  Negative for abdominal pain, diarrhea, nausea and vomiting. Genitourinary:  Negative for dysuria and hematuria. Musculoskeletal:  Positive for back pain and gait problem. Skin:  Positive for rash. Negative for wound. Allergic/Immunologic: Negative for immunocompromised state. Neurological:  Positive for weakness. Negative for syncope. All other systems reviewed and are negative. Physical Exam  ED Triage Vitals   Temperature Pulse Respirations Blood Pressure SpO2   11/14/23 1532 11/14/23 1437 11/14/23 1437 11/14/23 1437 11/14/23 1437   98.5 °F (36.9 °C) 68 18 163/97 96 %      Temp Source Heart Rate Source Patient Position - Orthostatic VS BP Location FiO2 (%)   11/14/23 1532 11/14/23 1600 -- -- --   Oral Monitor         Pain Score       11/14/23 1437       10 - Worst Possible Pain             Orthostatic Vital Signs  Vitals:    11/14/23 1437 11/14/23 1600   BP: 163/97 155/70   Pulse: 68 68       Physical Exam  Vitals and nursing note reviewed. Constitutional:       General: She is in acute distress. Appearance: She is not toxic-appearing or diaphoretic. Comments: Sitting up in bed, severe shooting pain anytime she tries to lay back   HENT:      Head: Normocephalic and atraumatic. Right Ear: External ear normal.      Left Ear: External ear normal.      Nose: Nose normal.      Mouth/Throat:      Mouth: Mucous membranes are moist.   Eyes:      General: No scleral icterus. Extraocular Movements: Extraocular movements intact. Conjunctiva/sclera: Conjunctivae normal.   Cardiovascular:      Rate and Rhythm: Normal rate and regular rhythm. Pulses: Normal pulses. Radial pulses are 2+ on the right side. Dorsalis pedis pulses are 2+ on the right side and 2+ on the left side.       Heart sounds: Normal heart sounds, S1 normal and S2 normal. No murmur heard. Pulmonary:      Effort: Pulmonary effort is normal. No respiratory distress. Breath sounds: Normal breath sounds. No stridor. No wheezing. Abdominal:      Palpations: Abdomen is soft. Tenderness: There is abdominal tenderness in the right lower quadrant. Comments: Pain in RLQ though hard to tell whether this is direct tenderness or causing increased pain from lower back   Musculoskeletal:         General: Normal range of motion. Cervical back: Normal range of motion. Right lower le+ Pitting Edema present. Left lower le+ Pitting Edema present. Comments: Minimal tenderness to lumbar or sacral spine. No paraspinal tenderness. Pain with any attempt to lay back. Weaker with right hip flexion, possibly from pain. Good dorsiflexion of right ankle. Good distal pulse/sensation in right lower extremity. Skin:     General: Skin is warm and dry. Neurological:      General: No focal deficit present. Mental Status: She is alert and oriented to person, place, and time.    Psychiatric:         Mood and Affect: Mood normal.         ED Medications  Medications   fentanyl citrate (PF) 100 MCG/2ML 50 mcg (50 mcg Intravenous Given 23 1622)   fentanyl citrate (PF) 100 MCG/2ML 50 mcg (50 mcg Intravenous Given 23 151)       Diagnostic Studies  Results Reviewed       Procedure Component Value Units Date/Time    Protime-INR [[id number]]  (Abnormal) Collected: 23    Lab Status: Final result Specimen: Blood from Arm, Left Updated: 23 1544     Protime 35.0 seconds      INR 3.35    APTT [[id number]]  (Abnormal) Collected: 23    Lab Status: Final result Specimen: Blood from Arm, Left Updated: 23 1544     PTT 38 seconds     Comprehensive metabolic panel [[id number]]  (Abnormal) Collected: 23    Lab Status: Final result Specimen: Blood from Arm, Left Updated: 23 1543     Sodium 137 mmol/L      Potassium 4.9 mmol/L      Chloride 105 mmol/L      CO2 25 mmol/L      ANION GAP 7 mmol/L      BUN 44 mg/dL      Creatinine 1.68 mg/dL      Glucose 120 mg/dL      Calcium 8.6 mg/dL      AST 29 U/L      ALT 18 U/L      Alkaline Phosphatase 87 U/L      Total Protein 7.1 g/dL      Albumin 4.1 g/dL      Total Bilirubin 0.71 mg/dL      eGFR 25 ml/min/1.73sq m     Narrative:      National Kid[employer] Foundation guidelines for Chronic Kidney Disease (CKD):     Stage 1 with normal or high GFR (GFR > 90 mL/min/1.73 square meters)    Stage 2 Mild CKD (GFR = 60-89 mL/min/1.73 square meters)    Stage 3A Moderate CKD (GFR = 45-59 mL/min/1.73 square meters)    Stage 3B Moderate CKD (GFR = 30-44 mL/min/1.73 square meters)    Stage 4 Severe CKD (GFR = 15-29 mL/min/1.73 square meters)    Stage 5 End Stage CKD (GFR <15 mL/min/1.73 square meters)  Note: GFR calculation is accurate only with a steady state creatinine    Lipase [[id number]]  (Normal) Collected: 11/14/23 1518    Lab Status: Final result Specimen: Blood from Arm, Left Updated: 11/14/23 1543     Lipase 12 u/L     Magnesium [[id number]]  (Normal) Collected: 11/14/23 1518    Lab Status: Final result Specimen: Blood from Arm, Left Updated: 11/14/23 1543     Magnesium 2.2 mg/dL     CBC and differential [[id number]]  (Abnormal) Collected: 11/14/23 1518    Lab Status: Final result Specimen: Blood from Arm, Left Updated: 11/14/23 1527     WBC 10.49 Thousand/uL      RBC 4.00 Million/uL      Hemoglobin 11.4 g/dL      Hematocrit 35.7 %      MCV 89 fL      MCH 28.5 pg      MCHC 31.9 g/dL      RDW 15.9 %      MPV 9.7 fL      Platelets 061 Thousands/uL      nRBC 0 /100 WBCs      Neutrophils Relative 73 %      Immat GRANS % 1 %      Lymphocytes Relative 10 %      Monocytes Relative 10 %      Eosinophils Relative 5 %      Basophils Relative 1 %      Neutrophils Absolute 7.80 Thousands/µL      Immature Grans Absolute 0.05 Thousand/uL      Lymphocytes Absolute 1.04 Thousands/µL      Monocytes Absolute 1.00 Thousand/µL      Eosinophils Absolute 0.54 Thousand/µL      Basophils Absolute 0.06 Thousands/µL                    CT recon only lumbar spine    (Results Pending)   CT abdomen pelvis wo contrast    (Results Pending)         Procedures  Procedures      ED Course  ED Course as of 11/14/23 1657   Tue Nov 14, 2023   1531 WBC(!): 10.49   1548 POCT INR(!): 3.35   1549 Creatinine(!): 1.68  1.51 six days ago                                       Medical Decision Making  Initial Impression: Acute lower back pain rating down leg is very consistent with sciatica. Patient is in a lot of pain and unable to lie down due to the pain and also interfering with her baseline ambulation. All this is concerned enough to want to get imaging. With the possibility of RLQ of the pain we will also get a CT scan of abdomen. Will likely need to be without contrast given history of CKD but will get baseline labs to check. Given how much pain she is and likely will need to be admitted for pain control. Without any signs of neurovascular dysfunction, do not suspect cauda equina at this time. Since she is anticoagulated with therapeutic range INR do not suspect DVT. With good distal pulse and no color or skin changes, do not suspect artery dissection or arterial occlusion. Patient care signed out to Dr. David Caruso pending results of CT scans and likely admission for pain control. Amount and/or Complexity of Data Reviewed  Labs: ordered. Decision-making details documented in ED Course. Radiology: ordered. Risk  Prescription drug management. Disposition  Final diagnoses:   None     ED Disposition       None          Follow-up Information    None         Patient's Medications   Discharge Prescriptions    No medications on file     No discharge procedures on file.     PDMP Review         Value Time User    PDMP Reviewed  Yes 7/14/2023  3:12 PM Tari Javier PA-C             ED Provider  Attending physically available and evaluated Rhys Soto. I managed the patient along with the ED Attending.     Electronically Signed by           Brendan Ceja MD  11/14/23 9967

## 2023-11-15 ENCOUNTER — APPOINTMENT (OUTPATIENT)
Dept: RADIOLOGY | Facility: HOSPITAL | Age: 88
End: 2023-11-15
Payer: MEDICARE

## 2023-11-15 ENCOUNTER — APPOINTMENT (OUTPATIENT)
Dept: LAB | Facility: HOSPITAL | Age: 88
End: 2023-11-15
Attending: INTERNAL MEDICINE

## 2023-11-15 ENCOUNTER — APPOINTMENT (INPATIENT)
Dept: CT IMAGING | Facility: HOSPITAL | Age: 88
End: 2023-11-15
Payer: MEDICARE

## 2023-11-15 PROBLEM — R29.810 FACIAL DROOP: Status: ACTIVE | Noted: 2023-11-15

## 2023-11-15 PROBLEM — G92.9 TOXIC ENCEPHALOPATHY: Status: ACTIVE | Noted: 2023-11-15

## 2023-11-15 PROBLEM — D72.829 LEUKOCYTOSIS: Status: ACTIVE | Noted: 2023-11-15

## 2023-11-15 PROBLEM — R60.0 BILATERAL LOWER EXTREMITY EDEMA: Status: ACTIVE | Noted: 2023-11-15

## 2023-11-15 PROBLEM — Z71.89 GOALS OF CARE, COUNSELING/DISCUSSION: Status: ACTIVE | Noted: 2023-11-15

## 2023-11-15 PROBLEM — J96.00 ACUTE RESPIRATORY FAILURE (HCC): Status: ACTIVE | Noted: 2023-11-15

## 2023-11-15 PROBLEM — K59.00 CONSTIPATION: Status: ACTIVE | Noted: 2023-11-15

## 2023-11-15 PROBLEM — G93.40 ENCEPHALOPATHY: Status: ACTIVE | Noted: 2023-11-15

## 2023-11-15 LAB
ANION GAP SERPL CALCULATED.3IONS-SCNC: 6 MMOL/L
BACTERIA UR QL AUTO: NORMAL /HPF
BASE EX.OXY STD BLDV CALC-SCNC: 95.8 % (ref 60–80)
BASE EXCESS BLDV CALC-SCNC: 0 MMOL/L
BASOPHILS # BLD AUTO: 0.06 THOUSANDS/ÂΜL (ref 0–0.1)
BASOPHILS NFR BLD AUTO: 1 % (ref 0–1)
BILIRUB UR QL STRIP: NEGATIVE
BUN SERPL-MCNC: 43 MG/DL (ref 5–25)
CALCIUM SERPL-MCNC: 8.5 MG/DL (ref 8.4–10.2)
CHLORIDE SERPL-SCNC: 105 MMOL/L (ref 96–108)
CLARITY UR: CLEAR
CO2 SERPL-SCNC: 25 MMOL/L (ref 21–32)
COLOR UR: ABNORMAL
CREAT SERPL-MCNC: 1.63 MG/DL (ref 0.6–1.3)
CRP SERPL QL: 18.6 MG/L
EOSINOPHIL # BLD AUTO: 0.07 THOUSAND/ÂΜL (ref 0–0.61)
EOSINOPHIL NFR BLD AUTO: 1 % (ref 0–6)
ERYTHROCYTE [DISTWIDTH] IN BLOOD BY AUTOMATED COUNT: 16 % (ref 11.6–15.1)
ERYTHROCYTE [SEDIMENTATION RATE] IN BLOOD: 43 MM/HOUR (ref 0–29)
GFR SERPL CREATININE-BSD FRML MDRD: 26 ML/MIN/1.73SQ M
GLUCOSE SERPL-MCNC: 108 MG/DL (ref 65–140)
GLUCOSE SERPL-MCNC: 91 MG/DL (ref 65–140)
GLUCOSE UR STRIP-MCNC: NEGATIVE MG/DL
HCO3 BLDV-SCNC: 22.8 MMOL/L (ref 24–30)
HCT VFR BLD AUTO: 33.3 % (ref 34.8–46.1)
HGB BLD-MCNC: 10.7 G/DL (ref 11.5–15.4)
HGB UR QL STRIP.AUTO: NEGATIVE
IMM GRANULOCYTES # BLD AUTO: 0.05 THOUSAND/UL (ref 0–0.2)
IMM GRANULOCYTES NFR BLD AUTO: 1 % (ref 0–2)
INR PPP: 2.99 (ref 0.84–1.19)
KETONES UR STRIP-MCNC: NEGATIVE MG/DL
LEUKOCYTE ESTERASE UR QL STRIP: ABNORMAL
LYMPHOCYTES # BLD AUTO: 0.94 THOUSANDS/ÂΜL (ref 0.6–4.47)
LYMPHOCYTES NFR BLD AUTO: 9 % (ref 14–44)
MCH RBC QN AUTO: 28.6 PG (ref 26.8–34.3)
MCHC RBC AUTO-ENTMCNC: 32.1 G/DL (ref 31.4–37.4)
MCV RBC AUTO: 89 FL (ref 82–98)
MONOCYTES # BLD AUTO: 0.87 THOUSAND/ÂΜL (ref 0.17–1.22)
MONOCYTES NFR BLD AUTO: 8 % (ref 4–12)
NEUTROPHILS # BLD AUTO: 8.96 THOUSANDS/ÂΜL (ref 1.85–7.62)
NEUTS SEG NFR BLD AUTO: 80 % (ref 43–75)
NITRITE UR QL STRIP: NEGATIVE
NON-SQ EPI CELLS URNS QL MICRO: NORMAL /HPF
NRBC BLD AUTO-RTO: 0 /100 WBCS
O2 CT BLDV-SCNC: 15.7 ML/DL
PCO2 BLDV: 31.2 MM HG (ref 42–50)
PH BLDV: 7.48 [PH] (ref 7.3–7.4)
PH UR STRIP.AUTO: 7 [PH]
PLATELET # BLD AUTO: 236 THOUSANDS/UL (ref 149–390)
PMV BLD AUTO: 10 FL (ref 8.9–12.7)
PO2 BLDV: 195.3 MM HG (ref 35–45)
POTASSIUM SERPL-SCNC: 5.3 MMOL/L (ref 3.5–5.3)
PROT UR STRIP-MCNC: ABNORMAL MG/DL
PROTHROMBIN TIME: 32 SECONDS (ref 11.6–14.5)
RBC # BLD AUTO: 3.74 MILLION/UL (ref 3.81–5.12)
RBC #/AREA URNS AUTO: NORMAL /HPF
SODIUM SERPL-SCNC: 136 MMOL/L (ref 135–147)
SP GR UR STRIP.AUTO: 1.02 (ref 1–1.03)
UROBILINOGEN UR STRIP-ACNC: <2 MG/DL
WBC # BLD AUTO: 10.95 THOUSAND/UL (ref 4.31–10.16)
WBC #/AREA URNS AUTO: NORMAL /HPF

## 2023-11-15 PROCEDURE — 70450 CT HEAD/BRAIN W/O DYE: CPT

## 2023-11-15 PROCEDURE — 94760 N-INVAS EAR/PLS OXIMETRY 1: CPT

## 2023-11-15 PROCEDURE — 97163 PT EVAL HIGH COMPLEX 45 MIN: CPT

## 2023-11-15 PROCEDURE — 82948 REAGENT STRIP/BLOOD GLUCOSE: CPT

## 2023-11-15 PROCEDURE — 81001 URINALYSIS AUTO W/SCOPE: CPT | Performed by: INTERNAL MEDICINE

## 2023-11-15 PROCEDURE — 86140 C-REACTIVE PROTEIN: CPT | Performed by: INTERNAL MEDICINE

## 2023-11-15 PROCEDURE — G1004 CDSM NDSC: HCPCS

## 2023-11-15 PROCEDURE — 99232 SBSQ HOSP IP/OBS MODERATE 35: CPT | Performed by: INTERNAL MEDICINE

## 2023-11-15 PROCEDURE — 73562 X-RAY EXAM OF KNEE 3: CPT

## 2023-11-15 PROCEDURE — 99222 1ST HOSP IP/OBS MODERATE 55: CPT | Performed by: PHYSICIAN ASSISTANT

## 2023-11-15 PROCEDURE — 82805 BLOOD GASES W/O2 SATURATION: CPT | Performed by: PHYSICIAN ASSISTANT

## 2023-11-15 PROCEDURE — 85652 RBC SED RATE AUTOMATED: CPT | Performed by: INTERNAL MEDICINE

## 2023-11-15 PROCEDURE — 85610 PROTHROMBIN TIME: CPT | Performed by: NURSE PRACTITIONER

## 2023-11-15 PROCEDURE — 80048 BASIC METABOLIC PNL TOTAL CA: CPT | Performed by: NURSE PRACTITIONER

## 2023-11-15 PROCEDURE — 85025 COMPLETE CBC W/AUTO DIFF WBC: CPT | Performed by: NURSE PRACTITIONER

## 2023-11-15 RX ORDER — LOSARTAN POTASSIUM 50 MG/1
100 TABLET ORAL DAILY
Status: DISCONTINUED | OUTPATIENT
Start: 2023-11-15 | End: 2023-11-21 | Stop reason: HOSPADM

## 2023-11-15 RX ORDER — LIDOCAINE 50 MG/G
3 PATCH TOPICAL DAILY
Status: DISCONTINUED | OUTPATIENT
Start: 2023-11-15 | End: 2023-11-21 | Stop reason: HOSPADM

## 2023-11-15 RX ORDER — OXYCODONE HYDROCHLORIDE 5 MG/1
5 TABLET ORAL EVERY 6 HOURS PRN
Status: DISCONTINUED | OUTPATIENT
Start: 2023-11-15 | End: 2023-11-21 | Stop reason: HOSPADM

## 2023-11-15 RX ORDER — METOPROLOL TARTRATE 100 MG/1
100 TABLET ORAL 2 TIMES DAILY
Status: DISCONTINUED | OUTPATIENT
Start: 2023-11-15 | End: 2023-11-21 | Stop reason: HOSPADM

## 2023-11-15 RX ORDER — AMLODIPINE BESYLATE 5 MG/1
5 TABLET ORAL DAILY
Status: DISCONTINUED | OUTPATIENT
Start: 2023-11-15 | End: 2023-11-21 | Stop reason: HOSPADM

## 2023-11-15 RX ORDER — PRAVASTATIN SODIUM 40 MG
40 TABLET ORAL
Status: DISCONTINUED | OUTPATIENT
Start: 2023-11-15 | End: 2023-11-21 | Stop reason: HOSPADM

## 2023-11-15 RX ORDER — HYDROMORPHONE HCL IN WATER/PF 6 MG/30 ML
0.2 PATIENT CONTROLLED ANALGESIA SYRINGE INTRAVENOUS EVERY 4 HOURS PRN
Status: DISCONTINUED | OUTPATIENT
Start: 2023-11-15 | End: 2023-11-15

## 2023-11-15 RX ORDER — NYSTATIN 100000 [USP'U]/G
POWDER TOPICAL 2 TIMES DAILY
Status: DISCONTINUED | OUTPATIENT
Start: 2023-11-15 | End: 2023-11-21 | Stop reason: HOSPADM

## 2023-11-15 RX ORDER — DIAZEPAM 5 MG/ML
2.5 INJECTION, SOLUTION INTRAMUSCULAR; INTRAVENOUS EVERY 8 HOURS PRN
Status: DISCONTINUED | OUTPATIENT
Start: 2023-11-15 | End: 2023-11-15

## 2023-11-15 RX ORDER — HYDROMORPHONE HCL IN WATER/PF 6 MG/30 ML
0.2 PATIENT CONTROLLED ANALGESIA SYRINGE INTRAVENOUS EVERY 4 HOURS PRN
Status: DISCONTINUED | OUTPATIENT
Start: 2023-11-15 | End: 2023-11-20

## 2023-11-15 RX ORDER — SENNOSIDES 8.6 MG
2 TABLET ORAL
Status: DISCONTINUED | OUTPATIENT
Start: 2023-11-15 | End: 2023-11-21 | Stop reason: HOSPADM

## 2023-11-15 RX ORDER — ACETAMINOPHEN 325 MG/1
975 TABLET ORAL EVERY 8 HOURS SCHEDULED
Status: DISCONTINUED | OUTPATIENT
Start: 2023-11-15 | End: 2023-11-21 | Stop reason: HOSPADM

## 2023-11-15 RX ORDER — POLYETHYLENE GLYCOL 3350 17 G/17G
17 POWDER, FOR SOLUTION ORAL DAILY
Status: DISCONTINUED | OUTPATIENT
Start: 2023-11-15 | End: 2023-11-21 | Stop reason: HOSPADM

## 2023-11-15 RX ORDER — ATORVASTATIN CALCIUM 40 MG/1
40 TABLET, FILM COATED ORAL EVERY EVENING
Status: DISCONTINUED | OUTPATIENT
Start: 2023-11-15 | End: 2023-11-21 | Stop reason: HOSPADM

## 2023-11-15 RX ORDER — ACETAMINOPHEN 325 MG/1
650 TABLET ORAL EVERY 8 HOURS SCHEDULED
Status: DISCONTINUED | OUTPATIENT
Start: 2023-11-15 | End: 2023-11-15

## 2023-11-15 RX ORDER — DIAZEPAM 2 MG/1
2 TABLET ORAL EVERY 8 HOURS PRN
Status: DISCONTINUED | OUTPATIENT
Start: 2023-11-15 | End: 2023-11-15

## 2023-11-15 RX ORDER — TORSEMIDE 5 MG/1
5 TABLET ORAL 3 TIMES WEEKLY
COMMUNITY
End: 2023-11-21

## 2023-11-15 RX ORDER — LEVOTHYROXINE SODIUM 0.05 MG/1
50 TABLET ORAL
Status: DISCONTINUED | OUTPATIENT
Start: 2023-11-15 | End: 2023-11-21 | Stop reason: HOSPADM

## 2023-11-15 RX ORDER — FUROSEMIDE 10 MG/ML
20 INJECTION INTRAMUSCULAR; INTRAVENOUS ONCE
Status: COMPLETED | OUTPATIENT
Start: 2023-11-15 | End: 2023-11-15

## 2023-11-15 RX ORDER — DOCUSATE SODIUM 100 MG/1
100 CAPSULE, LIQUID FILLED ORAL 2 TIMES DAILY
Status: DISCONTINUED | OUTPATIENT
Start: 2023-11-15 | End: 2023-11-21 | Stop reason: HOSPADM

## 2023-11-15 RX ORDER — DIAZEPAM 2 MG/1
2 TABLET ORAL EVERY 8 HOURS PRN
Status: DISCONTINUED | OUTPATIENT
Start: 2023-11-15 | End: 2023-11-16

## 2023-11-15 RX ORDER — DIAZEPAM 5 MG/ML
2.5 INJECTION, SOLUTION INTRAMUSCULAR; INTRAVENOUS EVERY 8 HOURS PRN
Status: DISCONTINUED | OUTPATIENT
Start: 2023-11-15 | End: 2023-11-16

## 2023-11-15 RX ORDER — DIAZEPAM 5 MG/ML
2.5 INJECTION, SOLUTION INTRAMUSCULAR; INTRAVENOUS ONCE
Status: COMPLETED | OUTPATIENT
Start: 2023-11-15 | End: 2023-11-15

## 2023-11-15 RX ORDER — HYDROMORPHONE HCL/PF 1 MG/ML
0.5 SYRINGE (ML) INJECTION ONCE
Status: COMPLETED | OUTPATIENT
Start: 2023-11-15 | End: 2023-11-15

## 2023-11-15 RX ORDER — METOPROLOL TARTRATE 1 MG/ML
2.5 INJECTION, SOLUTION INTRAVENOUS EVERY 6 HOURS PRN
Status: DISCONTINUED | OUTPATIENT
Start: 2023-11-15 | End: 2023-11-16

## 2023-11-15 RX ORDER — POLYETHYLENE GLYCOL 3350 17 G/17G
17 POWDER, FOR SOLUTION ORAL DAILY PRN
Status: DISCONTINUED | OUTPATIENT
Start: 2023-11-15 | End: 2023-11-15

## 2023-11-15 RX ADMIN — DOCUSATE SODIUM 100 MG: 100 CAPSULE, LIQUID FILLED ORAL at 11:46

## 2023-11-15 RX ADMIN — NYSTATIN 1 APPLICATION: 100000 POWDER TOPICAL at 10:06

## 2023-11-15 RX ADMIN — NYSTATIN: 100000 POWDER TOPICAL at 18:29

## 2023-11-15 RX ADMIN — OXYCODONE HYDROCHLORIDE 5 MG: 5 TABLET ORAL at 01:12

## 2023-11-15 RX ADMIN — POLYETHYLENE GLYCOL 3350 17 G: 17 POWDER, FOR SOLUTION ORAL at 11:48

## 2023-11-15 RX ADMIN — LOSARTAN POTASSIUM 100 MG: 50 TABLET, FILM COATED ORAL at 09:35

## 2023-11-15 RX ADMIN — OXYCODONE HYDROCHLORIDE 5 MG: 5 TABLET ORAL at 11:46

## 2023-11-15 RX ADMIN — FUROSEMIDE 20 MG: 10 INJECTION, SOLUTION INTRAMUSCULAR; INTRAVENOUS at 11:48

## 2023-11-15 RX ADMIN — HYDROMORPHONE HYDROCHLORIDE 0.5 MG: 1 INJECTION, SOLUTION INTRAMUSCULAR; INTRAVENOUS; SUBCUTANEOUS at 01:28

## 2023-11-15 RX ADMIN — AMLODIPINE BESYLATE 5 MG: 5 TABLET ORAL at 09:35

## 2023-11-15 RX ADMIN — SODIUM CHLORIDE, PRESERVATIVE FREE 0.04 MG: 5 INJECTION INTRAVENOUS at 05:20

## 2023-11-15 RX ADMIN — HYDROMORPHONE HYDROCHLORIDE 0.2 MG: 0.2 INJECTION, SOLUTION INTRAMUSCULAR; INTRAVENOUS; SUBCUTANEOUS at 05:41

## 2023-11-15 RX ADMIN — METOPROLOL TARTRATE 100 MG: 100 TABLET, FILM COATED ORAL at 01:32

## 2023-11-15 RX ADMIN — DIAZEPAM 2.5 MG: 10 INJECTION, SOLUTION INTRAMUSCULAR; INTRAVENOUS at 09:33

## 2023-11-15 RX ADMIN — HYDROMORPHONE HYDROCHLORIDE 0.2 MG: 0.2 INJECTION, SOLUTION INTRAMUSCULAR; INTRAVENOUS; SUBCUTANEOUS at 16:04

## 2023-11-15 NOTE — ASSESSMENT & PLAN NOTE
Unknown etiology, a/e/b inc RR up to 32, was on NRB temporarily.  Suspect multifactorial given acute mental status change possibly related to pain medication  Monitor O2 sats closely  CXR upon arrival with mild pulmonary venous congestion with trace effusions  S/p dose of IV lasix on 11/15 appears improved

## 2023-11-15 NOTE — ASSESSMENT & PLAN NOTE
Maintained on Lopressor 100 mg twice daily as well as warfarin as an outpatient with goal INR 2-3  INR held upon admission, monitor INR and resume as able  Warfarin not likely safe given age, limited mobility and fall risk.   This was reviewed with family and should be discussed further with her primary care physician

## 2023-11-15 NOTE — PLAN OF CARE
Problem: Prexisting or High Potential for Compromised Skin Integrity  Goal: Skin integrity is maintained or improved  Description: INTERVENTIONS:  - Identify patients at risk for skin breakdown  - Assess and monitor skin integrity  - Assess and monitor nutrition and hydration status  - Monitor labs   - Assess for incontinence   - Turn and reposition patient  - Assist with mobility/ambulation  - Relieve pressure over bony prominences  - Avoid friction and shearing  - Provide appropriate hygiene as needed including keeping skin clean and dry  - Evaluate need for skin moisturizer/barrier cream  - Collaborate with interdisciplinary team   - Patient/family teaching  - Consider wound care consult   Outcome: Not Progressing     Problem: PAIN - ADULT  Goal: Verbalizes/displays adequate comfort level or baseline comfort level  Description: Interventions:  - Encourage patient to monitor pain and request assistance  - Assess pain using appropriate pain scale  - Administer analgesics based on type and severity of pain and evaluate response  - Implement non-pharmacological measures as appropriate and evaluate response  - Consider cultural and social influences on pain and pain management  - Notify physician/advanced practitioner if interventions unsuccessful or patient reports new pain  Outcome: Not Progressing     Problem: INFECTION - ADULT  Goal: Absence or prevention of progression during hospitalization  Description: INTERVENTIONS:  - Assess and monitor for signs and symptoms of infection  - Monitor lab/diagnostic results  - Monitor all insertion sites, i.e. indwelling lines, tubes, and drains  - Monitor endotracheal if appropriate and nasal secretions for changes in amount and color  - [place] appropriate cooling/warming therapies per order  - Administer medications as ordered  - Instruct and encourage patient and family to use good hand hygiene technique  - Identify and instruct in appropriate isolation precautions for identified infection/condition  Outcome: Progressing  Goal: Absence of fever/infection during neutropenic period  Description: INTERVENTIONS:  - Monitor WBC    Outcome: Progressing     Problem: SAFETY ADULT  Goal: Patient will remain free of falls  Description: INTERVENTIONS:  - Educate patient/family on patient safety including physical limitations  - Instruct patient to call for assistance with activity   - Consult OT/PT to assist with strengthening/mobility   - Keep Call bell within reach  - Keep bed low and locked with side rails adjusted as appropriate  - Keep care items and personal belongings within reach  - Initiate and maintain comfort rounds  - Make Fall Risk Sign visible to staff  - Offer Toileting every \ Hours, in advance of need  - Initiate/Maintain alarm  - Obtain necessary fall risk management equipment:   - Apply yellow socks and bracelet for high fall risk patients  - Consider moving patient to room near nurses station  Outcome: Not Progressing  Goal: Maintain or return to baseline ADL function  Description: INTERVENTIONS:  -  Assess patient's ability to carry out ADLs; assess patient's baseline for ADL function and identify physical deficits which impact ability to perform ADLs (bathing, care of mouth/teeth, toileting, grooming, dressing, etc.)  - Assess/evaluate cause of self-care deficits   - Assess range of motion  - Assess patient's mobility; develop plan if impaired  - Assess patient's need for assistive devices and provide as appropriate  - Encourage maximum independence but intervene and supervise when necessary  - Involve family in performance of ADLs  - Assess for home care needs following discharge   - Consider OT consult to assist with ADL evaluation and planning for discharge  - Provide patient education as appropriate  Outcome: Not Progressing  Goal: Maintains/Returns to pre admission functional level  Description: INTERVENTIONS:  - Perform AM-PAC 6 Click Basic Mobility/ Daily Activity assessment daily.  - Set and communicate daily mobility goal to care team and patient/family/caregiver. - Collaborate with rehabilitation services on mobility goals if consulted  - Perform Range of Motion  times a day. - Reposition patient every  hours. - Dangle patient  times a day  - Stand patient  times a day  - Ambulate patient  times a day  - Out of bed to chair  times a day   - Out of bed for meals times a day  - Out of bed for toileting  - Record patient progress and toleration of activity level   Outcome: Progressing     Problem: DISCHARGE PLANNING  Goal: Discharge to home or other facility with appropriate resources  Description: INTERVENTIONS:  - Identify barriers to discharge w/patient and caregiver  - Arrange for needed discharge resources and transportation as appropriate  - Identify discharge learning needs (meds, wound care, etc.)  - Arrange for interpretive services to assist at discharge as needed  - Refer to Case Management Department for coordinating discharge planning if the patient needs post-hospital services based on physician/advanced practitioner order or complex needs related to functional status, cognitive ability, or social support system  Outcome: Progressing     Problem: Knowledge Deficit  Goal: Patient/family/caregiver demonstrates understanding of disease process, treatment plan, medications, and discharge instructions  Description: Complete learning assessment and assess knowledge base.   Interventions:  - Provide teaching at level of understanding  - Provide teaching via preferred learning methods  Outcome: Progressing

## 2023-11-15 NOTE — ASSESSMENT & PLAN NOTE
Lab Results   Component Value Date    EGFR 30 11/20/2023    EGFR 32 11/18/2023    EGFR 28 11/17/2023    CREATININE 1.45 (H) 11/20/2023    CREATININE 1.38 (H) 11/18/2023    CREATININE 1.51 (H) 11/17/2023   Estimated Creatinine Clearance: 23.4 mL/min (A) (by C-G formula based on SCr of 1.45 mg/dL (H)). We will avoid nephrotoxic pain medications.

## 2023-11-15 NOTE — CASE MANAGEMENT
Case Management Assessment & Discharge Planning Note    Patient name Cara Still  Location S /S -01 MRN [id number]  : 2/3/1927 Date 11/15/2023       Current Admission Date: 2023  Current Admission Diagnosis:Acute on chronic low back pain   Patient Active Problem List    Diagnosis Date Noted    Bilateral lower extremity edema 11/15/2023    Toxic encephalopathy 11/15/2023    Constipation 11/15/2023    Leukocytosis 11/15/2023    Goals of care, counseling/discussion 11/15/2023    Acute respiratory failure ([address]) 11/15/2023    Facial droop 11/15/2023    Gallbladder sludge 2023    Thickening of wall of gallbladder 2023    Groin rash 2023    Altered mental status 2023    Continuous opioid dependence ([address]) 2022    Acute on chronic low back pain 2019    Stage 3 chronic kidney disease ([address]) 2019    Chronic pain of left knee 2019    Effusion of right knee 2019    Loss of bladder control 2017    Atrial fibrillation ([address]) 2017    Urinary incontinence 2017    Bladder prolapse, female, acquired 2015    Hypertension 2015    Bilateral primary osteoarthritis of knee 2014    Hypercholesterolemia 2013    Hypothyroidism 2013    Osteoarthrosis of knee 2013      LOS (days): 0  Geometric Mean LOS (GMLOS) (days): 2.90  Days to GMLOS:2.5     OBJECTIVE:    Risk of Unplanned Readmission Score: 26.93         Current admission status: Inpatient       Preferred Pharmacy:   [address]  [address] [place] [address]  Phone: [phone] Fax: [phone]    Primary Care Provider: Saray Garcia MD    Primary Insurance: MEDICARE  Secondary Insurance: [address]:  [address] [place] [address] - Child   Primary Phone: [phone] (Home)                                Patient Information  Admitted from[de-identified] Home  Mental Status: Other (Comment) (patient with poor pain management - spoke with family on phnoe)  During Assessment patient was accompanied by: Not accompanied during assessment  Assessment information provided by[de-identified] Son, Daughter (children by phone - Clementina Martínez)  Primary Caregiver: Family  Support Systems: Daughter  [place] of Residence: [facility] [address] do you live in?: [address] entry access options.  Select all that apply.: Stairs (family working on getting ramps)  Number of steps to enter home.: 2 (to the porch; one if going through the back door)  Type of Current Residence: [place]  In the last 12 months, was there a time when you were not able to pay the mortgage or rent on time?: No  In the last 12 months, how many places have you lived?: 1  In the last 12 months, was there a time when you did not have a steady place to sleep or slept in a shelter (including now)?: No  Homeless/housing insecurity resource given?: N/A  Living Arrangements: Lives w/ Daughter    Activities of Daily Living Prior to Admission  Functional Status: Assistance  Completes ADLs independently?: No  Level of ADL dependence: Assistance  Ambulates independently?: Yes  Does patient use assisted devices?: Yes  Assisted Devices (DME) used: Rollator  Does patient currently own DME?: Yes  What DME does the patient currently own?: Rollator  Does patient have a history of [facility]?: No  Does patient currently have [facility]?: No         Patient Information Continued  Does patient have prescription coverage?: Yes  Within the past 12 months, you worried that your food would run out before you got the money to buy more.: Never true  Within the past 12 months, the food you bought just didn't last and you didn't have money to get more.: Never true  Food insecurity resource given?: N/A  Does patient receive dialysis treatments?: No         Means of Transportation  Means of Transport to Appts[de-identified] Family transport  In the past 12 months, has lack of transportation kept you from medical appointments or from getting medications?: No  In the past 12 months, has lack of transportation kept you from meetings, work, or from getting things needed for daily living?: No  Was application for public transport provided?: N/A        DISCHARGE DETAILS:    Discharge planning discussed with[de-identified] patient's daughter, Angelita Joshua and Bharati Quinn by phone        CM contacted family/caregiver?: Yes  Were Treatment Team discharge recommendations reviewed with patient/caregiver?: Yes  Did patient/caregiver verbalize understanding of patient care needs?: Yes  Were patient/caregiver advised of the risks associated with not following Treatment Team discharge recommendations?: Yes    Contacts  Patient Contacts: [place] (daughter)  Relationship to Patient[de-identified] Family  Contact Method: Phone  Phone Number: [phone]  Reason/Outcome: Continuity of Care, Emergency Contact, Discharge Planning, Referral              Other Referral/Resources/Interventions Provided:  Interventions: Hospice  Referral Comments: Patient admitted due to acute on chronic low back pain. Return call received from patient's daughter, [place], and other siblings - Karly Blood and [place] Lemmings - all included via speaker phone. Family relays concern about patient's pain management and underlying cause of same. Report that a week ago patient had been ambulatory with her rollator - family assisted with ADLs at baseline, but she was alert, oriented, and just a tad forgetful. Now they report difficulty moving and complaints of constant pain. Aware that APS consulted and assisting with pain management. Family relayed possible interest in comfort care/hospice, but continue to be concerned about what would have caused acute change in patient's condition.  At this time, they are not interested in making hospice consults, but are open to same if patient's condition not improving. Aware that this writer will follow-up with MD in AM. Daughter to be in around 11:00am and will discuss ongoing plan at this time. Patient does have good family support - Bhanu Rosas does have medical POA, but all five children are involved in patient's care (son, Zheng Henderson, coming in tomorrow, 11/16). Will continue to follow for d/c planning needs, but may require ongoing goals of care discussion.     Would you like to participate in our [facility] service program?  : No - Declined

## 2023-11-15 NOTE — ASSESSMENT & PLAN NOTE
Noted to be screaming secondary to pain, was not able to be redirected. Upon admission did receive several narcotics in an effort to obtain CT scan which then required Narcan due to oversedation.   Per daughter, at baseline patient is alert and oriented x3, however does have some forgetfulness and this tends to be worsened by acute pain  At this time, suspect multifactorial  Exam is nonfocal  Monitor mental status  Check UA with reflex to culture given leukocytosis

## 2023-11-15 NOTE — ASSESSMENT & PLAN NOTE
Noted to be screaming secondary to pain, was not able to be redirected on AM 11/15. Upon admission did receive several narcotics in an effort to obtain CT scan which then required Narcan due to oversedation.   Per daughter, at baseline patient is alert and oriented x3, however does have some forgetfulness and this tends to be worsened by acute pain  At this time, suspect multifactorial  CT head showing stroke however is not new per neurology, at least several months old  UA checked and unremarkable  Is markedly improved as of 11/16 AM, appears back to baseline

## 2023-11-15 NOTE — CONSULTS
Consultation - Acute Pain Service  Airam Melvin 80 y.o. female MRN: [id number]  Unit/Bed#: S MS 73984 Encounter: [id number]               Airam Melvin is a 80 y.o. female with acute on chronic lower back pain with no known trauma. Encephalopathy  Assessment & Plan  Patient reportedly with altered mental status overnight. Patient given Narcan with improvement in mentation. Patient had received 150 mcg IV fentanyl, 0.2 mg IV Dilaudid and 68 mg IV ketamine bolus in ED prior to change in mental status. Patient also received gabapentin 100 mg p.o. at approximately midnight. This does not appear to be a home medication. Suggest discontinue gabapentin based on patient's age and renal function as this could be a source of altered mental status. Continuous opioid dependence ([address])  Assessment & Plan  History of chronic lower back pain. Is prescribed Norco 7.5-325 mg tablets, 1 p.o. 3 times daily as needed severe pain. Patient takes this 3 times a day on most days. Norco prescribed by PCP. Stage 3 chronic kidney disease [facility])  Assessment & Plan  Lab Results   Component Value Date    EGFR 26 11/15/2023    EGFR 25 11/14/2023    EGFR 28 11/08/2023    CREATININE 1.63 (H) 11/15/2023    CREATININE 1.68 (H) 11/14/2023    CREATININE 1.51 (H) 11/08/2023   Estimated Creatinine Clearance: 20.8 mL/min (A) (by C-G formula based on SCr of 1.63 mg/dL (H)). We will avoid nephrotoxic pain medications. * Acute on chronic low back pain  Assessment & Plan  Tylenol 975 mg p.o. every 8 hours. Lidocaine patch x3, on for 12 hours and off for 12 hours. Oxycodone 2.5 mg p.o. every 6 hours as needed moderate pain or 5 mg p.o. every 6 hours as needed severe pain. Dilaudid 0.2 mg IV every 4 hours as needed breakthrough pain. Add Valium 2 mg p.o. every 8 hours as needed muscle spasm. Hold opioid pain medication and benzodiazepines for decreased mental status.   Bowel regimen to avoid opioid-induced constipation while on opioid pain medication. Ice to painful area for up to 20 minutes every hour as needed. On my initial assessment, patient appeared very uncomfortable having what appeared to be spasms of pain. Had received 0.2 mg IV Dilaudid approximately 4 hours prior to my arrival.  Patient was awake and alert, oriented x3. No evidence of respiratory depression or altered mental status. Patient was given a one-time dose of 2.5 mg IV Valium and reassessed approximately 1 hour later. At that time, patient was sleeping soundly and easily arousable. States her pain was much improved compared to previously. APS will continue to follow. Please contact Acute Pain Service - via [employer] from [phone] with additional questions or concerns. See [employer] or [email or web address] for additional contacts and after hours information. History of Present Illness    Admit Date:  11/14/2023  Hospital Day:  0 days  Primary Service:  Hospitalist  Attending Provider:  Trinity Dickerson MD  Physician Requesting Consult: Trinity Dickerson MD  Reason for Consult / Principal Problem: Back pain  HPI: Johnnie Cárdenas is a 80y.o. year old female who presents with history of chronic lower back pain managed with Norco 7.5 mg 3 times daily, Tylenol as needed prescribed by primary care physician. Patient seen in the emergency department on 11/8/2023 due to lower extremity edema and was discharged from there. While there, patient developed right lower back and right hip pain which did not require admission. Since that time, patient has had worsening lower back pain and bilateral hip pain as well as difficulty ambulating. No red flag symptoms to suggest cauda equina syndrome. Does have a long history of chronic lower back pain and spinal stenosis. Imaging on admission included CT scan of the abdomen and pelvis with lumbar spine reconstructions which showed no acute abnormalities. Lumbar spinal stenosis was again identified.   On my arrival to the room, patient was yelling out in pain and appeared extremely uncomfortable complaining of lower back and bilateral hip pain described as spasms and sharp. Overnight, patient reportedly had altered mental status following administration of fentanyl 150 mcg IV, Dilaudid 0.2 mg IV and ketamine 68 mg IV. Patient also received gabapentin 100 mg p.o. approximately midnight which does not appear to be a home medication. Patient was given Narcan with improvement in mentation, however now appears to be very uncomfortable. Following administration of Valium 2.5 mg IV, patient was reevaluated in 1 hour. At that time she felt more comfortable and was in fact sleeping on my arrival.  She was easy to awaken and remained oriented x3. Current pain location(s): Pain Score: 10  Pain Location/Orientation: Orientation: Right, Location: Buttocks, Location: Hip  Pain Scale: Pain Assessment Tool: 0-10  Current Analgesic regimen:  Tylenol 975 mg p.o. every 8 hours. Oxycodone 2.5 mg p.o. every 8 hours as needed moderate pain. Oxycodone 5 mg p.o. every 8 hours as needed severe pain. Dilaudid 0.2 mg IV every 6 hours as needed breakthrough pain. Lidocaine patch x2. Gabapentin 100 mg p.o. 3 times daily. Pain History: Chronic lower back pain  Pain Management Physician:    Karen Sanchez [facility] [address]       I have reviewed the patient's controlled substance dispensing history in the Prescription Drug Monitoring Program in compliance with the [facility] regulations before prescribing any controlled substances. Inpatient consult to Acute Pain Service  Consult performed by: Jose Barker PA-C  Consult ordered by: Yesi Hernández PA-C          Review of Systems   Musculoskeletal:  Positive for arthralgias, back pain and gait problem. Neurological:  Negative for weakness and numbness. All other systems reviewed and are negative.       Historical Information   Past Medical History: Diagnosis Date    , spontaneous complete     Carcinoma of skin     Cataract     last assessed: 17    Cataract     Cellulitis     Effusion of both knee joints     resolved: 3/25/15    Effusion of both knee joints     Enteritis     Female stress incontinence     last assessed: 13    Female stress incontinence     Gastric ulcer     last assessed: 14    Gastric ulcer     GERD (gastroesophageal reflux disease)     Hyperlipidemia     Hypertension     Hyperthyroidism     Lyme disease     last assessed: 13    Lyme disease     Microscopic hematuria     last assessed: 13    Microscopic hematuria     Normal delivery     1950 son, 1952 son, 12 daughter, 26 daughter, 65 daughter    Paroxysmal atrial fibrillation ([address])     last assessed: 13    Paroxysmal atrial fibrillation ([address])     Perirectal abscess     last assessed: 13    Platelet dysfunction (HCC)     PAF    Platelet dysfunction (HCC)     Sinus tachycardia     last assessed: 13    Sinus tachycardia     Spinal stenosis     lumbar; last assessed: 10/4/13    Stage 3 chronic kidney disease ([address]) 6/3/2019    Stage 3 chronic kidney disease ([address])     Tachycardia     unspecified; last assessed: 13    Trigger finger, acquired     last assessed: 6/30/15    Trigger finger, acquired      Past Surgical History:   Procedure Laterality Date    KNEE ARTHROSCOPY Bilateral         KNEE ARTHROSCOPY      LUMBAR LAMINECTOMY          LUMBAR LAMINECTOMY      NEUROPLASTY / TRANSPOSITION MEDIAN NERVE AT CARPAL TUNNEL Bilateral         NEUROPLASTY / TRANSPOSITION MEDIAN NERVE AT CARPAL TUNNEL      TONSILLECTOMY AND ADENOIDECTOMY      TOTAL HIP ARTHROPLASTY      joint replacement; L hip; 2/2/10    TOTAL HIP ARTHROPLASTY       Social History   Social History     Substance and Sexual Activity   Alcohol Use Yes    Comment: rare on special occassions only     Social History     Substance and Sexual Activity   Drug Use Never Social History     Tobacco Use   Smoking Status Never   Smokeless Tobacco Never     Family History:   Family History   Problem Relation Age of Onset    Rheumatic fever Mother     Heart disease Father     Crohn's disease Brother         (Granulomatous) Golitis    Psoriasis Daughter     Heart disease Son        Meds/Allergies   all current active meds have been reviewed, current meds:   Current Facility-Administered Medications   Medication Dose Route Frequency    acetaminophen (TYLENOL) tablet 975 mg  975 mg Oral Q8H [facility]    amLODIPine (NORVASC) tablet 5 mg  5 mg Oral Daily    diazepam (VALIUM) tablet 2 mg  2 mg Oral Q8H PRN    docusate sodium (COLACE) capsule 100 mg  100 mg Oral BID    furosemide (LASIX) injection 20 mg  20 mg Intravenous Once    HYDROmorphone HCl (DILAUDID) injection 0.2 mg  0.2 mg Intravenous Q4H PRN    levothyroxine tablet 50 mcg  50 mcg Oral Early Morning    lidocaine (LIDODERM) 5 % patch 3 patch  3 patch Topical Daily    losartan (COZAAR) tablet 100 mg  100 mg Oral Daily    metoprolol tartrate (LOPRESSOR) tablet 100 mg  100 mg Oral BID    nystatin (MYCOSTATIN) powder   Topical BID    oxyCODONE (ROXICODONE) IR tablet 5 mg  5 mg Oral Q6H PRN    oxyCODONE (ROXICODONE) split tablet 2.5 mg  2.5 mg Oral Q6H PRN    polyethylene glycol (MIRALAX) packet 17 g  17 g Oral Daily    pravastatin (PRAVACHOL) tablet 40 mg  40 mg Oral Daily With Dinner    senna (SENOKOT) tablet 17.2 mg  2 tablet Oral HS   , and PTA meds:   Prior to Admission Medications   Prescriptions Last Dose Informant Patient Reported? Taking?    HYDROcodone-acetaminophen (XODOL) 7.5-300 MG per tablet   No No   Sig: Take 1 tablet by mouth every 8 (eight) hours as needed for severe pain Max Daily Amount: 3 tablets   acetaminophen (TYLENOL) 325 mg tablet  Self, Child No No   Sig: Take 2 tablets (650 mg total) by mouth every 8 (eight) hours as needed for mild pain, headaches or fever   amLODIPine (NORVASC) 5 mg tablet   No No   Sig: Take 1 tablet (5 mg total) by mouth daily   clotrimazole (LOTRIMIN) 1 % cream  Self, Child No No   Sig: Apply 1 g (1 Application total) topically 2 (two) times a day for 14 days APPLY TO AREA OF BELLY BUTTON AND BOTH GROIN CREASES 2 TIMES PER DAY UNTIL GONE   ergocalciferol (VITAMIN D2) 50,000 units  Self, Child Yes No   Sig: Take by mouth once a week   levothyroxine 50 mcg tablet  Self, Child No No   Sig: Take 1 tablet (50 mcg total) by mouth daily   losartan (COZAAR) 100 MG tablet  Self, Child No No   Sig: Take 1 tablet (100 mg total) by mouth daily   metoprolol tartrate (LOPRESSOR) 100 mg tablet  Self, Child No No   Sig: Take 1 tablet (100 mg total) by mouth 2 (two) times a day   nystatin (MYCOSTATIN) powder  Self, Child No No   Sig: Apply topically 2 (two) times a day   polyethylene glycol (MIRALAX) 17 g packet  Self, Child Yes No   Sig: Take 17 g by mouth as needed     senna-docusate sodium (SENOKOT S) 8.6-50 mg per tablet  Self, Child Yes No   Sig: Take 1 tablet by mouth as needed for constipation   simvastatin (ZOCOR) 20 mg tablet  Self, Child No No   Sig: Take 1 tablet (20 mg total) by mouth daily at bedtime   torsemide (DEMADEX) 5 MG tablet   Yes Yes   Sig: Take 5 mg by mouth 3 (three) times a week   warfarin (COUMADIN) 5 mg tablet  Self, Child No No   Sig: TAKE 5 MG ON MONDAY AND SATURDAY AND 2.5 MG ALL OTHER DAYS   Patient taking differently: TAKE 5 MG SATURDAY AND 2.5 MG ALL OTHER DAYS      Facility-Administered Medications: None       Allergies   Allergen Reactions    Cortisone     Oxaprozin Hives    Penicillins Hives       Objective   Vitals:    11/15/23 0437 11/15/23 0454 11/15/23 0743 11/15/23 0915   BP: 161/74      BP Location:       Pulse: 63  89    Resp: 18  18    Temp: 98.3 °F (36.8 °C)  98.9 °F (37.2 °C)    TempSrc: Oral  Oral    SpO2:  100% 95% 97%   Weight:  91.2 kg (201 lb 1 oz)         No intake or output data in the 24 hours ending 11/15/23 1046    Physical Exam  Vitals and nursing note reviewed. Constitutional:       General: She is awake. She is in acute distress. Appearance: She is not ill-appearing, toxic-appearing or diaphoretic. Eyes:      Conjunctiva/sclera: Conjunctivae normal.      Pupils: Pupils are equal, round, and reactive to light. Cardiovascular:      Rate and Rhythm: Normal rate and regular rhythm. Musculoskeletal:      Lumbar back: Tenderness and bony tenderness present. Decreased range of motion. Skin:     General: Skin is warm and dry. Neurological:      Mental Status: She is alert and oriented to person, place, and time. GCS: GCS eye subscore is 4. GCS verbal subscore is 5. GCS motor subscore is 6. Psychiatric:         Attention and Perception: Attention normal.         Speech: Speech normal.         Behavior: Behavior is agitated. Behavior is cooperative. Lab Results:  Estimated Creatinine Clearance: 20.8 mL/min (A) (by C-G formula based on SCr of 1.63 mg/dL (H)). Lab Results   Component Value Date    WBC 10.95 (H) 11/15/2023    WBC 8.14 04/24/2015    HGB 10.7 (L) 11/15/2023    HGB 15.6 (H) 04/24/2015    HCT 33.3 (L) 11/15/2023    HCT 46.2 (H) 04/24/2015     11/15/2023     04/24/2015         Component Value Date/Time     04/24/2015 0732    K 5.3 11/15/2023 0459    K 4.0 04/24/2015 0732     11/15/2023 0459     04/24/2015 0732    CO2 25 11/15/2023 0459    CO2 34 (H) 06/24/2019 0234    BUN 43 (H) 11/15/2023 0459    BUN 16 04/24/2015 0732    CREATININE 1.63 (H) 11/15/2023 0459    CREATININE 1.13 04/24/2015 0732         Component Value Date/Time    CALCIUM 8.5 11/15/2023 0459    CALCIUM 9.0 04/24/2015 0732    ALKPHOS 87 11/14/2023 1518    ALKPHOS 130 (H) 04/24/2015 0732    AST 29 11/14/2023 1518    AST 24 04/24/2015 0732    ALT 18 11/14/2023 1518    ALT 17 04/24/2015 0732    BILITOT 0.46 04/24/2015 0732    TP 7.1 11/14/2023 1518    ALB 4.1 11/14/2023 1518    ALB 3.8 04/24/2015 0732       Imaging Studies/EKG:  I have personally reviewed pertinent reports. Counseling / Coordination of Care  Total floor / unit time spent today 56 minutes. Greater than 50% of total time was spent with the patient and / or family counseling and / or coordination of care. A description of the counseling / coordination of care: Patient interview, physical examination, review of medical records, review of imaging and laboratory data, development of pain management plan, discussion of pain management plan with patient and primary service. Please note that the APS provides consultative services regarding pain management only. With the exception of ketamine and epidural infusions and except when indicated, final decisions regarding starting or changing doses of analgesic medications are at the discretion of the consulting service.   Jose Barker PA-C  Acute Pain Service

## 2023-11-15 NOTE — ASSESSMENT & PLAN NOTE
History of chronic lower back pain. Is prescribed Norco 7.5-325 mg tablets, 1 p.o. 3 times daily as needed severe pain. Patient takes this 3 times a day on most days. Norco prescribed by PCP.

## 2023-11-15 NOTE — QUICK NOTE
Notified for right disorientation and facial droop, positional.    - exam normal after one dose narcan. Pain has been difficult to control while in ED requiring fentanyl, ketamine. Since, has gotten oxycodone 5 mg, dilaudid 0.5 mg.  Hold further pain meds for now.   - neuro exam non focal

## 2023-11-15 NOTE — ASSESSMENT & PLAN NOTE
Lab Results   Component Value Date    EGFR 26 11/15/2023    EGFR 25 11/14/2023    EGFR 28 11/08/2023    CREATININE 1.63 (H) 11/15/2023    CREATININE 1.68 (H) 11/14/2023    CREATININE 1.51 (H) 11/08/2023     Baseline appears around 1.3-1.5 per lab review  Daughter states patient is maintained on Demadex 5 mg 3 times weekly, was previously on daily diuretics however developed dehydration  Will trial IV lasix to help with edema  Ok for losartan as well

## 2023-11-15 NOTE — ASSESSMENT & PLAN NOTE
Worsening pain to R thoracic, R hip, R knee. Unable to ambulate. At baseline, is able to stand/pivot and take some steps with a  walker. CT: advanced disc degenerative change in the lumbar spine  Pain control. Consider pain mgt if no improvement  Neurovasc checks. Currently no numbness, bowel in continence, saddle anesthesia. Urinary incontinence at baseline.   PT OT

## 2023-11-15 NOTE — PLAN OF CARE
Problem: PHYSICAL THERAPY ADULT  Goal: Performs mobility at highest level of function for planned discharge setting. See evaluation for individualized goals. Description: Treatment/Interventions: Functional transfer training, LE strengthening/ROM, Therapeutic exercise, Endurance training, Cognitive reorientation, Patient/family training, Equipment eval/education, Bed mobility (PT to see next session to complete mobility assessment)          See flowsheet documentation for full assessment, interventions and recommendations. Note:    Problem List: Decreased strength, Decreased range of motion, Decreased endurance, Impaired balance, Decreased mobility, Decreased safety awareness, Decreased cognition, Pain  Assessment: Pt presents with worsening back pain over the past week. Dx: encephalopathy, continuous opioid dependence, CKD, acute on chronic low back pain, B LE edema, HTN, and a-fib. order placed for PT eval and tx, w/ activity order of up and out of bed as tolerated. pt presents w/ comorbidities of a-fib, OA, chronic back pain, urinary incontinence, cellulitis, hyperlipidemia, HTN, left KRYSTEN, and CKD and personal factors of advanced age, mobilizing w/ assistive device, stair(s) to enter home, and decreased cognition. pt presents w/ pain, weakness, decreased ROM, decreased endurance, impaired cognition, impaired balance, decreased safety awareness, and fall risk. these impairments are evident in findings from physical examination (weakness and decreased ROM), mobility assessment (need for max assist w/ bed mobility, inability to transfer or mobilize out of bed, need for input for mobility technique/safety), and Barthel Index: 5/100. pt needed input for task focus and mobility technique. pt is at risk for falls due to physical and safety awareness deficits.  pt's clinical presentation is unstable/unpredictable (evident in poor blood pressure control, need for assist w/ bed mobility and inability to mobilize out of bed when usually mobilizing independently, pain impacting overall mobility status, and need for input for task focus and mobility technique). pt needs inpatient PT tx to improve mobility deficits and progress mobility training as appropriate. Orthopedics consult would benefit pt to address back pain control. Pt needs dependent means for out of bed mobilization. Rehab Resource Intensity Level, PT: II (Moderate Resource Intensity)    See flowsheet documentation for full assessment.

## 2023-11-15 NOTE — PROGRESS NOTES
[id number]  Progress Note  Name: Jennifer De Los Santos  MRN: [id number]  Unit/Bed#: S -01 I Date of Admission: 11/14/2023   Date of Service: 11/15/2023 I Hospital Day: 0    Assessment/Plan   * Acute on chronic low back pain  Assessment & Plan  Presents from home (lives with daughters) with 1 week history of acute on chronic pain of back, bilateral hips and R knee. Has been unable to ambulate. At baseline, is able to stand/pivot and take some steps with a  walker. Does take Norco chronically for years for back pain. No red flag symptoms. Was seen in ER last week for similar and discharged. CT lumbar spine: severe disc and facet degenerative change throughout the lumbar spine. Multilevel mild to moderate central canal stenosis and moderate to severe neural foraminal narrowing. CT A/P: Moderate amount of stool in the colon may indicate constipation. No evidence of bowel obstruction, colitis or diverticulitis. Advanced disc degenerative change in the lumbar spine. Normal alignment of the left hip arthroplasty. R Knee xray: severe tricompartmental OA  Was started on pain medications including Ketamine in ER which then required Narcan due to AMS  Check ESR/CRP  APS consulted, regimen adjusted  Placed on scheduled Tylenol 975 mg every 8 hours, lido patch to back, L hip and R knee, Valium 2 mg every 8 hours as needed muscle spasms, Oxy 2.5 mg PO q6h PRN moderate pain, oxy 5 mg PO q6h PRN severe pain, IV dilaudid 0.2 mg q4h PRN breakthrough pain  D/W family, consideration for additional imaging like MRI if able to tolerate (however suspect would need anesthesia which would be high risk given age) however this would not change any management given her age and comorbidities  Eventual PT/OT when pain controlled    Goals of care, counseling/discussion  Assessment & Plan  Extensive d/w daughter Jacque Strickland AM 11/15. Confirms DNR/DNI status.   I explained that due to her advanced age, even if we were able to effectively control her pain for an MRI or other advanced imaging, she would not likely be a candidate for any sort of intervention. If we are able to effectively control her pain over the next 24 to 48 hours, will continue plan as above. If her pain remains uncontrolled despite maximum conservative therapies, will need to consider discussing comfort measures/hospice so that we do not have to balance the adverse effects of pain medications with her respiratory status/wakefulness    Continuous opioid dependence ([address])  Assessment & Plan  In setting of chronic OA, lumbar degenerative disc disease  Pain control as above    Bilateral primary osteoarthritis of knee  Assessment & Plan  Reported worsening R knee pain   Xrays negative  Pain control as above     Leukocytosis  Assessment & Plan  Mild, ? reactive  No etiology on CT  Check UA with reflex to culture     Constipation  Assessment & Plan  Noted on imaging  Bowel regimen ordered  Abdominal exams     Encephalopathy  Assessment & Plan  Noted to be screaming secondary to pain, was not able to be redirected. Upon admission did receive several narcotics in an effort to obtain CT scan which then required Narcan due to oversedation. Per daughter, at baseline patient is alert and oriented x3, however does have some forgetfulness and this tends to be worsened by acute pain  At this time, suspect multifactorial  Exam is nonfocal  Monitor mental status  Check UA with reflex to culture given leukocytosis     Bilateral lower extremity edema  Assessment & Plan  Acute on chronic per family, worsening over the last week  Is maintained on warfarin, doubt DVT  Orts she does not have a history of heart failure, no echo on file that I am able to review.   Given advanced age and the fact that this would again not , will defer  Elevate, compression with bilateral Ace wrap's  Hold home demadex  Trial dose of IV lasix    Stage 3 chronic kidney disease [facility])  Assessment & Plan  Lab Results   Component Value Date    EGFR 26 11/15/2023    EGFR 25 11/14/2023    EGFR 28 11/08/2023    CREATININE 1.63 (H) 11/15/2023    CREATININE 1.68 (H) 11/14/2023    CREATININE 1.51 (H) 11/08/2023     Baseline appears around 1.3-1.5 per lab review  Daughter states patient is maintained on Demadex 5 mg 3 times weekly, was previously on daily diuretics however developed dehydration  Will trial IV lasix to help with edema  Ok for losartan as well    Hypothyroidism  Assessment & Plan  Continue synthroid     Hypertension  Assessment & Plan  Elevated likely in setting of pain   Continue home dose Cozaar 100 mg daily, norvasc 5 mg daily and lopressor 100 mg BID    Atrial fibrillation (HCC)  Assessment & Plan  Maintained on Lopressor 100 mg twice daily as well as warfarin as an outpatient with goal INR 2-3  INR held upon admission, monitor INR and resume as able  Warfarin not likely safe given age, limited mobility and fall risk. This was reviewed with family and should be discussed further with her primary care physician               VTE Pharmacologic Prophylaxis: VTE Score: 4 Moderate Risk (Score 3-4) - Pharmacological DVT Prophylaxis Ordered: warfarin (Coumadin). Mobility:   Basic Mobility Inpatient Raw Score: 9  -[facility] Goal: 3: Sit at edge of bed  -[facility] Achieved: 1: Laying in bed      Patient Centered Rounds: I performed bedside rounds with nursing staff today. Discussions with Specialists or Other Care Team Provider: d/w TARA WALL    Education and Discussions with Family / Patient: Updated  (daughter) via phone. Total Time Spent on Date of Encounter in care of patient: 45 mins.  This time was spent on one or more of the following: performing physical exam; counseling and coordination of care; obtaining or reviewing history; documenting in the medical record; reviewing/ordering tests, medications or procedures; communicating with other healthcare professionals and discussing with patient's family/caregivers. Current Length of Stay: 0 day(s)  Current Patient Status: OBS  Certification Statement: The patient, admitted on an observation basis, will now require > 2 midnight hospital stay due to severe back pain, inability to ambulate, AMS, leukocytosis  Discharge Plan: Anticipate discharge in >72 hrs to discharge location to be determined pending rehab evaluations. Code Status: Level 3 - DNAR and DNI    Subjective:   Pt screaming in bed upon entrance to room this AM secondary to pain. I was able to get her to calm down temporarily to tell me that the pain was primarily in her bilateral hips, mid and low back, and right knee. She denies any trauma. Otherwise very poor historian. Objective:     Vitals:   Temp (24hrs), Av.6 °F (37 °C), Min:98.3 °F (36.8 °C), Max:98.9 °F (37.2 °C)    Temp:  [98.3 °F (36.8 °C)-98.9 °F (37.2 °C)] 98.9 °F (37.2 °C)  HR:  [] 89  Resp:  [17-32] 18  BP: (123-200)/(58-97) 161/74  SpO2:  [95 %-100 %] 97 %  Body mass index is 37.99 kg/m². Input and Output Summary (last 24 hours):   No intake or output data in the 24 hours ending 11/15/23 1026    Physical Exam:   Physical Exam  Vitals and nursing note reviewed. Constitutional:       General: She is in acute distress. Appearance: She is obese. Comments: On 2L, writhing in pain    Cardiovascular:      Rate and Rhythm: Rhythm irregular. Pulmonary:      Effort: No respiratory distress. Abdominal:      General: There is no distension. Palpations: Abdomen is soft. Tenderness: There is no abdominal tenderness. Musculoskeletal:         General: Tenderness (L hip, R knee) present. Right lower leg: Edema present. Left lower leg: Edema present. Skin:     Coloration: Skin is pale. Findings: No bruising or erythema. Neurological:      Mental Status: She is oriented to person, place, and time.       Comments: Moves all extremities    Psychiatric: Comments: Anxious           Additional Data:     Labs:  Results from last 7 days   Lab Units 11/15/23  0459   WBC Thousand/uL 10.95*   HEMOGLOBIN g/dL 10.7*   HEMATOCRIT % 33.3*   PLATELETS Thousands/uL 236   NEUTROS PCT % 80*   LYMPHS PCT % 9*   MONOS PCT % 8   EOS PCT % 1     Results from last 7 days   Lab Units 11/15/23  0459 11/14/23  1518   SODIUM mmol/L 136 137   POTASSIUM mmol/L 5.3 4.9   CHLORIDE mmol/L 105 105   CO2 mmol/L 25 25   BUN mg/dL 43* 44*   CREATININE mg/dL 1.63* 1.68*   ANION GAP mmol/L 6 7   CALCIUM mg/dL 8.5 8.6   ALBUMIN g/dL  --  4.1   TOTAL BILIRUBIN mg/dL  --  0.71   ALK PHOS U/L  --  87   ALT U/L  --  18   AST U/L  --  29   GLUCOSE RANDOM mg/dL 108 120     Results from last 7 days   Lab Units 11/15/23  0459   INR  2.99*                   Lines/Drains:  Invasive Devices       Peripheral Intravenous Line  Duration             Peripheral IV 11/14/23 Left Antecubital <1 day                      Telemetry:  Telemetry Orders (From admission, onward)               24 Hour Telemetry Monitoring  Continuous x 24 Hours (Telem)        Question:  Reason for 24 Hour Telemetry  Answer:  Arrhythmias requiring acute medical intervention / PPM or ICD malfunction                     Telemetry Reviewed: Atrial fibrillation. HR averaging 90s  Indication for Continued Telemetry Use: No indication for continued use. Will discontinue.               Imaging: Reviewed radiology reports from this admission including: all available     Recent Cultures (last 7 days):         Last 24 Hours Medication List:   Current Facility-Administered Medications   Medication Dose Route Frequency Provider Last Rate    acetaminophen  975 mg Oral Replaced by [facility] Alden Kan PA-C      amLODIPine  5 mg Oral Daily [facility] CRNP      diazepam  2 mg Oral Q8H PRN Alden Kan PA-C      docusate sodium  100 mg Oral BID lAden Kan PA-C      furosemide  20 mg Intravenous Once Alden Kan PA-C      HYDROmorphone  0.2 mg Intravenous Q4H PRN Ayaan Sears PA-C      levothyroxine  50 mcg Oral Early Morning Susannah Boy, S[place]      lidocaine  3 patch Topical Daily Tanesha Mcneal PA-C      losartan  100 mg Oral Daily Susannah Kunz, S[place]      metoprolol tartrate  100 mg Oral BID Susannah Kunz, S[place]      nystatin   Topical BID Adithya Brown PA-C      oxyCODONE  5 mg Oral Q6H PRN Ayaan Sears PA-C      oxyCODONE  2.5 mg Oral Q6H PRN Ayaan Sears PA-C      polyethylene glycol  17 g Oral Daily Tanesha Mcneal PA-C      pravastatin  40 mg Oral Daily With SJ Reyes      senna  2 tablet Oral HS Tanesha Mcneal PA-C          Today, Patient Was Seen By: Tanesha Mcneal PA-C    **Please Note: This note may have been constructed using a voice recognition system. **

## 2023-11-15 NOTE — H&P
[facility]  H&P  Name: Carol Knott 80 y.o. female I MRN: [id number]  Unit/Bed#: ED-41 I Date of Admission: 11/14/2023   Date of Service: 11/15/2023 I Hospital Day: 0      Assessment/Plan   * Acute on chronic low back pain  Assessment & Plan  Worsening pain to R thoracic, R hip, R knee. Unable to ambulate. At baseline, is able to stand/pivot and take some steps with a  walker. CT: advanced disc degenerative change in the lumbar spine  Pain control. Consider pain mgt if no improvement  Neurovasc checks. Currently no numbness, bowel in continence, saddle anesthesia. Urinary incontinence at baseline. PT OT    Bilateral primary osteoarthritis of knee  Assessment & Plan  Worsening R knee pain   Follow up xrays     Continuous opioid dependence ([address])  Assessment & Plan  In setting of chronic OA, lumbar degenerative disc disease  Pain control as above    Benign essential hypertension  Assessment & Plan  Elevated likely in setting of pain   Continue home meds    Atrial fibrillation (HCC)  Assessment & Plan  Continue metoprolol  Hold coumadin, inr supratherapeutic  Monitor inr         VTE Pharmacologic Prophylaxis: VTE Score: 4 Moderate Risk (Score 3-4) - Pharmacological DVT Prophylaxis Ordered: warfarin (Coumadin). Code Status: Prior level 3  Discussion with family: Patient declined call to . Anticipated Length of Stay: Patient will be admitted on an observation basis with an anticipated length of stay of less than 2 midnights secondary to painc ontrol. Total Time Spent on Date of Encounter in care of patient:  mins. This time was spent on one or more of the following: performing physical exam; counseling and coordination of care; obtaining or reviewing history; documenting in the medical record; reviewing/ordering tests, medications or procedures; communicating with other healthcare professionals and discussing with patient's family/caregivers.     Chief Complaint: back pain     History of Present Illness:  Anastacia Sy is a 80 y.o. female with a PMH of afib, OA, chronic back pain, HTN, urinary incontinence, CKD who presents with worsening back pain over the past week. Patient was seen in the ED on  due to lower extremity edema. She was discharged home with follow up to PCP. While in the ED on , she was started to develop R back pain and R hip pain thought to be secondary to stretcher. Patient denies any injuries or trauma. At baseline she is able to stand/pivot and walk some steps with her walker. Over the past week she has been having difficulty ambulating due to pain and decreased mobility. She has urinary incontinence at baseline. She has no other findings such as numbness, bowel incontinence, saddle anesthesia. Patient required fentanyl to undergo CT. CT shows severe degeneration. May consider pain management evaluation    Review of Systems:  Review of Systems   Constitutional:  Negative for chills and fever. Genitourinary: Negative. Musculoskeletal:  Positive for arthralgias, back pain and gait problem. Negative for joint swelling, myalgias, neck pain and neck stiffness. All other systems reviewed and are negative.       Past Medical and Surgical History:   Past Medical History:   Diagnosis Date    , spontaneous complete     Carcinoma of skin     Cataract     last assessed: 17    Cataract     Cellulitis     Effusion of both knee joints     resolved: 3/25/15    Effusion of both knee joints     Enteritis     Female stress incontinence     last assessed: 13    Female stress incontinence     Gastric ulcer     last assessed: 14    Gastric ulcer     GERD (gastroesophageal reflux disease)     Hyperlipidemia     Hypertension     Hyperthyroidism     Lyme disease     last assessed: 13    Lyme disease     Microscopic hematuria     last assessed: 13    Microscopic hematuria     Normal delivery     1950 son, 1952 son, 12 daughter, 26 daughter, 65 daughter    Paroxysmal atrial fibrillation ([address])     last assessed: 9/26/13    Paroxysmal atrial fibrillation (HCC)     Perirectal abscess     last assessed: 9/26/13    Platelet dysfunction (HCC)     PAF    Platelet dysfunction (HCC)     Sinus tachycardia     last assessed: 9/26/13    Sinus tachycardia     Spinal stenosis     lumbar; last assessed: 10/4/13    Stage 3 chronic kidney disease ([address]) 6/3/2019    Stage 3 chronic kidney disease ([address])     Tachycardia     unspecified; last assessed: 9/26/13    Trigger finger, acquired     last assessed: 6/30/15    Trigger finger, acquired        Past Surgical History:   Procedure Laterality Date    KNEE ARTHROSCOPY Bilateral     2008    KNEE ARTHROSCOPY      LUMBAR LAMINECTOMY      5/09    LUMBAR LAMINECTOMY      NEUROPLASTY / TRANSPOSITION MEDIAN NERVE AT CARPAL TUNNEL Bilateral     2011    NEUROPLASTY / TRANSPOSITION MEDIAN NERVE AT CARPAL TUNNEL      TONSILLECTOMY AND ADENOIDECTOMY      TOTAL HIP ARTHROPLASTY      joint replacement; L hip; 2/2/10    TOTAL HIP ARTHROPLASTY         Meds/Allergies:  Prior to Admission medications    Medication Sig Start Date End Date Taking?  Authorizing Provider   acetaminophen (TYLENOL) 325 mg tablet Take 2 tablets (650 mg total) by mouth every 8 (eight) hours as needed for mild pain, headaches or fever 7/16/23   Taya Zamora PA-C   amLODIPine (NORVASC) 5 mg tablet Take 1 tablet (5 mg total) by mouth daily 10/17/23   Thomas Chen MD   clotrimazole (LOTRIMIN) 1 % cream Apply 1 g (1 Application total) topically 2 (two) times a day for 14 days APPLY TO AREA OF BELLY BUTTON AND BOTH GROIN CREASES 2 TIMES PER DAY UNTIL GONE 7/14/23 9/15/23  Marjan Huff MD   ergocalciferol (VITAMIN D2) 50,000 units Take by mouth once a week 9/12/23   Historical Provider, MD   HYDROcodone-acetaminophen (XODOL) 7.5-300 MG per tablet Take 1 tablet by mouth every 8 (eight) hours as needed for severe pain Max Daily Amount: 3 tablets 10/24/23   Louise Cao MD   levothyroxine 50 mcg tablet Take 1 tablet (50 mcg total) by mouth daily 4/18/23   Louise Cao MD   losartan (COZAAR) 100 MG tablet Take 1 tablet (100 mg total) by mouth daily 4/18/23   Louise Cao MD   metoprolol tartrate (LOPRESSOR) 100 mg tablet Take 1 tablet (100 mg total) by mouth 2 (two) times a day 6/8/23   Louise Cao MD   nystatin (MYCOSTATIN) powder Apply topically 2 (two) times a day 5/24/23   Radha Church MD   polyethylene glycol (MIRALAX) 17 g packet Take 17 g by mouth as needed      Historical Provider, MD   senna-docusate sodium (SENOKOT S) 8.6-50 mg per tablet Take 1 tablet by mouth as needed for constipation    Historical Provider, MD   simvastatin (ZOCOR) 20 mg tablet Take 1 tablet (20 mg total) by mouth daily at bedtime 4/18/23   Louise Cao MD   warfarin (COUMADIN) 5 mg tablet TAKE 5 MG ON MONDAY AND SATURDAY AND 2.5 MG ALL OTHER DAYS  Patient taking differently: TAKE 5 MG SATURDAY AND 2.5 MG ALL OTHER DAYS 6/8/23   Louise Cao MD     I have reviewed home medications with a medical source (PCP, Pharmacy, other). Allergies: Allergies   Allergen Reactions    Cortisone     Oxaprozin Hives    Penicillins Hives       Social History:  Marital Status:     Occupation:   Patient Pre-hospital Living Situation: Home  Patient Pre-hospital Level of Mobility: walks with walker  Patient Pre-hospital Diet Restrictions:   Substance Use History:   Social History     Substance and Sexual Activity   Alcohol Use Yes    Comment: rare on special occassions only     Social History     Tobacco Use   Smoking Status Never   Smokeless Tobacco Never     Social History     Substance and Sexual Activity   Drug Use Never       Family History:  Family History   Problem Relation Age of Onset    Rheumatic fever Mother     Heart disease Father     Crohn's disease Brother         (Granulomatous) Golitis    Psoriasis Daughter     Heart disease Son Physical Exam:     Vitals:   Blood Pressure: 157/65 (11/14/23 2300)  Pulse: 75 (11/14/23 2300)  Temperature: 98.5 °F (36.9 °C) (11/14/23 1532)  Temp Source: Oral (11/14/23 1532)  Respirations: (!) 32 (11/14/23 2300)  SpO2: 97 % (11/14/23 2300)    Physical Exam  Constitutional:       General: She is in acute distress. Appearance: Normal appearance. She is obese. She is not ill-appearing. HENT:      Head: Normocephalic and atraumatic. Right Ear: External ear normal.      Left Ear: External ear normal.      Nose: Nose normal.      Mouth/Throat:      Pharynx: Oropharynx is clear. Eyes:      Extraocular Movements: Extraocular movements intact. Conjunctiva/sclera: Conjunctivae normal.   Cardiovascular:      Rate and Rhythm: Normal rate and regular rhythm. Pulses: Normal pulses. Heart sounds: Normal heart sounds. Pulmonary:      Effort: Pulmonary effort is normal.      Breath sounds: Normal breath sounds. Abdominal:      General: Bowel sounds are normal. There is no distension. Palpations: Abdomen is soft. Tenderness: There is no abdominal tenderness. There is no right CVA tenderness, left CVA tenderness, guarding or rebound. Musculoskeletal:         General: Tenderness present. Cervical back: Normal range of motion. Thoracic back: Tenderness (right sided) present. Right knee: Effusion present. Decreased range of motion. Tenderness present. Left knee: Decreased range of motion. Tenderness present. Right lower leg: Edema present. Left lower leg: Edema present. Comments: Unable to lift rle off stretcher 2/2 pain  Sensations and pulses intact   Skin:     General: Skin is warm. Capillary Refill: Capillary refill takes less than 2 seconds. Neurological:      General: No focal deficit present. Mental Status: She is alert and oriented to person, place, and time.    Psychiatric:         Mood and Affect: Mood normal.         Behavior: Behavior normal.          Additional Data:     Lab Results:  Results from last 7 days   Lab Units 11/14/23  1518   WBC Thousand/uL 10.49*   HEMOGLOBIN g/dL 11.4*   HEMATOCRIT % 35.7   PLATELETS Thousands/uL 256   NEUTROS PCT % 73   LYMPHS PCT % 10*   MONOS PCT % 10   EOS PCT % 5     Results from last 7 days   Lab Units 11/14/23  1518   SODIUM mmol/L 137   POTASSIUM mmol/L 4.9   CHLORIDE mmol/L 105   CO2 mmol/L 25   BUN mg/dL 44*   CREATININE mg/dL 1.68*   ANION GAP mmol/L 7   CALCIUM mg/dL 8.6   ALBUMIN g/dL 4.1   TOTAL BILIRUBIN mg/dL 0.71   ALK PHOS U/L 87   ALT U/L 18   AST U/L 29   GLUCOSE RANDOM mg/dL 120     Results from last 7 days   Lab Units 11/14/23  1518   INR  3.35*                   Lines/Drains:  Invasive Devices       Peripheral Intravenous Line  Duration             Peripheral IV 11/14/23 Left Antecubital <1 day                        Imaging: Reviewed radiology reports from this admission including: abdominal/pelvic CT  CT recon only lumbar spine   Final Result by Merlinda Blazing, MD (11/14 2018)      Advanced disc degenerative change in the lumbar spine. Workstation performed: [id number]         CT abdomen pelvis wo contrast   Final Result by Merlinda Blazing, MD (11/14 2016)      Moderate amount of stool in the colon may indicate constipation. No evidence of bowel obstruction, colitis or diverticulitis. Workstation performed: [id number]             EKG and Other Studies Reviewed on Admission:   EKG: No EKG obtained. ** Please Note: This note has been constructed using a voice recognition system.  **

## 2023-11-15 NOTE — PHYSICAL THERAPY NOTE
PHYSICAL THERAPY EVALUATION NOTE    Patient Name: Elaine Carter  LTFSL'X Date: 11/15/2023  AGE:   80 y.o.   Mrn:   [id number]  ADMIT DX:  Hepatomegaly [R16.0]  Hiatal hernia [K44.9]  Diverticulosis [K57.90]  Hip pain [M25.559]  Gallstones [K80.20]  Lumbar disc disease [M51.9]  Acute low back pain [M54.50]  Localized swelling of both lower legs [R22.43]    Past Medical History:   Diagnosis Date    , spontaneous complete     Carcinoma of skin     Cataract     last assessed: 17    Cataract     Cellulitis     Effusion of both knee joints     resolved: 3/25/15    Effusion of both knee joints     Enteritis     Female stress incontinence     last assessed: 13    Female stress incontinence     Gastric ulcer     last assessed: 14    Gastric ulcer     GERD (gastroesophageal reflux disease)     Hyperlipidemia     Hypertension     Hyperthyroidism     Lyme disease     last assessed: 13    Lyme disease     Microscopic hematuria     last assessed: 13    Microscopic hematuria     Normal delivery     1950 son, 1952 son, 12 daughter, 26 daughter, 65 daughter    Paroxysmal atrial fibrillation ([address])     last assessed: 13    Paroxysmal atrial fibrillation ([address])     Perirectal abscess     last assessed: 13    Platelet dysfunction (HCC)     PAF    Platelet dysfunction (HCC)     Sinus tachycardia     last assessed: 13    Sinus tachycardia     Spinal stenosis     lumbar; last assessed: 10/4/13    Stage 3 chronic kidney disease ([address]) 6/3/2019    Stage 3 chronic kidney disease ([address])     Tachycardia     unspecified; last assessed: 13    Trigger finger, acquired     last assessed: 6/30/15    Trigger finger, acquired      Length Of Stay: 0  PHYSICAL THERAPY EVALUATION :    11/15/23 1422   PT Last Visit   PT Visit Date 11/15/23   Pain Assessment   Pain Assessment Tool [facility] Pain Intervention(s) Repositioned   Pain Rating: FLACC (Rest) - Face 1   Pain Rating: FLACC (Rest) - Legs 1   Pain Rating: FLACC (Rest) - Activity 1   Pain Rating: FLACC (Rest) - Cry 1   Pain Rating: FLACC (Rest) - Consolability 1   Score: FLACC (Rest) 5   Pain Rating: FLACC (Activity) - Face 1   Pain Rating: FLACC (Activity) - Legs 1   Pain Rating: FLACC (Activity) - Activity 1   Pain Rating: FLACC (Activity) - Cry 1   Pain Rating: FLACC (Activity) - Consolability 1   Score: FLACC (Activity) 5   Restrictions/Precautions   Other Precautions Cognitive; Bed Alarm;Multiple lines; Fall Risk;Pain   Home Living   Type of [address] [place] [address] Dr One level; Other (Comment)  (1-2 DEBI)   Additional Comments lives w/ family. ambulates short distances w/ rollator. owns manual wheelchair and transport chair. (pt was unable to provide social history; obtaned from previous documentation.)   Prior Function   Comments (S)  right lower facial droop noted. also intermittent right trunk lean noted in sitting on edge of bed. unable to complete full assessment of strength, coordination, and sensation due to inconsistent command following and commuication. notified Bg MARTÍNEZ and Dr. Brittany Meehan via [address] Janell BERGMAN aware. General   Additional Pertinent History 11/15/23 at 1:15 blood pressure was 171/72   Family/Caregiver Present No   Cognition   Arousal/Participation Lethargic   Orientation Level Oriented to person; Other (Comment)  (pt provided full name, unable to state birthday. pt was identified w/ full name, birth date.)   Following Commands Follows one step commands inconsistently   Comments room air resting pulse ox 95% and 81 BPM. supine blood prssure 145/82. Subjective   Subjective pt seen supine in bed. pt had varying level of awareness and ability to communicate. pt provided short responses to intermittent simple questions and followed commands inconsistently.  pt stated having pain in her back but was unable to provide numeric rating.  (+ facial grimace/withdrawal to noxious stim of all extremities. + response to bilateral visual threat.)   RUE Assessment   RUE Assessment X  (limited active, passive ROM)   LUE Assessment   LUE Assessment X  (limited active, passive ROM)   RLE Assessment   RLE Assessment X  (limited active, passive ROM)   LLE Assessment   LLE Assessment X  (limited active, passive ROM)   Wound 11/15/23 Skin Tear Arm Anterior;Left;Proximal   Date First Assessed/Time First Assessed: 11/15/23 1422   Primary Wound Type: Skin Tear  Location: Arm  Wound Location Orientation: Anterior;Left;Proximal   Wound Image    Wound Description Beefy red   Drainage Amount Small   Drainage Description Bloody   Treatments Cleansed   Dressing Dermagran gauze; Foam, Silicon (eg. Allevyn, etc)   Dressing Changed New   Patient Tolerance Tolerated well   Dressing Status Clean;Dry; Intact   Bed Mobility   Supine to Sit 2  Maximal assistance   Additional items Assist x 1;HOB elevated; Increased time required;Verbal cues;LE management  (for trunk/LE positioning)   Sit to Supine 2  Maximal assistance   Additional items Assist x 1;HOB elevated; Increased time required;Verbal cues;LE management  (for trunk/LE positioning)   Additional Comments pt sat edge of bed approximately 9 minutes w/ level of assist varying between supervision and modx1. intermittent right trunk lean was noted. additional sitting was not possible due to fatigue and pain. Transfers   Sit to Stand Unable to assess   Additional Comments pt did not have adequate sitting balance or command following to appropriately participate in transfer training. Balance   Static Sitting Fair -  (to poor)   Static Standing Zero   Activity Tolerance   Activity Tolerance Patient limited by fatigue;Patient limited by pain   Nurse Made Aware spoke to Dr. Shira Mccray AP, Elenita Silver CM   Assessment   Problem List Decreased strength;Decreased range of motion;Decreased endurance; Impaired balance;Decreased mobility; Decreased safety awareness;Decreased cognition;Pain   Assessment Pt presents with worsening back pain over the past week. Dx: encephalopathy, continuous opioid dependence, CKD, acute on chronic low back pain, B LE edema, HTN, and a-fib. order placed for PT eval and tx, w/ activity order of up and out of bed as tolerated. pt presents w/ comorbidities of a-fib, OA, chronic back pain, urinary incontinence, cellulitis, hyperlipidemia, HTN, left KRYSTEN, and CKD and personal factors of advanced age, mobilizing w/ assistive device, stair(s) to enter home, and decreased cognition. pt presents w/ pain, weakness, decreased ROM, decreased endurance, impaired cognition, impaired balance, decreased safety awareness, and fall risk. these impairments are evident in findings from physical examination (weakness and decreased ROM), mobility assessment (need for max assist w/ bed mobility, inability to transfer or mobilize out of bed, need for input for mobility technique/safety), and Barthel Index: 5/100. pt needed input for task focus and mobility technique. pt is at risk for falls due to physical and safety awareness deficits. pt's clinical presentation is unstable/unpredictable (evident in poor blood pressure control, need for assist w/ bed mobility and inability to mobilize out of bed when usually mobilizing independently, pain impacting overall mobility status, and need for input for task focus and mobility technique). pt needs inpatient PT tx to improve mobility deficits and progress mobility training as appropriate. Orthopedics consult would benefit pt to address back pain control. Pt needs dependent means for out of bed mobilization. Goals   Patient Goals pt did not state goals when asked. will continue to assess.    STG Expiration Date 11/25/23   Short Term Goal #1 pt will: Perform rolling and repositioning in bed w/ supervision to decrease caregiver burden, Perform supine <--> sitting edge of bed transition w/ supervision to improve level of function, Increase static sitting balance 1 grade to decrease risk for falls, Tolerate seated at EOB 20 minutes modified independent to facilitate functional task performance, Improve Barthel Index score to 25 or greater to facilitate independence, and Complete mobility assessment to maximize pt's levels of mobility and independence. PT Treatment Day 0   Plan   Treatment/Interventions Functional transfer training;LE strengthening/ROM; Therapeutic exercise; Endurance training;Cognitive reorientation;Patient/family training;Equipment eval/education; Bed mobility  (PT to see next session to complete mobility assessment)   PT Frequency 2-3x/wk   Discharge Recommendation   Rehab Resource Intensity Level, PT II (Moderate Resource Intensity)   Additional Comments pt presently needs mechanical lift or hovermatt transition to shuttle chair for out of bed mobilization. Additional Comments 2 (S)  Orthopedics consult would benefit pt to address back pain control. AM-PAC Basic Mobility Inpatient   Turning in Flat Bed Without Bedrails 2   Lying on Back to Sitting on Edge of Flat Bed Without Bedrails 1   Moving Bed to Chair 1   Standing Up From Chair Using Arms 1   Walk in Room 1   Climb 3-5 Stairs With Railing 1   Basic Mobility Inpatient Raw Score 7   Turning Head Towards Sound 3   Follow Simple Instructions 2   Low Function Basic Mobility Raw Score  12   Low Function Basic Mobility Standardized Score  18.33   Highest Level Of Mobility   JH-HLM Goal 2: Bed activities/Dependent transfer   JH-HLM Achieved 3: Sit at edge of bed   Barthel Index   Feeding 0   Bathing 0   Grooming Score 0   Dressing Score 0   Bladder Score 0   Bowels Score 5   Toilet Use Score 0   Transfers (Bed/Chair) Score 0   Mobility (Level Surface) Score 0   Stairs Score 0   Barthel Index Score 5   End of Consult   Patient Position at End of Consult Supine;Bed/Chair alarm activated; All needs within reach     The patient's AM-PAC Basic Mobility Inpatient Short Form Raw Score is 7. A Raw score of less than or equal to 16 suggests the patient may benefit from discharge to post-acute rehabilitation services. Please also refer to the recommendation of the Physical Therapist for safe discharge planning. Skilled PT recommended while in hospital and upon DC to progress pt toward treatment goals.      Geraldine Odom, PT

## 2023-11-15 NOTE — CASE MANAGEMENT
Case Management Progress Note    Patient name Johnnie Mejias S /S -01 MRN [id number]  : 2/3/1927 Date 11/15/2023       LOS (days): 0  Geometric Mean LOS (GMLOS) (days): 2.90  Days to GMLOS:2.6        OBJECTIVE:        Current admission status: Inpatient  Preferred Pharmacy:   [id number], [address] [place], [place] [address] [address] [place] [address]  Phone: [phone] Fax: [phone]    Primary Care Provider: Louise Cao MD    Primary Insurance: MEDICARE  Secondary Insurance: [facility] HE[employer] OPTIONS PROGRAM    PROGRESS NOTE:    Call made to patient's daughter, Fernando Cooper ([phone]) to complete assessment and discuss d/c plan. Call went to voicemail so message left requesting return call. TT received from RN relaying family may be interested in hospice care. Awaiting return call from family at this time.

## 2023-11-15 NOTE — ASSESSMENT & PLAN NOTE
Elevated likely in setting of pain   Continue home dose Cozaar 100 mg daily, norvasc 5 mg daily and lopressor 100 mg BID

## 2023-11-15 NOTE — ASSESSMENT & PLAN NOTE
Tylenol 975 mg p.o. every 8 hours. Lidocaine patch x3, on for 12 hours and off for 12 hours. Oxycodone 2.5 mg p.o. every 6 hours as needed moderate pain or 5 mg p.o. every 6 hours as needed severe pain. Discontinue IV Dilaudid   Valium 2 mg p.o. every 8 hours as needed muscle spasm. Discontinue this prior to discharge. Hold opioid pain medication and benzodiazepines for decreased mental status. Bowel regimen to avoid opioid-induced constipation while on opioid pain medication. Ice to painful area for up to 20 minutes every hour as needed. At discharge, suggest the following:  Tylenol 975 mg p.o. every 8 hours as needed mild pain. Lidocaine patch x3, on for 12 hours and off for 12 hours as needed. Oxycodone 2.5 mg p.o. every 6 hours as needed moderate to severe pain x3 days, then discontinue. Discontinue Valium at discharge. Bowel regimen while on opioid pain medication to avoid opioid-induced constipation.

## 2023-11-15 NOTE — ED PROVIDER NOTES
Emergency Department Sign Out Note        Sign out and transfer of care from Dr. Tamiko Wilson, Dr. Mani Farrar. See Separate Emergency Department note. The patient, Samson Albarran, was evaluated by the previous provider for right lower back pain.     Workup Completed:  labs    ED Course / Workup Pending (followup):  Pending CT ab/pelvis w/ recon lumbar spine non-contrast d/t low GFR and admission for intractable pain                 Results Reviewed       Procedure Component Value Units Date/Time    Protime-INR [[id number]]  (Abnormal) Collected: 11/14/23 1518    Lab Status: Final result Specimen: Blood from Arm, Left Updated: 11/14/23 1544     Protime 35.0 seconds      INR 3.35    APTT [[id number]]  (Abnormal) Collected: 11/14/23 1518    Lab Status: Final result Specimen: Blood from Arm, Left Updated: 11/14/23 1544     PTT 38 seconds     Comprehensive metabolic panel [[id number]]  (Abnormal) Collected: 11/14/23 1518    Lab Status: Final result Specimen: Blood from Arm, Left Updated: 11/14/23 1543     Sodium 137 mmol/L      Potassium 4.9 mmol/L      Chloride 105 mmol/L      CO2 25 mmol/L      ANION GAP 7 mmol/L      BUN 44 mg/dL      Creatinine 1.68 mg/dL      Glucose 120 mg/dL      Calcium 8.6 mg/dL      AST 29 U/L      ALT 18 U/L      Alkaline Phosphatase 87 U/L      Total Protein 7.1 g/dL      Albumin 4.1 g/dL      Total Bilirubin 0.71 mg/dL      eGFR 25 ml/min/1.73sq m     Narrative:      [place] guidelines for Chronic Kidney Disease (CKD):     Stage 1 with normal or high GFR (GFR > 90 mL/min/1.73 square meters)    Stage 2 Mild CKD (GFR = 60-89 mL/min/1.73 square meters)    Stage 3A Moderate CKD (GFR = 45-59 mL/min/1.73 square meters)    Stage 3B Moderate CKD (GFR = 30-44 mL/min/1.73 square meters)    Stage 4 Severe CKD (GFR = 15-29 mL/min/1.73 square meters)    Stage 5 End Stage CKD (GFR <15 mL/min/1.73 square meters)  Note: GFR calculation is accurate only with a steady state creatinine Lipase [[id number]]  (Normal) Collected: 11/14/23 1518    Lab Status: Final result Specimen: Blood from Arm, Left Updated: 11/14/23 1543     Lipase 12 u/L     Magnesium [[id number]]  (Normal) Collected: 11/14/23 1518    Lab Status: Final result Specimen: Blood from Arm, Left Updated: 11/14/23 1543     Magnesium 2.2 mg/dL     CBC and differential [[id number]]  (Abnormal) Collected: 11/14/23 1518    Lab Status: Final result Specimen: Blood from Arm, Left Updated: 11/14/23 1527     WBC 10.49 Thousand/uL      RBC 4.00 Million/uL      Hemoglobin 11.4 g/dL      Hematocrit 35.7 %      MCV 89 fL      MCH 28.5 pg      MCHC 31.9 g/dL      RDW 15.9 %      MPV 9.7 fL      Platelets 698 Thousands/uL      nRBC 0 /100 WBCs      Neutrophils Relative 73 %      Immat GRANS % 1 %      Lymphocytes Relative 10 %      Monocytes Relative 10 %      Eosinophils Relative 5 %      Basophils Relative 1 %      Neutrophils Absolute 7.80 Thousands/µL      Immature Grans Absolute 0.05 Thousand/uL      Lymphocytes Absolute 1.04 Thousands/µL      Monocytes Absolute 1.00 Thousand/µL      Eosinophils Absolute 0.54 Thousand/µL      Basophils Absolute 0.06 Thousands/µL           CT recon only lumbar spine   Final Result by Juan Antonio Mireles MD (11/14 2018)      Advanced disc degenerative change in the lumbar spine. Workstation performed: [id number]         CT abdomen pelvis wo contrast   Final Result by Juan Antonio Mireles MD (11/14 2016)      Moderate amount of stool in the colon may indicate constipation. No evidence of bowel obstruction, colitis or diverticulitis.             Workstation performed: [id number]         XR knee 3 vw right non injury    (Results Pending)                       ED Course as of 11/15/23 0224   Tue Nov 14, 2023   1634 Pt signed out to me at this time pending CT abdomen/pelvis with lumbar recon and admission    1635 Radiology tech stated pt went for CT, couldn't lay flat due to pain, pt was brought back to the room 1858 Attempted low-dose ketamine 0.2mg/kg IV, pt's pain is improved however is unable to lay flat. Will proceed with full procedural sedation   1914 Procedural sedation consent form signed and on patient's chart. 2019 CT abdomen pelvis wo contrast  FINDINGS:     ABDOMEN     LOWER CHEST: Clear lung bases. LIVER/BILIARY TREE: Mild hepatomegaly. GALLBLADDER: Punctate gallstones in the fundus and neck     SPLEEN:  Unremarkable. PANCREAS:  Unremarkable. ADRENAL GLANDS:  Unremarkable. KIDNEYS/URETERS: No nephrolithiasis or obstructive uropathy     STOMACH AND BOWEL: Small hiatal hernia. Moderate amount stool throughout the colon. Diverticulosis without evidence of diverticulitis or colitis. APPENDIX: Normal appendix. ABDOMINOPELVIC CAVITY:  No ascites. No pneumoperitoneum. No lymphadenopathy. VESSELS: No abdominal aortic aneurysm. PELVIS     REPRODUCTIVE ORGANS: No pelvic mass or cyst.     URINARY BLADDER:  Unremarkable. ABDOMINAL WALL/INGUINAL REGIONS:  Unremarkable. OSSEOUS STRUCTURES: Advanced disc degenerative change in the lumbar spine. Normal alignment of the left hip arthroplasty. IMPRESSION:     Moderate amount of stool in the colon may indicate constipation. No evidence of bowel obstruction, colitis or diverticulitis. 2020 CT recon only lumbar spine  FINDINGS:     ALIGNMENT: Straightening of lumbar lordosis. VERTEBRAE: No evidence of acute fracture. DEGENERATIVE CHANGES: Severe disc and facet degenerative change throughout the lumbar spine. Multilevel mild to moderate central canal stenosis and moderate to severe neural foraminal narrowing. PREVERTEBRAL AND PARASPINAL SOFT TISSUES: No mass or fluid collection. IMPRESSION:     Advanced disc degenerative change in the lumbar spine.         2028 SLIM texted for admission     Pre-Procedural Sedation    Performed by: Zacarias Salas MD  Authorized by: Zacarias Salas MD    Consent:     Consent obtained:  Verbal and written    Consent given by:  Patient    Risks discussed:   Allergic reaction, dysrhythmia, inadequate sedation, nausea, vomiting, respiratory compromise necessitating ventilatory assistance and intubation, prolonged sedation necessitating reversal and prolonged hypoxia resulting in organ damage    Alternatives discussed:  Analgesia without sedation and anxiolysis  Universal protocol:     Patient identity confirmation method:  Verbally with patient, arm band and hospital-assigned identification number  Indications:     Sedation purpose:  Imaging studies    Procedure necessitating sedation performed by:  Physician performing sedation    Intended level of sedation:  Moderate (conscious sedation)  Pre-sedation assessment:     NPO status caution: unable to specify NPO status      ASA classification: class 3 - patient with severe systemic disease      Neck mobility: normal      Mallampati score:  III - soft palate, base of uvula visible    Pre-sedation assessments completed and reviewed: airway patency, cardiovascular function, hydration status, mental status, nausea/vomiting, pain level and respiratory function      History of difficult intubation: no      Pre-sedation assessment completed:  11/15/2023 7:00 PM  Procedural Sedation    Date/Time: 11/15/2023 1:07 AM    Performed by: Corina Yin MD  Authorized by: Corina Yin MD    Immediate pre-procedure details:     Reassessment: Patient reassessed immediately prior to procedure      Reviewed: vital signs, relevant labs/tests and NPO status      Verified: bag valve mask available, emergency equipment available, intubation equipment available, IV patency confirmed, oxygen available and suction available    Procedure details (see MAR for exact dosages):     Sedation start time:  11/14/2023 7:18 PM    Preoxygenation:  Nasal cannula and nonrebreather mask    Sedation:  Ketamine    Analgesia:  None    Intra-procedure monitoring:  Blood pressure monitoring, cardiac monitor, continuous capnometry, continuous pulse oximetry, frequent vital sign checks and frequent LOC assessments    Intra-procedure events: none      Sedation end time:  11/14/2023 7:58 PM    Total sedation time (minutes):  40  Post-procedure details:     Post-sedation assessment completed:  11/14/2023 8:00 PM    Attendance: Constant attendance by certified staff until patient recovered      Recovery: Patient returned to pre-procedure baseline      Post-sedation assessments completed and reviewed: airway patency, cardiovascular function, hydration status, mental status, nausea/vomiting, pain level, respiratory function and temperature      Patient is stable for discharge or admission: yes      Patient tolerance: Tolerated well, no immediate complications    Medical Decision Making  80year-old female presenting to ED for evaluation of right-sided back pain radiating down right leg, RLQ pain. By time of signout, pending CT abdomen pelvis, CT lumbar spine without contrast and admission for intractable pain. Patient went to CT, was unable to lay flat due to pain, was brought back into her room. Patient was then given ketamine 0.2 mg/kg IV with attempts to obtain CT, had improved pain but was still unable to lay down. Then proceeded with procedural sedation with ketamine and CTs were taken successfully. CT lumbar spine with severe disc and facet degenerative change throughout the lumbar spine and mild to moderate central canal stenosis with neural foraminal narrowing. Case was discussed with internal medicine, patient admitted for intractable pain with probable acute pain service consult in the morning. Amount and/or Complexity of Data Reviewed  Labs: ordered. Radiology: ordered. Decision-making details documented in ED Course. Risk  Prescription drug management. Decision regarding hospitalization.                   Disposition  Final diagnoses:   Acute low back pain   Lumbar disc disease   Hepatomegaly   Gallstones   Hiatal hernia   Diverticulosis     Time reflects when diagnosis was documented in both MDM as applicable and the Disposition within this note       Time User Action Codes Description Comment    11/14/2023  8:31 PM Arty Larger Add [M54.50] Acute low back pain     11/14/2023  8:31 PM Arty Larger Add [M51.9] Lumbar disc disease     11/14/2023  8:31 PM Arty Larger Add [R16.0] Hepatomegaly     11/14/2023  8:31 PM Arty Larger Add [K80.20] Gallstones     11/14/2023  8:32 PM Arty Larger Add [K44.9] Hiatal hernia     11/14/2023  8:32 PM Arty Larger Add [K57.90] Diverticulosis           ED Disposition       ED Disposition   Admit    Condition   Stable    Date/Time   Tue Nov 14, 2023  8:32 PM    Comment   Case was discussed with MARIPOSA and the patient's admission status was agreed to be Admission Status: observation status to the service of Dr. Cecily Paz . Follow-up Information    None       Patient's Medications   Discharge Prescriptions    No medications on file     No discharge procedures on file.        ED Provider  Electronically Signed by     Chris Palacios MD  11/15/23 7211

## 2023-11-15 NOTE — ASSESSMENT & PLAN NOTE
Acute on chronic per family, worsening over the last week  Is maintained on warfarin, doubt DVT  Orts she does not have a history of heart failure, no echo on file that I am able to review.   Given advanced age and the fact that this would again not , will defer  Elevate, compression with bilateral Ace wrap's  Hold home demadex  Trial dose of IV lasix

## 2023-11-15 NOTE — ASSESSMENT & PLAN NOTE
Extensive d/w daughter Iris Munoz AM 11/15. Confirms DNR/DNI status. I explained that due to her advanced age, even if we were able to effectively control her pain for an MRI or other advanced imaging, she would not likely be a candidate for any sort of intervention. If we are able to effectively control her pain over the next 24 to 48 hours, will continue plan as above.   If her pain remains uncontrolled despite maximum conservative therapies, will need to consider discussing comfort measures/hospice so that we do not have to balance the adverse effects of pain medications with her respiratory status/wakefulness

## 2023-11-16 ENCOUNTER — APPOINTMENT (INPATIENT)
Dept: NON INVASIVE DIAGNOSTICS | Facility: HOSPITAL | Age: 88
End: 2023-11-16
Payer: MEDICARE

## 2023-11-16 ENCOUNTER — APPOINTMENT (INPATIENT)
Dept: RADIOLOGY | Facility: HOSPITAL | Age: 88
End: 2023-11-16
Payer: MEDICARE

## 2023-11-16 PROBLEM — J96.00 ACUTE RESPIRATORY FAILURE (HCC): Status: RESOLVED | Noted: 2023-11-15 | Resolved: 2023-11-16

## 2023-11-16 PROBLEM — R29.810 FACIAL DROOP: Chronic | Status: ACTIVE | Noted: 2023-11-15

## 2023-11-16 PROBLEM — I63.9 CVA (CEREBRAL VASCULAR ACCIDENT) (HCC): Status: ACTIVE | Noted: 2023-11-15

## 2023-11-16 LAB
ALBUMIN SERPL BCP-MCNC: 3.8 G/DL (ref 3.5–5)
ALP SERPL-CCNC: 78 U/L (ref 34–104)
ALT SERPL W P-5'-P-CCNC: 18 U/L (ref 7–52)
ANION GAP SERPL CALCULATED.3IONS-SCNC: 11 MMOL/L
AORTIC ROOT: 2.7 CM
AORTIC VALVE MEAN VELOCITY: 12.9 M/S
APICAL FOUR CHAMBER EJECTION FRACTION: 76 %
AST SERPL W P-5'-P-CCNC: 38 U/L (ref 13–39)
AV AREA BY CONTINUOUS VTI: 2 CM2
AV AREA PEAK VELOCITY: 1.8 CM2
AV LVOT MEAN GRADIENT: 4 MMHG
AV LVOT PEAK GRADIENT: 7 MMHG
AV MEAN GRADIENT: 8 MMHG
AV PEAK GRADIENT: 15 MMHG
AV VALVE AREA: 2.12 CM2
AV VELOCITY RATIO: 0.71
BASOPHILS # BLD AUTO: 0.06 THOUSANDS/ÂΜL (ref 0–0.1)
BASOPHILS NFR BLD AUTO: 1 % (ref 0–1)
BILIRUB SERPL-MCNC: 1.15 MG/DL (ref 0.2–1)
BUN SERPL-MCNC: 42 MG/DL (ref 5–25)
CALCIUM SERPL-MCNC: 8.8 MG/DL (ref 8.4–10.2)
CHLORIDE SERPL-SCNC: 106 MMOL/L (ref 96–108)
CHOLEST SERPL-MCNC: 102 MG/DL
CO2 SERPL-SCNC: 22 MMOL/L (ref 21–32)
CREAT SERPL-MCNC: 1.4 MG/DL (ref 0.6–1.3)
DME PARACHUTE DELIVERY DATE REQUESTED: NORMAL
DME PARACHUTE ITEM DESCRIPTION: NORMAL
DME PARACHUTE ORDER STATUS: NORMAL
DME PARACHUTE SUPPLIER NAME: NORMAL
DME PARACHUTE SUPPLIER PHONE: NORMAL
DOP CALC AO PEAK VEL: 1.91 M/S
DOP CALC AO VTI: 32.05 CM
DOP CALC LVOT AREA: 2.54 CM2
DOP CALC LVOT CARDIAC INDEX: 1.92 L/MIN/M2
DOP CALC LVOT CARDIAC OUTPUT: 3.63 L/MIN
DOP CALC LVOT DIAMETER: 1.8 CM
DOP CALC LVOT PEAK VEL VTI: 26.69 CM
DOP CALC LVOT PEAK VEL: 1.36 M/S
DOP CALC LVOT STROKE INDEX: 23.8 ML/M2
DOP CALC LVOT STROKE VOLUME: 67.88 CM3
EOSINOPHIL # BLD AUTO: 0.05 THOUSAND/ÂΜL (ref 0–0.61)
EOSINOPHIL NFR BLD AUTO: 0 % (ref 0–6)
ERYTHROCYTE [DISTWIDTH] IN BLOOD BY AUTOMATED COUNT: 16 % (ref 11.6–15.1)
EST. AVERAGE GLUCOSE BLD GHB EST-MCNC: 123 MG/DL
FRACTIONAL SHORTENING: 43 (ref 28–44)
GFR SERPL CREATININE-BSD FRML MDRD: 31 ML/MIN/1.73SQ M
GLUCOSE SERPL-MCNC: 106 MG/DL (ref 65–140)
HBA1C MFR BLD: 5.9 %
HCT VFR BLD AUTO: 32.7 % (ref 34.8–46.1)
HDLC SERPL-MCNC: 49 MG/DL
HGB BLD-MCNC: 10.7 G/DL (ref 11.5–15.4)
IMM GRANULOCYTES # BLD AUTO: 0.11 THOUSAND/UL (ref 0–0.2)
IMM GRANULOCYTES NFR BLD AUTO: 1 % (ref 0–2)
INR PPP: 2.77 (ref 0.84–1.19)
INTERVENTRICULAR SEPTUM IN DIASTOLE (PARASTERNAL SHORT AXIS VIEW): 1.2 CM
INTERVENTRICULAR SEPTUM: 1.2 CM (ref 0.6–1.1)
LAAS-AP2: 20.6 CM2
LAAS-AP4: 22.2 CM2
LDLC SERPL CALC-MCNC: 35 MG/DL (ref 0–100)
LEFT ATRIUM SIZE: 3.2 CM
LEFT ATRIUM VOLUME (MOD BIPLANE): 66 ML
LEFT ATRIUM VOLUME INDEX (MOD BIPLANE): 34.9 ML/M2
LEFT INTERNAL DIMENSION IN SYSTOLE: 2.3 CM (ref 2.1–4)
LEFT VENTRICULAR INTERNAL DIMENSION IN DIASTOLE: 4 CM (ref 3.5–6)
LEFT VENTRICULAR POSTERIOR WALL IN END DIASTOLE: 1.2 CM
LEFT VENTRICULAR STROKE VOLUME: 50 ML
LVSV (TEICH): 50 ML
LYMPHOCYTES # BLD AUTO: 0.89 THOUSANDS/ÂΜL (ref 0.6–4.47)
LYMPHOCYTES NFR BLD AUTO: 7 % (ref 14–44)
MCH RBC QN AUTO: 28.5 PG (ref 26.8–34.3)
MCHC RBC AUTO-ENTMCNC: 32.7 G/DL (ref 31.4–37.4)
MCV RBC AUTO: 87 FL (ref 82–98)
MONOCYTES # BLD AUTO: 1.35 THOUSAND/ÂΜL (ref 0.17–1.22)
MONOCYTES NFR BLD AUTO: 10 % (ref 4–12)
NEUTROPHILS # BLD AUTO: 10.79 THOUSANDS/ÂΜL (ref 1.85–7.62)
NEUTS SEG NFR BLD AUTO: 81 % (ref 43–75)
NRBC BLD AUTO-RTO: 0 /100 WBCS
PLATELET # BLD AUTO: 262 THOUSANDS/UL (ref 149–390)
PMV BLD AUTO: 10.1 FL (ref 8.9–12.7)
POTASSIUM SERPL-SCNC: 4.5 MMOL/L (ref 3.5–5.3)
PROT SERPL-MCNC: 6.4 G/DL (ref 6.4–8.4)
PROTHROMBIN TIME: 30.2 SECONDS (ref 11.6–14.5)
RBC # BLD AUTO: 3.76 MILLION/UL (ref 3.81–5.12)
RIGHT ATRIAL 2D VOLUME: 58 ML
RIGHT ATRIUM AREA SYSTOLE A4C: 20.3 CM2
RIGHT VENTRICLE ID DIMENSION: 3.8 CM
SL CV LEFT ATRIUM LENGTH A2C: 5.4 CM
SL CV LV EF: 65
SL CV PED ECHO LEFT VENTRICLE DIASTOLIC VOLUME (MOD BIPLANE) 2D: 69 ML
SL CV PED ECHO LEFT VENTRICLE SYSTOLIC VOLUME (MOD BIPLANE) 2D: 19 ML
SODIUM SERPL-SCNC: 139 MMOL/L (ref 135–147)
TRICUSPID ANNULAR PLANE SYSTOLIC EXCURSION: 1.7 CM
TRIGL SERPL-MCNC: 90 MG/DL
WBC # BLD AUTO: 13.25 THOUSAND/UL (ref 4.31–10.16)

## 2023-11-16 PROCEDURE — 99232 SBSQ HOSP IP/OBS MODERATE 35: CPT | Performed by: INTERNAL MEDICINE

## 2023-11-16 PROCEDURE — 99223 1ST HOSP IP/OBS HIGH 75: CPT | Performed by: PSYCHIATRY & NEUROLOGY

## 2023-11-16 PROCEDURE — 80061 LIPID PANEL: CPT | Performed by: INTERNAL MEDICINE

## 2023-11-16 PROCEDURE — 97530 THERAPEUTIC ACTIVITIES: CPT

## 2023-11-16 PROCEDURE — 92610 EVALUATE SWALLOWING FUNCTION: CPT

## 2023-11-16 PROCEDURE — 80053 COMPREHEN METABOLIC PANEL: CPT | Performed by: INTERNAL MEDICINE

## 2023-11-16 PROCEDURE — 85025 COMPLETE CBC W/AUTO DIFF WBC: CPT | Performed by: INTERNAL MEDICINE

## 2023-11-16 PROCEDURE — 99232 SBSQ HOSP IP/OBS MODERATE 35: CPT | Performed by: PHYSICIAN ASSISTANT

## 2023-11-16 PROCEDURE — 93306 TTE W/DOPPLER COMPLETE: CPT | Performed by: INTERNAL MEDICINE

## 2023-11-16 PROCEDURE — 85610 PROTHROMBIN TIME: CPT | Performed by: INTERNAL MEDICINE

## 2023-11-16 PROCEDURE — 83036 HEMOGLOBIN GLYCOSYLATED A1C: CPT | Performed by: INTERNAL MEDICINE

## 2023-11-16 PROCEDURE — 93306 TTE W/DOPPLER COMPLETE: CPT

## 2023-11-16 PROCEDURE — 73522 X-RAY EXAM HIPS BI 3-4 VIEWS: CPT

## 2023-11-16 RX ORDER — FUROSEMIDE 10 MG/ML
20 INJECTION INTRAMUSCULAR; INTRAVENOUS ONCE
Status: COMPLETED | OUTPATIENT
Start: 2023-11-16 | End: 2023-11-16

## 2023-11-16 RX ORDER — DIAZEPAM 2 MG/1
2 TABLET ORAL EVERY 8 HOURS PRN
Status: DISCONTINUED | OUTPATIENT
Start: 2023-11-16 | End: 2023-11-21 | Stop reason: HOSPADM

## 2023-11-16 RX ORDER — WARFARIN SODIUM 2.5 MG/1
2.5 TABLET ORAL
Status: DISCONTINUED | OUTPATIENT
Start: 2023-11-16 | End: 2023-11-17

## 2023-11-16 RX ADMIN — DIAZEPAM 2.5 MG: 10 INJECTION, SOLUTION INTRAMUSCULAR; INTRAVENOUS at 05:49

## 2023-11-16 RX ADMIN — LOSARTAN POTASSIUM 100 MG: 50 TABLET, FILM COATED ORAL at 08:45

## 2023-11-16 RX ADMIN — METOPROLOL TARTRATE 100 MG: 100 TABLET, FILM COATED ORAL at 17:11

## 2023-11-16 RX ADMIN — ACETAMINOPHEN 975 MG: 325 TABLET, FILM COATED ORAL at 22:06

## 2023-11-16 RX ADMIN — HYDROMORPHONE HYDROCHLORIDE 0.2 MG: 0.2 INJECTION, SOLUTION INTRAMUSCULAR; INTRAVENOUS; SUBCUTANEOUS at 08:45

## 2023-11-16 RX ADMIN — NYSTATIN: 100000 POWDER TOPICAL at 17:14

## 2023-11-16 RX ADMIN — OXYCODONE HYDROCHLORIDE 5 MG: 5 TABLET ORAL at 22:08

## 2023-11-16 RX ADMIN — OXYCODONE HYDROCHLORIDE 5 MG: 5 TABLET ORAL at 12:28

## 2023-11-16 RX ADMIN — WARFARIN SODIUM 2.5 MG: 2.5 TABLET ORAL at 17:12

## 2023-11-16 RX ADMIN — POLYETHYLENE GLYCOL 3350 17 G: 17 POWDER, FOR SOLUTION ORAL at 08:45

## 2023-11-16 RX ADMIN — FUROSEMIDE 20 MG: 10 INJECTION, SOLUTION INTRAMUSCULAR; INTRAVENOUS at 12:28

## 2023-11-16 RX ADMIN — AMLODIPINE BESYLATE 5 MG: 5 TABLET ORAL at 08:45

## 2023-11-16 RX ADMIN — HYDROMORPHONE HYDROCHLORIDE 0.2 MG: 0.2 INJECTION, SOLUTION INTRAMUSCULAR; INTRAVENOUS; SUBCUTANEOUS at 15:35

## 2023-11-16 RX ADMIN — PRAVASTATIN SODIUM 40 MG: 40 TABLET ORAL at 17:14

## 2023-11-16 RX ADMIN — SENNOSIDES 17.2 MG: 8.6 TABLET, FILM COATED ORAL at 22:06

## 2023-11-16 RX ADMIN — LIDOCAINE 3 PATCH: 700 PATCH TOPICAL at 05:48

## 2023-11-16 RX ADMIN — DOCUSATE SODIUM 100 MG: 100 CAPSULE, LIQUID FILLED ORAL at 08:45

## 2023-11-16 RX ADMIN — NYSTATIN: 100000 POWDER TOPICAL at 08:47

## 2023-11-16 RX ADMIN — ACETAMINOPHEN 975 MG: 325 TABLET, FILM COATED ORAL at 15:01

## 2023-11-16 RX ADMIN — LIDOCAINE 3 PATCH: 700 PATCH TOPICAL at 22:12

## 2023-11-16 RX ADMIN — ATORVASTATIN CALCIUM 40 MG: 40 TABLET, FILM COATED ORAL at 17:12

## 2023-11-16 RX ADMIN — DOCUSATE SODIUM 100 MG: 100 CAPSULE, LIQUID FILLED ORAL at 17:11

## 2023-11-16 RX ADMIN — METOPROLOL TARTRATE 100 MG: 100 TABLET, FILM COATED ORAL at 08:45

## 2023-11-16 RX ADMIN — HYDROMORPHONE HYDROCHLORIDE 0.2 MG: 0.2 INJECTION, SOLUTION INTRAMUSCULAR; INTRAVENOUS; SUBCUTANEOUS at 01:28

## 2023-11-16 NOTE — SPEECH THERAPY NOTE
Speech-Language Pathology Bedside Swallow Evaluation        Patient Name: Bennett Bansal    QMPXJ'S Date: 11/16/2023     Problem List  Principal Problem:    Acute on chronic low back pain  Active Problems:    Atrial fibrillation (HCC)    Hypertension    Hypothyroidism    Bilateral primary osteoarthritis of knee    Stage 3 chronic kidney disease (HCC)    Continuous opioid dependence (HCC)    Bilateral lower extremity edema    Toxic encephalopathy    Constipation    Leukocytosis    Goals of care, counseling/discussion    CVA (cerebral vascular accident) ([address])         Summary    Pt presents with normal appearing oral and pharyngeal swallowing skills, no overt s/s aspiration and no c/o food dysphagia symptoms. Recommendations:   Diet: regular diet and thin liquids   Meds: whole with liquid   Frequent Oral care: 2x/day  Aspiration precautions   Other Recommendations/ considerations: will follow up x 1-2 as needed. Current Medical Status  Pt is a 80 y.o. female who presented to [address] [place] [address]  with acute on chronic low back pain. While in the ED on 11/8, she was started to develop R back pain and R hip pain thought to be secondary to stretcher. Patient denies any injuries or trauma. At baseline she is able to stand/pivot and walk some steps with her walker. Over the past week she has been having difficulty ambulating due to pain and decreased mobility. She has urinary incontinence at baseline. She has no other findings such as numbness, bowel incontinence, saddle anesthesia. Patient required fentanyl to undergo CT. CT shows severe degeneration. May consider pain management evaluation       Past medical history:   Please see H&P for details    Special Studies:  CT-head: 11/15/23 Age-indeterminate lacunar infarct in the left basal ganglia/internal capsule is new since May. No acute large vessel distribution infarct or acute hemorrhage. Chronic microangiopathy.      CT-A/P w/o contrast: 11/14/23 LOWER CHEST: Clear lung bases. Social/Education/Vocational Hx:  Pt lives  home    Swallow Information   Current Risks for Dysphagia & Aspiration: AMS  Current Symptoms/Concerns:  AMS  Current Diet: NPO   Baseline Diet: regular diet and thin liquids  Takes pills- whole w/ water     Baseline Assessment   Behavior/Cognition: alert  Speech/Language Status: able to participate in conversation and able to follow commands  Patient Positioning: upright in bed     Swallow Mechanism Exam   Facial: symmetrical  Labial: WFL  Lingual: WFL  Velum: unable to visualize  Mandible: adequate ROM  Dentition: full dentures  Vocal quality:clear/adequate   Volitional Cough: strong/productive   Respiratory: RA    Consistencies Assessed and Performance   Consistencies Administered: thin liquids, nectar thick, puree, and mechanical soft solids    Oral Stage: pt was able to drink liquids from cup and straw, adequate bolus retrieval from spoon. Mastication/manipulation were prolonged but appeared effective. Pt w/ good oral control w/ NTL and thin liquids. Mild oral residue noted w/ solids. Pharyngeal Stage: swallow initiation was timely with complete laryngeal excursion. No coughing, throat clearing or wet voice noted. Esophageal Concerns: none reported      Results Reviewed with: patient, RN, PA, and family   Dysphagia Goals: pt will tolerate regular diet with thin liquids without s/s of aspiration x1-2 sessions.       Skylar De Dios MA CCC-SLP  Speech Pathologist  [place] license # [id number]  [address] license # [id number]  Available via [employer]

## 2023-11-16 NOTE — ASSESSMENT & PLAN NOTE
Assessment:  Aditya Fajardo is a 80 y.o. female with a past medical history of atrial fibrillation, hypertension, hypothyroidism, bilateral primary osteoarthritis of knee, stage III kidney disease, chronic, chronic neck continuous opioid dependence, constipation, acute respiratory failure that neurology was consulted for a facial droop. Per chart review on November 15, 2023 a provider was notified of right disorientation, and facial droop that appeared to be positional.  The examination returned to normal after 1 dose of Narcan. The examination was nonfocal.  Per chart review it appears that the patient's daughter inclined to state that the facial droop was present prior to this admission. It also appears that the patient was unable to lift her right lower extremity from bed. Patient was subsequently placed on stroke pathway neurology consulted.      Plan:  - Case discussed with attending physician Dr. Zelaya Asa  - Recommend discontinuing stroke pathway in setting of chronic infarct  - Recommend discontinuing MRI Brain wo contrast  - Recommend discontinuing transthoracic Echocardiogram  - In setting of LDL 35 would hold off on statin  - Chemical and mechanical DVT prophylaxis per primary team  - In setting of pAF would continue [facility] when appropriate by primary team  - Evaluate and treat any metabolic, infectious, and inflammatory derangements  - Maintain euglycaemia, euthermia, and normotension  - Recommend TSH with reflex to fT4, vitamin B12, MMA, and Vitamin D serum studies  - No further inpatient neurology recommendations at this time, please call for any questions/concerns  - Rest per primary team appreciated

## 2023-11-16 NOTE — ASSESSMENT & PLAN NOTE
Extensive d/w daughter Liane Leon AM 11/15 due to worsening clinical condition and apparent suffering. Confirmed DNR/DNI status.  As she improved significantly on 11/16 planning to continue with multimodal pain regimen, re-eval with PT/OT   Only if pain is unable to be controlled would we consider additional imaging +/- consultation with ortho/neurosurgery

## 2023-11-16 NOTE — PROGRESS NOTES
[address] [facility]  Progress Note  Name: Alicia Murphy  MRN: [id number]  Unit/Bed#: S -01 I Date of Admission: 11/14/2023   Date of Service: 11/16/2023 I Hospital Day: 1    Assessment/Plan   * Acute on chronic low back pain  Assessment & Plan  Presents from home (lives with daughters) with 1 week history of acute on chronic pain of back, bilateral hips and R knee. Has been unable to ambulate. At baseline, is able to stand/pivot and take some steps with a  walker. Does take Norco chronically for years for back pain. No red flag symptoms. Was seen in ER last week for similar and discharged. CT lumbar spine: severe disc and facet degenerative change throughout the lumbar spine. Multilevel mild to moderate central canal stenosis and moderate to severe neural foraminal narrowing. CT A/P: Moderate amount of stool in the colon may indicate constipation. No evidence of bowel obstruction, colitis or diverticulitis. Advanced disc degenerative change in the lumbar spine. Normal alignment of the left hip arthroplasty.    R Knee xray: severe tricompartmental OA  Was started on pain medications including Ketamine in ER which then required Narcan due to AMS  ESR/CRP elevated   APS consulted, regimen adjusted however as was NPO was unable to receive  Now that mental status better, able to take PO placed on scheduled Tylenol 975 mg every 8 hours, lido patch to back, L hip and R knee, Valium 2 mg every 8 hours as needed muscle spasms, Oxy 2.5 mg PO q6h PRN moderate pain, oxy 5 mg PO q6h PRN severe pain, IV dilaudid 0.2 mg q4h PRN breakthrough pain  D/w family given advanced age will hold off on additional imaging with MRI at this time especially if pain controlled on above regimen as findings unlikely to  anyway   PT/OT at this time recommending rehab however family likely to take patient home    CVA (cerebral vascular accident) [facility])  Assessment & Plan  Noted to have intermittent facial droop during this hospitalization. CT head obtained and shows CVA however per my d/w neurology is at least few months old however new since May according to imaging  Etiology unclear however per neurology no need for MRI or stroke pathway  Echo already pending, can f/u results   Is on warfarin and INR therapeutic so doubt etiology  Given advanced age and chronicity of CVA, nothing additional recommended  Given droop, failed bedside screen however did very well with speech and is on regular diet as of 11/16    Goals of care, counseling/discussion  Assessment & Plan  Extensive d/w daughter Lionel Schuster AM 11/15 due to worsening clinical condition and apparent suffering. Confirmed DNR/DNI status. As she improved significantly on 11/16 planning to continue with multimodal pain regimen, re-eval with PT/OT   Only if pain is unable to be controlled would we consider additional imaging +/- consultation with ortho/neurosurgery     Toxic encephalopathy  Assessment & Plan  Noted to be screaming secondary to pain, was not able to be redirected on AM 11/15. Upon admission did receive several narcotics in an effort to obtain CT scan which then required Narcan due to oversedation. Per daughter, at baseline patient is alert and oriented x3, however does have some forgetfulness and this tends to be worsened by acute pain  At this time, suspect multifactorial  CT head showing stroke however is not new per neurology, at least several months old  UA checked and unremarkable  Is markedly improved as of 11/16 AM, appears back to baseline     Continuous opioid dependence ([address])  Assessment & Plan  In setting of chronic OA, lumbar degenerative disc disease.  Maintained on Norco for many years  Holding home norco  Currently on tylenol ATC as well as oxy PRN for breakthrough     Bilateral primary osteoarthritis of knee  Assessment & Plan  Reported worsening R knee pain   Xrays negative  Pain control as above     Acute respiratory failure (HCC)-resolved as of 11/16/2023  Assessment & Plan  Unknown etiology, a/e/b inc RR up to 32, was on NRB temporarily. Suspect multifactorial given acute mental status change possibly related to pain medication  Monitor O2 sats closely  CXR upon arrival with mild pulmonary venous congestion with trace effusions  S/p dose of IV lasix on 11/15 appears improved     Leukocytosis  Assessment & Plan  Mild, ? reactive  No etiology on CT other than constipation  UA unremarkable  Monitor     Constipation  Assessment & Plan  Noted on imaging  Bowel regimen ordered  Abdominal exams     Bilateral lower extremity edema  Assessment & Plan  Acute on chronic per family, worsening over the last week  Is maintained on warfarin with INR in range, doubt DVT  Family denies hx of heart failure, no echo on file that I am able to review--echo obtained above for CVA can f/u results  Elevate, compression with bilateral Ace wrap's  Hold home demadex  S/P dose of IV lasix on 11/15 and again on 11/16    Stage 3 chronic kidney disease [facility])  Assessment & Plan  Lab Results   Component Value Date    EGFR 31 11/16/2023    EGFR 26 11/15/2023    EGFR 25 11/14/2023    CREATININE 1.40 (H) 11/16/2023    CREATININE 1.63 (H) 11/15/2023    CREATININE 1.68 (H) 11/14/2023     Baseline appears around 1.3-1.5 per lab review  Daughter states patient is maintained on Demadex 5 mg 3 times weekly, was previously on daily diuretics however developed dehydration  S/P 1x dose of IV lasix to help with edema on 11/15  Creat improved, will give another dose on 11/16  Consideration of more frequent demadex dosing upon dc   Ok for ARB for now    Hypothyroidism  Assessment & Plan  Continue synthroid, most recent TSH WNL    Hypertension  Assessment & Plan  Elevated likely in setting of pain upon admission   Continue home dose Cozaar 100 mg daily, norvasc 5 mg daily and lopressor 100 mg BID    Atrial fibrillation (HCC)  Assessment & Plan  Maintained on Lopressor 100 mg twice daily as well as warfarin as an outpatient with goal INR 2-3  INR held upon admission given increased INR, INR now within acceptable range, will resume  Warfarin not likely safe given age, limited mobility and fall risk. 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 This was reviewed with family and should be discussed further with her primary care physician             VTE Pharmacologic Prophylaxis: VTE Score: 4 Moderate Risk (Score 3-4) - Pharmacological DVT Prophylaxis Ordered: warfarin (Coumadin). Mobility:   Basic Mobility Inpatient Raw Score: 7  -[facility] Goal: 2: Bed activities/Dependent transfer  -[facility] Achieved: 1: Laying in bed      Patient Centered Rounds: I performed bedside rounds with nursing staff today. Discussions with Specialists or Other Care Team Provider: d/w speech, PT, CM, neurology and APS     Education and Discussions with Family / Patient: Updated  (son and daughter shawanda ) at bedside. Total Time Spent on Date of Encounter in care of patient: 45 mins. This time was spent on one or more of the following: performing physical exam; counseling and coordination of care; obtaining or reviewing history; documenting in the medical record; reviewing/ordering tests, medications or procedures; communicating with other healthcare professionals and discussing with patient's family/caregivers. Current Length of Stay: 1 day(s)  Current Patient Status: Inpatient   Certification Statement: The patient will continue to require additional inpatient hospital stay due to pain control, monitoring of mental status, edema control, dispo planning  Discharge Plan: Anticipate discharge in 24-48 hrs to home with [facility] vs ?rehab pending progress and family preference    Code Status: Level 3 - DNAR and DNI    Subjective:   Doing so much better today. Met with family and patient at bedside.  Lengthy discussion regarding course so far and plan going forward. Patient worked with speech and able to eat regular diet, did very well. Did not complain of any pain. Children very pleased with progress. Objective:     Vitals:   Temp (24hrs), Av °F (37.2 °C), Min:98.4 °F (36.9 °C), Max:99.7 °F (37.6 °C)    Temp:  [98.4 °F (36.9 °C)-99.7 °F (37.6 °C)] 98.8 °F (37.1 °C)  HR:  [81-91] 83  Resp:  [18-20] 18  BP: (121-159)/(69-92) 159/69  SpO2:  [92 %-97 %] 92 %  Body mass index is 37.98 kg/m². Input and Output Summary (last 24 hours): Intake/Output Summary (Last 24 hours) at 2023 1222  Last data filed at 11/15/2023 2200  Gross per 24 hour   Intake --   Output 550 ml   Net -550 ml       Physical Exam:   Physical Exam  Vitals and nursing note reviewed. Constitutional:       General: She is not in acute distress. Comments: On RA    Cardiovascular:      Rate and Rhythm: Rhythm irregular. Pulmonary:      Effort: No respiratory distress. Abdominal:      General: There is no distension. Tenderness: There is no abdominal tenderness. Musculoskeletal:      Right lower leg: Edema present. Left lower leg: Edema present. Neurological:      Mental Status: She is oriented to person, place, and time.       Comments: R facial droop at rest, resolve with smiling, LLE weakness (difficult to raise off bed, in part due to pain)--chronic per family   Psychiatric:         Mood and Affect: Mood normal.          Additional Data:     Labs:  Results from last 7 days   Lab Units 23  0539   WBC Thousand/uL 13.25*   HEMOGLOBIN g/dL 10.7*   HEMATOCRIT % 32.7*   PLATELETS Thousands/uL 262   NEUTROS PCT % 81*   LYMPHS PCT % 7*   MONOS PCT % 10   EOS PCT % 0     Results from last 7 days   Lab Units 23  0539   SODIUM mmol/L 139   POTASSIUM mmol/L 4.5   CHLORIDE mmol/L 106   CO2 mmol/L 22   BUN mg/dL 42*   CREATININE mg/dL 1.40*   ANION GAP mmol/L 11   CALCIUM mg/dL 8.8   ALBUMIN g/dL 3.8   TOTAL BILIRUBIN mg/dL 1.15*   ALK PHOS U/L 78   ALT U/L 18   AST U/L 38   GLUCOSE RANDOM mg/dL 106     Results from last 7 days   Lab Units 11/16/23  0539   INR  2.77*     Results from last 7 days   Lab Units 11/15/23  2221   POC GLUCOSE mg/dl 91     Results from last 7 days   Lab Units 11/16/23  0539   HEMOGLOBIN A1C % 5.9*           Lines/Drains:  Invasive Devices       Peripheral Intravenous Line  Duration             Peripheral IV 11/14/23 Left Antecubital 1 day                          Imaging: No pertinent imaging reviewed. Recent Cultures (last 7 days):         Last 24 Hours Medication List:   Current Facility-Administered Medications   Medication Dose Route Frequency Provider Last Rate    acetaminophen  975 mg Oral [facility] Natalie Betts PA-C      amLODIPine  5 mg Oral Daily SJ Gomez      atorvastatin  40 mg Oral QPM Alberta Loyd MD      diazepam  2 mg Oral Q8H PRN Natalie Betts PA-C      docusate sodium  100 mg Oral BID Natalie Betts PA-C      furosemide  20 mg Intravenous Once Natalie Betts PA-C      HYDROmorphone  0.2 mg Intravenous Q4H PRN Didi Stanton PA-C      levothyroxine  50 mcg Oral Early Morning SJ Gomez      lidocaine  3 patch Topical Daily Natalie Betts PA-C      losartan  100 mg Oral Daily SJ Gomez      metoprolol tartrate  100 mg Oral BID SJ Gomez      nystatin   Topical BID Jorge Manzo PA-C      oxyCODONE  5 mg Oral Q6H PRN Daniela Carey PA-C      oxyCODONE  2.5 mg Oral Q6H PRN Daniela Carey PA-C      polyethylene glycol  17 g Oral Daily Natalie Betts PA-C      pravastatin  40 mg Oral Daily With [facility] [place]      senna  2 tablet Oral HS Natalie Betts PA-C      warfarin  2.5 mg Oral Daily (warfarin) Natalie Betts PA-C          Today, Patient Was Seen By: Natalie Betts PA-C    **Please Note: This note may have been constructed using a voice recognition system. **

## 2023-11-16 NOTE — ASSESSMENT & PLAN NOTE
Acute on chronic per family, worsening over the last week  Is maintained on warfarin with INR in range, doubt DVT  Family denies hx of heart failure, no echo on file that I am able to review--echo obtained above for CVA can f/u results  Elevate, compression with bilateral Ace wrap's  Hold home demadex  S/P dose of IV lasix on 11/15 and again on 11/16

## 2023-11-16 NOTE — PLAN OF CARE
Problem: PHYSICAL THERAPY ADULT  Goal: Performs mobility at highest level of function for planned discharge setting. See evaluation for individualized goals. Description: Treatment/Interventions: Functional transfer training, LE strengthening/ROM, Therapeutic exercise, Endurance training, Cognitive reorientation, Patient/family training, Equipment eval/education, Bed mobility (PT to see next session to complete mobility assessment)          See flowsheet documentation for full assessment, interventions and recommendations. Outcome: Progressing  Note:    Problem List: Decreased strength, Decreased range of motion, Decreased endurance, Impaired balance, Decreased mobility, Decreased safety awareness, Decreased cognition, Pain  Assessment: pt shows improvement in mobility status from previous session w/ improve ability to participate and initiation of transfer training. pt needed assist x 1 to 2 to mobilize safely and frequent rest breaks related to fatigue and pain. pt was unable to mobilize to bedside chair or ambulate. pt is at high risk for falling due to physical and safety awareness limitations. continued inpatient PT is needed to progress mobility training and increase levels of mobility and independence. Rehab Resource Intensity Level, PT: II (Moderate Resource Intensity)    See flowsheet documentation for full assessment.

## 2023-11-16 NOTE — CONSULTS
NEUROLOGY RESIDENCY CONSULT NOTE     Name: Samson Albarran   Age & Sex: 80 y.o. female   MRN: [id number]  Unit/Bed#: S -01   Encounter: [id number]  Length of Stay: 1    Recommendations for outpatient neurological follow up:  - Follow up with vascular AP in 4-6 weeks  - No new imaging required at this juncture in time. Pending for discharge: Per Primary Team    ASSESSMENT & PLAN     Facial droop  Assessment & Plan  Assessment:  Samson Albarran is a 80 y.o. female with a past medical history of atrial fibrillation, hypertension, hypothyroidism, bilateral primary osteoarthritis of knee, stage III kidney disease, chronic, chronic neck continuous opioid dependence, constipation, acute respiratory failure that neurology was consulted for a facial droop. Per chart review on November 15, 2023 a provider was notified of right disorientation, and facial droop that appeared to be positional.  The examination returned to normal after 1 dose of Narcan. The examination was nonfocal.  Per chart review it appears that the patient's daughter inclined to state that the facial droop was present prior to this admission. It also appears that the patient was unable to lift her right lower extremity from bed. Patient was subsequently placed on stroke pathway neurology consulted.      Plan:  - Case discussed with attending physician Dr. Taj Simons  - Recommend discontinuing stroke pathway in setting of chronic infarct  - Recommend discontinuing MRI Brain wo contrast  - Recommend discontinuing transthoracic Echocardiogram  - In setting of LDL 35 would hold off on statin  - Chemical and mechanical DVT prophylaxis per primary team  - In setting of pAF would continue [facility] when appropriate by primary team  - Evaluate and treat any metabolic, infectious, and inflammatory derangements  - Maintain euglycaemia, euthermia, and normotension  - Recommend TSH with reflex to fT4, vitamin B12, MMA, and Vitamin D serum studies  - No further inpatient neurology recommendations at this time, please call for any questions/concerns  - Rest per primary team appreciated      SUBJECTIVE     Reason for Consult / Principal Problem: R Facial Droop  Hx and PE limited by: Severe pain in bilateral lower extremities on ROM testing in either passive and active fashion. HPI: Petr Palomares is a 80 y.o. female with a past medical history of atrial fibrillation, hypertension, hypothyroidism, bilateral primary osteoarthritis of knee, stage III kidney disease, chronic, chronic neck continuous opioid dependence, constipation, acute respiratory failure that neurology was consulted for a facial droop. Per chart review on November 15, 2023 a provider was notified of right disorientation, and facial droop that appeared to be positional.  The examination returned to normal after 1 dose of Narcan. The examination was nonfocal.  Per chart review it appears that the patient's daughter inclined to state that the facial droop was present prior to this admission. It also appears that the patient was unable to lift her right lower extremity from bed. Patient was subsequently placed on stroke pathway neurology consulted. On evaluation patient was uncomfortable and in significant amount of pain in bilateral lower extremities and thusly examination of the lower extremities were significantly limited. Patient demonstrated an apparent facial droop in the right lower half of face, this facial droop had complete resolution when patient provided a full, symmetric bilaterally smile. Imaging Studies Review:  Noncontrast CT head completed on November 15, 2023 demonstrated age-indeterminate lacunar infarct in left basal ganglia/internal capsule that appears to be new since May. No acute large vessel distribution infarct or acute hemorrhage. Chronic microangiopathy.     Serum studies review:  - BUN 42, creatinine 1.40, total bilirubin 1.15  - Cholesterol 102, triglycerides 90, HDL 49, LDL 28  - PT 30.2 INR 2.77  - White blood cell count 13.25 red blood cell count 3.71 hemoglobin 10.7, hematocrit 32.7 RDW 16.0 relative neutrophils 81%  - Urinalysis trace leukocytes, trace proteins, no bacteria seen, negative nitrite  - Sedimentation rate 43  - CRP 18.6    Inpatient consult to Neurology  Consult performed by: Brii Harris DO  Consult ordered by: Alberta Loyd MD        Historical Information   Past Medical History:   Diagnosis Date    , spontaneous complete     Carcinoma of skin     Cataract     last assessed: 17    Cataract     Cellulitis     Effusion of both knee joints     resolved: 3/25/15    Effusion of both knee joints     Enteritis     Female stress incontinence     last assessed: 13    Female stress incontinence     Gastric ulcer     last assessed: 14    Gastric ulcer     GERD (gastroesophageal reflux disease)     Hyperlipidemia     Hypertension     Hyperthyroidism     Lyme disease     last assessed: 13    Lyme disease     Microscopic hematuria     last assessed: 13    Microscopic hematuria     Normal delivery     1950 son, 1952 son, 12 daughter, 26 daughter, 65 daughter    Paroxysmal atrial fibrillation ([address])     last assessed: 13    Paroxysmal atrial fibrillation ([address])     Perirectal abscess     last assessed: 13    Platelet dysfunction (HCC)     PAF    Platelet dysfunction ([address])     Sinus tachycardia     last assessed: 13    Sinus tachycardia     Spinal stenosis     lumbar; last assessed: 10/4/13    Stage 3 chronic kidney disease ([address]) 6/3/2019    Stage 3 chronic kidney disease ([address])     Tachycardia     unspecified; last assessed: 13    Trigger finger, acquired     last assessed: 6/30/15    Trigger finger, acquired      Past Surgical History:   Procedure Laterality Date    KNEE ARTHROSCOPY Bilateral         KNEE ARTHROSCOPY      LUMBAR LAMINECTOMY          LUMBAR LAMINECTOMY      NEUROPLASTY / TRANSPOSITION MEDIAN NERVE AT CARPAL TUNNEL Bilateral     2011    NEUROPLASTY / TRANSPOSITION MEDIAN NERVE AT CARPAL TUNNEL      TONSILLECTOMY AND ADENOIDECTOMY      TOTAL HIP ARTHROPLASTY      joint replacement; L hip; 2/2/10    TOTAL HIP ARTHROPLASTY       Social History   Social History     Substance and Sexual Activity   Alcohol Use Yes    Comment: rare on special occassions only     Social History     Substance and Sexual Activity   Drug Use Never     E-Cigarette/Vaping    E-Cigarette Use Never User      E-Cigarette/Vaping Substances    Nicotine No     THC No     CBD No     Flavoring No     Other No     Unknown No      Social History     Tobacco Use   Smoking Status Never   Smokeless Tobacco Never     Family History:   Family History   Problem Relation Age of Onset    Rheumatic fever Mother     Heart disease Father     Crohn's disease Brother         (Granulomatous) Golitis    Psoriasis Daughter     Heart disease Son      Meds/Allergies   all current active meds have been reviewed  Allergies   Allergen Reactions    Cortisone     Oxaprozin Hives    Penicillins Hives     Review of previous medical records was completed. Review of Systems   Constitutional:  Positive for activity change and fatigue. Negative for chills and diaphoresis. HENT:  Negative for congestion, ear discharge, mouth sores, sneezing and voice change. Eyes:  Negative for discharge and visual disturbance. Respiratory:  Negative for cough, chest tightness, shortness of breath and wheezing. Cardiovascular:  Negative for chest pain and palpitations. Gastrointestinal:  Negative for blood in stool, diarrhea, nausea and vomiting. Genitourinary:  Negative for hematuria. Musculoskeletal:  Positive for back pain. Skin:  Negative for color change and pallor. Neurological:  Positive for facial asymmetry and weakness. Negative for dizziness, tremors, seizures, syncope, speech difficulty, light-headedness, numbness and headaches.    Psychiatric/Behavioral:  Positive for confusion and decreased concentration. Negative for hallucinations, self-injury, sleep disturbance and suicidal ideas. The patient is nervous/anxious. The patient is not hyperactive. OBJECTIVE     Patient ID: Petr Palomares is a 80 y.o. female. Vitals:   Vitals:    23 0000 23 0200 23 0400 23 0700   BP: 121/92 128/78 130/70 159/69   BP Location: Right arm Right arm Right arm    Pulse: 81 82 84 83   Resp:  18   Temp:  98.6 °F (37 °C) 98.9 °F (37.2 °C) 98.8 °F (37.1 °C)   TempSrc: Oral Oral Oral    SpO2: 96%   92%   Weight:          Body mass index is 37.99 kg/m². Intake/Output Summary (Last 24 hours) at 2023 1004  Last data filed at 11/15/2023 2200  Gross per 24 hour   Intake --   Output 890 ml   Net -890 ml     Temperature:   Temp (24hrs), Av °F (37.2 °C), Min:98.4 °F (36.9 °C), Max:99.7 °F (37.6 °C)    Temperature: 98.8 °F (37.1 °C)    Invasive Devices: Invasive Devices       Peripheral Intravenous Line  Duration             Peripheral IV 23 Left Antecubital 1 day                  Physical Exam  Vitals and nursing note reviewed. Constitutional:       General: She is in acute distress. Appearance: She is obese. She is ill-appearing. HENT:      Head: Normocephalic. Nose: Nose normal.      Mouth/Throat:      Mouth: Mucous membranes are dry. Pharynx: No oropharyngeal exudate. Eyes:      General:         Right eye: No discharge. Left eye: No discharge. Extraocular Movements: Extraocular movements intact and EOM normal.      Pupils: Pupils are equal, round, and reactive to light. Cardiovascular:      Rate and Rhythm: Normal rate. Pulmonary:      Effort: No respiratory distress. Breath sounds: No wheezing. Abdominal:      General: There is no distension. Musculoskeletal:         General: Tenderness present. Cervical back: No rigidity or tenderness. Right lower leg: Edema present.       Left lower leg: Edema present. Skin:     General: Skin is warm and dry. Coloration: Skin is not jaundiced. Neurological:      Mental Status: She is alert. Cranial Nerves: Cranial nerve deficit present. Sensory: No sensory deficit. Coordination: Coordination abnormal.      Deep Tendon Reflexes:      Reflex Scores:       Bicep reflexes are 2+ on the right side and 2+ on the left side. Brachioradialis reflexes are 2+ on the right side and 2+ on the left side. Patellar reflexes are 1+ on the right side and 1+ on the left side. Achilles reflexes are 1+ on the right side and 1+ on the left side. Psychiatric:         Speech: Speech normal.        Neurologic Exam     Mental Status   Oriented to person. Oriented to place. Disoriented to month and date. Oriented to year. Follows 1 step commands. Attention: decreased. Concentration: decreased. Speech: speech is normal   Level of consciousness: alert  Able to name object. Able to repeat. Cranial Nerves     CN II   Right visual field deficit: none  Left visual field deficit: none     CN III, IV, VI   Pupils are equal, round, and reactive to light. Extraocular motions are normal.   Right pupil: Size: 2 mm. Shape: regular. Reactivity: brisk. Left pupil: Size: 2 mm. Shape: regular. Reactivity: brisk.    CN III: no CN III palsy  CN VI: no CN VI palsy  Nystagmus: none   Ophthalmoparesis: none    CN V   Right facial sensation deficit: none  Left facial sensation deficit: none    CN VII   Right facial weakness: peripheral  Left facial weakness: none    CN VIII   Hearing: intact    CN IX, X   Palate: symmetric    CN XI   Right sternocleidomastoid strength: normal  Left sternocleidomastoid strength: normal    CN XII   Tongue: not atrophic  Fasciculations: absent  Tongue deviation: none    Motor Exam   Muscle bulk: decreased  Right arm tone: normal  Left arm tone: normal  Right arm pronator drift: absent  Left arm pronator drift: absent  Right leg tone: normal  Left leg tone: normal  -Patient demonstrated antigravity movement in bilateral upper extremities and on evaluation demonstrated 4+/5 in bilateral upper extremities.  -Patient demonstrated 2/4 movement in bilateral lower extremities, movement significantly limited by pain during active and passive movement. Patient demonstrated wiggling of toes and bilateral lower extremities. Sensory Exam   - On light touch sensation evaluation patient reported no sensory abnormalities and bilateral V1 V2 V3, bilateral upper extremities, and bilateral lower extremities. Patient had difficulty completing evaluation due to significant ongoing pain. Gait, Coordination, and Reflexes     Tremor   Resting tremor: absent  Intention tremor: absent    Reflexes   Right brachioradialis: 2+  Left brachioradialis: 2+  Right biceps: 2+  Left biceps: 2+  Right patellar: 1+  Left patellar: 1+  Right achilles: 1+  Left achilles: 1+  Right plantar: normal  Left plantar: normal  Right Veras: absent  Left Veras: absent  Right ankle clonus: absent  Left ankle clonus: absent  - Gait examination was deferred due to significant pain.  -Patient demonstrated intact finger-to-nose and bilateral upper extremities.  -Patient was unable to complete heel-to-shin in bilateral lower extremities due to significant pain. LABORATORY DATA     Labs: I have personally reviewed pertinent reports.       Results from last 7 days   Lab Units 11/16/23  0539 11/15/23  0459 11/14/23  1518   WBC Thousand/uL 13.25* 10.95* 10.49*   HEMOGLOBIN g/dL 10.7* 10.7* 11.4*   HEMATOCRIT % 32.7* 33.3* 35.7   PLATELETS Thousands/uL 262 236 256   NEUTROS PCT % 81* 80* 73   MONOS PCT % 10 8 10   EOS PCT % 0 1 5      Results from last 7 days   Lab Units 11/16/23  0539 11/15/23  0459 11/14/23  1518   POTASSIUM mmol/L 4.5 5.3 4.9   CHLORIDE mmol/L 106 105 105   CO2 mmol/L 22 25 25   BUN mg/dL 42* 43* 44*   CREATININE mg/dL 1.40* 1.63* 1.68*   CALCIUM mg/dL 8.8 8.5 8.6   ALK PHOS U/L 78  --  87   ALT U/L 18  --  18   AST U/L 38  --  29     Results from last 7 days   Lab Units 11/14/23  1518   MAGNESIUM mg/dL 2.2          Results from last 7 days   Lab Units 11/16/23  0539 11/15/23  0459 11/14/23  1518   INR  2.77* 2.99* 3.35*   PTT seconds  --   --  38*               IMAGING & DIAGNOSTIC TESTING     Radiology Results: I have personally reviewed pertinent reports. and I have personally reviewed pertinent films in PACS  CT head wo contrast   Final Result by WILY Chand MD (11/15 1650)   Age-indeterminate lacunar infarct in the left basal ganglia/internal capsule is new since May. No acute large vessel distribution infarct or acute hemorrhage. Chronic microangiopathy. Workstation performed: [id number]         XR knee 3 vw right non injury   Final Result by Jone Lujan MD (11/15 1701)      No acute osseous abnormality. Severe tricompartmental osteoarthritis. Workstation performed: [id number]         CT recon only lumbar spine   Final Result by Walter Marrero MD (11/14 2018)      Advanced disc degenerative change in the lumbar spine. Workstation performed: [id number]         CT abdomen pelvis wo contrast   Final Result by Walter Marrero MD (11/14 2016)      Moderate amount of stool in the colon may indicate constipation. No evidence of bowel obstruction, colitis or diverticulitis. Workstation performed: [id number]         MRI Inpatient Order    (Results Pending)   MRI inpatient order    (Results Pending)     Other Diagnostic Testing: I have personally reviewed pertinent reports.       ACTIVE MEDICATIONS     Current Facility-Administered Medications   Medication Dose Route Frequency    acetaminophen (TYLENOL) tablet 975 mg  975 mg Oral Q8H ELIZABETH    amLODIPine (NORVASC) tablet 5 mg  5 mg Oral Daily    atorvastatin (LIPITOR) tablet 40 mg  40 mg Oral QPM    diazepam (VALIUM) injection 2.5 mg  2.5 mg Intravenous Q8H PRN    docusate sodium (COLACE) capsule 100 mg  100 mg Oral BID    HYDROmorphone HCl (DILAUDID) injection 0.2 mg  0.2 mg Intravenous Q4H PRN    levothyroxine tablet 50 mcg  50 mcg Oral Early Morning    lidocaine (LIDODERM) 5 % patch 3 patch  3 patch Topical Daily    losartan (COZAAR) tablet 100 mg  100 mg Oral Daily    metoprolol (LOPRESSOR) injection 2.5 mg  2.5 mg Intravenous Q6H PRN    metoprolol tartrate (LOPRESSOR) tablet 100 mg  100 mg Oral BID    nystatin (MYCOSTATIN) powder   Topical BID    oxyCODONE (ROXICODONE) IR tablet 5 mg  5 mg Oral Q6H PRN    oxyCODONE (ROXICODONE) split tablet 2.5 mg  2.5 mg Oral Q6H PRN    polyethylene glycol (MIRALAX) packet 17 g  17 g Oral Daily    pravastatin (PRAVACHOL) tablet 40 mg  40 mg Oral Daily With Dinner    senna (SENOKOT) tablet 17.2 mg  2 tablet Oral HS     Prior to Admission medications    Medication Sig Start Date End Date Taking?  Authorizing Provider   torsemide (DEMADEX) 5 MG tablet Take 5 mg by mouth 3 (three) times a week   Yes Historical Provider, MD   acetaminophen (TYLENOL) 325 mg tablet Take 2 tablets (650 mg total) by mouth every 8 (eight) hours as needed for mild pain, headaches or fever 7/16/23   Taya Zamora PA-C   amLODIPine (NORVASC) 5 mg tablet Take 1 tablet (5 mg total) by mouth daily 10/17/23   Kobe Mitchell MD   clotrimazole (LOTRIMIN) 1 % cream Apply 1 g (1 Application total) topically 2 (two) times a day for 14 days APPLY TO AREA OF BELLY BUTTON AND BOTH GROIN CREASES 2 TIMES PER DAY UNTIL GONE 7/14/23 9/15/23  Jessie Perkins MD   ergocalciferol (VITAMIN D2) 50,000 units Take by mouth once a week 9/12/23   Historical Provider, MD   HYDROcodone-acetaminophen (Letty Gell) 7.5-300 MG per tablet Take 1 tablet by mouth every 8 (eight) hours as needed for severe pain Max Daily Amount: 3 tablets 10/24/23   Kobe Mitchell MD   levothyroxine 50 mcg tablet Take 1 tablet (50 mcg total) by mouth daily 4/18/23   Kobe Mitchell MD losartan (COZAAR) 100 MG tablet Take 1 tablet (100 mg total) by mouth daily 4/18/23   Nina Thompson MD   metoprolol tartrate (LOPRESSOR) 100 mg tablet Take 1 tablet (100 mg total) by mouth 2 (two) times a day 6/8/23   Nina Thompson MD   nystatin (MYCOSTATIN) powder Apply topically 2 (two) times a day 5/24/23   Wellington Ledesma MD   polyethylene glycol (MIRALAX) 17 g packet Take 17 g by mouth as needed      Historical Provider, MD   senna-docusate sodium (SENOKOT S) 8.6-50 mg per tablet Take 1 tablet by mouth as needed for constipation    Historical Provider, MD   simvastatin (ZOCOR) 20 mg tablet Take 1 tablet (20 mg total) by mouth daily at bedtime 4/18/23   Nina Thompson MD   warfarin (COUMADIN) 5 mg tablet TAKE 5 MG ON MONDAY AND SATURDAY AND 2.5 MG ALL OTHER DAYS  Patient taking differently: TAKE 5 MG SATURDAY AND 2.5 MG ALL OTHER DAYS 6/8/23   Nina Thompson MD     CODE STATUS & ADVANCED DIRECTIVES     Code Status: Level 3 - DNAR and DNI  Advance Directive and Living Will: Yes    Power of :    POLST:      VTE Pharmacologic Prophylaxis: Per Primary Team  VTE Mechanical Prophylaxis: Per Primary Team    ======    I have discussed the patient's history, physical exam findings, assessment, and plan in detail with attending, Dr. Patt Krabbe    Thank you for allowing me to participate in the care of your patient, Alexis Rey.     DO [facility], PGY-3

## 2023-11-16 NOTE — ASSESSMENT & PLAN NOTE
Lab Results   Component Value Date    EGFR 31 11/16/2023    EGFR 26 11/15/2023    EGFR 25 11/14/2023    CREATININE 1.40 (H) 11/16/2023    CREATININE 1.63 (H) 11/15/2023    CREATININE 1.68 (H) 11/14/2023     Baseline appears around 1.3-1.5 per lab review  Daughter states patient is maintained on Demadex 5 mg 3 times weekly, was previously on daily diuretics however developed dehydration  S/P 1x dose of IV lasix to help with edema on 11/15  Creat improved, will give another dose on 11/16  Consideration of more frequent demadex dosing upon dc   Ok for ARB for now

## 2023-11-16 NOTE — CASE MANAGEMENT
Case Management Discharge Planning Note    Patient name Lakshmi Lawrence  Location S /S -01 MRN [id number]  : 2/3/1927 Date 2023       Current Admission Date: 2023  Current Admission Diagnosis:Acute on chronic low back pain   Patient Active Problem List    Diagnosis Date Noted    Bilateral lower extremity edema 11/15/2023    Toxic encephalopathy 11/15/2023    Constipation 11/15/2023    Leukocytosis 11/15/2023    Goals of care, counseling/discussion 11/15/2023    CVA (cerebral vascular accident) ([address]) 11/15/2023    Gallbladder sludge 2023    Thickening of wall of gallbladder 2023    Groin rash 2023    Altered mental status 2023    Continuous opioid dependence ([address]) 2022    Acute on chronic low back pain 2019    Stage 3 chronic kidney disease ([address]) 2019    Chronic pain of left knee 2019    Effusion of right knee 2019    Loss of bladder control 2017    Atrial fibrillation ([address]) 2017    Urinary incontinence 2017    Bladder prolapse, female, acquired 2015    Hypertension 2015    Bilateral primary osteoarthritis of knee 2014    Hypercholesterolemia 2013    Hypothyroidism 2013    Osteoarthrosis of knee 2013      LOS (days): 1  Geometric Mean LOS (GMLOS) (days): 4.60  Days to GMLOS:3.3     OBJECTIVE:  Risk of Unplanned Readmission Score: 28.53         Current admission status: Inpatient   Preferred Pharmacy:   [address], [address] [place] [address]  Phone: [phone] Fax: [phone]    Primary Care Provider: Julio Betancur MD    Primary Insurance: MEDICARE  Secondary Insurance: [id number]:    Discharge planning discussed with[de-identified] patient and children - Cristobal Gao - at bedside  Freedom of Choice: Yes (re: rehab)  Comments - Freedom of Choice: leaning towards having patient return home; agreeable for home care referrals being sent  CM contacted family/caregiver?: Yes  Were Treatment Team discharge recommendations reviewed with patient/caregiver?: Yes  Did patient/caregiver verbalize understanding of patient care needs?: Yes  Were patient/caregiver advised of the risks associated with not following Treatment Team discharge recommendations?: Yes    Contacts  Patient Contacts: Pippa Lynn (daughter)  Relationship to Patient[de-identified] Family  Contact Method: In Person  Reason/Outcome: Continuity of Care, Emergency Contact, Discharge Planning, Referral    Requested [address] [facility]         Is the patient interested in [address] [facility] [address] at discharge?: Yes  [facility] requested[de-identified] Nursing, Occupational Therapy, [address], [facility] Provider[de-identified] PCP  Home Health Services Needed[de-identified] Evaluate Functional Status and Safety, Gait/ADL Training, Strengthening/Theraputic Exercises to Improve Function  Homebound Criteria Met[de-identified] Requires the Assistance of Another Person for Safe Ambulation or to Leave the Home, Uses an Assist Device (i.e. cane, walker, etc)  Supporting Clincal Findings[de-identified] Limited Endurance, Fatigues Easliy in [employer]    DME Referral Provided  Referral made for DME?: Yes  DME referral completed for the following items[de-identified] Hospital Bed  DME Supplier Name[de-identified] [employer]    Other Referral/Resources/Interventions Provided:  Interventions: [address]  Referral Comments: Met with patient and children - Pippa Lynn and Jose Nephew - at bedside to follow-up on conversation re: discharge plan. Patient more alert/oriented today and conversation no longer geared towards comfort care. Spoke with family re: discharge plan and rehab recommendation. Daughter is a retired CNA and reports feeling comfortable having patient return home and caring for her there. Reviewed home care services and they were agreeable for blanket referral to be sent.  Daughter also asking about coverage for hospital bed/rosario lift if needed, although would like to see if patient more mobile today (compared to yesterday) as she's more alert and in less pain. Aware that PT/OT will follow-up and referrals will be sent for hospital bed and home care services. Referral for hospital bed sent in [address]. Home care referrals sent in [address] [facility]; awaiting response.     Would you like to participate in our [facility] [employer] service program?  : No - Declined    Treatment Team Recommendation: Short Term Rehab  Discharge Destination Plan[de-identified] Home with [address]

## 2023-11-16 NOTE — ASSESSMENT & PLAN NOTE
Elevated likely in setting of pain upon admission   Continue home dose Cozaar 100 mg daily, norvasc 5 mg daily and lopressor 100 mg BID

## 2023-11-16 NOTE — ASSESSMENT & PLAN NOTE
Presents from home (lives with daughters) with 1 week history of acute on chronic pain of back, bilateral hips and R knee. Has been unable to ambulate. At baseline, is able to stand/pivot and take some steps with a  walker. Does take Norco chronically for years for back pain. No red flag symptoms. Was seen in ER last week for similar and discharged. CT lumbar spine: severe disc and facet degenerative change throughout the lumbar spine. Multilevel mild to moderate central canal stenosis and moderate to severe neural foraminal narrowing. CT A/P: Moderate amount of stool in the colon may indicate constipation. No evidence of bowel obstruction, colitis or diverticulitis. Advanced disc degenerative change in the lumbar spine. Normal alignment of the left hip arthroplasty.    R Knee xray: severe tricompartmental OA  Was started on pain medications including Ketamine in ER which then required Narcan due to AMS  ESR/CRP elevated   APS consulted, regimen adjusted however as was NPO was unable to receive  Now that mental status better, able to take PO placed on scheduled Tylenol 975 mg every 8 hours, lido patch to back, L hip and R knee, Valium 2 mg every 8 hours as needed muscle spasms, Oxy 2.5 mg PO q6h PRN moderate pain, oxy 5 mg PO q6h PRN severe pain, IV dilaudid 0.2 mg q4h PRN breakthrough pain  D/w family given advanced age will hold off on additional imaging with MRI at this time especially if pain controlled on above regimen as findings unlikely to  anyway   PT/OT at this time recommending rehab however family likely to take patient home

## 2023-11-16 NOTE — PROGRESS NOTES
Progress Note - Acute Pain Service    Elieser Garcia 80 y.o. female MRN: [id number]  Unit/Bed#: S -28 Encounter: [id number]      Elieser Garcia is a 80 y.o. female with acute on chronic lower back pain of unclear etiology    Toxic encephalopathy  Assessment & Plan  Patient reportedly with altered mental status overnight. Patient given Narcan with improvement in mentation. Patient had received 150 mcg IV fentanyl, 0.2 mg IV Dilaudid and 68 mg IV ketamine bolus in ED prior to change in mental status. Patient also received gabapentin 100 mg p.o. at approximately midnight. This does not appear to be a home medication. Suggest discontinue gabapentin based on patient's age and renal function as this could be a source of altered mental status. Continuous opioid dependence ([address])  Assessment & Plan  History of chronic lower back pain. Is prescribed Norco 7.5-325 mg tablets, 1 p.o. 3 times daily as needed severe pain. Patient takes this 3 times a day on most days. Norco prescribed by PCP. Stage 3 chronic kidney disease [facility])  Assessment & Plan  Lab Results   Component Value Date    EGFR 26 11/15/2023    EGFR 25 11/14/2023    EGFR 28 11/08/2023    CREATININE 1.63 (H) 11/15/2023    CREATININE 1.68 (H) 11/14/2023    CREATININE 1.51 (H) 11/08/2023   Estimated Creatinine Clearance: 20.8 mL/min (A) (by C-G formula based on SCr of 1.63 mg/dL (H)). We will avoid nephrotoxic pain medications. * Acute on chronic low back pain  Assessment & Plan  Tylenol 975 mg p.o. every 8 hours. Lidocaine patch x3, on for 12 hours and off for 12 hours. Oxycodone 2.5 mg p.o. every 6 hours as needed moderate pain or 5 mg p.o. every 6 hours as needed severe pain. Dilaudid 0.2 mg IV every 4 hours as needed breakthrough pain or if unable to take p.o. for severe pain. Discontinue p.o. Valium. Continue Valium 2.5 mg IV every 8 hours as needed muscle spasm.   Hold opioid pain medication and benzodiazepines for decreased mental status. Bowel regimen to avoid opioid-induced constipation while on opioid pain medication. Ice to painful area for up to 20 minutes every hour as needed. APS will continue to follow. Please contact Acute Pain Service - via redealize from [phone] with additional questions or concerns. See Lenka or Phyllis for additional contacts and after hours information. Pain History  Current pain location(s):  Pain Score: 10  Pain Location/Orientation: Orientation: Right, Orientation: Lower, Location: Leg, Location: Knee, Location: Hip  Pain Scale: Pain Assessment Tool: FLACC  24 hour history: Patient sleeping on my arrival.  Appears comfortable. Awakens easily and is conversant. Denies any significant pain at this time. Opioid requirement previous 24 hours: Oxycodone 5 mg p.o. x2, Dilaudid 0.2 mg IV x3.     Meds/Allergies   all current active meds have been reviewed, current meds:   Current Facility-Administered Medications   Medication Dose Route Frequency    acetaminophen (TYLENOL) tablet 975 mg  975 mg Oral Q8H [facility]    amLODIPine (NORVASC) tablet 5 mg  5 mg Oral Daily    atorvastatin (LIPITOR) tablet 40 mg  40 mg Oral QPM    diazepam (VALIUM) tablet 2 mg  2 mg Oral Q8H PRN    docusate sodium (COLACE) capsule 100 mg  100 mg Oral BID    HYDROmorphone HCl (DILAUDID) injection 0.2 mg  0.2 mg Intravenous Q4H PRN    levothyroxine tablet 50 mcg  50 mcg Oral Early Morning    lidocaine (LIDODERM) 5 % patch 3 patch  3 patch Topical Daily    losartan (COZAAR) tablet 100 mg  100 mg Oral Daily    metoprolol tartrate (LOPRESSOR) tablet 100 mg  100 mg Oral BID    nystatin (MYCOSTATIN) powder   Topical BID    oxyCODONE (ROXICODONE) IR tablet 5 mg  5 mg Oral Q6H PRN    oxyCODONE (ROXICODONE) split tablet 2.5 mg  2.5 mg Oral Q6H PRN    polyethylene glycol (MIRALAX) packet 17 g  17 g Oral Daily    pravastatin (PRAVACHOL) tablet 40 mg  40 mg Oral Daily With Dinner    senna (SENOKOT) tablet 17.2 mg  2 tablet Oral HS warfarin (COUMADIN) tablet 2.5 mg  2.5 mg Oral Daily (warfarin)   , and PTA meds:   Prior to Admission Medications   Prescriptions Last Dose Informant Patient Reported? Taking?    HYDROcodone-acetaminophen (XODOL) 7.5-300 MG per tablet   No No   Sig: Take 1 tablet by mouth every 8 (eight) hours as needed for severe pain Max Daily Amount: 3 tablets   acetaminophen (TYLENOL) 325 mg tablet  Self, Child No No   Sig: Take 2 tablets (650 mg total) by mouth every 8 (eight) hours as needed for mild pain, headaches or fever   amLODIPine (NORVASC) 5 mg tablet   No No   Sig: Take 1 tablet (5 mg total) by mouth daily   clotrimazole (LOTRIMIN) 1 % cream  Self, Child No No   Sig: Apply 1 g (1 Application total) topically 2 (two) times a day for 14 days APPLY TO AREA OF BELLY BUTTON AND BOTH GROIN CREASES 2 TIMES PER DAY UNTIL GONE   ergocalciferol (VITAMIN D2) 50,000 units  Self, Child Yes No   Sig: Take by mouth once a week   levothyroxine 50 mcg tablet  Self, Child No No   Sig: Take 1 tablet (50 mcg total) by mouth daily   losartan (COZAAR) 100 MG tablet  Self, Child No No   Sig: Take 1 tablet (100 mg total) by mouth daily   metoprolol tartrate (LOPRESSOR) 100 mg tablet  Self, Child No No   Sig: Take 1 tablet (100 mg total) by mouth 2 (two) times a day   nystatin (MYCOSTATIN) powder  Self, Child No No   Sig: Apply topically 2 (two) times a day   polyethylene glycol (MIRALAX) 17 g packet  Self, Child Yes No   Sig: Take 17 g by mouth as needed     senna-docusate sodium (SENOKOT S) 8.6-50 mg per tablet  Self, Child Yes No   Sig: Take 1 tablet by mouth as needed for constipation   simvastatin (ZOCOR) 20 mg tablet  Self, Child No No   Sig: Take 1 tablet (20 mg total) by mouth daily at bedtime   torsemide (DEMADEX) 5 MG tablet   Yes Yes   Sig: Take 5 mg by mouth 3 (three) times a week   warfarin (COUMADIN) 5 mg tablet  Self, Child No No   Sig: TAKE 5 MG ON MONDAY AND SATURDAY AND 2.5 MG ALL OTHER DAYS   Patient taking differently: TAKE 5 MG SATURDAY AND 2.5 MG ALL OTHER DAYS      Facility-Administered Medications: None       Allergies   Allergen Reactions    Cortisone     Oxaprozin Hives    Penicillins Hives       Objective        Vitals:    11/16/23 0200 11/16/23 0400 11/16/23 0700 11/16/23 1030   BP: 128/78 130/70 159/69 159/69   BP Location: Right arm Right arm     Pulse: 82 84 83 83   Resp: 20 18 18    Temp: 98.6 °F (37 °C) 98.9 °F (37.2 °C) 98.8 °F (37.1 °C)    TempSrc: Oral Oral     SpO2:   92%    Weight:    91.2 kg (201 lb)   Height:    5' 1" (1.549 m)         Physical Exam  Vitals and nursing note reviewed. Constitutional:       General: She is sleeping. She is not in acute distress. Appearance: She is not ill-appearing, toxic-appearing or diaphoretic. Skin:     General: Skin is warm and dry. Neurological:      Mental Status: She is oriented to person, place, and time and easily aroused. GCS: GCS eye subscore is 3. GCS verbal subscore is 5. GCS motor subscore is 6. Psychiatric:         Attention and Perception: Attention normal.         Speech: Speech normal.         Behavior: Behavior is cooperative. Lab Results:   Estimated Creatinine Clearance: 24.2 mL/min (A) (by C-G formula based on SCr of 1.4 mg/dL (H)).   Lab Results   Component Value Date    WBC 13.25 (H) 11/16/2023    WBC 8.14 04/24/2015    HGB 10.7 (L) 11/16/2023    HGB 15.6 (H) 04/24/2015    HCT 32.7 (L) 11/16/2023    HCT 46.2 (H) 04/24/2015     11/16/2023     04/24/2015         Component Value Date/Time     04/24/2015 0732    K 4.5 11/16/2023 0539    K 4.0 04/24/2015 0732     11/16/2023 0539     04/24/2015 0732    CO2 22 11/16/2023 0539    CO2 34 (H) 06/24/2019 0234    BUN 42 (H) 11/16/2023 0539    BUN 16 04/24/2015 0732    CREATININE 1.40 (H) 11/16/2023 0539    CREATININE 1.13 04/24/2015 0732         Component Value Date/Time    CALCIUM 8.8 11/16/2023 0539    CALCIUM 9.0 04/24/2015 0732    ALKPHOS 78 11/16/2023 0539    ALKPHOS 130 (H) 04/24/2015 0732    AST 38 11/16/2023 0539    AST 24 04/24/2015 0732    ALT 18 11/16/2023 0539    ALT 17 04/24/2015 0732    BILITOT 0.46 04/24/2015 0732    TP 6.4 11/16/2023 0539    ALB 3.8 11/16/2023 0539    ALB 3.8 04/24/2015 0732       Imaging Studies/EKG: I have personally reviewed pertinent reports. Counseling / Coordination of Care  Total floor / unit time spent today 30 minutes minutes. Greater than 50% of total time was spent with the patient and / or family counseling and / or coordination of care. A description of the counseling / coordination of care: Patient interview, physical examination, review of medical records, review of imaging and laboratory data, development of pain management plan, discussion of pain management plan with patient and primary service. Please note that the APS provides consultative services regarding pain management only. With the exception of ketamine and epidural infusions and except when indicated, final decisions regarding starting or changing doses of analgesic medications are at the discretion of the consulting service.     Justice Alves PA-C   Acute Pain Service

## 2023-11-16 NOTE — ASSESSMENT & PLAN NOTE
Maintained on Lopressor 100 mg twice daily as well as warfarin as an outpatient with goal INR 2-3  INR held upon admission given increased INR, INR now within acceptable range, will resume  Warfarin not likely safe given age, limited mobility and fall risk. 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   This was reviewed with family and should be discussed further with her primary care physician

## 2023-11-16 NOTE — CONSULTS
Noted Neurology recommended d/c from CVA pathway. Spoke with daughter - patient usually has good appetite. Currently sleepy. SLP approved patient for Regular diet thin liquids. Nursing to assist with meals and obtain current bed weight.

## 2023-11-16 NOTE — ASSESSMENT & PLAN NOTE
In setting of chronic OA, lumbar degenerative disc disease.  Maintained on Norco for many years  Holding home norco  Currently on tylenol ATC as well as oxy PRN for breakthrough

## 2023-11-16 NOTE — ASSESSMENT & PLAN NOTE
Noted to have intermittent facial droop during this hospitalization.  CT head obtained and shows CVA however per my d/w neurology is at least few months old however new since May according to imaging  Etiology unclear however per neurology no need for MRI or stroke pathway  Echo already pending, can f/u results   Is on warfarin and INR therapeutic so doubt etiology  Given advanced age and chronicity of CVA, nothing additional recommended  Given droop, failed bedside screen however did very well with speech and is on regular diet as of 11/16

## 2023-11-16 NOTE — PHYSICAL THERAPY NOTE
PHYSICAL THERAPY TREATMENT NOTE    Patient Name: Jose Tapia  UMEJX'R Date: 11/16/2023 11/16/23 1510   PT Last Visit   PT Visit Date 11/16/23   Pain Assessment   Pain Assessment Tool 0-10   Pain Score 4   Pain Location/Orientation Location: Hip;Orientation: Right;Orientation: Left   Hospital Pain Intervention(s) Repositioned; Ambulation/increased activity   Restrictions/Precautions   Other Precautions Chair Alarm; Bed Alarm;Multiple lines;Pain; Fall Risk   General   Chart Reviewed Yes   Family/Caregiver Present No   Cognition   Arousal/Participation Lethargic;Cooperative   Attention Attends with cues to redirect   Orientation Level Oriented to person; Other (Comment)  (pt was identified w/ full name, birth date)   Following Commands Follows one step commands with increased time or repetition   Subjective   Subjective pt seen supine in bed. pt is better able to communicate than previous session. pt states having bilateral hip pain. pt agreed to PT session. Bed Mobility   Rolling R 2  Maximal assistance   Additional items Assist x 1; Increased time required;Verbal cues;LE management  (for LE positioning)   Rolling L 2  Maximal assistance   Additional items Assist x 1; Increased time required;Verbal cues;LE management  (for LE positionign)   Supine to Sit 2  Maximal assistance   Additional items Assist x 1;HOB elevated; Increased time required;Verbal cues;LE management  (for trunk/LE positioning)   Sit to Supine 2  Maximal assistance   Additional items Assist x 2; Increased time required;Verbal cues;LE management  (for trunk/LE positioning)   Transfers   Sit to Stand 1  Dependent   Additional items Assist x 1; Increased time required;Verbal cues  (for hand placement, LE positioning)   Stand to Sit 2  Maximal assistance  (poorly controlled descent to sitting surface)   Additional items Assist x 1; Impulsive;Verbal cues  (for body positioning, safety)   Stand pivot Unable to assess   Additional Comments pt stood 5 to 10 second during 3 standing trials w/ roller walker and max to totalx1. additional standing not possible due to fatigue and pain. pt was unable to weight shift or mobilize to bedside chair. Ambulation/Elevation   Gait pattern Not appropriate   Assistive Device Rolling walker   Balance   Static Sitting Fair -  (to poor+)   Static Standing Poor  (to poor-, w/ roller walker)   Ambulatory Zero   Activity Tolerance   Activity Tolerance Patient limited by fatigue;Patient limited by pain   Nurse Made Aware spoke to [address] [place], [place] [address]Gautam Nevelyn [facility]   Assessment   Problem List Decreased strength;Decreased range of motion;Decreased endurance; Impaired balance;Decreased mobility; Decreased safety awareness;Decreased cognition;Pain   Assessment pt shows improvement in mobility status from previous session w/ improve ability to participate and initiation of transfer training. pt needed assist x 1 to 2 to mobilize safely and frequent rest breaks related to fatigue and pain. pt was unable to mobilize to bedside chair or ambulate. pt is at high risk for falling due to physical and safety awareness limitations. continued inpatient PT is needed to progress mobility training and increase levels of mobility and independence. Goals   Patient Goals go home.    STG Expiration Date 11/25/23   Short Term Goal #1 (S)  pt will: Perform rolling and repositioning in bed w/ minx1 to decrease caregiver burden, Perform supine <--> sitting edge of bed transition w/ modx1 to improve level of function, Perform sit <---> stand and stand pivot transfers w/ modx1 to improve independence, Ambulate 25 ft. with roller walker w/ modx1 w/o LOB to improve functional independence, Increase all balance 1 grade to decrease risk for falls, Tolerate 3 hr OOB to faciliate upright tolerance, Tolerate standing 2 minutes w/ modx1 to facilitate functional task performance, and Improve Barthel Index score to 60 or greater to facilitate independence  (ADDENDUM TO PRESENT GOALS)   PT Treatment Day 1   Plan   Treatment/Interventions Functional transfer training;LE strengthening/ROM; Therapeutic exercise; Endurance training;Cognitive reorientation;Patient/family training;Bed mobility; Equipment eval/education;Gait training   Progress Progressing toward goals   PT Frequency 2-3x/wk   Discharge Recommendation   Rehab Resource Intensity Level, PT II (Moderate Resource Intensity)   Additional Comments pt will: Perform rolling and repositioning in bed w/ minx1 to decrease caregiver burden, Perform supine <--> sitting edge of bed transition w/ modx1 to improve level of function, Perform all transfers w/ modx1 to improve independence, Ambulate 25 ft. with roller walker w/ modx1 w/o LOB to improve functional independence, Increase all balance 1 grade to decrease risk for falls, Tolerate 3 hr OOB to faciliate upright tolerance, Tolerate standing 2 minutes w/ modx1 to facilitate functional task performance, and Improve Barthel Index score to 60 or greater to facilitate independence   AM-PAC Basic Mobility Inpatient   Turning in Flat Bed Without Bedrails 2   Lying on Back to Sitting on Edge of Flat Bed Without Bedrails 1   Moving Bed to Chair 1   Standing Up From Chair Using Arms 1   Walk in Room 1   Climb 3-5 Stairs With Railing 1   Basic Mobility Inpatient Raw Score 7   Turning Head Towards Sound 3   Follow Simple Instructions 3   Low Function Basic Mobility Raw Score  13   Low Function Basic Mobility Standardized Score  20.14   Highest Level Of Mobility   -HLM Goal 2: Bed activities/Dependent transfer   -HLM Achieved 3: Sit at edge of bed   End of Consult   Patient Position at End of Consult Supine;Bed/Chair alarm activated; All needs within reach     The patient's AM-PAC Basic Mobility Inpatient Short Form Raw Score is 7.  A Raw score of less than or equal to 16 suggests the patient may benefit from discharge to post-acute rehabilitation services. Please also refer to the recommendation of the Physical Therapist for safe discharge planning. Skilled inpatient PT recommended while in hospital to progress pt toward treatment goals.     Zuleima Joseph, PT

## 2023-11-17 LAB
ANION GAP SERPL CALCULATED.3IONS-SCNC: 4 MMOL/L
BASOPHILS # BLD AUTO: 0.06 THOUSANDS/ÂΜL (ref 0–0.1)
BASOPHILS NFR BLD AUTO: 1 % (ref 0–1)
BUN SERPL-MCNC: 45 MG/DL (ref 5–25)
CALCIUM SERPL-MCNC: 8.2 MG/DL (ref 8.4–10.2)
CHLORIDE SERPL-SCNC: 106 MMOL/L (ref 96–108)
CO2 SERPL-SCNC: 28 MMOL/L (ref 21–32)
CREAT SERPL-MCNC: 1.51 MG/DL (ref 0.6–1.3)
EOSINOPHIL # BLD AUTO: 0.49 THOUSAND/ÂΜL (ref 0–0.61)
EOSINOPHIL NFR BLD AUTO: 5 % (ref 0–6)
ERYTHROCYTE [DISTWIDTH] IN BLOOD BY AUTOMATED COUNT: 16.1 % (ref 11.6–15.1)
GFR SERPL CREATININE-BSD FRML MDRD: 28 ML/MIN/1.73SQ M
GLUCOSE SERPL-MCNC: 83 MG/DL (ref 65–140)
HCT VFR BLD AUTO: 30.1 % (ref 34.8–46.1)
HGB BLD-MCNC: 9.7 G/DL (ref 11.5–15.4)
IMM GRANULOCYTES # BLD AUTO: 0.05 THOUSAND/UL (ref 0–0.2)
IMM GRANULOCYTES NFR BLD AUTO: 1 % (ref 0–2)
INR PPP: 3.35 (ref 0.84–1.19)
LYMPHOCYTES # BLD AUTO: 1.54 THOUSANDS/ÂΜL (ref 0.6–4.47)
LYMPHOCYTES NFR BLD AUTO: 16 % (ref 14–44)
MCH RBC QN AUTO: 28.4 PG (ref 26.8–34.3)
MCHC RBC AUTO-ENTMCNC: 32.2 G/DL (ref 31.4–37.4)
MCV RBC AUTO: 88 FL (ref 82–98)
MONOCYTES # BLD AUTO: 1.11 THOUSAND/ÂΜL (ref 0.17–1.22)
MONOCYTES NFR BLD AUTO: 12 % (ref 4–12)
NEUTROPHILS # BLD AUTO: 6.27 THOUSANDS/ÂΜL (ref 1.85–7.62)
NEUTS SEG NFR BLD AUTO: 65 % (ref 43–75)
NRBC BLD AUTO-RTO: 0 /100 WBCS
PLATELET # BLD AUTO: 211 THOUSANDS/UL (ref 149–390)
PMV BLD AUTO: 9.9 FL (ref 8.9–12.7)
POTASSIUM SERPL-SCNC: 4.3 MMOL/L (ref 3.5–5.3)
PROTHROMBIN TIME: 35 SECONDS (ref 11.6–14.5)
RBC # BLD AUTO: 3.41 MILLION/UL (ref 3.81–5.12)
SODIUM SERPL-SCNC: 138 MMOL/L (ref 135–147)
WBC # BLD AUTO: 9.52 THOUSAND/UL (ref 4.31–10.16)

## 2023-11-17 PROCEDURE — 80048 BASIC METABOLIC PNL TOTAL CA: CPT | Performed by: INTERNAL MEDICINE

## 2023-11-17 PROCEDURE — 99232 SBSQ HOSP IP/OBS MODERATE 35: CPT | Performed by: PHYSICIAN ASSISTANT

## 2023-11-17 PROCEDURE — 85610 PROTHROMBIN TIME: CPT | Performed by: INTERNAL MEDICINE

## 2023-11-17 PROCEDURE — 97535 SELF CARE MNGMENT TRAINING: CPT

## 2023-11-17 PROCEDURE — 97167 OT EVAL HIGH COMPLEX 60 MIN: CPT

## 2023-11-17 PROCEDURE — 97530 THERAPEUTIC ACTIVITIES: CPT

## 2023-11-17 PROCEDURE — 85025 COMPLETE CBC W/AUTO DIFF WBC: CPT | Performed by: INTERNAL MEDICINE

## 2023-11-17 RX ADMIN — METOPROLOL TARTRATE 100 MG: 100 TABLET, FILM COATED ORAL at 09:18

## 2023-11-17 RX ADMIN — OXYCODONE HYDROCHLORIDE 5 MG: 5 TABLET ORAL at 04:53

## 2023-11-17 RX ADMIN — METOPROLOL TARTRATE 100 MG: 100 TABLET, FILM COATED ORAL at 18:50

## 2023-11-17 RX ADMIN — LEVOTHYROXINE SODIUM 50 MCG: 50 TABLET ORAL at 04:53

## 2023-11-17 RX ADMIN — ACETAMINOPHEN 975 MG: 325 TABLET, FILM COATED ORAL at 15:00

## 2023-11-17 RX ADMIN — AMLODIPINE BESYLATE 5 MG: 5 TABLET ORAL at 09:19

## 2023-11-17 RX ADMIN — POLYETHYLENE GLYCOL 3350 17 G: 17 POWDER, FOR SOLUTION ORAL at 09:18

## 2023-11-17 RX ADMIN — LIDOCAINE 3 PATCH: 700 PATCH TOPICAL at 21:37

## 2023-11-17 RX ADMIN — ACETAMINOPHEN 975 MG: 325 TABLET, FILM COATED ORAL at 21:30

## 2023-11-17 RX ADMIN — ACETAMINOPHEN 975 MG: 325 TABLET, FILM COATED ORAL at 04:54

## 2023-11-17 RX ADMIN — HYDROMORPHONE HYDROCHLORIDE 0.2 MG: 0.2 INJECTION, SOLUTION INTRAMUSCULAR; INTRAVENOUS; SUBCUTANEOUS at 20:53

## 2023-11-17 RX ADMIN — OXYCODONE HYDROCHLORIDE 5 MG: 5 TABLET ORAL at 18:57

## 2023-11-17 RX ADMIN — NYSTATIN: 100000 POWDER TOPICAL at 09:20

## 2023-11-17 RX ADMIN — ATORVASTATIN CALCIUM 40 MG: 40 TABLET, FILM COATED ORAL at 18:49

## 2023-11-17 RX ADMIN — HYDROMORPHONE HYDROCHLORIDE 0.2 MG: 0.2 INJECTION, SOLUTION INTRAMUSCULAR; INTRAVENOUS; SUBCUTANEOUS at 09:18

## 2023-11-17 RX ADMIN — OXYCODONE HYDROCHLORIDE 5 MG: 5 TABLET ORAL at 11:15

## 2023-11-17 RX ADMIN — DOCUSATE SODIUM 100 MG: 100 CAPSULE, LIQUID FILLED ORAL at 18:49

## 2023-11-17 RX ADMIN — LOSARTAN POTASSIUM 100 MG: 50 TABLET, FILM COATED ORAL at 09:18

## 2023-11-17 RX ADMIN — PRAVASTATIN SODIUM 40 MG: 40 TABLET ORAL at 17:35

## 2023-11-17 RX ADMIN — NYSTATIN: 100000 POWDER TOPICAL at 18:50

## 2023-11-17 RX ADMIN — DOCUSATE SODIUM 100 MG: 100 CAPSULE, LIQUID FILLED ORAL at 09:19

## 2023-11-17 NOTE — ASSESSMENT & PLAN NOTE
Presented from home (lives with daughters) with 1 week history of acute on chronic pain of back, bilateral hips and R knee. Has been unable to ambulate. At baseline, is able to stand/pivot and take some steps with a  walker. Does take Norco chronically for years for back pain. No red flag symptoms. Was seen in ER last week for similar and discharged. CT lumbar spine: severe disc and facet degenerative change throughout the lumbar spine. Multilevel mild to moderate central canal stenosis and moderate to severe neural foraminal narrowing. CT A/P: Moderate amount of stool in the colon may indicate constipation. No evidence of bowel obstruction, colitis or diverticulitis. Advanced disc degenerative change in the lumbar spine. Normal alignment of the left hip arthroplasty.    R Knee xray: severe tricompartmental OA  Was started on pain medications including Ketamine in ER which then required Narcan due to AMS  ESR/CRP elevated   APS consulted, appreciate their ongoing recommendations  On scheduled Tylenol 975 mg every 8 hours, lido patch to back, L hip and R knee, Valium 2 mg every 8 hours as needed muscle spasms, Oxy 2.5 mg PO q6h PRN moderate pain, oxy 5 mg PO q6h PRN severe pain, IV dilaudid 0.2 mg q4h PRN breakthrough pain  Prior provider d/w family given advanced age - will hold off on additional imaging with MRI at this time especially if pain controlled on above regimen as findings unlikely to  anyway   PT/OT at this time recommending rehab however family likely to take patient home

## 2023-11-17 NOTE — OCCUPATIONAL THERAPY NOTE
Occupational Therapy Evaluation/Treatment     Patient Name: Linda Escobedo  CDIUU'F Date: 2023  Problem List  Principal Problem:    Acute on chronic low back pain  Active Problems:    Atrial fibrillation ([address])    Hypertension    Hypothyroidism    Bilateral primary osteoarthritis of knee    Stage 3 chronic kidney disease (HCC)    Continuous opioid dependence ([address])    Bilateral lower extremity edema    Toxic encephalopathy    Constipation    Leukocytosis    Goals of care, counseling/discussion    CVA (cerebral vascular accident) [facility])    Past Medical History  Past Medical History:   Diagnosis Date    , spontaneous complete     Carcinoma of skin     Cataract     last assessed: 17    Cataract     Cellulitis     Effusion of both knee joints     resolved: 3/25/15    Effusion of both knee joints     Enteritis     Female stress incontinence     last assessed: 13    Female stress incontinence     Gastric ulcer     last assessed: 14    Gastric ulcer     GERD (gastroesophageal reflux disease)     Hyperlipidemia     Hypertension     Hyperthyroidism     Lyme disease     last assessed: 13    Lyme disease     Microscopic hematuria     last assessed: 13    Microscopic hematuria     Normal delivery     1950 son, 1952 son, 12 daughter, 26 daughter, 65 daughter    Paroxysmal atrial fibrillation ([address])     last assessed: 13    Paroxysmal atrial fibrillation ([address])     Perirectal abscess     last assessed: 13    Platelet dysfunction (HCC)     PAF    Platelet dysfunction (HCC)     Sinus tachycardia     last assessed: 13    Sinus tachycardia     Spinal stenosis     lumbar; last assessed: 10/4/13    Stage 3 chronic kidney disease ([address]) 6/3/2019    Stage 3 chronic kidney disease ([address])     Tachycardia     unspecified; last assessed: 13    Trigger finger, acquired     last assessed: 6/30/15    Trigger finger, acquired      Past Surgical History  Past Surgical History: Procedure Laterality Date    KNEE ARTHROSCOPY Bilateral     2008    KNEE ARTHROSCOPY      LUMBAR LAMINECTOMY      5/09    LUMBAR LAMINECTOMY      NEUROPLASTY / TRANSPOSITION MEDIAN NERVE AT CARPAL TUNNEL Bilateral     2011    NEUROPLASTY / TRANSPOSITION MEDIAN NERVE AT CARPAL TUNNEL      TONSILLECTOMY AND ADENOIDECTOMY      TOTAL HIP ARTHROPLASTY      joint replacement; L hip; 2/2/10    TOTAL HIP ARTHROPLASTY             11/17/23 0927   OT Last Visit   OT Visit Date 11/17/23   Note Type   Note type Evaluation  (+treatment [id number])   Pain Assessment   Pain Assessment Tool 0-10   Pain Score 7  ("6/10" at the end of the session)   Pain Location/Orientation Orientation: Bilateral;Location: Hip;Orientation: Right;Location: Knee   Pain Radiating Towards n/a   Pain Onset/Description Onset: Ongoing; Descriptor: Aching;Descriptor: Discomfort  (chronic in nature)   Effect of Pain on Daily Activities limits comfort, activity tolerance and mobility   Patient's Stated Pain Goal No pain   Hospital Pain Intervention(s) Repositioned; Ambulation/increased activity; Emotional support   Multiple Pain Sites No   Restrictions/Precautions   Weight Bearing Precautions Per Order No   Other Precautions Chair Alarm; Bed Alarm;Multiple lines; Fall Risk;Pain   Home Living   Type of [address] [place] [address] Dr One level;Performs ADLs on one level; Able to live on main level with bedroom/bathroom  (1-2 DEBI)   Home Equipment Walker; Other (Comment); Wheelchair-manual  (Rollator; transport chair)   Prior Function   Level of [place] Needs assistance with ADLs; Needs assistance with IADLS   Lives With [facility] Help From Family;Friend(s)   IADLs Family/Friend/Other provides transportation; Family/Friend/Other provides meals; Family/Friend/Other provides medication management   Falls in the last 6 months 1 to 4   Vocational Retired   Comments Pt is a poor/questionable historian at time of eval. Prior level of functional and home setup obtained from EMR and CM report. At baseline pt has (A) with ADL/IADLs. Ambulates short household distances with use of rollator. Pt reports the rollator walker does not fit in all places of the home. Lifestyle   Autonomy At baseline pt is (A) c ADLs/IADLs, LUCAS for functional mobility in the home with use of rollator walker. Pt is sedentary. (-) driving. (+) falls. Reciprocal Relationships Supportive family   Service to Others Retired   General   Additional Pertinent History Pt [facility] with pain in her right hip and right lower back radiating down the back of her right leg that has been progressively getting worse. Family/Caregiver Present No   Subjective   Subjective "I HAVE to use the bathroom!"   ADL   Eating Assistance 5  Supervision/Setup   Grooming Assistance 5  Supervision/Setup   UB Bathing Assistance 4  Minimal Assistance   LB Bathing Assistance 2  Maximal Assistance   UB Dressing Assistance 4  Minimal Assistance   LB Dressing Assistance 2  Maximal [address]  1  Total Assistance   Additional Comments Unable to formally assess bathing, dressing, grooming and toileting at time of eval. The above levels of assistance are anticipated based on functional performance deficits with use of clinical judgement. Bed Mobility   Rolling R 2  Maximal assistance   Additional items Assist x 1; Increased time required;Verbal cues;LE management  (trunk managment)   Rolling L 2  Maximal assistance   Additional items Assist x 1; Increased time required;Verbal cues;LE management  (trunk managment)   Supine to Sit 2  Maximal assistance   Additional items Assist x 2; Increased time required;Verbal cues;LE management  (trunk managment)   Sit to Supine 1  Dependent   Additional items Assist x 2; Increased time required;Verbal cues;LE management  (trunk managment)   Additional Comments Pt tolerated sitting EOB for approx ~6 mins with variable support of JUANITO-supervision.  Tactile cues for optimal hand placement and use of bedrails for increased support. MAXA x 1 to scoot fwd towards the EOB for optimal seated position and balance. Transfers   Sit to Stand 2  Maximal assistance   Additional items Assist x 2; Increased time required;Verbal cues   Stand to Sit 2  Maximal assistance   Additional items Assist x 2; Increased time required;Verbal cues   Additional Comments B HHA with B knee blocking to acheive STS from  EOB. Pt tolerated static standing for approx ~1 min with incontience of BM. Functional Mobility   Additional Comments Futher mobility not assessed at this time d/t impaired safety awareness and inability to participate in pre-gait traning techniques. Balance   Static Sitting Fair   Dynamic Sitting Fair -   Static Standing Poor   Dynamic Standing Poor -   Activity Tolerance   Activity Tolerance Patient limited by fatigue;Patient limited by pain   Medical Staff Made Aware Spoke with PT   Nurse Made Aware Spoke with RN Annita pre/post   RUE Assessment   RUE Assessment X  (limited AROM/PROM throughout)   LUE Assessment   LUE Assessment X  (limited AROM/PROM throughout)   Hand Function   Gross Motor Coordination Functional   Fine Motor Coordination Functional   Vision-Basic Assessment   Current Vision Wears glasses only for reading   Cognition   Overall Cognitive Status Impaired   Arousal/Participation Alert; Responsive; Cooperative   Attention Attends with cues to redirect   Orientation Level Oriented to person;Disoriented to place; Disoriented to time;Disoriented to situation  (States she is in a hospital ~"I have no idea where though. There is so many around here")   Memory Decreased recall of precautions;Decreased recall of recent events;Decreased short term memory;Decreased long term memory   Following Commands Follows one step commands with increased time or repetition   Comments Pt is very pleasent and cooperative. Assessment   Limitation Decreased ADL status; Decreased UE ROM; Decreased UE strength;Decreased Safe judgement during ADL;Decreased cognition;Decreased endurance;Decreased self-care trans;Decreased high-level ADLs   Prognosis Poor   Assessment Patient is a 80 y.o. female seen for OT evaluation and treatment  at [address] [place] [address] following admission on 11/14/2023  s/p Chronic low back pain. Please see above for comprehensive list of comorbidities and significant PMHx impacting functional performance. At baseline, pt is (A) with ADLs/IADLs and LUCAS for functional mobility or short distances with use of rollator vs SPT to/from [facility] <> various self-care surfaces. Upon initial evaluation, pt appears to be performing below baseline functional status. Pt requires MODA for UB ADLs, TOTAL A for LB ADLs, MAXA x 2 for bed mobility, MAXA x 2 for transfers. The AM-PAC & Barthel Index outcome tools were used to assist in determining pt safety w/self care /mobility and appropriate d/c recommendations, see above for score. Occupational performance is affected by the following deficits: endurance ,  decreased muscular strength , decreased balance , decreased standing tolerance for self care tasks , decreased dynamic balance impacting functional reach, decreased functional reach , decreased trunk control , decreased activity tolerance , impaired memory , impaired safety awareness , (+) pain , impaired global mental status, and direction following. Personal/Environmental factors impacting D/C include: (+) Hx of falls , steps to enter/navigate the home, Assistance needed for ADL/IADLs, Assistance needed for ADLs and functional mobility, High fall risk , decreased insight toward deficits , and decreased recall of precautions .  Supporting factors include: able to maintain FFSU, accessible home environment, support system available, and attitude towards recovery Patient would benefit from OT services within the acute care setting to maximize level of functional independence in the following areas fall prevention , self-care transfers, bed mobility , functional mobility, and ADLs. From OT standpoint, recommendation at time of D/C would be to level II (Moderate Resource Intensity). Goals   Patient Goals "to feel better"   LTG Time Frame 10-14   Long Term Goal See below   Plan   Treatment Interventions ADL retraining;Functional transfer training;UE strengthening/ROM; Endurance training;Cognitive reorientation;Patient/family training;Equipment evaluation/education; Compensatory technique education; Energy conservation; Activityengagement   Goal Expiration Date 11/27/23   OT Treatment Day 1   OT Frequency 3-5x/wk   Discharge Recommendation   Rehab Resource Intensity Level, OT II (Moderate Resource Intensity)   Additional Comments  The patient's raw score on the AM-PAC Daily Activity Inpatient Short Form is 12. A raw score of less than 19 suggests the patient may benefit from discharge to post-acute rehabilitation services. Please refer to the recommendation of the Occupational Therapist for safe discharge planning.    AM-PAC Daily Activity Inpatient   Lower Body Dressing 1   Bathing 1   Toileting 1   Upper Body Dressing 2   Grooming 3   Eating 4   Daily Activity Raw Score 12   Daily Activity Standardized Score (Calc for Raw Score >=11) 30.6   AM-PAC Applied Cognition Inpatient   Following a Speech/Presentation 2   Understanding Ordinary Conversation 3   Taking Medications 2   Remembering Where Things Are Placed or Put Away 2   Remembering List of 4-5 Errands 2   Taking Care of Complicated Tasks 1   Applied Cognition Raw Score 12   Applied Cognition Standardized Score 28.82   Barthel Index   Feeding 10   Bathing 0   Grooming Score 0   Dressing Score 0   Bladder Score 0   Bowels Score 0   Toilet Use Score 0   Transfers (Bed/Chair) Score 5   Mobility (Level Surface) Score 0   Stairs Score 0   Barthel Index Score 15   Additional Treatment Session   Start Time 0910   End Time 0508   Treatment Assessment Pt participated in tx session following IE for ADL training and to further challenge activity tolerance. Stand pivot transfer completed with MAXA x 2 people from EOB >> bedside commode. JUANITO to maintain static sitting on the commode, progressing to supervision. MAXA x 2 people to complete STS from commode x 2 with total assist for pericare. Additional SPT completed from commode >> EOB. All transfers completed with B HHA and B knee blocking. Patient demonstrates poor recall of precautions, continued cueing provided t/o the session. At the end of the session pt remained supine in bed with alarm donned and all needs in reach. Pt reports pain to be "6/10" after mobility. Continue POC. End of Consult   Education Provided Yes   Patient Position at End of Consult Supine;Bed/Chair alarm activated; All needs within reach   Nurse Communication Nurse aware of consult       Goals established on initial evaluation in order to achieve pt's goal of feeling better. Pt will complete UB ADLs Min A  for increased ADL independence within 10 days. Pt will complete LB ADLs Max A  for increased ADL independence within 10 days. Pt will complete toileting Max A  with use of DME for increased ADL independence within 10 days. Pt will demonstrate proper body mechanics to complete self-care transfers and functional mobility with Mod A  and use of LRAD for increased safety and functional independence within 10 days. Pt will demonstrate standing tolerance of 2  min with Mod A  and use of LRAD for increased activity tolerance during ADL/IADL tasks within 10 days. Pt will complete bed mobility Mod A  for increased independence in repositioning, pressure offloading, and managing comfort.      Pt will demonstrate proper body mechanics and fall prevention strategies during 100% of tx sessions for increased safety awareness during ADL/IADLs    Pt will improve functional activity tolerance to 25 mins of sustained functional tasks to increase participation in basic self-care tasks and minimize assistance level. Pt will receive education on use of compensatory memory strategies for increased safety and independent with IADL tasks. Pt will participate in ongoing cognitive assessments to assist with safe D/C planning and supervision/assistance recommendations. Pt will verbalize and demonstrate understanding of B UE HEP program increase strength and endurance during self care transfers within 10 days. Pt will demonstrate use of pain management techniques PRN for increased activity tolerance and engagement. Pt will demonstrate OOB sitting tolerance of 2-4 hr/day for increased activity tolerance and engagement in leisure activities within 10 days. Pt benefited from co-session of skilled OT and PT therapists in order to most appropriately address functional deficits d/t regression from functioning level prior to admission , extensive physical assistance required for safe mobility , and decreased activity tolerance. OT/PT objectives were addressed separately; please see PT note for specific goal areas targeted.     Elise Bass, [id number] [facility] OTR/L   [place] Licensure# [id number]

## 2023-11-17 NOTE — ASSESSMENT & PLAN NOTE
Noted to have intermittent facial droop during this hospitalization.  CT head obtained and shows CVA however per prior provider's d/w neurology is at least few months old however new since May according to imaging  Etiology unclear however per neurology no need for MRI or stroke pathway  INR supratherapeutic today, hold as below   Given advanced age and chronicity of CVA, nothing additional recommended  Given droop, failed bedside screen however did very well with speech and is on regular diet as of 11/16

## 2023-11-17 NOTE — ASSESSMENT & PLAN NOTE
Lab Results   Component Value Date    EGFR 28 11/17/2023    EGFR 31 11/16/2023    EGFR 26 11/15/2023    CREATININE 1.51 (H) 11/17/2023    CREATININE 1.40 (H) 11/16/2023    CREATININE 1.63 (H) 11/15/2023     Baseline appears around 1.3-1.5 per lab review  Daughter states patient is maintained on Demadex 5 mg 3 times weekly, was previously on daily diuretics however developed dehydration  S/P IV lasix on 11/15 and 11/16  Consideration of more frequent demadex dosing upon dc   Ok for ARB for now

## 2023-11-17 NOTE — ASSESSMENT & PLAN NOTE
Maintained on Lopressor 100 mg twice daily as well as warfarin as an outpatient with goal INR 2-3  INR held upon admission given increased INR, and was resumed when INR was back within acceptable rang. INR now supratherapeutic again, hold coumadin for now and repeat INR in AM  Warfarin not likely safe given age, limited mobility and fall risk. 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   This was reviewed by prior provider with family and should be discussed further with her primary care physician

## 2023-11-17 NOTE — PLAN OF CARE
Problem: PHYSICAL THERAPY ADULT  Goal: Performs mobility at highest level of function for planned discharge setting. See evaluation for individualized goals. Description: Treatment/Interventions: Functional transfer training, LE strengthening/ROM, Therapeutic exercise, Endurance training, Cognitive reorientation, Patient/family training, Equipment eval/education, Bed mobility (PT to see next session to complete mobility assessment)          See flowsheet documentation for full assessment, interventions and recommendations. Outcome: Progressing  Note:    Problem List: Decreased strength, Decreased range of motion, Decreased endurance, Impaired balance, Decreased mobility, Decreased safety awareness, Decreased cognition, Pain  Assessment: pt's mobility status continues to progress w/ completion of stand pivot transfer. pt needed assist x2 w/ all phases of mobility, including input for mobility technique and safety. pt was unable to complete pregait activities and remains at risk for falling. continued inpatient PT is needed to reduce fall risk factors and progress mobility training as appropriate.`        Rehab Resource Intensity Level, PT: II (Moderate Resource Intensity)    See flowsheet documentation for full assessment.

## 2023-11-17 NOTE — ASSESSMENT & PLAN NOTE
Noted to be screaming secondary to pain, was not able to be redirected on AM 11/15. Upon admission did receive several narcotics in an effort to obtain CT scan which then required Narcan due to oversedation. Per daughter, at baseline patient is alert and oriented x3, however does have some forgetfulness and this tends to be worsened by acute pain  At this time, suspect multifactorial  CT head showing stroke however is not new per neurology, at least several months old  UA checked and unremarkable  MS overall appears improved, continue to monitor.

## 2023-11-17 NOTE — PROGRESS NOTES
Progress Note - Acute Pain Service    Cara Still 80 y.o. female MRN: [id number]  Unit/Bed#: S -45 Encounter: [id number]      Cara Still is a 80 y.o. female with acute on chronic lower back pain and hip pain. Toxic encephalopathy  Assessment & Plan  Patient reportedly with altered mental status overnight. Patient given Narcan with improvement in mentation. Patient had received 150 mcg IV fentanyl, 0.2 mg IV Dilaudid and 68 mg IV ketamine bolus in ED prior to change in mental status. Patient also received gabapentin 100 mg p.o. at approximately midnight. This does not appear to be a home medication. Suggest discontinue gabapentin based on patient's age and renal function as this could be a source of altered mental status. Continuous opioid dependence ([address])  Assessment & Plan  History of chronic lower back pain. Is prescribed Norco 7.5-325 mg tablets, 1 p.o. 3 times daily as needed severe pain. Patient takes this 3 times a day on most days. Norco prescribed by PCP. Stage 3 chronic kidney disease [facility])  Assessment & Plan  Lab Results   Component Value Date    EGFR 28 11/17/2023    EGFR 31 11/16/2023    EGFR 26 11/15/2023    CREATININE 1.51 (H) 11/17/2023    CREATININE 1.40 (H) 11/16/2023    CREATININE 1.63 (H) 11/15/2023   Estimated Creatinine Clearance: 22.4 mL/min (A) (by C-G formula based on SCr of 1.51 mg/dL (H)). We will avoid nephrotoxic pain medications. * Acute on chronic low back pain  Assessment & Plan  Tylenol 975 mg p.o. every 8 hours. Lidocaine patch x3, on for 12 hours and off for 12 hours. Oxycodone 2.5 mg p.o. every 6 hours as needed moderate pain or 5 mg p.o. every 6 hours as needed severe pain. Dilaudid 0.2 mg IV every 4 hours as needed breakthrough pain or if unable to take p.o. for severe pain. If IV Dilaudid not used in the 24-hour period, can discontinue. Valium 2 mg p.o. every 8 hours as needed muscle spasm. Discontinue this prior to discharge.   Hold opioid pain medication and benzodiazepines for decreased mental status. Bowel regimen to avoid opioid-induced constipation while on opioid pain medication. Ice to painful area for up to 20 minutes every hour as needed. APS will continue to follow. Please contact Acute Pain Service - via [facility] [facility] from [phone] with additional questions or concerns. See [employer] or Phyllis for additional contacts and after hours information. Pain History  Current pain location(s):  Pain Score: 7 (6/10 at end of session)  Pain Location/Orientation: Other (Comment) (bilateral hips, right knee)  Pain Scale: Pain Assessment Tool: 0-10  24 hour history: Patient alert and oriented. Complains of mild pain at rest with increased pain on movement. Current regimen has been effective. Has not required as needed Valium. Opioid requirement previous 24 hours: Oxycodone 5 mg p.o. x3, Dilaudid 0.2 mg IV x2.     Meds/Allergies   all current active meds have been reviewed, current meds:   Current Facility-Administered Medications   Medication Dose Route Frequency    acetaminophen (TYLENOL) tablet 975 mg  975 mg Oral Q8H [address]    amLODIPine (NORVASC) tablet 5 mg  5 mg Oral Daily    atorvastatin (LIPITOR) tablet 40 mg  40 mg Oral QPM    diazepam (VALIUM) tablet 2 mg  2 mg Oral Q8H PRN    docusate sodium (COLACE) capsule 100 mg  100 mg Oral BID    HYDROmorphone HCl (DILAUDID) injection 0.2 mg  0.2 mg Intravenous Q4H PRN    levothyroxine tablet 50 mcg  50 mcg Oral Early Morning    lidocaine (LIDODERM) 5 % patch 3 patch  3 patch Topical Daily    losartan (COZAAR) tablet 100 mg  100 mg Oral Daily    metoprolol tartrate (LOPRESSOR) tablet 100 mg  100 mg Oral BID    nystatin (MYCOSTATIN) powder   Topical BID    oxyCODONE (ROXICODONE) IR tablet 5 mg  5 mg Oral Q6H PRN    oxyCODONE (ROXICODONE) split tablet 2.5 mg  2.5 mg Oral Q6H PRN    polyethylene glycol (MIRALAX) packet 17 g  17 g Oral Daily    pravastatin (PRAVACHOL) tablet 40 mg  40 mg Oral Daily With Dinner    senna (SENOKOT) tablet 17.2 mg  2 tablet Oral HS   , and PTA meds:   Prior to Admission Medications   Prescriptions Last Dose Informant Patient Reported? Taking?    HYDROcodone-acetaminophen (XODOL) 7.5-300 MG per tablet   No No   Sig: Take 1 tablet by mouth every 8 (eight) hours as needed for severe pain Max Daily Amount: 3 tablets   acetaminophen (TYLENOL) 325 mg tablet  Self, Child No No   Sig: Take 2 tablets (650 mg total) by mouth every 8 (eight) hours as needed for mild pain, headaches or fever   amLODIPine (NORVASC) 5 mg tablet   No No   Sig: Take 1 tablet (5 mg total) by mouth daily   clotrimazole (LOTRIMIN) 1 % cream  Self, Child No No   Sig: Apply 1 g (1 Application total) topically 2 (two) times a day for 14 days APPLY TO AREA OF BELLY BUTTON AND BOTH GROIN CREASES 2 TIMES PER DAY UNTIL GONE   ergocalciferol (VITAMIN D2) 50,000 units  Self, Child Yes No   Sig: Take by mouth once a week   levothyroxine 50 mcg tablet  Self, Child No No   Sig: Take 1 tablet (50 mcg total) by mouth daily   losartan (COZAAR) 100 MG tablet  Self, Child No No   Sig: Take 1 tablet (100 mg total) by mouth daily   metoprolol tartrate (LOPRESSOR) 100 mg tablet  Self, Child No No   Sig: Take 1 tablet (100 mg total) by mouth 2 (two) times a day   nystatin (MYCOSTATIN) powder  Self, Child No No   Sig: Apply topically 2 (two) times a day   polyethylene glycol (MIRALAX) 17 g packet  Self, Child Yes No   Sig: Take 17 g by mouth as needed     senna-docusate sodium (SENOKOT S) 8.6-50 mg per tablet  Self, Child Yes No   Sig: Take 1 tablet by mouth as needed for constipation   simvastatin (ZOCOR) 20 mg tablet  Self, Child No No   Sig: Take 1 tablet (20 mg total) by mouth daily at bedtime   torsemide (DEMADEX) 5 MG tablet   Yes Yes   Sig: Take 5 mg by mouth 3 (three) times a week   warfarin (COUMADIN) 5 mg tablet  Self, Child No No   Sig: TAKE 5 MG ON MONDAY AND SATURDAY AND 2.5 MG ALL OTHER DAYS   Patient taking differently: TAKE 5 MG SATURDAY AND 2.5 MG ALL OTHER DAYS      Facility-Administered Medications: None       Allergies   Allergen Reactions    Cortisone     Oxaprozin Hives    Penicillins Hives       Objective        Vitals:    11/16/23 1457 11/16/23 1711 11/16/23 2040 11/17/23 0804   BP: 99/64 116/61 (!) 110/46 133/66   BP Location: Right arm  Left arm Left arm   Pulse: 64 66 70 89   Resp: 17  18 18   Temp: 99.3 °F (37.4 °C)  99 °F (37.2 °C) 98.3 °F (36.8 °C)   TempSrc: Oral  Axillary Oral   SpO2: 95%  92% 95%   Weight:       Height:             Physical Exam  Vitals and nursing note reviewed. Constitutional:       General: She is sleeping. She is not in acute distress. Appearance: She is not ill-appearing, toxic-appearing or diaphoretic. Skin:     General: Skin is warm and dry. Neurological:      Mental Status: She is oriented to person, place, and time and easily aroused. GCS: GCS eye subscore is 4. GCS verbal subscore is 5. GCS motor subscore is 6. Psychiatric:         Attention and Perception: Attention normal.         Speech: Speech normal.         Behavior: Behavior normal. Behavior is cooperative. Lab Results:   Estimated Creatinine Clearance: 22.4 mL/min (A) (by C-G formula based on SCr of 1.51 mg/dL (H)).   Lab Results   Component Value Date    WBC 9.52 11/17/2023    WBC 8.14 04/24/2015    HGB 9.7 (L) 11/17/2023    HGB 15.6 (H) 04/24/2015    HCT 30.1 (L) 11/17/2023    HCT 46.2 (H) 04/24/2015     11/17/2023     04/24/2015         Component Value Date/Time     04/24/2015 0732    K 4.3 11/17/2023 0451    K 4.0 04/24/2015 0732     11/17/2023 0451     04/24/2015 0732    CO2 28 11/17/2023 0451    CO2 34 (H) 06/24/2019 0234    BUN 45 (H) 11/17/2023 0451    BUN 16 04/24/2015 0732    CREATININE 1.51 (H) 11/17/2023 0451    CREATININE 1.13 04/24/2015 0732         Component Value Date/Time    CALCIUM 8.2 (L) 11/17/2023 0451    CALCIUM 9.0 04/24/2015 0732    ALKPHOS 78 11/16/2023 0539    ALKPHOS 130 (H) 04/24/2015 0732    AST 38 11/16/2023 0539    AST 24 04/24/2015 0732    ALT 18 11/16/2023 0539    ALT 17 04/24/2015 0732    BILITOT 0.46 04/24/2015 0732    TP 6.4 11/16/2023 0539    ALB 3.8 11/16/2023 0539    ALB 3.8 04/24/2015 0732       Imaging Studies/EKG: I have personally reviewed pertinent reports. Counseling / Coordination of Care  Total floor / unit time spent today 30 minutes minutes. Greater than 50% of total time was spent with the patient and / or family counseling and / or coordination of care. A description of the counseling / coordination of care: Patient interview, physical examination, review of medical records, review of imaging and laboratory data, development of pain management plan, discussion of pain management plan with patient and primary service. Please note that the APS provides consultative services regarding pain management only. With the exception of ketamine and epidural infusions and except when indicated, final decisions regarding starting or changing doses of analgesic medications are at the discretion of the consulting service.     Jack Nuñez PA-C   Acute Pain Service

## 2023-11-17 NOTE — PROGRESS NOTES
[address] [facility]  Progress Note  Name: Damaso Sanabria  MRN: [id number]  Unit/Bed#: S -01 I Date of Admission: 11/14/2023   Date of Service: 11/17/2023 I Hospital Day: 2    Assessment/Plan   * Acute on chronic low back pain  Assessment & Plan  Presented from home (lives with daughters) with 1 week history of acute on chronic pain of back, bilateral hips and R knee. Has been unable to ambulate. At baseline, is able to stand/pivot and take some steps with a  walker. Does take Norco chronically for years for back pain. No red flag symptoms. Was seen in ER last week for similar and discharged. CT lumbar spine: severe disc and facet degenerative change throughout the lumbar spine. Multilevel mild to moderate central canal stenosis and moderate to severe neural foraminal narrowing. CT A/P: Moderate amount of stool in the colon may indicate constipation. No evidence of bowel obstruction, colitis or diverticulitis. Advanced disc degenerative change in the lumbar spine. Normal alignment of the left hip arthroplasty. R Knee xray: severe tricompartmental OA  Was started on pain medications including Ketamine in ER which then required Narcan due to AMS  ESR/CRP elevated   APS consulted, appreciate their ongoing recommendations  On scheduled Tylenol 975 mg every 8 hours, lido patch to back, L hip and R knee, Valium 2 mg every 8 hours as needed muscle spasms, Oxy 2.5 mg PO q6h PRN moderate pain, oxy 5 mg PO q6h PRN severe pain, IV dilaudid 0.2 mg q4h PRN breakthrough pain  Prior provider d/w family given advanced age - will hold off on additional imaging with MRI at this time especially if pain controlled on above regimen as findings unlikely to  anyway   PT/OT at this time recommending rehab however family likely to take patient home    CVA (cerebral vascular accident) [facility])  Assessment & Plan  Noted to have intermittent facial droop during this hospitalization.  CT head obtained and shows CVA however per prior provider's d/w neurology is at least few months old however new since May according to imaging  Etiology unclear however per neurology no need for MRI or stroke pathway  INR supratherapeutic today, hold as below   Given advanced age and chronicity of CVA, nothing additional recommended  Given droop, failed bedside screen however did very well with speech and is on regular diet as of 11/16    Atrial fibrillation (HCC)  Assessment & Plan  Maintained on Lopressor 100 mg twice daily as well as warfarin as an outpatient with goal INR 2-3  INR held upon admission given increased INR, and was resumed when INR was back within acceptable rang. INR now supratherapeutic again, hold coumadin for now and repeat INR in AM  Warfarin not likely safe given age, limited mobility and fall risk. 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 This was reviewed by prior provider with family and should be discussed further with her primary care physician    Toxic encephalopathy  Assessment & Plan  Noted to be screaming secondary to pain, was not able to be redirected on AM 11/15. Upon admission did receive several narcotics in an effort to obtain CT scan which then required Narcan due to oversedation. Per daughter, at baseline patient is alert and oriented x3, however does have some forgetfulness and this tends to be worsened by acute pain  At this time, suspect multifactorial  CT head showing stroke however is not new per neurology, at least several months old  UA checked and unremarkable  MS overall appears improved, continue to monitor. Continuous opioid dependence ([address])  Assessment & Plan  In setting of chronic OA, lumbar degenerative disc disease.  Maintained on Norco for many years  Holding home norco  Currently on tylenol ATC as well as oxy PRN for breakthrough     Bilateral lower extremity edema  Assessment & Plan  Acute on chronic per family, worsening over the last week  Is maintained on warfarin, doubt DVT  Echo from this admission revealed LVEF 65%  Elevate, compression with bilateral Ace wrap's  Hold home demadex  S/P dose of IV lasix on 11/15 and again on 11/16    Stage 3 chronic kidney disease [facility])  Assessment & Plan  Lab Results   Component Value Date    EGFR 28 11/17/2023    EGFR 31 11/16/2023    EGFR 26 11/15/2023    CREATININE 1.51 (H) 11/17/2023    CREATININE 1.40 (H) 11/16/2023    CREATININE 1.63 (H) 11/15/2023     Baseline appears around 1.3-1.5 per lab review  Daughter states patient is maintained on Demadex 5 mg 3 times weekly, was previously on daily diuretics however developed dehydration  S/P IV lasix on 11/15 and 11/16  Consideration of more frequent demadex dosing upon dc   Ok for ARB for now    Goals of care, counseling/discussion  Assessment & Plan  Overall pain control and mental status appears improved today  Only if pain is unable to be controlled would we consider additional imaging +/- consultation with ortho/neurosurgery     Hypertension  Assessment & Plan  Elevated likely in setting of pain upon admission   Continue home dose Cozaar 100 mg daily, norvasc 5 mg daily and lopressor 100 mg BID    Constipation  Assessment & Plan  Noted on imaging  Bowel regimen ordered  Abdominal exams     Leukocytosis  Assessment & Plan  Mild, ? reactive  No etiology on CT other than constipation  UA unremarkable  Now resolved, Monitor     Hypothyroidism  Assessment & Plan  Continue synthroid, most recent TSH WNL    Bilateral primary osteoarthritis of knee  Assessment & Plan  Reported worsening R knee pain   Xrays negative  Pain control as above            VTE Pharmacologic Prophylaxis: VTE Score: 4  coumadin on hold given supratherapeutic INR as above    Mobility:   Basic Mobility Inpatient Raw Score: 7  -HLM Goal: 2: Bed activities/Dependent transfer  -HLM Achieved: 5: Stand (1 or more minutes)  HLM Goal achieved. Continue to encourage appropriate mobility. Patient Centered Rounds: I performed bedside rounds with nursing staff today. Discussions with Specialists or Other Care Team Provider: acute pain service AP    Education and Discussions with Family / Patient: Updated  (daughter) via phone. shawanda    Total Time Spent on Date of Encounter in care of patient: 30 mins. This time was spent on one or more of the following: performing physical exam; counseling and coordination of care; obtaining or reviewing history; documenting in the medical record; reviewing/ordering tests, medications or procedures; communicating with other healthcare professionals and discussing with patient's family/caregivers. Current Length of Stay: 2 day(s)  Current Patient Status: Inpatient   Certification Statement: The patient will continue to require additional inpatient hospital stay due to pain control, safe d/c planning  Discharge Plan: Anticipate discharge in 24-48 hrs to rehab facility. Code Status: Level 3 - DNAR and DNI    Subjective:   Ms. Shaista Ulloa reports that her pain is controlled currently, resting. Worked with therapy this AM and got dilaudid after per RN    Objective:     Vitals:   Temp (24hrs), Av.9 °F (37.2 °C), Min:98.3 °F (36.8 °C), Max:99.3 °F (37.4 °C)    Temp:  [98.3 °F (36.8 °C)-99.3 °F (37.4 °C)] 98.3 °F (36.8 °C)  HR:  [64-89] 89  Resp:  [17-18] 18  BP: ()/(46-66) 133/66  SpO2:  [92 %-95 %] 95 %  Body mass index is 37.98 kg/m². Input and Output Summary (last 24 hours): Intake/Output Summary (Last 24 hours) at 2023 1103  Last data filed at 2023 0901  Gross per 24 hour   Intake 240 ml   Output 750 ml   Net -510 ml       Physical Exam:   Physical Exam  Vitals reviewed. Constitutional:       Comments: Patient seen lying in bed, NAD   Cardiovascular:      Rate and Rhythm: Normal rate and regular rhythm.    Pulmonary:      Effort: Pulmonary effort is normal. No respiratory distress. Breath sounds: Normal breath sounds. Abdominal:      General: Bowel sounds are normal.      Palpations: Abdomen is soft. Tenderness: There is no abdominal tenderness. Musculoskeletal:      Comments: B/l LE wrapped   Neurological:      Mental Status: She is alert.       Comments: Oriented to person, hospital, but states the year is 2024   Psychiatric:         Mood and Affect: Mood normal.         Behavior: Behavior normal.          Additional Data:     Labs:  Results from last 7 days   Lab Units 11/17/23  0451   WBC Thousand/uL 9.52   HEMOGLOBIN g/dL 9.7*   HEMATOCRIT % 30.1*   PLATELETS Thousands/uL 211   NEUTROS PCT % 65   LYMPHS PCT % 16   MONOS PCT % 12   EOS PCT % 5     Results from last 7 days   Lab Units 11/17/23  0451 11/16/23  0539   SODIUM mmol/L 138 139   POTASSIUM mmol/L 4.3 4.5   CHLORIDE mmol/L 106 106   CO2 mmol/L 28 22   BUN mg/dL 45* 42*   CREATININE mg/dL 1.51* 1.40*   ANION GAP mmol/L 4 11   CALCIUM mg/dL 8.2* 8.8   ALBUMIN g/dL  --  3.8   TOTAL BILIRUBIN mg/dL  --  1.15*   ALK PHOS U/L  --  78   ALT U/L  --  18   AST U/L  --  38   GLUCOSE RANDOM mg/dL 83 106     Results from last 7 days   Lab Units 11/17/23  0451   INR  3.35*     Results from last 7 days   Lab Units 11/15/23  2221   POC GLUCOSE mg/dl 91     Results from last 7 days   Lab Units 11/16/23  0539   HEMOGLOBIN A1C % 5.9*           Lines/Drains:  Invasive Devices       Peripheral Intravenous Line  Duration             Peripheral IV 11/14/23 Left Antecubital 2 days                          Imaging: Reviewed radiology reports from this admission including: abdominal/pelvic CT and CT head    Recent Cultures (last 7 days):         Last 24 Hours Medication List:   Current Facility-Administered Medications   Medication Dose Route Frequency Provider Last Rate    acetaminophen  975 mg Oral Q8H [facility], [place]-      amLODIPine  5 mg Oral Daily SJ Mullen      atorvastatin  40 mg Oral QPM Meggan Tobias MD diazepam  2 mg Oral Q8H PRN Deetta Forest, ORLIN      docusate sodium  100 mg Oral BID Deetta Forest, ORLIN      HYDROmorphone  0.2 mg Intravenous Q4H PRN Nicole Marie, ORLIN      levothyroxine  50 mcg Oral Early Morning [place] Bjornstad, CRNP      lidocaine  3 patch Topical Daily Deetta Forest, ORLIN      losartan  100 mg Oral Daily [place] Bjornstad, CRNP      metoprolol tartrate  100 mg Oral BID [place] Bjornstad, CRNP      nystatin   Topical BID Joseph Arcos, ORLIN      oxyCODONE  5 mg Oral Q6H PRN [place], ORLIN      oxyCODONE  2.5 mg Oral Q6H PRN [place], ORLIN      polyethylene glycol  17 g Oral Daily Deetta Forest, ORLIN      pravastatin  40 mg Oral Daily With Norm Tolbert, S[place]      senna  2 tablet Oral HS Deetta Forest, ORLIN          Today, Patient Was Seen By: Eliane Hemphill PA-C    **Please Note: This note may have been constructed using a voice recognition system. **

## 2023-11-17 NOTE — PHYSICAL THERAPY NOTE
PHYSICAL THERAPY TREATMENT NOTE    Patient Name: Johnnie Cárdenas  [id number] Date: 11/17/2023 11/17/23 0930   PT Last Visit   PT Visit Date 11/17/23   Pain Assessment   Pain Assessment Tool 0-10   Pain Score 7  (6/10 at end of session)   Pain Location/Orientation Other (Comment)  (bilateral hips, right knee)   Hospital Pain Intervention(s) Repositioned; Ambulation/increased activity   Restrictions/Precautions   Other Precautions Chair Alarm; Bed Alarm; Fall Risk;Pain   General   Chart Reviewed Yes   Family/Caregiver Present No   Cognition   Arousal/Participation Cooperative   Attention Attends with cues to redirect   Orientation Level Oriented to person; Other (Comment)  (pt was identified w/ full name, birth date)   Following Commands Follows one step commands with increased time or repetition   Subjective   Subjective pt seen supine in bed. stated needing to use the bathroom. pt agreed to PT session. states having bilateral hip and right knee pain. Bed Mobility   Rolling R 2  Maximal assistance   Additional items Assist x 1;Bedrails; Increased time required;Verbal cues;LE management   Rolling L 2  Maximal assistance   Additional items Assist x 1;Bedrails; Increased time required;Verbal cues;LE management   Supine to Sit 2  Maximal assistance   Additional items Assist x 2;HOB elevated; Bedrails; Increased time required;Verbal cues;LE management   Sit to Supine 1  Dependent   Additional items Assist x 2;HOB elevated; Increased time required;Verbal cues;LE management   Transfers   Sit to Stand 2  Maximal assistance   Additional items Assist x 2; Increased time required;Verbal cues   Stand to Sit 2  Maximal assistance   Additional items Assist x 2; Increased time required;Verbal cues   Stand pivot 2  Maximal assistance  (edge of bed to bedside commode, then back)   Additional items Assist x 2; Increased time required;Verbal cues   Toilet transfer 2 Maximal assistance   Additional items Assist x 2;Commode; Increased time required;Verbal cues   Additional Comments after initial stand pivot transfer to commode and after having a bowel movement, pt stood 45 seconds w/ hand hold assist w/ maxx2 to be cleaned. seated rest break and pt performed stand pivot to edge of bed. Ambulation/Elevation   Gait pattern Not appropriate   Assistive Device Other (Comment)  (bilateral hand hold assist)   Ambulation/Elevation Additional Comments pt was unable to advance LEs in standing. Balance   Static Sitting Fair   Static Standing Zero  (assist x2)   Ambulatory Zero   Activity Tolerance   Activity Tolerance Patient limited by fatigue;Patient limited by pain   Nurse Made Aware spoke to [facility] Chrissy THAKUR [facility]   Assessment   Problem List Decreased strength;Decreased range of motion;Decreased endurance; Impaired balance;Decreased mobility; Decreased safety awareness;Decreased cognition;Pain   Assessment pt's mobility status continues to progress w/ completion of stand pivot transfer. pt needed assist x2 w/ all phases of mobility, including input for mobility technique and safety. pt was unable to complete pregait activities and remains at risk for falling. continued inpatient PT is needed to reduce fall risk factors and progress mobility training as appropriate.`   Goals   Patient Goals feel better. have less pain. go home.    STG Expiration Date 11/25/23   Short Term Goal #1 pt will: Perform rolling and repositioning in bed w/ minx1 to decrease caregiver burden, Perform supine <--> sitting edge of bed transition w/ modx1 to improve level of function, Perform sit <---> stand and stand pivot transfers w/ modx1 to improve independence, Ambulate 25 ft. with roller walker w/ modx1 w/o LOB to improve functional independence, Increase all balance 1 grade to decrease risk for falls, Tolerate 3 hr OOB to faciliate upright tolerance, Tolerate standing 2 minutes w/ modx1 to facilitate functional task performance, and Improve Barthel Index score to 60 or greater to facilitate independence   PT Treatment Day 2   Plan   Treatment/Interventions Functional transfer training;LE strengthening/ROM; Therapeutic exercise; Endurance training;Cognitive reorientation;Patient/family training;Bed mobility; Equipment eval/education;Gait training   Progress Progressing toward goals   PT Frequency 2-3x/wk   Discharge Recommendation   Rehab Resource Intensity Level, PT II (Moderate Resource Intensity)   AM-PAC Basic Mobility Inpatient   Turning in Flat Bed Without Bedrails 2   Lying on Back to Sitting on Edge of Flat Bed Without Bedrails 1   Moving Bed to Chair 1   Standing Up From Chair Using Arms 1   Walk in Room 1   Climb 3-5 Stairs With Railing 1   Basic Mobility Inpatient Raw Score 7   Turning Head Towards Sound 4   Follow Simple Instructions 3   Low Function Basic Mobility Raw Score  14   Low Function Basic Mobility Standardized Score  22.01   Highest Level Of Mobility   -HL Goal 2: Bed activities/Dependent transfer   JH-HLM Achieved 5: Stand (1 or more minutes)   End of Consult   Patient Position at End of Consult Supine;Bed/Chair alarm activated; All needs within reach     The patient's AM-PAC Basic Mobility Inpatient Short Form Raw Score is 7. A Raw score of less than or equal to 16 suggests the patient may benefit from discharge to post-acute rehabilitation services. Please also refer to the recommendation of the Physical Therapist for safe discharge planning. Pt requires PT/OT co-treat due to signficant assistance with mobility and cognitive-behavioral impairments. Skilled inpatient PT recommended while in hospital to progress pt toward treatment goals.     Ellis Mendes, PT

## 2023-11-17 NOTE — CASE MANAGEMENT
Case Management Progress Note    Patient name Lakshmi Lawrence  Location S /S -01 MRN [id number]  : 2/3/1927 Date 2023       LOS (days): 2  Geometric Mean LOS (GMLOS) (days): 4.60  Days to GMLOS:2.2        OBJECTIVE:        Current admission status: Inpatient  Preferred Pharmacy:   [id number], [address] [place] [address]  Phone: [phone] Fax: [phone]    Primary Care Provider: Julio Betancur MD    Primary Insurance: MEDICARE  Secondary Insurance: [employer] HEALTH OPTIONS PROGRAM    PROGRESS NOTE:    Spoke with patient's family outside of room - report they had gotten a call re: delivery for the hospital bed, but were unable to reach [facility] to coordinate delivery. Message sent to [address] in [address] to request family reach back out to coordinate delivery. Referrals sent for home care services in [address], but may need to re-address rehab again if mobility does not improve and family unable to accommodate. Still working on pain management. Will continue to follow.

## 2023-11-17 NOTE — ASSESSMENT & PLAN NOTE
Acute on chronic per family, worsening over the last week  Is maintained on warfarin, doubt DVT  Echo from this admission revealed LVEF 65%  Elevate, compression with bilateral Ace wrap's  Hold home demadex  S/P dose of IV lasix on 11/15 and again on 11/16

## 2023-11-17 NOTE — ASSESSMENT & PLAN NOTE
Overall pain control and mental status appears improved today  Only if pain is unable to be controlled would we consider additional imaging +/- consultation with ortho/neurosurgery

## 2023-11-17 NOTE — PLAN OF CARE
Problem: OCCUPATIONAL THERAPY ADULT  Goal: Performs self-care activities at highest level of function for planned discharge setting. See evaluation for individualized goals. Description: Treatment Interventions: ADL retraining, Functional transfer training, UE strengthening/ROM, Endurance training, Cognitive reorientation, Patient/family training, Equipment evaluation/education, Compensatory technique education, Energy conservation, Activityengagement          See flowsheet documentation for full assessment, interventions and recommendations. Outcome: Progressing  Note: Limitation: Decreased ADL status, Decreased UE ROM, Decreased UE strength, Decreased Safe judgement during ADL, Decreased cognition, Decreased endurance, Decreased self-care trans, Decreased high-level ADLs  Prognosis: Poor  Assessment: Patient is a 80 y.o. female seen for OT evaluation and treatment  at [address] [place] [address] following admission on 11/14/2023  s/p Chronic low back pain. Please see above for comprehensive list of comorbidities and significant PMHx impacting functional performance. At baseline, pt is (A) with ADLs/IADLs and LUCAS for functional mobility or short distances with use of rollator vs SPT to/from [facility] <> various self-care surfaces. Upon initial evaluation, pt appears to be performing below baseline functional status. Pt requires MODA for UB ADLs, TOTAL A for LB ADLs, MAXA x 2 for bed mobility, MAXA x 2 for transfers. The AM-PAC & Barthel Index outcome tools were used to assist in determining pt safety w/self care /mobility and appropriate d/c recommendations, see above for score.  Occupational performance is affected by the following deficits: endurance ,  decreased muscular strength , decreased balance , decreased standing tolerance for self care tasks , decreased dynamic balance impacting functional reach, decreased functional reach , decreased trunk control , decreased activity tolerance , impaired memory , impaired safety awareness , (+) pain , impaired global mental status, and direction following. Personal/Environmental factors impacting D/C include: (+) Hx of falls , steps to enter/navigate the home, Assistance needed for ADL/IADLs, Assistance needed for ADLs and functional mobility, High fall risk , decreased insight toward deficits , and decreased recall of precautions . Supporting factors include: able to maintain FFSU, accessible home environment, support system available, and attitude towards recovery Patient would benefit from OT services within the acute care setting to maximize level of functional independence in the following areas fall prevention , self-care transfers, bed mobility , functional mobility, and ADLs. From OT standpoint, recommendation at time of D/C would be to level II (Moderate Resource Intensity).      Rehab Resource Intensity Level, OT: II (Moderate Resource Intensity)     Ludivina Villalobos, [id number] [facility] OTR/L   [place] Licensure# [id number]

## 2023-11-18 LAB
ANION GAP SERPL CALCULATED.3IONS-SCNC: 6 MMOL/L
BUN SERPL-MCNC: 38 MG/DL (ref 5–25)
CALCIUM SERPL-MCNC: 8.1 MG/DL (ref 8.4–10.2)
CHLORIDE SERPL-SCNC: 102 MMOL/L (ref 96–108)
CO2 SERPL-SCNC: 26 MMOL/L (ref 21–32)
CREAT SERPL-MCNC: 1.38 MG/DL (ref 0.6–1.3)
ERYTHROCYTE [DISTWIDTH] IN BLOOD BY AUTOMATED COUNT: 16 % (ref 11.6–15.1)
GFR SERPL CREATININE-BSD FRML MDRD: 32 ML/MIN/1.73SQ M
GLUCOSE SERPL-MCNC: 82 MG/DL (ref 65–140)
HCT VFR BLD AUTO: 33.8 % (ref 34.8–46.1)
HGB BLD-MCNC: 10.8 G/DL (ref 11.5–15.4)
INR PPP: 3.05 (ref 0.84–1.19)
MCH RBC QN AUTO: 28.6 PG (ref 26.8–34.3)
MCHC RBC AUTO-ENTMCNC: 32 G/DL (ref 31.4–37.4)
MCV RBC AUTO: 90 FL (ref 82–98)
PLATELET # BLD AUTO: 241 THOUSANDS/UL (ref 149–390)
PMV BLD AUTO: 9.5 FL (ref 8.9–12.7)
POTASSIUM SERPL-SCNC: 4.9 MMOL/L (ref 3.5–5.3)
PROTHROMBIN TIME: 32.6 SECONDS (ref 11.6–14.5)
RBC # BLD AUTO: 3.77 MILLION/UL (ref 3.81–5.12)
SODIUM SERPL-SCNC: 134 MMOL/L (ref 135–147)
WBC # BLD AUTO: 9.13 THOUSAND/UL (ref 4.31–10.16)

## 2023-11-18 PROCEDURE — 99232 SBSQ HOSP IP/OBS MODERATE 35: CPT | Performed by: PHYSICIAN ASSISTANT

## 2023-11-18 PROCEDURE — 80048 BASIC METABOLIC PNL TOTAL CA: CPT | Performed by: PHYSICIAN ASSISTANT

## 2023-11-18 PROCEDURE — 85027 COMPLETE CBC AUTOMATED: CPT | Performed by: PHYSICIAN ASSISTANT

## 2023-11-18 PROCEDURE — 85610 PROTHROMBIN TIME: CPT | Performed by: PHYSICIAN ASSISTANT

## 2023-11-18 RX ORDER — METHOCARBAMOL 500 MG/1
500 TABLET, FILM COATED ORAL EVERY 8 HOURS SCHEDULED
Status: COMPLETED | OUTPATIENT
Start: 2023-11-18 | End: 2023-11-19

## 2023-11-18 RX ADMIN — DOCUSATE SODIUM 100 MG: 100 CAPSULE, LIQUID FILLED ORAL at 17:43

## 2023-11-18 RX ADMIN — PRAVASTATIN SODIUM 40 MG: 40 TABLET ORAL at 15:59

## 2023-11-18 RX ADMIN — ACETAMINOPHEN 975 MG: 325 TABLET, FILM COATED ORAL at 21:59

## 2023-11-18 RX ADMIN — METHOCARBAMOL TABLETS 500 MG: 500 TABLET, COATED ORAL at 21:59

## 2023-11-18 RX ADMIN — OXYCODONE HYDROCHLORIDE 5 MG: 5 TABLET ORAL at 01:52

## 2023-11-18 RX ADMIN — NYSTATIN: 100000 POWDER TOPICAL at 09:11

## 2023-11-18 RX ADMIN — OXYCODONE HYDROCHLORIDE 5 MG: 5 TABLET ORAL at 11:04

## 2023-11-18 RX ADMIN — HYDROMORPHONE HYDROCHLORIDE 0.2 MG: 0.2 INJECTION, SOLUTION INTRAMUSCULAR; INTRAVENOUS; SUBCUTANEOUS at 04:47

## 2023-11-18 RX ADMIN — METOPROLOL TARTRATE 100 MG: 100 TABLET, FILM COATED ORAL at 17:42

## 2023-11-18 RX ADMIN — METOPROLOL TARTRATE 100 MG: 100 TABLET, FILM COATED ORAL at 09:10

## 2023-11-18 RX ADMIN — SENNOSIDES 17.2 MG: 8.6 TABLET, FILM COATED ORAL at 21:59

## 2023-11-18 RX ADMIN — ATORVASTATIN CALCIUM 40 MG: 40 TABLET, FILM COATED ORAL at 17:42

## 2023-11-18 RX ADMIN — LOSARTAN POTASSIUM 100 MG: 50 TABLET, FILM COATED ORAL at 09:10

## 2023-11-18 RX ADMIN — LEVOTHYROXINE SODIUM 50 MCG: 50 TABLET ORAL at 04:47

## 2023-11-18 RX ADMIN — ACETAMINOPHEN 975 MG: 325 TABLET, FILM COATED ORAL at 04:47

## 2023-11-18 RX ADMIN — ACETAMINOPHEN 975 MG: 325 TABLET, FILM COATED ORAL at 12:43

## 2023-11-18 RX ADMIN — OXYCODONE HYDROCHLORIDE 5 MG: 5 TABLET ORAL at 21:59

## 2023-11-18 RX ADMIN — AMLODIPINE BESYLATE 5 MG: 5 TABLET ORAL at 09:10

## 2023-11-18 RX ADMIN — NYSTATIN: 100000 POWDER TOPICAL at 17:43

## 2023-11-18 RX ADMIN — DOCUSATE SODIUM 100 MG: 100 CAPSULE, LIQUID FILLED ORAL at 09:10

## 2023-11-18 RX ADMIN — POLYETHYLENE GLYCOL 3350 17 G: 17 POWDER, FOR SOLUTION ORAL at 09:10

## 2023-11-18 RX ADMIN — Medication 2.5 MG: at 15:59

## 2023-11-18 RX ADMIN — METHOCARBAMOL TABLETS 500 MG: 500 TABLET, COATED ORAL at 12:43

## 2023-11-18 NOTE — ASSESSMENT & PLAN NOTE
Lab Results   Component Value Date    EGFR 32 11/18/2023    EGFR 28 11/17/2023    EGFR 31 11/16/2023    CREATININE 1.38 (H) 11/18/2023    CREATININE 1.51 (H) 11/17/2023    CREATININE 1.40 (H) 11/16/2023     Baseline appears around 1.3-1.5 per lab review  Daughter states patient is maintained on Demadex 5 mg 3 times weekly, was previously on daily diuretics however developed dehydration  S/P IV lasix on 11/15 and 11/16  Consideration of more frequent demadex dosing upon dc   Ok for ARB for now

## 2023-11-18 NOTE — PROGRESS NOTES
[id number]  Progress Note  Name: Willie Barthel  MRN: [id number]  Unit/Bed#: S -01 I Date of Admission: 11/14/2023   Date of Service: 11/18/2023 I Hospital Day: 3    Assessment/Plan   * Acute on chronic low back pain  Assessment & Plan  Presented from home (lives with daughters) with 1 week history of acute on chronic pain of back, bilateral hips and R knee. Has been unable to ambulate. At baseline, is able to stand/pivot and take some steps with a  walker. Does take Norco chronically for years for back pain. No red flag symptoms. Was seen in ER last week for similar and discharged. CT lumbar spine: severe disc and facet degenerative change throughout the lumbar spine. Multilevel mild to moderate central canal stenosis and moderate to severe neural foraminal narrowing. CT A/P: Moderate amount of stool in the colon may indicate constipation. No evidence of bowel obstruction, colitis or diverticulitis. Advanced disc degenerative change in the lumbar spine. Normal alignment of the left hip arthroplasty. R Knee xray: severe tricompartmental OA  Was started on pain medications including Ketamine in ER which then required Narcan due to AMS  ESR/CRP elevated   APS consulted, appreciate their ongoing recommendations  On scheduled Tylenol 975 mg every 8 hours, lido patch to back, L hip and R knee, Valium 2 mg every 8 hours as needed muscle spasms, Oxy 2.5 mg PO q6h PRN moderate pain, oxy 5 mg PO q6h PRN severe pain, IV dilaudid 0.2 mg q4h PRN breakthrough pain  Still c/o severe pain limiting mobility, though overall improved. Will trial scheduled muscle relaxer on 11/8 as she has not been using or getting the PRN valium and monitor response.    Prior provider d/w family given advanced age - will hold off on additional imaging with MRI at this time especially if pain controlled on above regimen as findings unlikely to  anyway   PT/OT at this time recommending rehab however family likely to take patient home    CVA (cerebral vascular accident) [facility])  Assessment & Plan  Noted to have intermittent facial droop during this hospitalization. CT head obtained and shows CVA however per prior provider's d/w neurology is at least few months old however new since May according to imaging  Etiology unclear however per neurology no need for MRI or stroke pathway  INR supratherapeutic today, hold as below   Given advanced age and chronicity of CVA, nothing additional recommended  Given droop, failed bedside screen however did very well with speech and is on regular diet as of 11/16    Goals of care, counseling/discussion  Assessment & Plan  Overall pain control and mental status appears improved today  Only if pain is unable to be controlled would we consider additional imaging +/- consultation with ortho/neurosurgery     Leukocytosis  Assessment & Plan  Mild, ? reactive  No etiology on CT other than constipation  UA unremarkable  Now resolved, Monitor     Constipation  Assessment & Plan  Noted on imaging  Bowel regimen ordered  Abdominal exams     Toxic encephalopathy  Assessment & Plan  Noted to be screaming secondary to pain, was not able to be redirected on AM 11/15. Upon admission did receive several narcotics in an effort to obtain CT scan which then required Narcan due to oversedation. Per daughter, at baseline patient is alert and oriented x3, however does have some forgetfulness and this tends to be worsened by acute pain  At this time, suspect multifactorial  CT head showing stroke however is not new per neurology, at least several months old  UA checked and unremarkable  MS overall appears improved, continue to monitor.     Bilateral lower extremity edema  Assessment & Plan  Acute on chronic per family, worsening over the last week  Is maintained on warfarin, doubt DVT  Echo from this admission revealed LVEF 65%  Elevate, compression with bilateral Ace wrap's  Hold home demadex  S/P dose of IV lasix on 11/15 and again on 11/16    Continuous opioid dependence ([address])  Assessment & Plan  In setting of chronic OA, lumbar degenerative disc disease. Maintained on Norco for many years  Holding home norco  Currently on tylenol ATC as well as oxy PRN for breakthrough     Stage 3 chronic kidney disease [facility])  Assessment & Plan  Lab Results   Component Value Date    EGFR 32 11/18/2023    EGFR 28 11/17/2023    EGFR 31 11/16/2023    CREATININE 1.38 (H) 11/18/2023    CREATININE 1.51 (H) 11/17/2023    CREATININE 1.40 (H) 11/16/2023     Baseline appears around 1.3-1.5 per lab review  Daughter states patient is maintained on Demadex 5 mg 3 times weekly, was previously on daily diuretics however developed dehydration  S/P IV lasix on 11/15 and 11/16  Consideration of more frequent demadex dosing upon dc   Ok for ARB for now    Bilateral primary osteoarthritis of knee  Assessment & Plan  Reported worsening R knee pain   Xrays negative  Pain control as above     Hypothyroidism  Assessment & Plan  Continue synthroid, most recent TSH WNL    Hypertension  Assessment & Plan  Elevated likely in setting of pain upon admission   Continue home dose Cozaar 100 mg daily, norvasc 5 mg daily and lopressor 100 mg BID    Atrial fibrillation (HCC)  Assessment & Plan  Maintained on Lopressor 100 mg twice daily as well as warfarin as an outpatient with goal INR 2-3  INR held upon admission given increased INR, and was resumed when INR was back within acceptable rang. INR now supratherapeutic again, hold coumadin for now and repeat INR in AM  Warfarin not likely safe given age, limited mobility and fall risk. 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   This was reviewed by prior provider with family and should be discussed further with her primary care physician           VTE Pharmacologic Prophylaxis: VTE Score: 4 Moderate Risk (Score 3-4) - Pharmacological DVT Prophylaxis Contraindicated. Sequential Compression Devices Ordered. Mobility:   Basic Mobility Inpatient Raw Score: 7  -HLM Goal: 2: Bed activities/Dependent transfer  JH-HLM Achieved: 5: Stand (1 or more minutes)  HLM Goal achieved. Continue to encourage appropriate mobility. Patient Centered Rounds: I performed bedside rounds with nursing staff today. Discussions with Specialists or Other Care Team Provider:     Education and Discussions with Family / Patient: Updated  (daughter) via phone. Nell     Total Time Spent on Date of Encounter in care of patient: 20 mins. This time was spent on one or more of the following: performing physical exam; counseling and coordination of care; obtaining or reviewing history; documenting in the medical record; reviewing/ordering tests, medications or procedures; communicating with other healthcare professionals and discussing with patient's family/caregivers. Current Length of Stay: 3 day(s)  Current Patient Status: Inpatient   Certification Statement: The patient will continue to require additional inpatient hospital stay due to pain control, amb dysfunction, safe d/c planning  Discharge Plan: Anticipate discharge in 48 hrs to home with home services. Code Status: Level 3 - DNAR and DNI    Subjective:   Pt reports to b/l hip pain, rates pain 7/10 and exacerbated with movement. She reports her pain is improved from admission     Objective:     Vitals:   Temp (24hrs), Av.4 °F (36.9 °C), Min:98.3 °F (36.8 °C), Max:98.6 °F (37 °C)    Temp:  [98.3 °F (36.8 °C)-98.6 °F (37 °C)] 98.6 °F (37 °C)  HR:  [68-95] 95  Resp:  [16-18] 18  BP: (119-154)/(59-71) 154/71  SpO2:  [93 %-96 %] 93 %  Body mass index is 37.98 kg/m². Input and Output Summary (last 24 hours):      Intake/Output Summary (Last 24 hours) at 2023 1127  Last data filed at 2023 0401  Gross per 24 hour   Intake --   Output 450 ml   Net -450 ml Physical Exam:   Physical Exam  Vitals reviewed. Constitutional:       General: She is not in acute distress. Appearance: She is not toxic-appearing. HENT:      Head: Normocephalic and atraumatic. Eyes:      Extraocular Movements: Extraocular movements intact. Cardiovascular:      Rate and Rhythm: Normal rate and regular rhythm. Pulmonary:      Effort: Pulmonary effort is normal. No respiratory distress. Breath sounds: Normal breath sounds. Abdominal:      General: Bowel sounds are normal. There is no distension. Palpations: Abdomen is soft. Tenderness: There is no abdominal tenderness. Musculoskeletal:         General: Normal range of motion. Cervical back: Normal range of motion. Neurological:      General: No focal deficit present. Mental Status: She is alert and oriented to person, place, and time. Psychiatric:         Mood and Affect: Mood normal.         Behavior: Behavior normal.         Thought Content: Thought content normal.         Additional Data:     Labs:  Results from last 7 days   Lab Units 11/18/23 0458 11/17/23 0451   WBC Thousand/uL 9.13 9.52   HEMOGLOBIN g/dL 10.8* 9.7*   HEMATOCRIT % 33.8* 30.1*   PLATELETS Thousands/uL 241 211   NEUTROS PCT %  --  65   LYMPHS PCT %  --  16   MONOS PCT %  --  12   EOS PCT %  --  5     Results from last 7 days   Lab Units 11/18/23 0458 11/17/23 0451 11/16/23  0539   SODIUM mmol/L 134*   < > 139   POTASSIUM mmol/L 4.9   < > 4.5   CHLORIDE mmol/L 102   < > 106   CO2 mmol/L 26   < > 22   BUN mg/dL 38*   < > 42*   CREATININE mg/dL 1.38*   < > 1.40*   ANION GAP mmol/L 6   < > 11   CALCIUM mg/dL 8.1*   < > 8.8   ALBUMIN g/dL  --   --  3.8   TOTAL BILIRUBIN mg/dL  --   --  1.15*   ALK PHOS U/L  --   --  78   ALT U/L  --   --  18   AST U/L  --   --  38   GLUCOSE RANDOM mg/dL 82   < > 106    < > = values in this interval not displayed.      Results from last 7 days   Lab Units 11/18/23 0458   INR  3.05*     Results from last 7 days   Lab Units 11/15/23  2221   POC GLUCOSE mg/dl 91     Results from last 7 days   Lab Units 11/16/23  0539   HEMOGLOBIN A1C % 5.9*           Lines/Drains:  Invasive Devices       Peripheral Intravenous Line  Duration             Peripheral IV 11/18/23 Right Antecubital <1 day                      Imaging: No pertinent imaging reviewed. Recent Cultures (last 7 days):         Last 24 Hours Medication List:   Current Facility-Administered Medications   Medication Dose Route Frequency Provider Last Rate    acetaminophen  975 mg Oral [facility] Donna Medici, ORLIN      amLODIPine  5 mg Oral Daily Wilhemina Emperor, SJ      atorvastatin  40 mg Oral QPM Sara Rodgers MD      diazepam  2 mg Oral Q8H PRN Donna MediciORLIN      docusate sodium  100 mg Oral BID Donna Medici, ORLIN      HYDROmorphone  0.2 mg Intravenous Q4H PRN Renetta Villanuvea PA-C      levothyroxine  50 mcg Oral Early Morning Wilhemina Emperor, SJ      lidocaine  3 patch Topical Daily Donna Medici, ORLIN      losartan  100 mg Oral Daily Wilhemina Emperor, SJ      metoprolol tartrate  100 mg Oral BID Wilhemina Emperor, SJ      naloxone  0.04 mg Intravenous Q1MIN PRN Vinetta Messing, ORLIN      nystatin   Topical BID Claude Brand, ORLIN      oxyCODONE  5 mg Oral Q6H PRN Vinetta Messing, ORLIN      oxyCODONE  2.5 mg Oral Q6H PRN Vinetta Messing, ORLIN      polyethylene glycol  17 g Oral Daily Donna Medici, ORLIN      pravastatin  40 mg Oral Daily With SJ Dumont      senna  2 tablet Oral HS Donnauzma Godoy PA-C          Today, Patient Was Seen By: Ed Hairston PA-C    **Please Note: This note may have been constructed using a voice recognition system. **

## 2023-11-18 NOTE — ASSESSMENT & PLAN NOTE
Presented from home (lives with daughters) with 1 week history of acute on chronic pain of back, bilateral hips and R knee. Has been unable to ambulate. At baseline, is able to stand/pivot and take some steps with a  walker. Does take Norco chronically for years for back pain. No red flag symptoms. Was seen in ER last week for similar and discharged. CT lumbar spine: severe disc and facet degenerative change throughout the lumbar spine. Multilevel mild to moderate central canal stenosis and moderate to severe neural foraminal narrowing. CT A/P: Moderate amount of stool in the colon may indicate constipation. No evidence of bowel obstruction, colitis or diverticulitis. Advanced disc degenerative change in the lumbar spine. Normal alignment of the left hip arthroplasty. R Knee xray: severe tricompartmental OA  Was started on pain medications including Ketamine in ER which then required Narcan due to AMS  ESR/CRP elevated   APS consulted, appreciate their ongoing recommendations  On scheduled Tylenol 975 mg every 8 hours, lido patch to back, L hip and R knee, Valium 2 mg every 8 hours as needed muscle spasms, Oxy 2.5 mg PO q6h PRN moderate pain, oxy 5 mg PO q6h PRN severe pain, IV dilaudid 0.2 mg q4h PRN breakthrough pain  Still c/o severe pain limiting mobility, though overall improved. Will trial scheduled muscle relaxer on 11/8 as she has not been using or getting the PRN valium and monitor response.    Prior provider d/w family given advanced age - will hold off on additional imaging with MRI at this time especially if pain controlled on above regimen as findings unlikely to  anyway   PT/OT at this time recommending rehab however family likely to take patient home

## 2023-11-19 PROBLEM — M25.551 RIGHT HIP PAIN: Status: ACTIVE | Noted: 2023-11-19

## 2023-11-19 LAB
INR PPP: 2.25 (ref 0.84–1.19)
PROTHROMBIN TIME: 25.7 SECONDS (ref 11.6–14.5)

## 2023-11-19 PROCEDURE — 99233 SBSQ HOSP IP/OBS HIGH 50: CPT | Performed by: PHYSICIAN ASSISTANT

## 2023-11-19 PROCEDURE — 85610 PROTHROMBIN TIME: CPT | Performed by: INTERNAL MEDICINE

## 2023-11-19 RX ORDER — WARFARIN SODIUM 2.5 MG/1
2.5 TABLET ORAL
Status: COMPLETED | OUTPATIENT
Start: 2023-11-19 | End: 2023-11-19

## 2023-11-19 RX ADMIN — METHOCARBAMOL TABLETS 500 MG: 500 TABLET, COATED ORAL at 05:56

## 2023-11-19 RX ADMIN — NYSTATIN: 100000 POWDER TOPICAL at 08:54

## 2023-11-19 RX ADMIN — POLYETHYLENE GLYCOL 3350 17 G: 17 POWDER, FOR SOLUTION ORAL at 08:51

## 2023-11-19 RX ADMIN — ACETAMINOPHEN 975 MG: 325 TABLET, FILM COATED ORAL at 05:56

## 2023-11-19 RX ADMIN — SENNOSIDES 17.2 MG: 8.6 TABLET, FILM COATED ORAL at 21:51

## 2023-11-19 RX ADMIN — NYSTATIN: 100000 POWDER TOPICAL at 17:17

## 2023-11-19 RX ADMIN — OXYCODONE HYDROCHLORIDE 5 MG: 5 TABLET ORAL at 09:36

## 2023-11-19 RX ADMIN — LEVOTHYROXINE SODIUM 50 MCG: 50 TABLET ORAL at 05:56

## 2023-11-19 RX ADMIN — DOCUSATE SODIUM 100 MG: 100 CAPSULE, LIQUID FILLED ORAL at 17:17

## 2023-11-19 RX ADMIN — ATORVASTATIN CALCIUM 40 MG: 40 TABLET, FILM COATED ORAL at 17:17

## 2023-11-19 RX ADMIN — ACETAMINOPHEN 975 MG: 325 TABLET, FILM COATED ORAL at 21:51

## 2023-11-19 RX ADMIN — OXYCODONE HYDROCHLORIDE 5 MG: 5 TABLET ORAL at 15:47

## 2023-11-19 RX ADMIN — WARFARIN SODIUM 2.5 MG: 2.5 TABLET ORAL at 17:17

## 2023-11-19 RX ADMIN — LOSARTAN POTASSIUM 100 MG: 50 TABLET, FILM COATED ORAL at 08:51

## 2023-11-19 RX ADMIN — ACETAMINOPHEN 975 MG: 325 TABLET, FILM COATED ORAL at 13:23

## 2023-11-19 RX ADMIN — DOCUSATE SODIUM 100 MG: 100 CAPSULE, LIQUID FILLED ORAL at 08:51

## 2023-11-19 RX ADMIN — AMLODIPINE BESYLATE 5 MG: 5 TABLET ORAL at 08:51

## 2023-11-19 RX ADMIN — LIDOCAINE 3 PATCH: 700 PATCH TOPICAL at 21:51

## 2023-11-19 RX ADMIN — METOPROLOL TARTRATE 100 MG: 100 TABLET, FILM COATED ORAL at 08:51

## 2023-11-19 RX ADMIN — OXYCODONE HYDROCHLORIDE 5 MG: 5 TABLET ORAL at 21:51

## 2023-11-19 RX ADMIN — PRAVASTATIN SODIUM 40 MG: 40 TABLET ORAL at 15:47

## 2023-11-19 RX ADMIN — HYDROMORPHONE HYDROCHLORIDE 0.2 MG: 0.2 INJECTION, SOLUTION INTRAMUSCULAR; INTRAVENOUS; SUBCUTANEOUS at 18:58

## 2023-11-19 NOTE — ASSESSMENT & PLAN NOTE
Noted to have intermittent facial droop during this hospitalization.  CT head obtained and shows CVA however per prior provider's d/w neurology is at least few months old however new since May according to imaging  Etiology unclear however per neurology no need for MRI or stroke pathway  INR 2.25 today  Given advanced age and chronicity of CVA, nothing additional recommended  Given droop, failed bedside screen however did very well with speech and is on regular diet as of 11/16

## 2023-11-19 NOTE — ASSESSMENT & PLAN NOTE
Elevated likely in setting of pain upon admission   BP today 124/57  Continue home dose Cozaar 100 mg daily, norvasc 5 mg daily and lopressor 100 mg BID

## 2023-11-19 NOTE — ASSESSMENT & PLAN NOTE
In setting of chronic OA, lumbar degenerative disc disease.  Maintained on Norco for many years  Holding home norco  Continue oxycodone, robaxin  Currently on tylenol ATC as well as oxy PRN for breakthrough

## 2023-11-19 NOTE — PLAN OF CARE
Problem: Prexisting or High Potential for Compromised Skin Integrity  Goal: Skin integrity is maintained or improved  Description: INTERVENTIONS:  - Identify patients at risk for skin breakdown  - Assess and monitor skin integrity  - Assess and monitor nutrition and hydration status  - Monitor labs   - Assess for incontinence   - Turn and reposition patient  - Assist with mobility/ambulation  - Relieve pressure over bony prominences  - Avoid friction and shearing  - Provide appropriate hygiene as needed including keeping skin clean and dry  - Evaluate need for skin moisturizer/barrier cream  - Collaborate with interdisciplinary team   - Patient/family teaching  - Consider wound care consult   Outcome: Progressing     Problem: PAIN - ADULT  Goal: Verbalizes/displays adequate comfort level or baseline comfort level  Description: Interventions:  - Encourage patient to monitor pain and request assistance  - Assess pain using appropriate pain scale  - Administer analgesics based on type and severity of pain and evaluate response  - Implement non-pharmacological measures as appropriate and evaluate response  - Consider cultural and social influences on pain and pain management  - Notify physician/advanced practitioner if interventions unsuccessful or patient reports new pain  Outcome: Progressing     Problem: INFECTION - ADULT  Goal: Absence or prevention of progression during hospitalization  Description: INTERVENTIONS:  - Assess and monitor for signs and symptoms of infection  - Monitor lab/diagnostic results  - Monitor all insertion sites, i.e. indwelling lines, tubes, and drains  - Monitor endotracheal if appropriate and nasal secretions for changes in amount and color  - [place] appropriate cooling/warming therapies per order  - Administer medications as ordered  - Instruct and encourage patient and family to use good hand hygiene technique  - Identify and instruct in appropriate isolation precautions for identified infection/condition  Outcome: Progressing  Goal: Absence of fever/infection during neutropenic period  Description: INTERVENTIONS:  - Monitor WBC    Outcome: Progressing     Problem: SAFETY ADULT  Goal: Patient will remain free of falls  Description: INTERVENTIONS:  - Educate patient/family on patient safety including physical limitations  - Instruct patient to call for assistance with activity   - Consult OT/PT to assist with strengthening/mobility   - Keep Call bell within reach  - Keep bed low and locked with side rails adjusted as appropriate  - Keep care items and personal belongings within reach  - Initiate and maintain comfort rounds  - Make Fall Risk Sign visible to staff  - Apply yellow socks and bracelet for high fall risk patients  - Consider moving patient to room near nurses station  Outcome: Progressing  Goal: Maintain or return to baseline ADL function  Description: INTERVENTIONS:  -  Assess patient's ability to carry out ADLs; assess patient's baseline for ADL function and identify physical deficits which impact ability to perform ADLs (bathing, care of mouth/teeth, toileting, grooming, dressing, etc.)  - Assess/evaluate cause of self-care deficits   - Assess range of motion  - Assess patient's mobility; develop plan if impaired  - Assess patient's need for assistive devices and provide as appropriate  - Encourage maximum independence but intervene and supervise when necessary  - Involve family in performance of ADLs  - Assess for home care needs following discharge   - Consider OT consult to assist with ADL evaluation and planning for discharge  - Provide patient education as appropriate  Outcome: Progressing  Goal: Maintains/Returns to pre admission functional level  Description: INTERVENTIONS:  - Perform AM-PAC 6 Click Basic Mobility/ Daily Activity assessment daily.  - Set and communicate daily mobility goal to care team and patient/family/caregiver.    - Collaborate with rehabilitation services on mobility goals if consulted  - Out of bed for toileting  - Record patient progress and toleration of activity level   Outcome: Progressing     Problem: DISCHARGE PLANNING  Goal: Discharge to home or other facility with appropriate resources  Description: INTERVENTIONS:  - Identify barriers to discharge w/patient and caregiver  - Arrange for needed discharge resources and transportation as appropriate  - Identify discharge learning needs (meds, wound care, etc.)  - Arrange for interpretive services to assist at discharge as needed  - Refer to Case Management Department for coordinating discharge planning if the patient needs post-hospital services based on physician/advanced practitioner order or complex needs related to functional status, cognitive ability, or social support system  Outcome: Progressing     Problem: Knowledge Deficit  Goal: Patient/family/caregiver demonstrates understanding of disease process, treatment plan, medications, and discharge instructions  Description: Complete learning assessment and assess knowledge base. Interventions:  - Provide teaching at level of understanding  - Provide teaching via preferred learning methods  Outcome: Progressing     Problem: Nutrition/Hydration-ADULT  Goal: Nutrient/Hydration intake appropriate for improving, restoring or maintaining nutritional needs  Description: Monitor and assess patient's nutrition/hydration status for malnutrition. Collaborate with interdisciplinary team and initiate plan and interventions as ordered. Monitor patient's weight and dietary intake as ordered or per policy. Utilize nutrition screening tool and intervene as necessary. Determine patient's food preferences and provide high-protein, high-caloric foods as appropriate.      INTERVENTIONS:  - Monitor oral intake, urinary output, labs, and treatment plans  - Assess nutrition and hydration status and recommend course of action  - Evaluate amount of meals eaten  - Assist patient with eating if necessary   - Allow adequate time for meals  - Recommend/ encourage appropriate diets, oral nutritional supplements, and vitamin/mineral supplements  - Order, calculate, and assess calorie counts as needed  - Recommend, monitor, and adjust tube feedings and TPN/PPN based on assessed needs  - Assess need for intravenous fluids  - Provide specific nutrition/hydration education as appropriate  - Include patient/family/caregiver in decisions related to nutrition  Outcome: Progressing

## 2023-11-19 NOTE — ASSESSMENT & PLAN NOTE
Noted previously to be screaming secondary to pain, was not able to be redirected on AM 11/15. Upon admission did receive several narcotics in an effort to obtain CT scan which then required Narcan due to oversedation. Per daughter, at baseline patient is alert and oriented x3, however does have some forgetfulness and this tends to be worsened by acute pain  Today she was sleeping as I entered the room. Upon waking immediately started complaining of hip pain  At this time, suspect multifactorial  CT head showing stroke however is not new per neurology, at least several months old  UA checked and unremarkable  MS overall appears improved, continue to monitor.   Will consult geriatrics

## 2023-11-19 NOTE — ASSESSMENT & PLAN NOTE
Mild, ?reactive  No etiology on CT other than constipation  UA unremarkable  Now resolved, last WBC 9.13, Monitor

## 2023-11-19 NOTE — ASSESSMENT & PLAN NOTE
Presented from home (lives with daughters) with 1 week history of acute on chronic pain of back, bilateral hips and R knee. Has been unable to ambulate. At baseline, is able to stand/pivot and take some steps with a  walker. Does take Norco chronically for years for back pain. No red flag symptoms. Was seen in ER last week for similar and discharged. CT lumbar spine: severe disc and facet degenerative change throughout the lumbar spine. Multilevel mild to moderate central canal stenosis and moderate to severe neural foraminal narrowing. CT A/P: Moderate amount of stool in the colon may indicate constipation. No evidence of bowel obstruction, colitis or diverticulitis. Advanced disc degenerative change in the lumbar spine. Normal alignment of the left hip arthroplasty. R Knee xray: severe tricompartmental OA  Was started on pain medications including Ketamine in ER which then required Narcan due to AMS  ESR/CRP elevated   APS consulted, appreciate their ongoing recommendations  On scheduled Tylenol 975 mg every 8 hours, lido patch to back, L hip and R knee, Robaxin every 8 hours scheduled for muscle spasms, Oxy 2.5 mg PO q6h PRN moderate pain, oxy 5 mg PO q6h PRN severe pain, IV dilaudid 0.2 mg q4h PRN breakthrough pain  Still c/o severe pain limiting mobility, though overall improved. Will trial scheduled muscle relaxer on 11/8 as she has not been using or getting the PRN valium and monitor response.    Prior provider d/w family given advanced age - will hold off on additional imaging with MRI at this time especially if pain controlled on above regimen as findings unlikely to  anyway   PT/OT at this time recommending rehab however family wants to take her home

## 2023-11-19 NOTE — CASE MANAGEMENT
Case Management Discharge Planning Note    Patient name Gurjit Pals  Location S /S -01 MRN [id number]  : 2/3/1927 Date 2023       Current Admission Date: 2023  Current Admission Diagnosis:Acute on chronic low back pain   Patient Active Problem List    Diagnosis Date Noted    Right hip pain 2023    Bilateral lower extremity edema 11/15/2023    Toxic encephalopathy 11/15/2023    Constipation 11/15/2023    Leukocytosis 11/15/2023    Goals of care, counseling/discussion 11/15/2023    CVA (cerebral vascular accident) ([address]) 11/15/2023    Gallbladder sludge 2023    Thickening of wall of gallbladder 2023    Groin rash 2023    Altered mental status 2023    Continuous opioid dependence ([address]) 2022    Acute on chronic low back pain 2019    Stage 3 chronic kidney disease ([address]) 2019    Chronic pain of left knee 2019    Effusion of right knee 2019    Loss of bladder control 2017    Atrial fibrillation ([address]) 2017    Urinary incontinence 2017    Bladder prolapse, female, acquired 2015    Hypertension 2015    Bilateral primary osteoarthritis of knee 2014    Hypercholesterolemia 2013    Hypothyroidism 2013    Osteoarthrosis of knee 2013      LOS (days): 4  Geometric Mean LOS (GMLOS) (days): 4.60  Days to GMLOS:0.3     OBJECTIVE:  Risk of Unplanned Readmission Score: 33.47         Current admission status: Inpatient   Preferred Pharmacy:   [facility], [address] [place] [address]  Phone: [phone] Fax: [phone]    Primary Care Provider: Ivon Hummel MD    Primary Insurance: MEDICARE  Secondary Insurance: [id number]:    CM f/u with patient's dtr Jeremiah Varela via phone re: [address] options - pt choice list sent to dtr via email at [email or web address]. [employer] for her review.  CM to f/u with dtr in AM for [address] choice. Lionel Schuster also requested information re: hospice - dtr does not want hospice currently for pt but would just like more information to be prepared should pt need hospice in future. Nell relayed choice to s/w SL liaison if possbile re: same. Referral sent to [facility] via aidin for f/u.

## 2023-11-19 NOTE — PROGRESS NOTES
[facility]  Progress Note  Name: Perry Collins  MRN: [id number]  Unit/Bed#: S -01 I Date of Admission: 11/14/2023   Date of Service: 11/19/2023 I Hospital Day: 4    Assessment/Plan   Right hip pain  Assessment & Plan  Right hip with DJD noted on xrays, official report not available  S/p left THR in the past      CVA (cerebral vascular accident) [facility])  Assessment & Plan  Noted to have intermittent facial droop during this hospitalization. CT head obtained and shows CVA however per prior provider's d/w neurology is at least few months old however new since May according to imaging  Etiology unclear however per neurology no need for MRI or stroke pathway  INR 2.25 today  Given advanced age and chronicity of CVA, nothing additional recommended  Given droop, failed bedside screen however did very well with speech and is on regular diet as of 11/16    Goals of care, counseling/discussion  Assessment & Plan  Overall pain control and mental status appears improved today  Only if pain is unable to be controlled would we consider additional imaging +/- consultation with ortho/neurosurgery     Leukocytosis  Assessment & Plan  Mild, ? reactive  No etiology on CT other than constipation  UA unremarkable  Now resolved, last WBC 9.13, Monitor     Constipation  Assessment & Plan  Noted on imaging  Bowel regimen ordered  Abdominal exams     Toxic encephalopathy  Assessment & Plan  Noted previously to be screaming secondary to pain, was not able to be redirected on AM 11/15. Upon admission did receive several narcotics in an effort to obtain CT scan which then required Narcan due to oversedation. Per daughter, at baseline patient is alert and oriented x3, however does have some forgetfulness and this tends to be worsened by acute pain  Today she was sleeping as I entered the room.  Upon waking immediately started complaining of hip pain  At this time, suspect multifactorial  CT head showing stroke however is not new per neurology, at least several months old  UA checked and unremarkable  MS overall appears improved, continue to monitor. Will consult geriatrics    Bilateral lower extremity edema  Assessment & Plan  Acute on chronic per family, worsening over the last week  Is maintained on warfarin, doubt DVT  Echo from this admission revealed LVEF 65%  Elevate, compression with bilateral Ace wrap's  Hold home demadex  S/P dose of IV lasix on 11/15 and again on 11/16    Continuous opioid dependence ([address])  Assessment & Plan  In setting of chronic OA, lumbar degenerative disc disease.  Maintained on Norco for many years  Holding home norco  Continue oxycodone, robaxin  Currently on tylenol ATC as well as oxy PRN for breakthrough     Stage 3 chronic kidney disease [facility])  Assessment & Plan  Lab Results   Component Value Date    EGFR 32 11/18/2023    EGFR 28 11/17/2023    EGFR 31 11/16/2023    CREATININE 1.38 (H) 11/18/2023    CREATININE 1.51 (H) 11/17/2023    CREATININE 1.40 (H) 11/16/2023     Baseline appears around 1.3-1.5 per lab review  Daughter states patient is maintained on Demadex 5 mg 3 times weekly, was previously on daily diuretics however developed dehydration  S/P IV lasix on 11/15 and 11/16  Consideration of more frequent demadex dosing upon dc   Ok for ARB for now    Bilateral primary osteoarthritis of knee  Assessment & Plan  Reported worsening R knee pain   Xrays negative  Pain control as above     Hypothyroidism  Assessment & Plan  Continue synthroid, most recent TSH WNL    Hypertension  Assessment & Plan  Elevated likely in setting of pain upon admission   BP today 124/57  Continue home dose Cozaar 100 mg daily, norvasc 5 mg daily and lopressor 100 mg BID    Atrial fibrillation ([facility])  Assessment & Plan  Maintained on Lopressor 100 mg twice daily as well as warfarin as an outpatient with goal INR 2-3  INR held upon admission given increased INR, and was resumed when INR was back within acceptable rang. INR now 2.25  Warfarin not likely safe given age, limited mobility and fall risk. 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 This was reviewed by prior provider with family and should be discussed further with her primary care physician    * Acute on chronic low back pain  Assessment & Plan  Presented from home (lives with daughters) with 1 week history of acute on chronic pain of back, bilateral hips and R knee. Has been unable to ambulate. At baseline, is able to stand/pivot and take some steps with a  walker. Does take Norco chronically for years for back pain. No red flag symptoms. Was seen in ER last week for similar and discharged. CT lumbar spine: severe disc and facet degenerative change throughout the lumbar spine. Multilevel mild to moderate central canal stenosis and moderate to severe neural foraminal narrowing. CT A/P: Moderate amount of stool in the colon may indicate constipation. No evidence of bowel obstruction, colitis or diverticulitis. Advanced disc degenerative change in the lumbar spine. Normal alignment of the left hip arthroplasty. R Knee xray: severe tricompartmental OA  Was started on pain medications including Ketamine in ER which then required Narcan due to AMS  ESR/CRP elevated   APS consulted, appreciate their ongoing recommendations  On scheduled Tylenol 975 mg every 8 hours, lido patch to back, L hip and R knee, Robaxin every 8 hours scheduled for muscle spasms, Oxy 2.5 mg PO q6h PRN moderate pain, oxy 5 mg PO q6h PRN severe pain, IV dilaudid 0.2 mg q4h PRN breakthrough pain  Still c/o severe pain limiting mobility, though overall improved. Will trial scheduled muscle relaxer on 11/8 as she has not been using or getting the PRN valium and monitor response.    Prior provider d/w family given advanced age - will hold off on additional imaging with MRI at this time especially if pain controlled on above regimen as findings unlikely to  anyway   PT/OT at this time recommending rehab however family wants to take her home             VTE Pharmacologic Prophylaxis:   Pharmacologic: Warfarin (Coumadin)    Discussions with Specialists or Other Care Team Provider: reviewed acute pain notes    Education and Discussions with Family / Patient: call to daughter, updated    Time Spent for Care: 30 minutes. More than 50% of total time spent on counseling and coordination of care as described above. Current Length of Stay: 4 day(s)    Current Patient Status: Inpatient   Certification Statement: The patient will continue to require additional inpatient hospital stay due to need for PT/OT/pain control    Discharge Plan: would benefit from rehab but family wants to take her home, likely in next 24-48 hours    Code Status: Level 3 - DNAR and DNI      Subjective:   80year old female admitted with acute on chronic back pain. She was sleeping initially upon entering room. She did immediately start complaining of right hip pain after she woke up. Was difficult to redirect    Objective:     Vitals:   Temp (24hrs), Av.6 °F (37 °C), Min:98.6 °F (37 °C), Max:98.6 °F (37 °C)    Temp:  [98.6 °F (37 °C)] 98.6 °F (37 °C)  HR:  [67] 67  Resp:  [16] 16  BP: (111-140)/(57-61) 124/57  SpO2:  [94 %] 94 %  Body mass index is 37.98 kg/m². Input and Output Summary (last 24 hours): Intake/Output Summary (Last 24 hours) at 2023 1031  Last data filed at 2023 1527  Gross per 24 hour   Intake --   Output 1500 ml   Net -1500 ml       Physical Exam:     Physical Exam  Vitals reviewed. Constitutional:       Appearance: She is not ill-appearing or diaphoretic. HENT:      Head: Normocephalic and atraumatic. Nose: Nose normal.   Cardiovascular:      Rate and Rhythm: Normal rate. Pulmonary:      Effort: Pulmonary effort is normal. No respiratory distress.    Abdominal:      General: There is no distension. Palpations: Abdomen is soft. Tenderness: There is no abdominal tenderness. Musculoskeletal:         General: No deformity. Comments: Complains of right leg pain, decreased right hip ROM due to pain   Skin:     General: Skin is warm and dry. Findings: No bruising or erythema. Neurological:      Comments: Awake and alert           Additional Data:     Labs:    Results from last 7 days   Lab Units 11/18/23 0458 11/17/23  0451   WBC Thousand/uL 9.13 9.52   HEMOGLOBIN g/dL 10.8* 9.7*   HEMATOCRIT % 33.8* 30.1*   PLATELETS Thousands/uL 241 211   NEUTROS PCT %  --  65   LYMPHS PCT %  --  16   MONOS PCT %  --  12   EOS PCT %  --  5     Results from last 7 days   Lab Units 11/18/23 0458 11/17/23 0451 11/16/23  0539   SODIUM mmol/L 134*   < > 139   POTASSIUM mmol/L 4.9   < > 4.5   CHLORIDE mmol/L 102   < > 106   CO2 mmol/L 26   < > 22   BUN mg/dL 38*   < > 42*   CREATININE mg/dL 1.38*   < > 1.40*   ANION GAP mmol/L 6   < > 11   CALCIUM mg/dL 8.1*   < > 8.8   ALBUMIN g/dL  --   --  3.8   TOTAL BILIRUBIN mg/dL  --   --  1.15*   ALK PHOS U/L  --   --  78   ALT U/L  --   --  18   AST U/L  --   --  38   GLUCOSE RANDOM mg/dL 82   < > 106    < > = values in this interval not displayed. Results from last 7 days   Lab Units 11/19/23  0443   INR  2.25*     Results from last 7 days   Lab Units 11/15/23  2221   POC GLUCOSE mg/dl 91     Results from last 7 days   Lab Units 11/16/23  0539   HEMOGLOBIN A1C % 5.9*               * I Have Reviewed All Lab Data Listed Above. * Additional Pertinent Lab Tests Reviewed:  [address] Admission Reviewed    Imaging:      Imaging Personally Reviewed by Myself Includes:  hip xrays    Recent Cultures (last 7 days):           Last 24 Hours Medication List:   Current Facility-Administered Medications   Medication Dose Route Frequency Provider Last Rate    acetaminophen  975 mg Oral Q8H [facility] Nila Cotter PA-C      amLODIPine  5 mg Oral Daily Ruthine Jovani, SJ      atorvastatin  40 mg Oral QPM Ezequiel Andrews MD      diazepam  2 mg Oral Q8H PRN Fermín Gunter PA-C      docusate sodium  100 mg Oral BID Fermín Gunter PA-C      HYDROmorphone  0.2 mg Intravenous Q4H PRN Rubi HuORLIN butler      levothyroxine  50 mcg Oral Early Morning RuthiSJ Gonzalez      lidocaine  3 patch Topical Daily Fermín Gunter PA-C      losartan  100 mg Oral Daily Ruthine SJ Hahn      metoprolol tartrate  100 mg Oral BID Ruthishaye Hahn, SJ      naloxone  0.04 mg Intravenous Q1MIN PRN Rianbrenda Griffiths PA-C      nystatin   Topical BID Asa HippsORLIN      oxyCODONE  5 mg Oral Q6H PRN Rian MonsORLIN      oxyCODONE  2.5 mg Oral Q6H PRN Rianana Griffiths PA-C      polyethylene glycol  17 g Oral Daily Fermín Gunter PA-C      pravastatin  40 mg Oral Daily With SJ Bales      senna  2 tablet Oral HS Fermín Gunter PA-C          Today, Patient Was Seen By: Caroleen Apgar, PA-C    ** Please Note: Dictation voice to text software may have been used in the creation of this document.  **

## 2023-11-19 NOTE — ASSESSMENT & PLAN NOTE
Maintained on Lopressor 100 mg twice daily as well as warfarin as an outpatient with goal INR 2-3  INR held upon admission given increased INR, and was resumed when INR was back within acceptable rang. INR now 2.25  Warfarin not likely safe given age, limited mobility and fall risk. 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   This was reviewed by prior provider with family and should be discussed further with her primary care physician

## 2023-11-20 ENCOUNTER — HOSPICE ADMISSION (OUTPATIENT)
Dept: HOME HOSPICE | Facility: HOSPICE | Age: 88
End: 2023-11-20
Payer: MEDICARE

## 2023-11-20 ENCOUNTER — HOME CARE VISIT (OUTPATIENT)
Dept: HOME HEALTH SERVICES | Facility: HOME HEALTHCARE | Age: 88
End: 2023-11-20
Payer: MEDICARE

## 2023-11-20 PROBLEM — D72.829 LEUKOCYTOSIS: Status: RESOLVED | Noted: 2023-11-15 | Resolved: 2023-11-17

## 2023-11-20 LAB
ANION GAP SERPL CALCULATED.3IONS-SCNC: 5 MMOL/L
BUN SERPL-MCNC: 34 MG/DL (ref 5–25)
CALCIUM SERPL-MCNC: 7.8 MG/DL (ref 8.4–10.2)
CHLORIDE SERPL-SCNC: 103 MMOL/L (ref 96–108)
CO2 SERPL-SCNC: 27 MMOL/L (ref 21–32)
CREAT SERPL-MCNC: 1.45 MG/DL (ref 0.6–1.3)
ERYTHROCYTE [DISTWIDTH] IN BLOOD BY AUTOMATED COUNT: 16 % (ref 11.6–15.1)
GFR SERPL CREATININE-BSD FRML MDRD: 30 ML/MIN/1.73SQ M
GLUCOSE SERPL-MCNC: 82 MG/DL (ref 65–140)
HCT VFR BLD AUTO: 32.1 % (ref 34.8–46.1)
HGB BLD-MCNC: 10 G/DL (ref 11.5–15.4)
INR PPP: 1.57 (ref 0.84–1.19)
MCH RBC QN AUTO: 27.9 PG (ref 26.8–34.3)
MCHC RBC AUTO-ENTMCNC: 31.2 G/DL (ref 31.4–37.4)
MCV RBC AUTO: 89 FL (ref 82–98)
PLATELET # BLD AUTO: 197 THOUSANDS/UL (ref 149–390)
PMV BLD AUTO: 9.7 FL (ref 8.9–12.7)
POTASSIUM SERPL-SCNC: 4.4 MMOL/L (ref 3.5–5.3)
PROTHROMBIN TIME: 19.5 SECONDS (ref 11.6–14.5)
RBC # BLD AUTO: 3.59 MILLION/UL (ref 3.81–5.12)
SODIUM SERPL-SCNC: 135 MMOL/L (ref 135–147)
WBC # BLD AUTO: 5.95 THOUSAND/UL (ref 4.31–10.16)

## 2023-11-20 PROCEDURE — 99232 SBSQ HOSP IP/OBS MODERATE 35: CPT | Performed by: PHYSICIAN ASSISTANT

## 2023-11-20 PROCEDURE — 99232 SBSQ HOSP IP/OBS MODERATE 35: CPT | Performed by: NURSE PRACTITIONER

## 2023-11-20 PROCEDURE — 85027 COMPLETE CBC AUTOMATED: CPT | Performed by: PHYSICIAN ASSISTANT

## 2023-11-20 PROCEDURE — 85610 PROTHROMBIN TIME: CPT | Performed by: PHYSICIAN ASSISTANT

## 2023-11-20 PROCEDURE — 80048 BASIC METABOLIC PNL TOTAL CA: CPT | Performed by: PHYSICIAN ASSISTANT

## 2023-11-20 PROCEDURE — 99233 SBSQ HOSP IP/OBS HIGH 50: CPT | Performed by: PHYSICIAN ASSISTANT

## 2023-11-20 PROCEDURE — 97535 SELF CARE MNGMENT TRAINING: CPT

## 2023-11-20 PROCEDURE — 97530 THERAPEUTIC ACTIVITIES: CPT

## 2023-11-20 RX ORDER — TORSEMIDE 10 MG/1
5 TABLET ORAL 3 TIMES WEEKLY
Status: DISCONTINUED | OUTPATIENT
Start: 2023-11-20 | End: 2023-11-21 | Stop reason: HOSPADM

## 2023-11-20 RX ORDER — WARFARIN SODIUM 3 MG/1
3 TABLET ORAL
Status: DISCONTINUED | OUTPATIENT
Start: 2023-11-20 | End: 2023-11-21 | Stop reason: HOSPADM

## 2023-11-20 RX ORDER — HYDROMORPHONE HCL IN WATER/PF 6 MG/30 ML
0.2 PATIENT CONTROLLED ANALGESIA SYRINGE INTRAVENOUS EVERY 6 HOURS PRN
Status: DISCONTINUED | OUTPATIENT
Start: 2023-11-20 | End: 2023-11-20

## 2023-11-20 RX ADMIN — DIAZEPAM 2 MG: 2 TABLET ORAL at 16:07

## 2023-11-20 RX ADMIN — DOCUSATE SODIUM 100 MG: 100 CAPSULE, LIQUID FILLED ORAL at 18:40

## 2023-11-20 RX ADMIN — ATORVASTATIN CALCIUM 40 MG: 40 TABLET, FILM COATED ORAL at 18:40

## 2023-11-20 RX ADMIN — LIDOCAINE 3 PATCH: 700 PATCH TOPICAL at 23:00

## 2023-11-20 RX ADMIN — TORSEMIDE 5 MG: 10 TABLET ORAL at 12:21

## 2023-11-20 RX ADMIN — AMLODIPINE BESYLATE 5 MG: 5 TABLET ORAL at 08:02

## 2023-11-20 RX ADMIN — ACETAMINOPHEN 975 MG: 325 TABLET, FILM COATED ORAL at 06:08

## 2023-11-20 RX ADMIN — OXYCODONE HYDROCHLORIDE 5 MG: 5 TABLET ORAL at 12:22

## 2023-11-20 RX ADMIN — ACETAMINOPHEN 975 MG: 325 TABLET, FILM COATED ORAL at 21:32

## 2023-11-20 RX ADMIN — SENNOSIDES 17.2 MG: 8.6 TABLET, FILM COATED ORAL at 21:32

## 2023-11-20 RX ADMIN — METOPROLOL TARTRATE 100 MG: 100 TABLET, FILM COATED ORAL at 18:40

## 2023-11-20 RX ADMIN — DOCUSATE SODIUM 100 MG: 100 CAPSULE, LIQUID FILLED ORAL at 08:02

## 2023-11-20 RX ADMIN — OXYCODONE HYDROCHLORIDE 5 MG: 5 TABLET ORAL at 20:11

## 2023-11-20 RX ADMIN — DIAZEPAM 2 MG: 2 TABLET ORAL at 00:57

## 2023-11-20 RX ADMIN — NYSTATIN: 100000 POWDER TOPICAL at 18:41

## 2023-11-20 RX ADMIN — HYDROMORPHONE HYDROCHLORIDE 0.2 MG: 0.2 INJECTION, SOLUTION INTRAMUSCULAR; INTRAVENOUS; SUBCUTANEOUS at 08:02

## 2023-11-20 RX ADMIN — WARFARIN SODIUM 3 MG: 3 TABLET ORAL at 18:40

## 2023-11-20 RX ADMIN — LEVOTHYROXINE SODIUM 50 MCG: 50 TABLET ORAL at 06:08

## 2023-11-20 RX ADMIN — POLYETHYLENE GLYCOL 3350 17 G: 17 POWDER, FOR SOLUTION ORAL at 08:02

## 2023-11-20 RX ADMIN — Medication 2.5 MG: at 06:08

## 2023-11-20 RX ADMIN — NYSTATIN: 100000 POWDER TOPICAL at 08:06

## 2023-11-20 RX ADMIN — METOPROLOL TARTRATE 100 MG: 100 TABLET, FILM COATED ORAL at 08:02

## 2023-11-20 RX ADMIN — ACETAMINOPHEN 975 MG: 325 TABLET, FILM COATED ORAL at 14:39

## 2023-11-20 RX ADMIN — PRAVASTATIN SODIUM 40 MG: 40 TABLET ORAL at 16:07

## 2023-11-20 RX ADMIN — LOSARTAN POTASSIUM 100 MG: 50 TABLET, FILM COATED ORAL at 08:02

## 2023-11-20 NOTE — CASE COMMUNICATION
For  David Carpenter  [birth year] currently at     [address] [employer] female with hypertension, hypothyroidism, CKD stage III, Hepatomegaly, A-fib on Coumadin, and opiate dependence who presented with facial droop and disorientation which resolved after Narcan. There is family reports that the facial asymmetry was chronic. CT head demonstrated an age-indeterminate lacunar infarct in the left basal ganglia, which is new since CT head in 2022. Upon my review the above-mentioned Lupu is well demarcated and about the same density as CSF consistent with a chronic stroke. Pt is wheelchair bound. Severe chronic hip and back pain. Does not want to return to the hospital. Wants no further tests or treatments.  PPS 36  Apporved for RLOC by Dr Regino Lees 23  DX Late effects CVA

## 2023-11-20 NOTE — PLAN OF CARE
Problem: OCCUPATIONAL THERAPY ADULT  Goal: Performs self-care activities at highest level of function for planned discharge setting. See evaluation for individualized goals. Description: Treatment Interventions: ADL retraining, Functional transfer training, UE strengthening/ROM, Endurance training, Cognitive reorientation, Patient/family training, Equipment evaluation/education, Compensatory technique education, Energy conservation, Activityengagement          See flowsheet documentation for full assessment, interventions and recommendations. Outcome: Progressing  Note: Limitation: Decreased ADL status, Decreased UE ROM, Decreased UE strength, Decreased Safe judgement during ADL, Decreased cognition, Decreased endurance, Decreased self-care trans, Decreased high-level ADLs  Prognosis: Poor  Assessment: Pt was seen for skilled OT tx session focusing on bed mobility, ADLs, engagement in activity, fnxl transfer training, fnxl mobility, and activity endurance. Upon arrival pt was supine in bed and agreeable to therapy. Pt was max A x2 for bed mobility and required verbal cueing for hand placement and sequencing during tasks. Pt was min A x2 for sit to stand transfer x2 from EOB with RW and verbal cueing. Pt was max A for toileting hygiene, pt unable to stand for long enough to tolerate standing toileting hygiene. Pt was max A for LB dressing while seated at the EOB. Pt sat EOB for ~5 minutes and levels varied for assist from supervision to mod A x1 with right lateral lean noted. Pt completed sit to stand with min A x2 with RW and completed stand pivot transfer to recliner chair with mod Ax2 and use of RW, pt limited by cognitive status, requires consistent VC s for attention to task. At the end of the session pt was seated in recliner chair and required min A x1 for eating. Pt was seated in recliner and all needs met, will continue to follow pt on OT caseload while admitted.      Rehab Resource Intensity Level, OT: II (Moderate Resource Intensity)

## 2023-11-20 NOTE — PLAN OF CARE
Problem: Prexisting or High Potential for Compromised Skin Integrity  Goal: Skin integrity is maintained or improved  Description: INTERVENTIONS:  - Identify patients at risk for skin breakdown  - Assess and monitor skin integrity  - Assess and monitor nutrition and hydration status  - Monitor labs   - Assess for incontinence   - Turn and reposition patient  - Assist with mobility/ambulation  - Relieve pressure over bony prominences  - Avoid friction and shearing  - Provide appropriate hygiene as needed including keeping skin clean and dry  - Evaluate need for skin moisturizer/barrier cream  - Collaborate with interdisciplinary team   - Patient/family teaching  - Consider wound care consult   Outcome: Progressing     Problem: PAIN - ADULT  Goal: Verbalizes/displays adequate comfort level or baseline comfort level  Description: Interventions:  - Encourage patient to monitor pain and request assistance  - Assess pain using appropriate pain scale  - Administer analgesics based on type and severity of pain and evaluate response  - Implement non-pharmacological measures as appropriate and evaluate response  - Consider cultural and social influences on pain and pain management  - Notify physician/advanced practitioner if interventions unsuccessful or patient reports new pain  Outcome: Progressing     Problem: INFECTION - ADULT  Goal: Absence or prevention of progression during hospitalization  Description: INTERVENTIONS:  - Assess and monitor for signs and symptoms of infection  - Monitor lab/diagnostic results  - Monitor all insertion sites, i.e. indwelling lines, tubes, and drains  - Monitor endotracheal if appropriate and nasal secretions for changes in amount and color  - [place] appropriate cooling/warming therapies per order  - Administer medications as ordered  - Instruct and encourage patient and family to use good hand hygiene technique  - Identify and instruct in appropriate isolation precautions for identified infection/condition  Outcome: Progressing  Goal: Absence of fever/infection during neutropenic period  Description: INTERVENTIONS:  - Monitor WBC    Outcome: Progressing     Problem: SAFETY ADULT  Goal: Patient will remain free of falls  Description: INTERVENTIONS:  - Educate patient/family on patient safety including physical limitations  - Instruct patient to call for assistance with activity   - Consult OT/PT to assist with strengthening/mobility   - Keep Call bell within reach  - Keep bed low and locked with side rails adjusted as appropriate  - Keep care items and personal belongings within reach  - Initiate and maintain comfort rounds  - Make Fall Risk Sign visible to staff  - Offer Toileting every  Hours, in advance of need  - Initiate/Maintain alarm  - Obtain necessary fall risk management equipment:   - Apply yellow socks and bracelet for high fall risk patients  - Consider moving patient to room near nurses station  Outcome: Progressing  Goal: Maintain or return to baseline ADL function  Description: INTERVENTIONS:  -  Assess patient's ability to carry out ADLs; assess patient's baseline for ADL function and identify physical deficits which impact ability to perform ADLs (bathing, care of mouth/teeth, toileting, grooming, dressing, etc.)  - Assess/evaluate cause of self-care deficits   - Assess range of motion  - Assess patient's mobility; develop plan if impaired  - Assess patient's need for assistive devices and provide as appropriate  - Encourage maximum independence but intervene and supervise when necessary  - Involve family in performance of ADLs  - Assess for home care needs following discharge   - Consider OT consult to assist with ADL evaluation and planning for discharge  - Provide patient education as appropriate  Outcome: Progressing  Goal: Maintains/Returns to pre admission functional level  Description: INTERVENTIONS:  - Perform AM-PAC 6 Click Basic Mobility/ Daily Activity assessment daily.  - Set and communicate daily mobility goal to care team and patient/family/caregiver. - Collaborate with rehabilitation services on mobility goals if consulted  - Perform Range of Motion  times a day. - Reposition patient every  hours. - Dangle patient  times a day  - Stand patient  times a day  - Ambulate patient  times a day  - Out of bed to chair  times a day   - Out of bed for meals imes a day  - Out of bed for toileting  - Record patient progress and toleration of activity level   Outcome: Progressing     Problem: DISCHARGE PLANNING  Goal: Discharge to home or other facility with appropriate resources  Description: INTERVENTIONS:  - Identify barriers to discharge w/patient and caregiver  - Arrange for needed discharge resources and transportation as appropriate  - Identify discharge learning needs (meds, wound care, etc.)  - Arrange for interpretive services to assist at discharge as needed  - Refer to Case Management Department for coordinating discharge planning if the patient needs post-hospital services based on physician/advanced practitioner order or complex needs related to functional status, cognitive ability, or social support system  Outcome: Progressing     Problem: Knowledge Deficit  Goal: Patient/family/caregiver demonstrates understanding of disease process, treatment plan, medications, and discharge instructions  Description: Complete learning assessment and assess knowledge base. Interventions:  - Provide teaching at level of understanding  - Provide teaching via preferred learning methods  Outcome: Progressing     Problem: Nutrition/Hydration-ADULT  Goal: Nutrient/Hydration intake appropriate for improving, restoring or maintaining nutritional needs  Description: Monitor and assess patient's nutrition/hydration status for malnutrition. Collaborate with interdisciplinary team and initiate plan and interventions as ordered.   Monitor patient's weight and dietary intake as ordered or per policy. Utilize nutrition screening tool and intervene as necessary. Determine patient's food preferences and provide high-protein, high-caloric foods as appropriate.      INTERVENTIONS:  - Monitor oral intake, urinary output, labs, and treatment plans  - Assess nutrition and hydration status and recommend course of action  - Evaluate amount of meals eaten  - Assist patient with eating if necessary   - Allow adequate time for meals  - Recommend/ encourage appropriate diets, oral nutritional supplements, and vitamin/mineral supplements  - Order, calculate, and assess calorie counts as needed  - Recommend, monitor, and adjust tube feedings and TPN/PPN based on assessed needs  - Assess need for intravenous fluids  - Provide specific nutrition/hydration education as appropriate  - Include patient/family/caregiver in decisions related to nutrition  Outcome: Progressing

## 2023-11-20 NOTE — ASSESSMENT & PLAN NOTE
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    -CT head showing stroke however is not new per neurology, at least several months old  -No cognitive testing on file  -No formal diagnosis of cognitive impairment or dementia  -Patient denies any issues  -B12 no labs resulted  -TSH no labs resulted   -Caution with opioids/narcotics, avoid delirium and oversedation  -At risk for delirium due to previous toxic encephalopathy, hospital admission   -Recommend delirium precautions  -Maintain sleep-wake cycle, avoid nighttime interruptions  -minimize overnight interruptions, group overnight vitals/labs/nursing checks as possible  -Dim lights, close blinds and turn off tv to minimize stimulation and encourage sleep environment in evenings  -Provide adequate pain control  -Avoid urinary retention and constipation  -Provide frequent and early mobilization  -Provide frequent redirection and reorientation as needed  -Avoid medications that may worsen or precipitate delirium such as tramadol, benzodiazepines, anticholinergics, and Benadryl  -Redirect unwanted behaviors as first-line therapy, avoid physical restraints as able to  -Continue to monitor

## 2023-11-20 NOTE — ASSESSMENT & PLAN NOTE
Noted to have intermittent facial droop during this hospitalization.    CT head obtained and shows CVA, however per prior provider's d/w neurology this is at least few months old however new since May according to imaging  No need for MRI or further imaging per neurology given age and chronicity of CVA  Initially failed bedside screen, however did very well with speech and is on regular diet (11/16)

## 2023-11-20 NOTE — PHYSICAL THERAPY NOTE
PHYSICAL THERAPY NOTE          Patient Name: Mervin Prather  ADNAC'I Date: 11/20/2023 11/20/23 6715   PT Last Visit   PT Visit Date 11/20/23   Note Type   Note Type Treatment for insurance authorization   Pain Assessment   Pain Assessment Tool 0-10   Pain Score 8   Pain Location/Orientation Orientation: Bilateral;Location: Leg  (quads)   Pain Onset/Description Onset: Ongoing   Effect of Pain on Daily Activities limited comfort, activity tolerance and bed mobility   Patient's Stated Pain Goal No pain   Hospital Pain Intervention(s) Repositioned; Ambulation/increased activity; Emotional support; Rest   Multiple Pain Sites No   Pain Rating: FLACC (Rest) - Face 0   Pain Rating: FLACC (Rest) - Legs 0   Pain Rating: FLACC (Rest) - Activity 0   Pain Rating: FLACC (Rest) - Cry 0   Pain Rating: FLACC (Rest) - Consolability 0   Score: FLACC (Rest) 0   Pain Rating: FLACC (Activity) - Face 1   Pain Rating: FLACC (Activity) - Legs 1   Pain Rating: FLACC (Activity) - Activity 1   Pain Rating: FLACC (Activity) - Cry 1   Pain Rating: FLACC (Activity) - Consolability 1   Score: FLACC (Activity) 5   Restrictions/Precautions   Weight Bearing Precautions Per Order No   Other Precautions Cognitive; Chair Alarm; Bed Alarm; Fall Risk;Pain   General   Chart Reviewed Yes   Response to Previous Treatment Other (Comment)  (pt reports pain but able to participate in PT/OT intervention)   Cognition   Overall Cognitive Status Impaired   Arousal/Participation Alert; Responsive; Cooperative   Attention Attends with cues to redirect   Orientation Level Oriented to person;Oriented to place;Oriented to situation;Disoriented to time   Memory Decreased short term memory;Decreased recall of recent events;Decreased recall of precautions   Following Commands Follows one step commands with increased time or repetition   Comments pt continues to be pleasant and cooperatiev throughout tx session   Subjective   Subjective pt was agreeable to participate in PT intervention. pt stated 8/10 bilateral leg pain (quads)   Bed Mobility   Rolling R 2  Maximal assistance   Additional items Assist x 2;HOB elevated; Bedrails; Increased time required;Verbal cues;LE management; Other  (trunk management)   Rolling L 2  Maximal assistance   Additional items Assist x 2;HOB elevated; Bedrails; Increased time required;Verbal cues; Other;LE management  (trunk management)   Supine to Sit 2  Maximal assistance   Additional items Assist x 2;HOB elevated; Bedrails; Increased time required;Verbal cues;LE management; Other  (trunk management)   Sit to Supine 2  Maximal assistance   Additional items Assist x 2;HOB elevated; Bedrails; Increased time required;Verbal cues;LE management; Other  (trunk management)   Additional Comments pt was able to sit EOB > 5 minutes while beidng educated on all functional transfers to and from RW with /s to mod ax1   Transfers   Sit to Stand 4  Minimal assistance   Additional items Assist x 2; Increased time required;Verbal cues   Stand to Sit 4  Minimal assistance   Additional items Assist x 2; Increased time required;Verbal cues   Stand pivot 3  Moderate assistance   Additional items Assist x 2;Armrests; Increased time required;Verbal cues; Other  (RW management)   Additional Comments all functional transfers were completed w/ RW, VC's for ahnd placement and RW management   Ambulation/Elevation   Gait pattern Not tested  (pt with limited standing tolerance and difficulty completing a SPT from EOB to recliner)   Balance   Static Sitting Fair   Dynamic Sitting Fair -   Static Standing Poor +  (min x2)   Dynamic Standing Poor -   Ambulatory Zero   Endurance Deficit   Endurance Deficit Yes   Endurance Deficit Description limited bed mobility, activity tolerance, standing tolerance and functional mobility   Activity Tolerance   Activity Tolerance Patient limited by fatigue;Patient limited by pain Nurse Made Aware Spoke to RN   Assessment   Problem List Decreased strength;Decreased range of motion;Decreased endurance; Impaired balance;Decreased mobility; Decreased cognition;Decreased safety awareness;Pain   Assessment pt began tx session lying supine in the bed and was agreeable to participate in PT intervention. Care coordination with OT Rosy due to pt anticipated level of assistance required for all bed mobility, functional transfers and ambulation if appropriate. pt continues to require max ax2 for all bed mobility including rolling and repositioning in the bed from L to R with LE and trunk management. pt was able to maintain sidelying position with use of bed rail for about 1 minute per side in order to clean pt from bowel incontinence. pt also max ax2 in order to complete a supine<>sit EOB transfer with LE and trunk managemnt. pt was able to sit EOB with a varied degree of assistance  /s to mod while beding educated on all functional transfers to and from RW. pt required min Ax2 for STS and mod Ax2 for SPT from EOB to recliner with RW. pt also required RW management in order to complete SPT. pt was unable to reposition herself in recliner and required max Ax2 to reposition towards back of recliner. Post tx pt in recliner with call bell and all pt needs met. pt continues to demonstrate the following deficits: limited bed mobility, functional mobility, activity/standing tolerance. pt would benefit from continued skilled PT intervention in order to address deficits listed above. Continue to recommend DC w/ level 2 moderate rehab resource intensity when medically cleared   Goals   Patient Goals none stated   STG Expiration Date 11/25/23   PT Treatment Day 3   Plan   Treatment/Interventions Functional transfer training;LE strengthening/ROM; Therapeutic exercise; Endurance training;Patient/family training;Equipment eval/education; Bed mobility;Gait training   Progress Progressing toward goals   PT Frequency 2-3x/wk   Discharge Recommendation   Rehab Resource Intensity Level, PT II (Moderate Resource Intensity)   AM-PAC Basic Mobility Inpatient   Turning in Flat Bed Without Bedrails 2   Lying on Back to Sitting on Edge of Flat Bed Without Bedrails 1   Moving Bed to Chair 1   Standing Up From Chair Using Arms 1   Walk in Room 1   Climb 3-5 Stairs With Railing 1   Basic Mobility Inpatient Raw Score 7   Highest Level Of Mobility   JH-HLM Goal 2: Bed activities/Dependent transfer   JH-HLM Achieved 4: Move to [facility] Financial Provided Other  (bed mobility, functional transfers)   End of Consult   Patient Position at End of Consult Bedside chair;Bed/Chair alarm activated; All needs within reach   The patient's AM-PAC Basic Mobility Inpatient Short Form Raw Score is 7. A Raw score of less than or equal to 16 suggests the patient may benefit from discharge to post-acute rehabilitation services. Please also refer to the recommendation of the Physical Therapist for safe discharge planning. Mary Be) pt and family requesting information on how to transfer pt safely from bed to RW and RW to recliner. O) to educated pt and family on all safe bed mobility and functional transfer strategies with an AD in order for pt to transition home safely     A) pt and family educated with verbal/visual demonstration on all bed mobility, functional transfers and OOB mobility in order to reduce risk of falls and injuries when pt returns home. Pt did not want to return to supine position and stated she wanted to remain in recliner. Pt continues to require min Ax2 for all STS from recliner to RN. SPT not attempted in this afternoons tx session     P) pt will continue to receive skilled PT intervention during current hospitalization in order to address deficits listed above and continue to provide pt with intervention consistent with plan of care in order to achieve short term  goals.        Melody Price

## 2023-11-20 NOTE — ASSESSMENT & PLAN NOTE
Mild leukocytosis initially on admission, most likely reactive  No infectious etiology imaging and labs  Resolved, WBCs since normalized

## 2023-11-20 NOTE — ASSESSMENT & PLAN NOTE
Acute on chronic back pain present upon admission as well as bilateral hip and right knee pain   Lives at home with daughters, now unable to ambulate, previously able to stand/pivot and take some steps with walker at baseline.   Chronically taking Hydrocodone with dependence

## 2023-11-20 NOTE — PROGRESS NOTES
Progress Note - Acute Pain Service    Janneth Luna 80 y.o. female MRN: [id number]  Unit/Bed#: S -80 Encounter: [id number]      Janneth Luna is a 80 y.o. female with acute on chronic lower back and right hip pain    Toxic encephalopathy  Assessment & Plan  Resolved    Continuous opioid dependence [facility])  Assessment & Plan  History of chronic lower back pain. Is prescribed Norco 7.5-325 mg tablets, 1 p.o. 3 times daily as needed severe pain. Patient takes this 3 times a day on most days. Norco prescribed by PCP. Stage 3 chronic kidney disease [facility])  Assessment & Plan  Lab Results   Component Value Date    EGFR 30 11/20/2023    EGFR 32 11/18/2023    EGFR 28 11/17/2023    CREATININE 1.45 (H) 11/20/2023    CREATININE 1.38 (H) 11/18/2023    CREATININE 1.51 (H) 11/17/2023   Estimated Creatinine Clearance: 23.4 mL/min (A) (by C-G formula based on SCr of 1.45 mg/dL (H)). We will avoid nephrotoxic pain medications. * Acute on chronic low back pain  Assessment & Plan  Tylenol 975 mg p.o. every 8 hours. Lidocaine patch x3, on for 12 hours and off for 12 hours. Oxycodone 2.5 mg p.o. every 6 hours as needed moderate pain or 5 mg p.o. every 6 hours as needed severe pain. Discontinue IV Dilaudid   Valium 2 mg p.o. every 8 hours as needed muscle spasm. Discontinue this prior to discharge. Hold opioid pain medication and benzodiazepines for decreased mental status. Bowel regimen to avoid opioid-induced constipation while on opioid pain medication. Ice to painful area for up to 20 minutes every hour as needed. At discharge, suggest the following:  Tylenol 975 mg p.o. every 8 hours as needed mild pain. Lidocaine patch x3, on for 12 hours and off for 12 hours as needed. Oxycodone 2.5 mg p.o. every 6 hours as needed moderate to severe pain x3 days, then discontinue. Discontinue Valium at discharge. Bowel regimen while on opioid pain medication to avoid opioid-induced constipation.         APS will sign off at this time. Thank you for the consult. All opioids and other analgesics to be written at discretion of primary team. Please contact Acute Pain Service - via [employer] from [phone] with additional questions or concerns. See [email or web address] or Phyllis for additional contacts and after hours information. Pain History  Current pain location(s):  Pain Score: 8  Pain Location/Orientation: Orientation: Bilateral, Location: Leg (quads)  Pain Scale: Pain Assessment Tool: 0-10  24 hour history: Patient still complains of some right hip pain with movement. States current regimen has been effective. Has required less as needed pain medication over the weekend. Is much more alert and awake and active, appears generally improved. Opioid requirement previous 24 hours: Oxycodone 5 mg p.o. x3, oxycodone 2.5 mg p.o. x1, Dilaudid 0.2 mg IV x2.     Meds/Allergies   all current active meds have been reviewed, current meds:   Current Facility-Administered Medications   Medication Dose Route Frequency    acetaminophen (TYLENOL) tablet 975 mg  975 mg Oral Q8H [address]    amLODIPine (NORVASC) tablet 5 mg  5 mg Oral Daily    atorvastatin (LIPITOR) tablet 40 mg  40 mg Oral QPM    diazepam (VALIUM) tablet 2 mg  2 mg Oral Q8H PRN    docusate sodium (COLACE) capsule 100 mg  100 mg Oral BID    HYDROmorphone HCl (DILAUDID) injection 0.2 mg  0.2 mg Intravenous Q6H PRN    levothyroxine tablet 50 mcg  50 mcg Oral Early Morning    lidocaine (LIDODERM) 5 % patch 3 patch  3 patch Topical Daily    losartan (COZAAR) tablet 100 mg  100 mg Oral Daily    metoprolol tartrate (LOPRESSOR) tablet 100 mg  100 mg Oral BID    naloxone (NARCAN) 0.04 mg/mL syringe 0.04 mg  0.04 mg Intravenous Q1MIN PRN    nystatin (MYCOSTATIN) powder   Topical BID    oxyCODONE (ROXICODONE) IR tablet 5 mg  5 mg Oral Q6H PRN    oxyCODONE (ROXICODONE) split tablet 2.5 mg  2.5 mg Oral Q6H PRN    polyethylene glycol (MIRALAX) packet 17 g  17 g Oral Daily    pravastatin (PRAVACHOL) tablet 40 mg  40 mg Oral Daily With Dinner    senna (SENOKOT) tablet 17.2 mg  2 tablet Oral HS    torsemide (DEMADEX) tablet 5 mg  5 mg Oral Once per day on Mon Wed Fri    warfarin (COUMADIN) tablet 3 mg  3 mg Oral Daily (warfarin)   , and PTA meds:   Prior to Admission Medications   Prescriptions Last Dose Informant Patient Reported? Taking?    HYDROcodone-acetaminophen (XODOL) 7.5-300 MG per tablet   No No   Sig: Take 1 tablet by mouth every 8 (eight) hours as needed for severe pain Max Daily Amount: 3 tablets   acetaminophen (TYLENOL) 325 mg tablet  Self, Child No No   Sig: Take 2 tablets (650 mg total) by mouth every 8 (eight) hours as needed for mild pain, headaches or fever   amLODIPine (NORVASC) 5 mg tablet   No No   Sig: Take 1 tablet (5 mg total) by mouth daily   clotrimazole (LOTRIMIN) 1 % cream  Self, Child No No   Sig: Apply 1 g (1 Application total) topically 2 (two) times a day for 14 days APPLY TO AREA OF BELLY BUTTON AND BOTH GROIN CREASES 2 TIMES PER DAY UNTIL GONE   ergocalciferol (VITAMIN D2) 50,000 units  Self, Child Yes No   Sig: Take by mouth once a week   levothyroxine 50 mcg tablet  Self, Child No No   Sig: Take 1 tablet (50 mcg total) by mouth daily   losartan (COZAAR) 100 MG tablet  Self, Child No No   Sig: Take 1 tablet (100 mg total) by mouth daily   metoprolol tartrate (LOPRESSOR) 100 mg tablet  Self, Child No No   Sig: Take 1 tablet (100 mg total) by mouth 2 (two) times a day   nystatin (MYCOSTATIN) powder  Self, Child No No   Sig: Apply topically 2 (two) times a day   polyethylene glycol (MIRALAX) 17 g packet  Self, Child Yes No   Sig: Take 17 g by mouth as needed     senna-docusate sodium (SENOKOT S) 8.6-50 mg per tablet  Self, Child Yes No   Sig: Take 1 tablet by mouth as needed for constipation   simvastatin (ZOCOR) 20 mg tablet  Self, Child No No   Sig: Take 1 tablet (20 mg total) by mouth daily at bedtime   torsemide (DEMADEX) 5 MG tablet   Yes Yes   Sig: Take 5 mg by mouth 3 (three) times a week   warfarin (COUMADIN) 5 mg tablet  Self, Child No No   Sig: TAKE 5 MG ON MONDAY AND SATURDAY AND 2.5 MG ALL OTHER DAYS   Patient taking differently: TAKE 5 MG SATURDAY AND 2.5 MG ALL OTHER DAYS      Facility-Administered Medications: None       Allergies   Allergen Reactions    Cortisone     Oxaprozin Hives    Penicillins Hives       Objective        Vitals:    11/19/23 1716 11/19/23 1716 11/19/23 2248 11/20/23 0713   BP: (!) 107/48 (!) 107/48 122/87 125/59   BP Location:   Right arm Right arm   Pulse: 68  63 68   Resp:       Temp:    99 °F (37.2 °C)   TempSrc:    Oral   SpO2:   94% 94%   Weight:       Height:             Physical Exam  Vitals and nursing note reviewed. Constitutional:       General: She is awake. She is not in acute distress. Appearance: She is not ill-appearing, toxic-appearing or diaphoretic. Skin:     General: Skin is warm and dry. Neurological:      Mental Status: She is alert and oriented to person, place, and time. GCS: GCS eye subscore is 4. GCS verbal subscore is 5. GCS motor subscore is 6. Psychiatric:         Attention and Perception: Attention normal.         Speech: Speech normal.         Behavior: Behavior normal. Behavior is cooperative. Lab Results:   Estimated Creatinine Clearance: 23.4 mL/min (A) (by C-G formula based on SCr of 1.45 mg/dL (H)).   Lab Results   Component Value Date    WBC 5.95 11/20/2023    WBC 8.14 04/24/2015    HGB 10.0 (L) 11/20/2023    HGB 15.6 (H) 04/24/2015    HCT 32.1 (L) 11/20/2023    HCT 46.2 (H) 04/24/2015     11/20/2023     04/24/2015         Component Value Date/Time     04/24/2015 0732    K 4.4 11/20/2023 0503    K 4.0 04/24/2015 0732     11/20/2023 0503     04/24/2015 0732    CO2 27 11/20/2023 0503    CO2 34 (H) 06/24/2019 0234    BUN 34 (H) 11/20/2023 0503    BUN 16 04/24/2015 0732    CREATININE 1.45 (H) 11/20/2023 0503    CREATININE 1.13 04/24/2015 0732         Component Value Date/Time CALCIUM 7.8 (L) 11/20/2023 0503    CALCIUM 9.0 04/24/2015 0732    ALKPHOS 78 11/16/2023 0539    ALKPHOS 130 (H) 04/24/2015 0732    AST 38 11/16/2023 0539    AST 24 04/24/2015 0732    ALT 18 11/16/2023 0539    ALT 17 04/24/2015 0732    BILITOT 0.46 04/24/2015 0732    TP 6.4 11/16/2023 0539    ALB 3.8 11/16/2023 0539    ALB 3.8 04/24/2015 0732       Imaging Studies/EKG: I have personally reviewed pertinent reports. Counseling / Coordination of Care  Total floor / unit time spent today 30 minutes minutes. Greater than 50% of total time was spent with the patient and / or family counseling and / or coordination of care. A description of the counseling / coordination of care: Patient interview, physical examination, review of medical records, review of imaging and laboratory data, development of pain management plan, discussion of pain management plan with patient and primary service. Please note that the APS provides consultative services regarding pain management only. With the exception of ketamine and epidural infusions and except when indicated, final decisions regarding starting or changing doses of analgesic medications are at the discretion of the consulting service.     Emir Flor PA-C   Acute Pain Service

## 2023-11-20 NOTE — CONSULTS
Consultation - [address] 80 y.o. female MRN: [id number]  Unit/Bed#: S -01 Encounter: [id number]      Assessment/Plan   Goals of care, counseling/discussion  Assessment & Plan  At risk for poor prognosis given advanced age, chronic pain and complex comorbidities. Hospice services met with pt. And family, in agreement to proceed with hospice care  Continue to hold additional imaging    Constipation  Assessment & Plan  Patient complains of constipation  Last BM was today  Is currently on MiraLAX daily, Senokot 17.2 mg nightly, and Colace 100 mg daily  Encourage adequate p.o. Hydration  Encourage prune juice as tolerated      Toxic encephalopathy  Assessment & Plan  -resolved, mental status at time of encounter is AAOx3-disoriented to time  -History of toxic encephalopathy on admission after receiving several narcotics including ketamine in an effort to obtain CT scan, subsequently required Narcan due to oversedation.    -CT head showing stroke however is not new per neurology, at least several months old  -No cognitive testing on file  -No formal diagnosis of cognitive impairment or dementia  -Patient denies any issues  -B12 no labs resulted  -TSH no labs resulted   -Caution with opioids/narcotics, avoid delirium and oversedation  -At risk for delirium due to previous toxic encephalopathy, hospital admission   -Recommend delirium precautions  -Maintain sleep-wake cycle, avoid nighttime interruptions  -minimize overnight interruptions, group overnight vitals/labs/nursing checks as possible  -Dim lights, close blinds and turn off tv to minimize stimulation and encourage sleep environment in evenings  -Provide adequate pain control  -Avoid urinary retention and constipation  -Provide frequent and early mobilization  -Provide frequent redirection and reorientation as needed  -Avoid medications that may worsen or precipitate delirium such as tramadol, benzodiazepines, anticholinergics, and Benadryl  -Redirect unwanted behaviors as first-line therapy, avoid physical restraints as able to  -Continue to monitor     Hypertension  Assessment & Plan  Currently 125/59  Continue to monitor  Continue amlodipine 5 mg PO daily   Continue Losartan 100 mg PO daily   Continue metoprolol tartrate 100 mg PO BID with hold precautions  Manage per PCP    * Acute on chronic low back pain  Assessment & Plan  Acute on chronic back pain present upon admission as well as bilateral hip and right knee pain   Lives at home with daughters, now unable to ambulate, previously able to stand/pivot and take some steps with walker at baseline.   Chronically taking Hydrocodone 7.5/300 mg every 8 as needed for the last 2 years for pain management  Currently on Tylenol 975 every 8, lidocaine patches daily, oxycodone 2.5 mg every 6 as needed for mild pain, oxycodone 5 mg every 6 as needed for severe pain, and Valium 2 mg every 8 as needed for muscle spasms  Pain management following-appreciate recommendations  Patient being discharged with hospice services-further pain management will be as per hospice recommendations        Vision impairment  Assessment & Plan  Patient does have vision impairment  Wears prescription glasses at baseline   Recommend use of glasses at all appropriate times  Encourage adequate lighting and encourage use of assistance with ambulation  Keep personal belongings close to avoid reaching  Encourage appropriate footwear at all times  Recommend large font for printed materials provided to patient       Ambulatory dysfunction   Assessment & Plan  At a baseline ambulates with a walker  Pain has severely limited her ambulation, family assists  PT/OT following  Fall precautions  Out of bed as tolerated  Encourage early and frequent mobilization  Encourage adequate hydration and nutrition  Provide adequate pain management   Goal is pain control and return to baseline  Continue with PT/OT for continued strength and balance training       History of Present Illness   Physician Requesting Consult: Jaylin Salvador MD  Reason for Consult / Principal Problem: Low back pain/Toxic encephalopathy  Additional history obtained from: Family at bedside       HPI: Bennett Bansal is a 80y.o. year old female with OA, chronic back pain, and CKD who presents for geriatric consult after admission from ED presentation. She presented to the ED  for worsening low back pain that has limited her ambulation and mobility. CT revealed severe disc degeneration of the lumbar spine. Pt required sedatives including Ketamine for sedation during CT scan, and developed toxic encephalopathy as a result of narcotic administration, subsequently needing Narcan. pt. Was AAOx3 on recent evaluation . Pt and family met with hospice today with plan for discharge. Pt. Was seen and evaluated at bedside for consult and is currently sitting in room chair OOB in no signs of acute distress. At time of the encounter, Pt. Has family in the room to aide with Hx. Per family, her mental status is back to baseline after ED presentation. She does awake with confusion but this is transient and chronic. I confirmed the Hospice conversation with family, who is in agreement. Current plan is discharge tomorrow hospice care to home with daughter. She is AAOx3, she is able to tell me name, , time, and situation. She complains of 7/10 pain that is described as burning on the anterior surface of her thighs b/l. She states this is chronic and despite her pain scale score, feels it is well controlled on her current medication regimen. She denies insomnia and states she slept well last night. She does not feel constipated. At Pt.'s baseline, she ambulate's with a walker, but recently she cannot ambulate without pain. Uses prescription glasses at all time for vision. She has had no falls in the last six months and denies Hx of this.  They are incontinent of bladder and bowel at baseline. Appetite is good at baseline. Denies insomnia at baseline. No history of anxiety/depression. Medication management, chores, cleaning, and finances, and performed by herself historically, but increasingly managed by daughter at baseline for about a year. Patient was seen and examined by PA student and myself. Initially on exam by PA matias patient's daughter was in the room, on my exam patient daughter had left to go home. Plan at this time is to discharge with hospice. Called patients daughter Pippa Lynn to update and all questions answered. Inpatient consult to Gerontology  Consult performed by: Ceci Allen  Consult ordered by: Buddy Meigs, PA-C          Review of Systems   Constitutional:  Positive for activity change. Negative for appetite change, chills, fatigue and fever. HENT:  Negative for ear pain and sore throat. Eyes:  Negative for pain and visual disturbance. Respiratory:  Negative for cough and shortness of breath. Cardiovascular:  Negative for chest pain and palpitations. Gastrointestinal:  Negative for abdominal pain and vomiting. Genitourinary:  Negative for dysuria and hematuria. Musculoskeletal:  Positive for back pain (states this is chronic) and myalgias (anterior surface of thigh b/l. states this is chronic). Negative for arthralgias. Skin:  Negative for color change and rash. Neurological:  Positive for weakness. Negative for dizziness, seizures and syncope. Psychiatric/Behavioral:  Positive for confusion. Negative for hallucinations.             Historical Information   Past Medical History:   Diagnosis Date    , spontaneous complete     Carcinoma of skin     Cataract     last assessed: 17    Cataract     Cellulitis     Effusion of both knee joints     resolved: 3/25/15    Effusion of both knee joints     Enteritis     Female stress incontinence     last assessed: 13    Female stress incontinence     Gastric ulcer     last assessed: 1/30/14    Gastric ulcer     GERD (gastroesophageal reflux disease)     Hyperlipidemia     Hypertension     Hyperthyroidism     Lyme disease     last assessed: 9/26/13    Lyme disease     Microscopic hematuria     last assessed: 9/26/13    Microscopic hematuria     Normal delivery     1950 son, 1952 son, 12 daughter, 26 daughter, 65 daughter    Paroxysmal atrial fibrillation ([address])     last assessed: 9/26/13    Paroxysmal atrial fibrillation ([address])     Perirectal abscess     last assessed: 9/26/13    Platelet dysfunction (HCC)     PAF    Platelet dysfunction (HCC)     Sinus tachycardia     last assessed: 9/26/13    Sinus tachycardia     Spinal stenosis     lumbar; last assessed: 10/4/13    Stage 3 chronic kidney disease ([address]) 6/3/2019    Stage 3 chronic kidney disease ([address])     Tachycardia     unspecified; last assessed: 9/26/13    Trigger finger, acquired     last assessed: 6/30/15    Trigger finger, acquired      Past Surgical History:   Procedure Laterality Date    KNEE ARTHROSCOPY Bilateral     2008    KNEE ARTHROSCOPY      LUMBAR LAMINECTOMY      5/09    LUMBAR LAMINECTOMY      NEUROPLASTY / TRANSPOSITION MEDIAN NERVE AT CARPAL TUNNEL Bilateral     2011    NEUROPLASTY / TRANSPOSITION MEDIAN NERVE AT CARPAL TUNNEL      TONSILLECTOMY AND ADENOIDECTOMY      TOTAL HIP ARTHROPLASTY      joint replacement; L hip; 2/2/10    TOTAL HIP ARTHROPLASTY       Social History   Social History     Substance and Sexual Activity   Alcohol Use Yes    Comment: rare on special occassions only     Social History     Substance and Sexual Activity   Drug Use Never     Social History     Tobacco Use   Smoking Status Never   Smokeless Tobacco Never     Family History:   Family History   Problem Relation Age of Onset    Rheumatic fever Mother     Heart disease Father     Crohn's disease Brother         (Granulomatous) Golitis    Psoriasis Daughter     Heart disease Son        Meds/Allergies   current meds:   Current Facility-Administered Medications   Medication Dose Route Frequency    acetaminophen (TYLENOL) tablet 975 mg  975 mg Oral Q8H [address]    amLODIPine (NORVASC) tablet 5 mg  5 mg Oral Daily    atorvastatin (LIPITOR) tablet 40 mg  40 mg Oral QPM    diazepam (VALIUM) tablet 2 mg  2 mg Oral Q8H PRN    docusate sodium (COLACE) capsule 100 mg  100 mg Oral BID    levothyroxine tablet 50 mcg  50 mcg Oral Early Morning    lidocaine (LIDODERM) 5 % patch 3 patch  3 patch Topical Daily    losartan (COZAAR) tablet 100 mg  100 mg Oral Daily    metoprolol tartrate (LOPRESSOR) tablet 100 mg  100 mg Oral BID    naloxone (NARCAN) 0.04 mg/mL syringe 0.04 mg  0.04 mg Intravenous Q1MIN PRN    nystatin (MYCOSTATIN) powder   Topical BID    oxyCODONE (ROXICODONE) IR tablet 5 mg  5 mg Oral Q6H PRN    oxyCODONE (ROXICODONE) split tablet 2.5 mg  2.5 mg Oral Q6H PRN    polyethylene glycol (MIRALAX) packet 17 g  17 g Oral Daily    pravastatin (PRAVACHOL) tablet 40 mg  40 mg Oral Daily With Dinner    senna (SENOKOT) tablet 17.2 mg  2 tablet Oral HS    torsemide (DEMADEX) tablet 5 mg  5 mg Oral Once per day on Mon Wed Fri    warfarin (COUMADIN) tablet 3 mg  3 mg Oral Daily (warfarin)     Home Medications:  Attempted to call pharmacy. Stated too busy at this time to confirm, this is the current medication list on-file placed below.    Current Outpatient Medications   Medication Instructions    acetaminophen (TYLENOL) 650 mg, Oral, Every 8 hours PRN    amLODIPine (NORVASC) 5 mg, Oral, Daily    clotrimazole (LOTRIMIN) 1 % cream 1 Application, Topical, 2 times daily, APPLY TO AREA OF BELLY BUTTON AND BOTH GROIN CREASES 2 TIMES PER DAY UNTIL GONE    ergocalciferol (VITAMIN D2) 50,000 units Oral, Weekly    HYDROcodone-acetaminophen (XODOL) 7.5-300 MG per tablet 1 tablet, Oral, Every 8 hours PRN    levothyroxine 50 mcg, Oral, Daily    losartan (COZAAR) 100 mg, Oral, Daily    metoprolol tartrate (LOPRESSOR) 100 mg, Oral, 2 times daily    nystatin (MYCOSTATIN) powder Topical, 2 times daily    polyethylene glycol (MIRALAX) 17 g, Oral, As needed    senna-docusate sodium (SENOKOT S) 8.6-50 mg per tablet 1 tablet, Oral, As needed    simvastatin (ZOCOR) 20 mg, Oral, Daily at bedtime    torsemide (DEMADEX) 5 mg, Oral, 3 times weekly    warfarin (COUMADIN) 5 mg tablet TAKE 5 MG ON MONDAY AND SATURDAY AND 2.5 MG ALL OTHER DAYS         Allergies   Allergen Reactions    Cortisone     Oxaprozin Hives    Penicillins Hives       Objective     Intake/Output Summary (Last 24 hours) at 11/20/2023 1535  Last data filed at 11/20/2023 0948  Gross per 24 hour   Intake 1060 ml   Output 400 ml   Net 660 ml     Invasive Devices       Peripheral Intravenous Line  Duration             Peripheral IV 11/19/23 Right;Ventral (anterior) Forearm <1 day                    Physical Exam  Vitals reviewed. Constitutional:       General: She is not in acute distress. Appearance: She is well-developed. She is not ill-appearing. Comments: Frail appearing    HENT:      Head: Normocephalic and atraumatic. Cardiovascular:      Rate and Rhythm: Normal rate. Rhythm irregular. Heart sounds: No murmur heard. Pulmonary:      Effort: Pulmonary effort is normal. No respiratory distress. Breath sounds: Normal breath sounds. Abdominal:      General: Bowel sounds are normal. There is no distension. Palpations: Abdomen is soft. Tenderness: There is no abdominal tenderness. Musculoskeletal:         General: No swelling. Right lower leg: Edema present. Left lower leg: Edema present. Skin:     General: Skin is warm and dry. Coloration: Skin is pale. Neurological:      Mental Status: She is alert and oriented to person, place, and time. Mental status is at baseline. Motor: Weakness present.       Gait: Gait abnormal.   Psychiatric:         Mood and Affect: Mood normal.         Lab Results:   I have personally reviewed pertinent lab results including the following:  -CBC, BMP    I have personally reviewed the following imaging study reports in PACS:  -CT head      Therapies:   PT: consulted  OT: consulted    VTE Prophylaxis: Warfarin (Coumadin)    Code Status: Level 3 - DNAR and DNI  Advance Directive and Living Will: Yes    Power of :    POLST:      Family and Social Support: Family in room. Plan to d/c to daughter's residence  No data recorded    Scribed by BOBY Lopez. Note reviewed and history/physical exam taken with and completed by SJ Sutton. Please note:  Voice-recognition software may have been used in the preparation of this document. Occasional wrong word or "sound-alike" substitutions may have occurred due to the inherent limitations of voice recognition software. Interpretation should be guided by context.

## 2023-11-20 NOTE — PLAN OF CARE
Problem: Prexisting or High Potential for Compromised Skin Integrity  Goal: Skin integrity is maintained or improved  Description: INTERVENTIONS:  - Identify patients at risk for skin breakdown  - Assess and monitor skin integrity  - Assess and monitor nutrition and hydration status  - Monitor labs   - Assess for incontinence   - Turn and reposition patient  - Assist with mobility/ambulation  - Relieve pressure over bony prominences  - Avoid friction and shearing  - Provide appropriate hygiene as needed including keeping skin clean and dry  - Evaluate need for skin moisturizer/barrier cream  - Collaborate with interdisciplinary team   - Patient/family teaching  - Consider wound care consult   Outcome: Progressing     Problem: PAIN - ADULT  Goal: Verbalizes/displays adequate comfort level or baseline comfort level  Description: Interventions:  - Encourage patient to monitor pain and request assistance  - Assess pain using appropriate pain scale  - Administer analgesics based on type and severity of pain and evaluate response  - Implement non-pharmacological measures as appropriate and evaluate response  - Consider cultural and social influences on pain and pain management  - Notify physician/advanced practitioner if interventions unsuccessful or patient reports new pain  Outcome: Progressing     Problem: INFECTION - ADULT  Goal: Absence or prevention of progression during hospitalization  Description: INTERVENTIONS:  - Assess and monitor for signs and symptoms of infection  - Monitor lab/diagnostic results  - Monitor all insertion sites, i.e. indwelling lines, tubes, and drains  - Monitor endotracheal if appropriate and nasal secretions for changes in amount and color  - [place] appropriate cooling/warming therapies per order  - Administer medications as ordered  - Instruct and encourage patient and family to use good hand hygiene technique  - Identify and instruct in appropriate isolation precautions for identified infection/condition  Outcome: Progressing  Goal: Absence of fever/infection during neutropenic period  Description: INTERVENTIONS:  - Monitor WBC    Outcome: Progressing     Problem: SAFETY ADULT  Goal: Patient will remain free of falls  Description: INTERVENTIONS:  - Educate patient/family on patient safety including physical limitations  - Instruct patient to call for assistance with activity   - Consult OT/PT to assist with strengthening/mobility   - Keep Call bell within reach  - Keep bed low and locked with side rails adjusted as appropriate  - Keep care items and personal belongings within reach  - Initiate and maintain comfort rounds  - Make Fall Risk Sign visible to staff  - Apply yellow socks and bracelet for high fall risk patients  - Consider moving patient to room near nurses station  Outcome: Progressing  Goal: Maintain or return to baseline ADL function  Description: INTERVENTIONS:  -  Assess patient's ability to carry out ADLs; assess patient's baseline for ADL function and identify physical deficits which impact ability to perform ADLs (bathing, care of mouth/teeth, toileting, grooming, dressing, etc.)  - Assess/evaluate cause of self-care deficits   - Assess range of motion  - Assess patient's mobility; develop plan if impaired  - Assess patient's need for assistive devices and provide as appropriate  - Encourage maximum independence but intervene and supervise when necessary  - Involve family in performance of ADLs  - Assess for home care needs following discharge   - Consider OT consult to assist with ADL evaluation and planning for discharge  - Provide patient education as appropriate  Outcome: Progressing  Goal: Maintains/Returns to pre admission functional level  Description: INTERVENTIONS:  - Perform AM-PAC 6 Click Basic Mobility/ Daily Activity assessment daily.  - Set and communicate daily mobility goal to care team and patient/family/caregiver.    - Collaborate with rehabilitation services on mobility goals if consulted  - Out of bed for toileting  - Record patient progress and toleration of activity level   Outcome: Progressing     Problem: DISCHARGE PLANNING  Goal: Discharge to home or other facility with appropriate resources  Description: INTERVENTIONS:  - Identify barriers to discharge w/patient and caregiver  - Arrange for needed discharge resources and transportation as appropriate  - Identify discharge learning needs (meds, wound care, etc.)  - Arrange for interpretive services to assist at discharge as needed  - Refer to Case Management Department for coordinating discharge planning if the patient needs post-hospital services based on physician/advanced practitioner order or complex needs related to functional status, cognitive ability, or social support system  Outcome: Progressing     Problem: Knowledge Deficit  Goal: Patient/family/caregiver demonstrates understanding of disease process, treatment plan, medications, and discharge instructions  Description: Complete learning assessment and assess knowledge base. Interventions:  - Provide teaching at level of understanding  - Provide teaching via preferred learning methods  Outcome: Progressing     Problem: Nutrition/Hydration-ADULT  Goal: Nutrient/Hydration intake appropriate for improving, restoring or maintaining nutritional needs  Description: Monitor and assess patient's nutrition/hydration status for malnutrition. Collaborate with interdisciplinary team and initiate plan and interventions as ordered. Monitor patient's weight and dietary intake as ordered or per policy. Utilize nutrition screening tool and intervene as necessary. Determine patient's food preferences and provide high-protein, high-caloric foods as appropriate.      INTERVENTIONS:  - Monitor oral intake, urinary output, labs, and treatment plans  - Assess nutrition and hydration status and recommend course of action  - Evaluate amount of meals eaten  - Assist patient with eating if necessary   - Allow adequate time for meals  - Recommend/ encourage appropriate diets, oral nutritional supplements, and vitamin/mineral supplements  - Order, calculate, and assess calorie counts as needed  - Recommend, monitor, and adjust tube feedings and TPN/PPN based on assessed needs  - Assess need for intravenous fluids  - Provide specific nutrition/hydration education as appropriate  - Include patient/family/caregiver in decisions related to nutrition  Outcome: Progressing

## 2023-11-20 NOTE — ASSESSMENT & PLAN NOTE
Currently 125/59  Continue to monitor  Continue amlodipine 5 mg PO daily   Continue Losartan 100 mg PO daily   Continue metoprolol tartrate 100 mg PO BID with hold precautions  Manage per PCP

## 2023-11-20 NOTE — OCCUPATIONAL THERAPY NOTE
Occupational Therapy Progress Note     Patient Name: Mervin Prather  [id number] Date: 11/20/2023  Problem List  Principal Problem:    Acute on chronic low back pain  Active Problems:    Atrial fibrillation ([address])    Hypertension    Hypothyroidism    Bilateral primary osteoarthritis of knee    Stage 3 chronic kidney disease (HCC)    Continuous opioid dependence ([address])    Bilateral lower extremity edema    Toxic encephalopathy    Constipation    Leukocytosis    Goals of care, counseling/discussion    CVA (cerebral vascular accident) [facility])    Right hip pain          11/20/23 0833   OT Last Visit   OT Visit Date 11/20/23   Note Type   Note Type Treatment for insurance authorization   Pain Assessment   Pain Assessment Tool 0-10   Pain Score 8   Pain Location/Orientation Orientation: Bilateral;Location: Leg   Restrictions/Precautions   Weight Bearing Precautions Per Order No   Other Precautions Chair Alarm; Bed Alarm;Cognitive; Fall Risk;Pain   ADL   Where Assessed Edge of bed   Eating Assistance 4  Minimal Assistance   Eating Deficit Setup;Supervision/safety; Increased time to complete   Grooming Assistance 4  Minimal Assistance   Grooming Deficit Wash/dry face; Increased time to complete;Verbal cueing;Supervision/safety   LB Dressing Assistance 2  Maximal Assistance   LB Dressing Deficit Setup; Thread RLE into underwear; Thread LLE into underwear;Pull up over hips   Toileting Assistance  2  Maximal Assistance   Toileting Deficit Clothing management up;Clothing management down;Perineal hygiene   Bed Mobility   Rolling R 2  Maximal assistance   Additional items Assist x 2;Bedrails; Increased time required;Verbal cues;LE management   Rolling L 2  Maximal assistance   Additional items Assist x 2; Increased time required;Verbal cues;LE management   Supine to Sit 2  Maximal assistance   Additional items Assist x 2; Increased time required;LE management;Verbal cues; Bedrails   Sit to Supine 2  Maximal assistance   Additional items Assist x 2;HOB elevated; Increased time required;Verbal cues;LE management   Additional Comments pt tolerated sitting EOB for ~5 minutes, level of assist varries between supervision to mod A x1. Transfers   Sit to Stand 4  Minimal assistance   Additional items Assist x 2; Increased time required;Verbal cues   Stand to Sit 4  Minimal assistance   Additional items Assist x 2; Increased time required;Verbal cues   Stand pivot 3  Moderate assistance   Additional items Assist x 2; Increased time required;Verbal cues   Functional Mobility   Additional Comments further mobility not assessed at this time d/t impaired safety awareness   Cognition   Overall Cognitive Status Impaired   Arousal/Participation Cooperative   Attention Attends with cues to redirect   Orientation Level Oriented to person;Oriented to place; Disoriented to time;Oriented to situation   Memory Decreased recall of precautions;Decreased recall of recent events;Decreased short term memory;Decreased long term memory   Following Commands Follows one step commands with increased time or repetition   Activity Tolerance   Activity Tolerance Patient limited by fatigue;Patient limited by pain   Assessment   Assessment Pt was seen for skilled OT tx session focusing on bed mobility, ADLs, engagement in activity, fnxl transfer training, fnxl mobility, and activity endurance. Upon arrival pt was supine in bed and agreeable to therapy. Pt was max A x2 for bed mobility and required verbal cueing for hand placement and sequencing during tasks. Pt was min A x2 for sit to stand transfer x2 from EOB with RW and verbal cueing. Pt was max A for toileting hygiene, pt unable to stand for long enough to tolerate standing toileting hygiene. Pt was max A for LB dressing while seated at the EOB. Pt sat EOB for ~5 minutes and levels varied for assist from supervision to mod A x1 with right lateral lean noted.  Pt completed sit to stand with min A x2 with RW and completed stand pivot transfer to recliner chair with mod Ax2 and use of RW, pt limited by cognitive status, requires consistent VC s for attention to task. At the end of the session pt was seated in recliner chair and required min A x1 for eating. Pt was seated in recliner and all needs met, will continue to follow pt on OT caseload while admitted. Plan   Goal Expiration Date 11/27/23   OT Treatment Day 2   OT Frequency 3-5x/wk   Discharge Recommendation   Rehab Resource Intensity Level, OT II (Moderate Resource Intensity)   Additional Comments  The patient's raw score on the AM-PAC Daily Activity Inpatient Short Form is 12. A raw score of less than 19 suggests the patient may benefit from discharge to post-acute rehabilitation services. Please refer to the recommendation of the Occupational Therapist for safe discharge planning. AM-PAC Daily Activity Inpatient   Lower Body Dressing 2   Bathing 1   Toileting 1   Upper Body Dressing 2   Grooming 3   Eating 3   Daily Activity Raw Score 12   Daily Activity Standardized Score (Calc for Raw Score >=11) 30.6   AM-PAC Applied Cognition Inpatient   Following a Speech/Presentation 2   Understanding Ordinary Conversation 3   Taking Medications 2   Remembering Where Things Are Placed or Put Away 2   Remembering List of 4-5 Errands 2   Taking Care of Complicated Tasks 1   Applied Cognition Raw Score 12   Applied Cognition Standardized Score 28.82   End of Consult   Education Provided Yes   Patient Position at End of Consult Bed/Chair alarm activated; All needs within reach; Bedside chair   Nurse Communication Nurse aware of consult      Goals established on initial evaluation in order to achieve pt's goal of feeling better. Pt will complete UB ADLs Min A  for increased ADL independence within 10 days. Pt will complete LB ADLs Max A  for increased ADL independence within 10 days. Pt will complete toileting Max A  with use of DME for increased ADL independence within 10 days.       Pt will demonstrate proper body mechanics to complete self-care transfers and functional mobility with Mod A  and use of LRAD for increased safety and functional independence within 10 days. Pt will demonstrate standing tolerance of 2  min with Mod A  and use of LRAD for increased activity tolerance during ADL/IADL tasks within 10 days. PROGRESSING      Pt will complete bed mobility Mod A  for increased independence in repositioning, pressure offloading, and managing comfort. PROGRESSING      Pt will demonstrate proper body mechanics and fall prevention strategies during 100% of tx sessions for increased safety awareness during ADL/IADLs     Pt will improve functional activity tolerance to 25 mins of sustained functional tasks to increase participation in basic self-care tasks and minimize assistance level. Pt will receive education on use of compensatory memory strategies for increased safety and independent with IADL tasks. Pt will participate in ongoing cognitive assessments to assist with safe D/C planning and supervision/assistance recommendations. Pt will verbalize and demonstrate understanding of B UE HEP program increase strength and endurance during self care transfers within 10 days. Pt will demonstrate use of pain management techniques PRN for increased activity tolerance and engagement. Pt will demonstrate OOB sitting tolerance of 2-4 hr/day for increased activity tolerance and engagement in leisure activities within 10 days. Pt benefited from co-session of skilled OT and PT therapists in order to most appropriately address functional deficits d/t regression from functioning level prior to admission , extensive physical assistance required for safe mobility , and decreased activity tolerance. OT/PT objectives were addressed separately; please see PT note for specific goal areas targeted.     Xochitl Velazquez OTR/L

## 2023-11-20 NOTE — ASSESSMENT & PLAN NOTE
Concern for poor prognosis given advanced age, chronic pain and complex comorbidities.   Hold off on additional imaging, neurosurgery consult for now  Hospice evaluation today, family considering home hospice at discharge

## 2023-11-20 NOTE — ASSESSMENT & PLAN NOTE
AMS upon admission after receiving several narcotics in an effort to obtain CT scan, subsequently required Narcan due to oversedation. Family notes pain exacerbates forgetfulness. Noted previously to be screaming secondary to pain, was not able to be redirected on AM 11/15. CT head showing stroke however is not new per neurology, at least several months old  Mental status improved, A&O x3. Appears to be at baseline.   Caution with opioids/narcotics, avoid delirium and oversedation

## 2023-11-20 NOTE — ASSESSMENT & PLAN NOTE
Lab Results   Component Value Date    EGFR 30 11/20/2023    EGFR 32 11/18/2023    EGFR 28 11/17/2023    CREATININE 1.45 (H) 11/20/2023    CREATININE 1.38 (H) 11/18/2023    CREATININE 1.51 (H) 11/17/2023     Baseline Cr appears around 1.3-1.5 per lab review  Renal function remains stable, trial resuming torsemide today  Monitor BMP closely

## 2023-11-20 NOTE — ASSESSMENT & PLAN NOTE
Acute on chronic per family, worsening over the last week  Daughter reports patient is on torsemide 5 mg 3x/week, previously daily however developed dehydration  S/p IV Lasix (11/15, 11/16). Remains off diuretics since.    Trial resuming home torsemide today  Elevate, compression with bilateral ACE wraps

## 2023-11-20 NOTE — ASSESSMENT & PLAN NOTE
At risk for poor prognosis given advanced age, chronic pain and complex comorbidities. Hospice services met with pt.  And family, in agreement to proceed with hospice care  Continue to hold additional imaging

## 2023-11-20 NOTE — HOSPICE NOTE
Hospice Referral received. Met with pt and her daughter at bedside. Reviewed hospice care and services per Medicare guidelines. All questions answered. They are in agreement with the same. Consents signed. Copies to daughter. OOH DNR to CM for transport. DME to be delivered by noon. Req 12 N pick. Requested scripts be sent to pts pharmacy for daughter to pickup.

## 2023-11-20 NOTE — ASSESSMENT & PLAN NOTE
Rate controlled on Lopressor, anticoagulated on warfarin with goal INR 2-3  Warfarin initially held on admission with supratherapeutic INR, since resumed  Warfarin not likely safe given age, limited mobility and fall risk. Consider chronic/prior CVA possibly related to variable INR? Reccommend further discussion with PCP at discharge.   INR 1.57 today, increase warfarin to 3 mg daily

## 2023-11-20 NOTE — PROGRESS NOTES
[address] [facility]  Progress Note  Name: Damaso Sanabria  MRN: [id number]  Unit/Bed#: S -01 I Date of Admission: 11/14/2023   Date of Service: 11/20/2023 I Hospital Day: 5    Assessment/Plan   * Acute on chronic low back pain  Assessment & Plan  Presented with acute on chronic back pain, bilateral hip and right knee pain x 1 week. Lives at home with daughters, now unable to ambulate, previously able to stand/pivot and take some steps with walker at baseline. Chronically on Norco for years, seen in ED 1 week prior for similar and was discharged at the time  CT lumbar spine: severe disc and facet degenerative change throughout the lumbar spine. Multilevel mild to moderate central canal stenosis and moderate to severe neural foraminal narrowing. Received pain medications including Ketamine in ED, which then required Narcan due to AMS  Acute pain service following:  Scheduled Tylenol 975 mg q8hrs, lidocaine patches, oxycodone 2.5-5mg q6hrs PRN for mod-severe pain. D/C IV Dilaudid. Valium 2 mg q8hrs PRN for muscle spasms, D/C at discharge. PT/OT at this time recommending rehab, however family wants to take her home    Toxic encephalopathy  Assessment & Plan  AMS upon admission after receiving several narcotics in an effort to obtain CT scan, subsequently required Narcan due to oversedation. Family notes pain exacerbates forgetfulness. Noted previously to be screaming secondary to pain, was not able to be redirected on AM 11/15. CT head showing stroke however is not new per neurology, at least several months old  Mental status improved, A&O x3. Appears to be at baseline. Caution with opioids/narcotics, avoid delirium and oversedation    Goals of care, counseling/discussion  Assessment & Plan  Concern for poor prognosis given advanced age, chronic pain and complex comorbidities.   Hold off on additional imaging, neurosurgery consult for now  Hospice evaluation today, family considering home hospice at discharge    Atrial fibrillation [facility])  Assessment & Plan  Rate controlled on Lopressor, anticoagulated on warfarin with goal INR 2-3  Warfarin initially held on admission with supratherapeutic INR, since resumed  Warfarin not likely safe given age, limited mobility and fall risk. Consider chronic/prior CVA possibly related to variable INR? Reccommend further discussion with PCP at discharge. 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 CT head obtained and shows CVA, however per prior provider's d/w neurology this is at least few months old however new since May according to imaging  No need for MRI or further imaging per neurology given age and chronicity of CVA  Initially failed bedside screen, however did very well with speech and is on regular diet (11/16)    Constipation  Assessment & Plan  CT a/p: Moderate amount of stool in the colon. No evidence of SBO, colitis or diverticulitis. Last BM documented 11/17, abdominal exam benign  Continue bowel regimen, supportive care    Bilateral lower extremity edema  Assessment & Plan  Acute on chronic per family, worsening over the last week  Daughter reports patient is on torsemide 5 mg 3x/week, previously daily however developed dehydration  S/p IV Lasix (11/15, 11/16). Remains off diuretics since.    Trial resuming home torsemide today  Elevate, compression with bilateral ACE wraps    Stage 3 chronic kidney disease [facility])  Assessment & Plan  Lab Results   Component Value Date    EGFR 30 11/20/2023    EGFR 32 11/18/2023    EGFR 28 11/17/2023    CREATININE 1.45 (H) 11/20/2023    CREATININE 1.38 (H) 11/18/2023    CREATININE 1.51 (H) 11/17/2023     Baseline Cr appears around 1.3-1.5 per lab review  Renal function remains stable, trial resuming torsemide today  Monitor BMP closely    Hypertension  Assessment & Plan  BP reviewed, intermittently soft but overall stable  Continue home dose Cozaar 100 mg daily, norvasc 5 mg daily and lopressor 100 mg BID  Resume home torsemide 3 times weekly    Acute respiratory failure (HCC)-resolved as of 11/16/2023  Assessment & Plan  Initially tachypneic with RR up to 32, temporarily on NRB  Likely multifactorial given AMS, possibly related to pain medications  CXR with mild pulmonary venous congestion, trace effusions  S/p dose of IV Lasix with improvement (11/15)   Resolved, remains stable on room air with normal RR    Leukocytosis-resolved as of 11/17/2023  Assessment & Plan  Mild leukocytosis initially on admission, most likely reactive  No infectious etiology imaging and labs  Resolved, WBCs since normalized               VTE Pharmacologic Prophylaxis: VTE Score: 4 Moderate Risk (Score 3-4) - Pharmacological DVT Prophylaxis Ordered: warfarin (Coumadin). Mobility:   Basic Mobility Inpatient Raw Score: 7  -[facility] Goal: 2: Bed activities/Dependent transfer  -HLM Achieved: 4: Move to chair/commode  HLM Goal achieved. Continue to encourage appropriate mobility. Patient Centered Rounds: I performed bedside rounds with nursing staff today. Discussions with Specialists or Other Care Team Provider: Acute pain services, case management    Education and Discussions with Family / Patient: Updated  (son and daughter) at bedside. Total Time Spent on Date of Encounter in care of patient: 55 mins. This time was spent on one or more of the following: performing physical exam; counseling and coordination of care; obtaining or reviewing history; documenting in the medical record; reviewing/ordering tests, medications or procedures; communicating with other healthcare professionals and discussing with patient's family/caregivers.     Current Length of Stay: 5 day(s)  Current Patient Status: Inpatient   Certification Statement: The patient will continue to require additional inpatient hospital stay due to coordinating discharge with home hospice, improved pain  Discharge Plan: Anticipate discharge tomorrow to home. Code Status: Level 3 - DNAR and DNI    Subjective:   Patient is seen at bedside this a.m. with daughter at bedside, patient's son available via phone during exam.  Patient appears to be at baseline mental status, reports back and hip pain is 7/10 with relief from oxycodone. Long discussion at bedside with family, considering transitioning to hospice pending discussions with case management. Objective:     Vitals:   Temp (24hrs), Av.8 °F (37.1 °C), Min:98.5 °F (36.9 °C), Max:99 °F (37.2 °C)    Temp:  [98.5 °F (36.9 °C)-99 °F (37.2 °C)] 99 °F (37.2 °C)  HR:  [51-68] 68  BP: (107-125)/(48-87) 125/59  SpO2:  [92 %-94 %] 94 %  Body mass index is 37.98 kg/m². Input and Output Summary (last 24 hours): Intake/Output Summary (Last 24 hours) at 2023 1243  Last data filed at 2023 0948  Gross per 24 hour   Intake 1060 ml   Output 400 ml   Net 660 ml       Physical Exam:   Physical Exam  Constitutional:       General: She is not in acute distress. Appearance: She is not ill-appearing, toxic-appearing or diaphoretic. Cardiovascular:      Rate and Rhythm: Normal rate and regular rhythm. Pulses: Normal pulses. Heart sounds: Normal heart sounds. Pulmonary:      Effort: Pulmonary effort is normal. No respiratory distress. Breath sounds: Normal breath sounds. Abdominal:      General: Bowel sounds are normal. There is no distension. Palpations: Abdomen is soft. Tenderness: There is no abdominal tenderness. Musculoskeletal:         General: No swelling or tenderness. Skin:     General: Skin is warm. Neurological:      General: No focal deficit present.       Mental Status: She is alert and oriented to person, place, and time. Mental status is at baseline. Psychiatric:         Mood and Affect: Mood normal.         Behavior: Behavior normal.          Additional Data:     Labs:  Results from last 7 days   Lab Units 11/20/23  0503 11/18/23  0458 11/17/23  0451   WBC Thousand/uL 5.95   < > 9.52   HEMOGLOBIN g/dL 10.0*   < > 9.7*   HEMATOCRIT % 32.1*   < > 30.1*   PLATELETS Thousands/uL 197   < > 211   NEUTROS PCT %  --   --  65   LYMPHS PCT %  --   --  16   MONOS PCT %  --   --  12   EOS PCT %  --   --  5    < > = values in this interval not displayed. Results from last 7 days   Lab Units 11/20/23  0503 11/17/23  0451 11/16/23  0539   SODIUM mmol/L 135   < > 139   POTASSIUM mmol/L 4.4   < > 4.5   CHLORIDE mmol/L 103   < > 106   CO2 mmol/L 27   < > 22   BUN mg/dL 34*   < > 42*   CREATININE mg/dL 1.45*   < > 1.40*   ANION GAP mmol/L 5   < > 11   CALCIUM mg/dL 7.8*   < > 8.8   ALBUMIN g/dL  --   --  3.8   TOTAL BILIRUBIN mg/dL  --   --  1.15*   ALK PHOS U/L  --   --  78   ALT U/L  --   --  18   AST U/L  --   --  38   GLUCOSE RANDOM mg/dL 82   < > 106    < > = values in this interval not displayed. Results from last 7 days   Lab Units 11/20/23  0503   INR  1.57*     Results from last 7 days   Lab Units 11/15/23  2221   POC GLUCOSE mg/dl 91     Results from last 7 days   Lab Units 11/16/23  0539   HEMOGLOBIN A1C % 5.9*           Lines/Drains:  Invasive Devices       Peripheral Intravenous Line  Duration             Peripheral IV 11/19/23 Right;Ventral (anterior) Forearm <1 day                          Imaging: No pertinent imaging reviewed.     Recent Cultures (last 7 days):         Last 24 Hours Medication List:   Current Facility-Administered Medications   Medication Dose Route Frequency Provider Last Rate    acetaminophen  975 mg Oral [facility] Lucia Rocha PA-C      amLODIPine  5 mg Oral Daily SJ Palmer      atorvastatin  40 mg Oral QPM Greta Lamb MD      diazepam  2 mg Oral Q8H PRN Lucia Rocha PA-C docusate sodium  100 mg Oral BID Johnsie Epley, PA-C      levothyroxine  50 mcg Oral Early Morning SJ Lafleur      lidocaine  3 patch Topical Daily Johnsie Epley, PA-C      losartan  100 mg Oral Daily SJ Lafleur      metoprolol tartrate  100 mg Oral BID SJ Lafleur      naloxone  0.04 mg Intravenous Q1MIN PRN Kingman Flakes, ORLIN      nystatin   Topical BID [place] ORLIN [place]well      oxyCODONE  5 mg Oral Q6H PRN Jack Flakes, ORLIN      oxyCODONE  2.5 mg Oral Q6H PRN Kingman Flakes, ORLIN      polyethylene glycol  17 g Oral Daily Johnsie Epley, PA-C      pravastatin  40 mg Oral Daily With StarGuin SJ Mcleod      senna  2 tablet Oral HS Johnsie Epley, PA-C      torsemide  5 mg Oral Once per day on Mon Wed Fri Kavita Burden PA-C      warfarin  3 mg Oral Daily (warfarin) Radha Montes PA-C          Today, Patient Was Seen By: Radha Montes PA-C    **Please Note: This note may have been constructed using a voice recognition system. **

## 2023-11-20 NOTE — ASSESSMENT & PLAN NOTE
CT a/p: Moderate amount of stool in the colon. No evidence of SBO, colitis or diverticulitis.    Last BM documented 11/17, abdominal exam benign  Continue bowel regimen, supportive care

## 2023-11-20 NOTE — PLAN OF CARE
Problem: PHYSICAL THERAPY ADULT  Goal: Performs mobility at highest level of function for planned discharge setting. See evaluation for individualized goals. Description: Treatment/Interventions: Functional transfer training, LE strengthening/ROM, Therapeutic exercise, Endurance training, Cognitive reorientation, Patient/family training, Equipment eval/education, Bed mobility (PT to see next session to complete mobility assessment)          See flowsheet documentation for full assessment, interventions and recommendations. Outcome: Progressing  Note:    Problem List: Decreased strength, Decreased range of motion, Decreased endurance, Impaired balance, Decreased mobility, Decreased cognition, Decreased safety awareness, Pain  Assessment: pt began tx session lying supine in the bed and was agreeable to participate in PT intervention. Care coordination with OT Rosy due to pt anticipated level of assistance required for all bed mobility, functional transfers and ambulation if appropriate. pt continues to require max ax2 for all bed mobility including rolling and repositioning in the bed from L to R with LE and trunk management. pt was able to maintain sidelying position with use of bed rail for about 1 minute per side in order to clean pt from bowel incontinence. pt also max ax2 in order to complete a supine<>sit EOB transfer with LE and trunk managemnt. pt was able to sit EOB with a varied degree of assistance  /s to mod while beding educated on all functional transfers to and from RW. pt required min Ax2 for STS and mod Ax2 for SPT from EOB to recliner with RW. pt also required RW management in order to complete SPT. pt was unable to reposition herself in recliner and required max Ax2 to reposition towards back of recliner. Post tx pt in recliner with call bell and all pt needs met. pt continues to demonstrate the following deficits: limited bed mobility, functional mobility, activity/standing tolerance.  pt would benefit from continued skilled PT intervention in order to address deficits listed above. Continue to recommend DC w/ level 2 moderate rehab resource intensity when medically cleared        Rehab Resource Intensity Level, PT: II (Moderate Resource Intensity)    See flowsheet documentation for full assessment.

## 2023-11-20 NOTE — ASSESSMENT & PLAN NOTE
BP reviewed, intermittently soft but overall stable  Continue home dose Cozaar 100 mg daily, norvasc 5 mg daily and lopressor 100 mg BID  Resume home torsemide 3 times weekly

## 2023-11-20 NOTE — ASSESSMENT & PLAN NOTE
Initially tachypneic with RR up to 32, temporarily on NRB  Likely multifactorial given AMS, possibly related to pain medications  CXR with mild pulmonary venous congestion, trace effusions  S/p dose of IV Lasix with improvement (11/15)   Resolved, remains stable on room air with normal RR

## 2023-11-20 NOTE — CASE MANAGEMENT
Case Management Progress Note    Patient name Newton Marquez  Location S /S -01 MRN [id number]  : 2/3/1927 Date 2023       LOS (days): 5  Geometric Mean LOS (GMLOS) (days): 4.60  Days to GMLOS:-0.8        OBJECTIVE:        Current admission status: Inpatient  Preferred Pharmacy:   [id number], [address] [place], [place] [address] [address] [place] [address]  Phone: [phone] Fax: [phone]    Primary Care Provider: Antonio Acosta MD    Primary Insurance: MEDICARE  Secondary Insurance: [facility] [employer] PROGRAM    PROGRESS NOTE:      Hospice liaison met with patient/family at bedside at 12:15pm and confirmed plan for hospice at d/c. DME to be delivered through hospice agency tomorrow by noon. Will coordinate d/c for tomorrow afternoon, if otherwise medically stable. TT sent to PA to inform of same. Out-of-hospital DNR in folder for MD to sign.

## 2023-11-20 NOTE — ASSESSMENT & PLAN NOTE
Presented with acute on chronic back pain, bilateral hip and right knee pain x 1 week. Lives at home with daughters, now unable to ambulate, previously able to stand/pivot and take some steps with walker at baseline. Chronically on Norco for years, seen in ED 1 week prior for similar and was discharged at the time  CT lumbar spine: severe disc and facet degenerative change throughout the lumbar spine. Multilevel mild to moderate central canal stenosis and moderate to severe neural foraminal narrowing. Received pain medications including Ketamine in ED, which then required Narcan due to [facility]  Acute pain service following:  Scheduled Tylenol 975 mg q8hrs, lidocaine patches, oxycodone 2.5-5mg q6hrs PRN for mod-severe pain. D/C IV Dilaudid. Valium 2 mg q8hrs PRN for muscle spasms, D/C at discharge.   PT/OT at this time recommending rehab, however family wants to take her home

## 2023-11-21 ENCOUNTER — PATIENT OUTREACH (OUTPATIENT)
Dept: INTERNAL MEDICINE CLINIC | Age: 88
End: 2023-11-21

## 2023-11-21 ENCOUNTER — HOME CARE VISIT (OUTPATIENT)
Dept: HOME HEALTH SERVICES | Facility: HOME HEALTHCARE | Age: 88
End: 2023-11-21
Payer: MEDICARE

## 2023-11-21 VITALS
BODY MASS INDEX: 37.95 KG/M2 | RESPIRATION RATE: 18 BRPM | HEART RATE: 55 BPM | WEIGHT: 201 LBS | TEMPERATURE: 98.5 F | HEIGHT: 61 IN | DIASTOLIC BLOOD PRESSURE: 66 MMHG | SYSTOLIC BLOOD PRESSURE: 124 MMHG

## 2023-11-21 VITALS
OXYGEN SATURATION: 94 % | BODY MASS INDEX: 37.95 KG/M2 | WEIGHT: 201 LBS | HEIGHT: 61 IN | RESPIRATION RATE: 18 BRPM | TEMPERATURE: 97.5 F | DIASTOLIC BLOOD PRESSURE: 68 MMHG | SYSTOLIC BLOOD PRESSURE: 155 MMHG | HEART RATE: 58 BPM

## 2023-11-21 PROBLEM — Z51.5 HOSPICE CARE PATIENT: Status: ACTIVE | Noted: 2023-11-15

## 2023-11-21 LAB
INR PPP: 1.11 (ref 0.84–1.19)
PROTHROMBIN TIME: 15 SECONDS (ref 11.6–14.5)

## 2023-11-21 PROCEDURE — 85610 PROTHROMBIN TIME: CPT | Performed by: PHYSICIAN ASSISTANT

## 2023-11-21 PROCEDURE — G0299 HHS/HOSPICE OF RN EA 15 MIN: HCPCS

## 2023-11-21 PROCEDURE — 92526 ORAL FUNCTION THERAPY: CPT

## 2023-11-21 PROCEDURE — 99239 HOSP IP/OBS DSCHRG MGMT >30: CPT | Performed by: PHYSICIAN ASSISTANT

## 2023-11-21 PROCEDURE — 10330057 MEDICATION, GENERAL

## 2023-11-21 RX ORDER — OXYCODONE HYDROCHLORIDE 5 MG/1
5 TABLET ORAL EVERY 6 HOURS PRN
Qty: 30 TABLET | Refills: 0 | Status: SHIPPED | OUTPATIENT
Start: 2023-11-21 | End: 2023-11-22

## 2023-11-21 RX ORDER — DIAZEPAM 2 MG/1
2 TABLET ORAL EVERY 6 HOURS PRN
Qty: 30 TABLET | Refills: 0 | Status: SHIPPED | OUTPATIENT
Start: 2023-11-21 | End: 2023-12-01

## 2023-11-21 RX ORDER — LIDOCAINE 50 MG/G
3 PATCH TOPICAL DAILY
Qty: 30 PATCH | Refills: 0 | Status: SHIPPED | OUTPATIENT
Start: 2023-11-21

## 2023-11-21 RX ADMIN — DIAZEPAM 2 MG: 2 TABLET ORAL at 12:31

## 2023-11-21 RX ADMIN — ACETAMINOPHEN 975 MG: 325 TABLET, FILM COATED ORAL at 05:28

## 2023-11-21 RX ADMIN — NYSTATIN: 100000 POWDER TOPICAL at 09:24

## 2023-11-21 RX ADMIN — LOSARTAN POTASSIUM 100 MG: 50 TABLET, FILM COATED ORAL at 09:21

## 2023-11-21 RX ADMIN — LEVOTHYROXINE SODIUM 50 MCG: 50 TABLET ORAL at 05:28

## 2023-11-21 RX ADMIN — METOPROLOL TARTRATE 100 MG: 100 TABLET, FILM COATED ORAL at 09:21

## 2023-11-21 RX ADMIN — OXYCODONE HYDROCHLORIDE 5 MG: 5 TABLET ORAL at 09:20

## 2023-11-21 RX ADMIN — DIAZEPAM 2 MG: 2 TABLET ORAL at 00:54

## 2023-11-21 RX ADMIN — POLYETHYLENE GLYCOL 3350 17 G: 17 POWDER, FOR SOLUTION ORAL at 09:21

## 2023-11-21 RX ADMIN — DOCUSATE SODIUM 100 MG: 100 CAPSULE, LIQUID FILLED ORAL at 09:21

## 2023-11-21 RX ADMIN — AMLODIPINE BESYLATE 5 MG: 5 TABLET ORAL at 09:21

## 2023-11-21 NOTE — ASSESSMENT & PLAN NOTE
AMS upon admission after receiving several narcotics in an effort to obtain CT scan, subsequently required Narcan due to oversedation. Family notes pain exacerbates forgetfulness. Noted previously to be screaming secondary to pain, was not able to be redirected on AM 11/15. CT head showing stroke however is not new per neurology, at least several months old  Mental status improved, A&O x3. Appears to be at baseline.   DC on home hospice

## 2023-11-21 NOTE — ASSESSMENT & PLAN NOTE
Rate controlled on Lopressor, anticoagulated on warfarin with goal INR 2-3  Warfarin initially held on admission with supratherapeutic INR, since resumed  INR subtherapeutic today at 1.13  Patient to be discharged on hospice

## 2023-11-21 NOTE — SPEECH THERAPY NOTE
Speech Language/Pathology    Speech/Language Pathology Progress Note    Patient Name: Linda Escobedo  XPGHE'F Date: 2023     Problem List  Principal Problem:    Acute on chronic low back pain  Active Problems:    Atrial fibrillation ([address])    Hypertension    Toxic encephalopathy    Constipation    Goals of care, counseling/discussion       Past Medical History  Past Medical History:   Diagnosis Date    , spontaneous complete     Carcinoma of skin     Cataract     last assessed: 17    Cataract     Cellulitis     Effusion of both knee joints     resolved: 3/25/15    Effusion of both knee joints     Enteritis     Female stress incontinence     last assessed: 13    Female stress incontinence     Gastric ulcer     last assessed: 14    Gastric ulcer     GERD (gastroesophageal reflux disease)     Hyperlipidemia     Hypertension     Hyperthyroidism     Lyme disease     last assessed: 13    Lyme disease     Microscopic hematuria     last assessed: 13    Microscopic hematuria     Normal delivery     1950 son, 1952 son, 12 daughter, 26 daughter, 65 daughter    Paroxysmal atrial fibrillation ([address])     last assessed: 13    Paroxysmal atrial fibrillation ([address])     Perirectal abscess     last assessed: 13    Platelet dysfunction (HCC)     PAF    Platelet dysfunction (HCC)     Sinus tachycardia     last assessed: 13    Sinus tachycardia     Spinal stenosis     lumbar; last assessed: 10/4/13    Stage 3 chronic kidney disease ([address]) 6/3/2019    Stage 3 chronic kidney disease ([address])     Tachycardia     unspecified; last assessed: 13    Trigger finger, acquired     last assessed: 6/30/15    Trigger finger, acquired         Past Surgical History  Past Surgical History:   Procedure Laterality Date    KNEE ARTHROSCOPY Bilateral     2008    KNEE ARTHROSCOPY      LUMBAR LAMINECTOMY          LUMBAR LAMINECTOMY      NEUROPLASTY / TRANSPOSITION MEDIAN NERVE AT CARPAL TUNNEL Bilateral     2011    NEUROPLASTY / TRANSPOSITION MEDIAN NERVE AT CARPAL TUNNEL      TONSILLECTOMY AND ADENOIDECTOMY      TOTAL HIP ARTHROPLASTY      joint replacement; L hip; 2/2/10    TOTAL HIP ARTHROPLASTY           Subjective:  Pt awake/alert while upright in bed as SLP entered the room. No complaints at this time. Objective:  Pt was able to adequately bite through solids (trina crackers) and retrieve bolus from straw. No anterior leakage. Mastication was functional. Bolus control and transfer appeared [facility]. Mild oral residue in which pt was able to clear with additional swallows. No coughing, throat clearing, or changes in vocal quality s/p swallows. No concerns regarding swallow function from pt, family, or RN. Pt consumed ice cream and some Turkish toast for breakfast without reported difficulty. Assessment:  No overt s/s of aspiration/dysphagia observed with thin liquids by straw or regular solids.      Plan/Recommendations:  No further ST services warranted  Continue regular diet with thin liquids, medications as tolerated

## 2023-11-21 NOTE — PLAN OF CARE
Problem: Prexisting or High Potential for Compromised Skin Integrity  Goal: Skin integrity is maintained or improved  Description: INTERVENTIONS:  - Identify patients at risk for skin breakdown  - Assess and monitor skin integrity  - Assess and monitor nutrition and hydration status  - Monitor labs   - Assess for incontinence   - Turn and reposition patient  - Assist with mobility/ambulation  - Relieve pressure over bony prominences  - Avoid friction and shearing  - Provide appropriate hygiene as needed including keeping skin clean and dry  - Evaluate need for skin moisturizer/barrier cream  - Collaborate with interdisciplinary team   - Patient/family teaching  - Consider wound care consult   Outcome: Adequate for Discharge     Problem: PAIN - ADULT  Goal: Verbalizes/displays adequate comfort level or baseline comfort level  Description: Interventions:  - Encourage patient to monitor pain and request assistance  - Assess pain using appropriate pain scale  - Administer analgesics based on type and severity of pain and evaluate response  - Implement non-pharmacological measures as appropriate and evaluate response  - Consider cultural and social influences on pain and pain management  - Notify physician/advanced practitioner if interventions unsuccessful or patient reports new pain  Outcome: Adequate for Discharge     Problem: INFECTION - ADULT  Goal: Absence or prevention of progression during hospitalization  Description: INTERVENTIONS:  - Assess and monitor for signs and symptoms of infection  - Monitor lab/diagnostic results  - Monitor all insertion sites, i.e. indwelling lines, tubes, and drains  - Monitor endotracheal if appropriate and nasal secretions for changes in amount and color  - [place] appropriate cooling/warming therapies per order  - Administer medications as ordered  - Instruct and encourage patient and family to use good hand hygiene technique  - Identify and instruct in appropriate isolation precautions for identified infection/condition  Outcome: Adequate for Discharge  Goal: Absence of fever/infection during neutropenic period  Description: INTERVENTIONS:  - Monitor WBC    Outcome: Adequate for Discharge     Problem: SAFETY ADULT  Goal: Patient will remain free of falls  Description: INTERVENTIONS:  - Educate patient/family on patient safety including physical limitations  - Instruct patient to call for assistance with activity   - Consult OT/PT to assist with strengthening/mobility   - Keep Call bell within reach  - Keep bed low and locked with side rails adjusted as appropriate  - Keep care items and personal belongings within reach  - Initiate and maintain comfort rounds  - Make Fall Risk Sign visible to staff  - Offer Toileting every  Hours, in advance of need  - Initiate/Maintain alarm  - Obtain necessary fall risk management equipment:   - Apply yellow socks and bracelet for high fall risk patients  - Consider moving patient to room near nurses station  Outcome: Adequate for Discharge  Goal: Maintain or return to baseline ADL function  Description: INTERVENTIONS:  -  Assess patient's ability to carry out ADLs; assess patient's baseline for ADL function and identify physical deficits which impact ability to perform ADLs (bathing, care of mouth/teeth, toileting, grooming, dressing, etc.)  - Assess/evaluate cause of self-care deficits   - Assess range of motion  - Assess patient's mobility; develop plan if impaired  - Assess patient's need for assistive devices and provide as appropriate  - Encourage maximum independence but intervene and supervise when necessary  - Involve family in performance of ADLs  - Assess for home care needs following discharge   - Consider OT consult to assist with ADL evaluation and planning for discharge  - Provide patient education as appropriate  Outcome: Adequate for Discharge  Goal: Maintains/Returns to pre admission functional level  Description: INTERVENTIONS:  - Perform AM-PAC 6 Click Basic Mobility/ Daily Activity assessment daily.  - Set and communicate daily mobility goal to care team and patient/family/caregiver. - Collaborate with rehabilitation services on mobility goals if consulted  - Perform Range of Motion  times a day. - Reposition patient every  hours. - Dangle patient  times a day  - Stand patient  times a day  - Ambulate patient  times a day  - Out of bed to chair  times a day   - Out of bed for meals  times a day  - Out of bed for toileting  - Record patient progress and toleration of activity level   Outcome: Adequate for Discharge     Problem: DISCHARGE PLANNING  Goal: Discharge to home or other facility with appropriate resources  Description: INTERVENTIONS:  - Identify barriers to discharge w/patient and caregiver  - Arrange for needed discharge resources and transportation as appropriate  - Identify discharge learning needs (meds, wound care, etc.)  - Arrange for interpretive services to assist at discharge as needed  - Refer to Case Management Department for coordinating discharge planning if the patient needs post-hospital services based on physician/advanced practitioner order or complex needs related to functional status, cognitive ability, or social support system  Outcome: Adequate for Discharge     Problem: Knowledge Deficit  Goal: Patient/family/caregiver demonstrates understanding of disease process, treatment plan, medications, and discharge instructions  Description: Complete learning assessment and assess knowledge base. Interventions:  - Provide teaching at level of understanding  - Provide teaching via preferred learning methods  Outcome: Adequate for Discharge     Problem: PHYSICAL THERAPY ADULT  Goal: Performs mobility at highest level of function for planned discharge setting. See evaluation for individualized goals.   Description: Treatment/Interventions: Functional transfer training, LE strengthening/ROM, Therapeutic exercise, Endurance training, Cognitive reorientation, Patient/family training, Equipment eval/education, Bed mobility (PT to see next session to complete mobility assessment)          See flowsheet documentation for full assessment, interventions and recommendations. Outcome: Adequate for Discharge     Problem: SLP ADULT - SWALLOWING, IMPAIRED  Goal: Advance to least restrictive diet without signs or symptoms of aspiration for planned discharge setting. See evaluation for individualized goals. Description: Patient will:    1. Tolerate  with no S/S of  across meals . 2. Demonstrate effective  . 3. Use    Outcome: Adequate for Discharge     Problem: Nutrition/Hydration-ADULT  Goal: Nutrient/Hydration intake appropriate for improving, restoring or maintaining nutritional needs  Description: Monitor and assess patient's nutrition/hydration status for malnutrition. Collaborate with interdisciplinary team and initiate plan and interventions as ordered. Monitor patient's weight and dietary intake as ordered or per policy. Utilize nutrition screening tool and intervene as necessary. Determine patient's food preferences and provide high-protein, high-caloric foods as appropriate.      INTERVENTIONS:  - Monitor oral intake, urinary output, labs, and treatment plans  - Assess nutrition and hydration status and recommend course of action  - Evaluate amount of meals eaten  - Assist patient with eating if necessary   - Allow adequate time for meals  - Recommend/ encourage appropriate diets, oral nutritional supplements, and vitamin/mineral supplements  - Order, calculate, and assess calorie counts as needed  - Recommend, monitor, and adjust tube feedings and TPN/PPN based on assessed needs  - Assess need for intravenous fluids  - Provide specific nutrition/hydration education as appropriate  - Include patient/family/caregiver in decisions related to nutrition  Outcome: Adequate for Discharge     Problem: OCCUPATIONAL THERAPY ADULT  Goal: Performs self-care activities at highest level of function for planned discharge setting. See evaluation for individualized goals. Description: Treatment Interventions: ADL retraining, Functional transfer training, UE strengthening/ROM, Endurance training, Cognitive reorientation, Patient/family training, Equipment evaluation/education, Compensatory technique education, Energy conservation, Activityengagement          See flowsheet documentation for full assessment, interventions and recommendations.    Outcome: Adequate for Discharge

## 2023-11-21 NOTE — CASE MANAGEMENT
Case Management Discharge Planning Note    Patient name Valentino Schilling  Location S /S -01 MRN [id number]  : 2/3/1927 Date 2023       Current Admission Date: 2023  Current Admission Diagnosis:Acute on chronic low back pain   Patient Active Problem List    Diagnosis Date Noted    Right hip pain 2023    Bilateral lower extremity edema 11/15/2023    Toxic encephalopathy 11/15/2023    Constipation 11/15/2023    Goals of care, counseling/discussion 11/15/2023    CVA (cerebral vascular accident) ([address]) 11/15/2023    Gallbladder sludge 2023    Thickening of wall of gallbladder 2023    Groin rash 2023    Altered mental status 2023    Continuous opioid dependence ([address]) 2022    Acute on chronic low back pain 2019    Stage 3 chronic kidney disease ([address]) 2019    Chronic pain of left knee 2019    Effusion of right knee 2019    Loss of bladder control 2017    Atrial fibrillation ([address]) 2017    Urinary incontinence 2017    Bladder prolapse, female, acquired 2015    Hypertension 2015    Bilateral primary osteoarthritis of knee 2014    Hypercholesterolemia 2013    Hypothyroidism 2013    Osteoarthrosis of knee 2013      LOS (days): 6  Geometric Mean LOS (GMLOS) (days): 4.60  Days to GMLOS:-1.5     OBJECTIVE:  Risk of Unplanned Readmission Score: 30.95         Current admission status: Inpatient   Preferred Pharmacy:   [id number], [address] [place] [address]  Phone: [phone] Fax: [phone]    Primary Care Provider: Lisha Roth MD    Primary Insurance: MEDICARE  Secondary Insurance: [facility] HE[employer] OPTIONS PROGRAM    DISCHARGE DETAILS:    Discharge planning discussed with[de-identified] patient's daughter, Larissa Desai, by phone  Freedom of Choice: Yes (re: hospice)  Comments - [place] of Choice: [address]  CM contacted family/caregiver?: Yes  Were Treatment Team discharge recommendations reviewed with patient/caregiver?: Yes  Did patient/caregiver verbalize understanding of patient care needs?: Yes  Were patient/caregiver advised of the risks associated with not following Treatment Team discharge recommendations?: Yes    Contacts  Patient Contacts: Carter Perez (daughter)  Relationship to Patient[de-identified] Family  Contact Method: Phone  Phone Number: [phone]  Reason/Outcome: Continuity of Care, Emergency Contact, Discharge Planning, Referral         DME Referral Provided  Referral made for DME?: Yes (ordered through [facility])    Other Referral/Resources/Interventions Provided:  Interventions: Hospice  Referral Comments: Call made to patient's daughter, Carter Perez, to follow-up on d/c for today with plan for home hospice. Daughter confirmed agreement with same and relayed that hospital bed/DME should be delivered to the home by 12:00pm. Aware that transport to be requested for this afternoon so that hospice team can be out to evaluate/admit patient. Daughter reports that she had to move items out of the room to accommodate patient's bed, so she will need time to re-arrange items once bed is in place before patient comes home, so that stretcher can be moved through the home. Aware that 12:30pm transport will be requested, but can adjust if needed. Transport requested in [employer] for 12:30pm and confirmed for same time with [facility]. [facility] PA and RN aware of pick-up time. Return call made to daughter, Carter Perez, to relay same and she confirmed that she heard from [facility] and they should be dropping everything off around 11:15-11:30am, so believes she will have enough time to move things to accommodate 12:30pm pick-up. Melvin Granda at [facility] notified of 12:30pm pick-up. PA to write scripts for comfort meds for d/c. Out-of-hospital DNR and medical necessity in chart for pick-up.     Would you like to participate in our [facility] service program?  : No - Declined    Treatment Team Recommendation: Hospice  Discharge Destination Plan[de-identified] Hospice ([address])                                IMM Given (Date):: 11/21/23  IMM Given to[de-identified] Family (daughter, Collette Fleischer, by phone)  IMM reviewed with  daughter, Collette Fleischer ,  daughter  agrees with discharge determination.

## 2023-11-21 NOTE — PROGRESS NOTES
Received inbasket regarding pt and she is being discharged today from hospital to home with in home hospice in place. Chart review completed. Will remove self from care team at this time.

## 2023-11-21 NOTE — ASSESSMENT & PLAN NOTE
Concern for poor prognosis given advanced age, chronic pain and complex comorbidities.   Discharge on home hospice

## 2023-11-21 NOTE — ASSESSMENT & PLAN NOTE
Presented with acute on chronic back pain, bilateral hip and right knee pain x 1 week. Lives at home with daughters, now unable to ambulate, previously able to stand/pivot and take some steps with walker at baseline. Chronically on Norco for years, seen in ED 1 week prior for similar and was discharged at the time  CT lumbar spine: severe disc and facet degenerative change throughout the lumbar spine. Multilevel mild to moderate central canal stenosis and moderate to severe neural foraminal narrowing. Received pain medications including Ketamine in ED, which then required Narcan due to [facility]  Acute pain service following:  Scheduled Tylenol 975 mg q8hrs, lidocaine patches, oxycodone 2.5-5mg q6hrs PRN for mod-severe pain. D/C IV Dilaudid. Valium 2 mg q8hrs PRN for muscle spasms, D/C at discharge.   PT/OT at this time recommending rehab, however family wants to take her home  After further discussion, patient's family requesting hospice services at home  DC on home hospice

## 2023-11-21 NOTE — PLAN OF CARE
Problem: Prexisting or High Potential for Compromised Skin Integrity  Goal: Skin integrity is maintained or improved  Description: INTERVENTIONS:  - Identify patients at risk for skin breakdown  - Assess and monitor skin integrity  - Assess and monitor nutrition and hydration status  - Monitor labs   - Assess for incontinence   - Turn and reposition patient  - Assist with mobility/ambulation  - Relieve pressure over bony prominences  - Avoid friction and shearing  - Provide appropriate hygiene as needed including keeping skin clean and dry  - Evaluate need for skin moisturizer/barrier cream  - Collaborate with interdisciplinary team   - Patient/family teaching  - Consider wound care consult   Outcome: Progressing     Problem: PAIN - ADULT  Goal: Verbalizes/displays adequate comfort level or baseline comfort level  Description: Interventions:  - Encourage patient to monitor pain and request assistance  - Assess pain using appropriate pain scale  - Administer analgesics based on type and severity of pain and evaluate response  - Implement non-pharmacological measures as appropriate and evaluate response  - Consider cultural and social influences on pain and pain management  - Notify physician/advanced practitioner if interventions unsuccessful or patient reports new pain  Outcome: Progressing     Problem: INFECTION - ADULT  Goal: Absence or prevention of progression during hospitalization  Description: INTERVENTIONS:  - Assess and monitor for signs and symptoms of infection  - Monitor lab/diagnostic results  - Monitor all insertion sites, i.e. indwelling lines, tubes, and drains  - Monitor endotracheal if appropriate and nasal secretions for changes in amount and color  - [place] appropriate cooling/warming therapies per order  - Administer medications as ordered  - Instruct and encourage patient and family to use good hand hygiene technique  - Identify and instruct in appropriate isolation precautions for identified infection/condition  Outcome: Progressing  Goal: Absence of fever/infection during neutropenic period  Description: INTERVENTIONS:  - Monitor WBC    Outcome: Progressing     Problem: SAFETY ADULT  Goal: Patient will remain free of falls  Description: INTERVENTIONS:  - Educate patient/family on patient safety including physical limitations  - Instruct patient to call for assistance with activity   - Consult OT/PT to assist with strengthening/mobility   - Keep Call bell within reach  - Keep bed low and locked with side rails adjusted as appropriate  - Keep care items and personal belongings within reach  - Initiate and maintain comfort rounds  - Make Fall Risk Sign visible to staff  - Offer Toileting every  Hours, in advance of need  - Initiate/Maintain alarm  - Obtain necessary fall risk management equipment:   - Apply yellow socks and bracelet for high fall risk patients  - Consider moving patient to room near nurses station  Outcome: Progressing  Goal: Maintain or return to baseline ADL function  Description: INTERVENTIONS:  -  Assess patient's ability to carry out ADLs; assess patient's baseline for ADL function and identify physical deficits which impact ability to perform ADLs (bathing, care of mouth/teeth, toileting, grooming, dressing, etc.)  - Assess/evaluate cause of self-care deficits   - Assess range of motion  - Assess patient's mobility; develop plan if impaired  - Assess patient's need for assistive devices and provide as appropriate  - Encourage maximum independence but intervene and supervise when necessary  - Involve family in performance of ADLs  - Assess for home care needs following discharge   - Consider OT consult to assist with ADL evaluation and planning for discharge  - Provide patient education as appropriate  Outcome: Progressing  Goal: Maintains/Returns to pre admission functional level  Description: INTERVENTIONS:  - Perform AM-PAC 6 Click Basic Mobility/ Daily Activity assessment daily.  - Set and communicate daily mobility goal to care team and patient/family/caregiver. - Collaborate with rehabilitation services on mobility goals if consulted  - Perform Range of Motion  times a day. - Reposition patient every  hours. - Dangle patient  times a day  - Stand patient  times a day  - Ambulate patient  times a day  - Out of bed to chair  times a day   - Out of bed for meals times a day  - Out of bed for toileting  - Record patient progress and toleration of activity level   Outcome: Progressing     Problem: DISCHARGE PLANNING  Goal: Discharge to home or other facility with appropriate resources  Description: INTERVENTIONS:  - Identify barriers to discharge w/patient and caregiver  - Arrange for needed discharge resources and transportation as appropriate  - Identify discharge learning needs (meds, wound care, etc.)  - Arrange for interpretive services to assist at discharge as needed  - Refer to Case Management Department for coordinating discharge planning if the patient needs post-hospital services based on physician/advanced practitioner order or complex needs related to functional status, cognitive ability, or social support system  Outcome: Progressing     Problem: Knowledge Deficit  Goal: Patient/family/caregiver demonstrates understanding of disease process, treatment plan, medications, and discharge instructions  Description: Complete learning assessment and assess knowledge base. Interventions:  - Provide teaching at level of understanding  - Provide teaching via preferred learning methods  Outcome: Progressing     Problem: Nutrition/Hydration-ADULT  Goal: Nutrient/Hydration intake appropriate for improving, restoring or maintaining nutritional needs  Description: Monitor and assess patient's nutrition/hydration status for malnutrition. Collaborate with interdisciplinary team and initiate plan and interventions as ordered.   Monitor patient's weight and dietary intake as ordered or per policy. Utilize nutrition screening tool and intervene as necessary. Determine patient's food preferences and provide high-protein, high-caloric foods as appropriate.      INTERVENTIONS:  - Monitor oral intake, urinary output, labs, and treatment plans  - Assess nutrition and hydration status and recommend course of action  - Evaluate amount of meals eaten  - Assist patient with eating if necessary   - Allow adequate time for meals  - Recommend/ encourage appropriate diets, oral nutritional supplements, and vitamin/mineral supplements  - Order, calculate, and assess calorie counts as needed  - Recommend, monitor, and adjust tube feedings and TPN/PPN based on assessed needs  - Assess need for intravenous fluids  - Provide specific nutrition/hydration education as appropriate  - Include patient/family/caregiver in decisions related to nutrition  Outcome: Progressing

## 2023-11-22 ENCOUNTER — HOME CARE VISIT (OUTPATIENT)
Dept: HOME HOSPICE | Facility: HOSPICE | Age: 88
End: 2023-11-22
Payer: MEDICARE

## 2023-11-22 ENCOUNTER — TRANSITIONAL CARE MANAGEMENT (OUTPATIENT)
Dept: INTERNAL MEDICINE CLINIC | Facility: OTHER | Age: 88
End: 2023-11-22

## 2023-11-22 ENCOUNTER — HOME CARE VISIT (OUTPATIENT)
Dept: HOME HEALTH SERVICES | Facility: HOME HEALTHCARE | Age: 88
End: 2023-11-22
Payer: MEDICARE

## 2023-11-22 PROCEDURE — G0156 HHCP-SVS OF AIDE,EA 15 MIN: HCPCS

## 2023-11-24 ENCOUNTER — HOME CARE VISIT (OUTPATIENT)
Dept: HOME HOSPICE | Facility: HOSPICE | Age: 88
End: 2023-11-24
Payer: MEDICARE

## 2023-11-24 ENCOUNTER — HOME CARE VISIT (OUTPATIENT)
Dept: HOME HEALTH SERVICES | Facility: HOME HEALTHCARE | Age: 88
End: 2023-11-24
Payer: MEDICARE

## 2023-11-24 PROCEDURE — G0299 HHS/HOSPICE OF RN EA 15 MIN: HCPCS

## 2023-11-27 ENCOUNTER — HOME CARE VISIT (OUTPATIENT)
Dept: HOME HOSPICE | Facility: HOSPICE | Age: 88
End: 2023-11-27
Payer: MEDICARE

## 2023-11-27 PROCEDURE — G0155 HHCP-SVS OF CSW,EA 15 MIN: HCPCS

## 2023-11-28 ENCOUNTER — HOME CARE VISIT (OUTPATIENT)
Dept: HOME HOSPICE | Facility: HOSPICE | Age: 88
End: 2023-11-28
Payer: MEDICARE

## 2023-11-29 ENCOUNTER — HOME CARE VISIT (OUTPATIENT)
Dept: HOME HEALTH SERVICES | Facility: HOME HEALTHCARE | Age: 88
End: 2023-11-29
Payer: MEDICARE

## 2023-11-29 PROCEDURE — G0156 HHCP-SVS OF AIDE,EA 15 MIN: HCPCS

## 2023-11-30 ENCOUNTER — HOME CARE VISIT (OUTPATIENT)
Dept: HOME HEALTH SERVICES | Facility: HOME HEALTHCARE | Age: 88
End: 2023-11-30
Payer: MEDICARE

## 2023-11-30 PROCEDURE — G0299 HHS/HOSPICE OF RN EA 15 MIN: HCPCS

## 2023-12-02 ENCOUNTER — HOME CARE VISIT (OUTPATIENT)
Dept: HOME HOSPICE | Facility: HOSPICE | Age: 88
End: 2023-12-02
Payer: MEDICARE

## 2023-12-02 ENCOUNTER — HOME CARE VISIT (OUTPATIENT)
Dept: HOME HEALTH SERVICES | Facility: HOME HEALTHCARE | Age: 88
End: 2023-12-02
Payer: MEDICARE

## 2023-12-02 PROCEDURE — G0156 HHCP-SVS OF AIDE,EA 15 MIN: HCPCS

## 2023-12-04 ENCOUNTER — HOME CARE VISIT (OUTPATIENT)
Dept: HOME HEALTH SERVICES | Facility: HOME HEALTHCARE | Age: 88
End: 2023-12-04
Payer: MEDICARE

## 2023-12-04 VITALS
TEMPERATURE: 98.6 F | HEART RATE: 72 BPM | DIASTOLIC BLOOD PRESSURE: 80 MMHG | SYSTOLIC BLOOD PRESSURE: 138 MMHG | RESPIRATION RATE: 18 BRPM

## 2023-12-04 PROCEDURE — G0156 HHCP-SVS OF AIDE,EA 15 MIN: HCPCS

## 2023-12-04 PROCEDURE — G0299 HHS/HOSPICE OF RN EA 15 MIN: HCPCS

## 2023-12-06 ENCOUNTER — HOME CARE VISIT (OUTPATIENT)
Dept: HOME HEALTH SERVICES | Facility: HOME HEALTHCARE | Age: 88
End: 2023-12-06
Payer: MEDICARE

## 2023-12-06 PROCEDURE — G0156 HHCP-SVS OF AIDE,EA 15 MIN: HCPCS

## 2023-12-08 ENCOUNTER — HOME CARE VISIT (OUTPATIENT)
Dept: HOME HEALTH SERVICES | Facility: HOME HEALTHCARE | Age: 88
End: 2023-12-08
Payer: MEDICARE

## 2023-12-08 ENCOUNTER — HOME CARE VISIT (OUTPATIENT)
Dept: HOME HOSPICE | Facility: HOSPICE | Age: 88
End: 2023-12-08
Payer: MEDICARE

## 2023-12-08 PROCEDURE — G0156 HHCP-SVS OF AIDE,EA 15 MIN: HCPCS

## 2023-12-11 ENCOUNTER — HOME CARE VISIT (OUTPATIENT)
Dept: HOME HEALTH SERVICES | Facility: HOME HEALTHCARE | Age: 88
End: 2023-12-11
Payer: MEDICARE

## 2023-12-11 VITALS
DIASTOLIC BLOOD PRESSURE: 80 MMHG | SYSTOLIC BLOOD PRESSURE: 142 MMHG | HEART RATE: 60 BPM | TEMPERATURE: 98.2 F | RESPIRATION RATE: 16 BRPM

## 2023-12-11 PROCEDURE — G0156 HHCP-SVS OF AIDE,EA 15 MIN: HCPCS

## 2023-12-11 PROCEDURE — G0299 HHS/HOSPICE OF RN EA 15 MIN: HCPCS

## 2023-12-12 ENCOUNTER — HOME CARE VISIT (OUTPATIENT)
Dept: HOME HOSPICE | Facility: HOSPICE | Age: 88
End: 2023-12-12
Payer: MEDICARE

## 2023-12-12 PROCEDURE — G0155 HHCP-SVS OF CSW,EA 15 MIN: HCPCS

## 2023-12-13 ENCOUNTER — HOME CARE VISIT (OUTPATIENT)
Dept: HOME HEALTH SERVICES | Facility: HOME HEALTHCARE | Age: 88
End: 2023-12-13
Payer: MEDICARE

## 2023-12-13 PROCEDURE — G0156 HHCP-SVS OF AIDE,EA 15 MIN: HCPCS

## 2023-12-15 ENCOUNTER — HOME CARE VISIT (OUTPATIENT)
Dept: HOME HEALTH SERVICES | Facility: HOME HEALTHCARE | Age: 88
End: 2023-12-15
Payer: MEDICARE

## 2023-12-15 PROCEDURE — G0156 HHCP-SVS OF AIDE,EA 15 MIN: HCPCS

## 2023-12-16 ENCOUNTER — HOME CARE VISIT (OUTPATIENT)
Dept: HOME HOSPICE | Facility: HOSPICE | Age: 88
End: 2023-12-16
Payer: MEDICARE

## 2023-12-18 ENCOUNTER — HOME CARE VISIT (OUTPATIENT)
Dept: HOME HEALTH SERVICES | Facility: HOME HEALTHCARE | Age: 88
End: 2023-12-18
Payer: MEDICARE

## 2023-12-18 PROCEDURE — G0299 HHS/HOSPICE OF RN EA 15 MIN: HCPCS

## 2023-12-18 PROCEDURE — G0156 HHCP-SVS OF AIDE,EA 15 MIN: HCPCS

## 2023-12-19 VITALS — SYSTOLIC BLOOD PRESSURE: 140 MMHG | RESPIRATION RATE: 18 BRPM | HEART RATE: 60 BPM | DIASTOLIC BLOOD PRESSURE: 68 MMHG

## 2023-12-20 ENCOUNTER — HOME CARE VISIT (OUTPATIENT)
Dept: HOME HEALTH SERVICES | Facility: HOME HEALTHCARE | Age: 88
End: 2023-12-20
Payer: MEDICARE

## 2023-12-20 PROCEDURE — G0156 HHCP-SVS OF AIDE,EA 15 MIN: HCPCS

## 2023-12-22 ENCOUNTER — HOME CARE VISIT (OUTPATIENT)
Dept: HOME HEALTH SERVICES | Facility: HOME HEALTHCARE | Age: 88
End: 2023-12-22
Payer: MEDICARE

## 2023-12-22 VITALS
RESPIRATION RATE: 18 BRPM | HEART RATE: 78 BPM | DIASTOLIC BLOOD PRESSURE: 75 MMHG | SYSTOLIC BLOOD PRESSURE: 138 MMHG | TEMPERATURE: 97.5 F

## 2023-12-22 PROCEDURE — G0299 HHS/HOSPICE OF RN EA 15 MIN: HCPCS

## 2023-12-27 ENCOUNTER — HOME CARE VISIT (OUTPATIENT)
Dept: HOME HEALTH SERVICES | Facility: HOME HEALTHCARE | Age: 88
End: 2023-12-27
Payer: MEDICARE

## 2023-12-27 VITALS — SYSTOLIC BLOOD PRESSURE: 130 MMHG | HEART RATE: 64 BPM | RESPIRATION RATE: 20 BRPM | DIASTOLIC BLOOD PRESSURE: 84 MMHG

## 2023-12-27 PROCEDURE — G0156 HHCP-SVS OF AIDE,EA 15 MIN: HCPCS

## 2023-12-27 PROCEDURE — G0299 HHS/HOSPICE OF RN EA 15 MIN: HCPCS

## 2023-12-29 ENCOUNTER — HOME CARE VISIT (OUTPATIENT)
Dept: HOME HEALTH SERVICES | Facility: HOME HEALTHCARE | Age: 88
End: 2023-12-29
Payer: MEDICARE

## 2023-12-29 VITALS — HEART RATE: 78 BPM

## 2023-12-29 PROCEDURE — G0299 HHS/HOSPICE OF RN EA 15 MIN: HCPCS

## 2023-12-29 PROCEDURE — G0156 HHCP-SVS OF AIDE,EA 15 MIN: HCPCS

## 2024-01-01 ENCOUNTER — HOME CARE VISIT (OUTPATIENT)
Dept: HOME HOSPICE | Facility: HOSPICE | Age: 89
End: 2024-01-01
Payer: MEDICARE

## 2024-01-01 ENCOUNTER — HOME CARE VISIT (OUTPATIENT)
Dept: HOME HEALTH SERVICES | Facility: HOME HEALTHCARE | Age: 89
End: 2024-01-01
Payer: MEDICARE

## 2024-01-01 VITALS — RESPIRATION RATE: 14 BRPM | HEART RATE: 68 BPM

## 2024-01-01 VITALS — RESPIRATION RATE: 21 BRPM | HEART RATE: 110 BPM

## 2024-01-01 PROCEDURE — G0155 HHCP-SVS OF CSW,EA 15 MIN: HCPCS

## 2024-01-01 PROCEDURE — G0156 HHCP-SVS OF AIDE,EA 15 MIN: HCPCS

## 2024-01-01 PROCEDURE — G0299 HHS/HOSPICE OF RN EA 15 MIN: HCPCS

## 2024-01-03 ENCOUNTER — HOME CARE VISIT (OUTPATIENT)
Dept: HOME HEALTH SERVICES | Facility: HOME HEALTHCARE | Age: 89
End: 2024-01-03
Payer: MEDICARE

## 2024-01-03 VITALS
DIASTOLIC BLOOD PRESSURE: 60 MMHG | SYSTOLIC BLOOD PRESSURE: 118 MMHG | HEART RATE: 60 BPM | TEMPERATURE: 97.5 F | RESPIRATION RATE: 18 BRPM

## 2024-01-03 PROCEDURE — G0299 HHS/HOSPICE OF RN EA 15 MIN: HCPCS

## 2024-01-03 PROCEDURE — G0156 HHCP-SVS OF AIDE,EA 15 MIN: HCPCS

## 2024-01-04 ENCOUNTER — HOME CARE VISIT (OUTPATIENT)
Dept: HOME HOSPICE | Facility: HOSPICE | Age: 89
End: 2024-01-04
Payer: MEDICARE

## 2024-01-05 ENCOUNTER — HOME CARE VISIT (OUTPATIENT)
Dept: HOME HEALTH SERVICES | Facility: HOME HEALTHCARE | Age: 89
End: 2024-01-05
Payer: MEDICARE

## 2024-01-05 PROCEDURE — G0156 HHCP-SVS OF AIDE,EA 15 MIN: HCPCS

## 2024-01-08 ENCOUNTER — HOME CARE VISIT (OUTPATIENT)
Dept: HOME HEALTH SERVICES | Facility: HOME HEALTHCARE | Age: 89
End: 2024-01-08
Payer: MEDICARE

## 2024-01-08 PROCEDURE — G0156 HHCP-SVS OF AIDE,EA 15 MIN: HCPCS

## 2024-01-09 ENCOUNTER — HOME CARE VISIT (OUTPATIENT)
Dept: HOME HEALTH SERVICES | Facility: HOME HEALTHCARE | Age: 89
End: 2024-01-09
Payer: MEDICARE

## 2024-01-09 DIAGNOSIS — E03.9 HYPOTHYROIDISM, UNSPECIFIED TYPE: ICD-10-CM

## 2024-01-09 DIAGNOSIS — I10 HYPERTENSION, UNSPECIFIED TYPE: ICD-10-CM

## 2024-01-09 PROCEDURE — G0299 HHS/HOSPICE OF RN EA 15 MIN: HCPCS

## 2024-01-09 RX ORDER — LOSARTAN POTASSIUM 50 MG/1
50 TABLET ORAL DAILY
Qty: 15 TABLET | Refills: 0 | Status: SHIPPED | OUTPATIENT
Start: 2024-01-09

## 2024-01-09 RX ORDER — LEVOTHYROXINE SODIUM 0.05 MG/1
50 TABLET ORAL DAILY
Qty: 15 TABLET | Refills: 1 | Status: SHIPPED | OUTPATIENT
Start: 2024-01-09

## 2024-01-09 NOTE — DISCHARGE SUMMARY
[address]  Discharge- Johnnie Constant 2/3/1927, 80 y.o. female MRN: [id number]  Unit/Bed#: S -01 Encounter: [id number]  Primary Care Provider: Louise Cao MD   Date and time admitted to hospital: 11/14/2023  2:32 PM    * Acute on chronic low back pain  Assessment & Plan  Presented with acute on chronic back pain, bilateral hip and right knee pain x 1 week. Lives at home with daughters, now unable to ambulate, previously able to stand/pivot and take some steps with walker at baseline. Chronically on Norco for years, seen in ED 1 week prior for similar and was discharged at the time  CT lumbar spine: severe disc and facet degenerative change throughout the lumbar spine. Multilevel mild to moderate central canal stenosis and moderate to severe neural foraminal narrowing. Received pain medications including Ketamine in ED, which then required Narcan due to AMS  Acute pain service following:  Scheduled Tylenol 975 mg q8hrs, lidocaine patches, oxycodone 2.5-5mg q6hrs PRN for mod-severe pain. D/C IV Dilaudid. Valium 2 mg q8hrs PRN for muscle spasms, D/C at discharge. PT/OT at this time recommending rehab, however family wants to take her home  After further discussion, patient's family requesting hospice services at home  DC on home hospice    Hospice care patient  Assessment & Plan  Concern for poor prognosis given advanced age, chronic pain and complex comorbidities. Discharge on home hospice    Toxic encephalopathy  Assessment & Plan  AMS upon admission after receiving several narcotics in an effort to obtain CT scan, subsequently required Narcan due to oversedation. Family notes pain exacerbates forgetfulness. Noted previously to be screaming secondary to pain, was not able to be redirected on AM 11/15. CT head showing stroke however is not new per neurology, at least several months old  Mental status improved, A&O x3. Appears to be at baseline.   DC on home hospice    Hypertension  Assessment & Plan  BP reviewed, intermittently soft but overall stable  Continue home dose Cozaar 100 mg daily, norvasc 5 mg daily and lopressor 100 mg BID  Resume home torsemide 3 times weekly    Atrial fibrillation (HCC)  Assessment & Plan  Rate controlled on Lopressor, anticoagulated on warfarin with goal INR 2-3  Warfarin initially held on admission with supratherapeutic INR, since resumed  INR subtherapeutic today at 1.13  Patient to be discharged on hospice      Medical Problems       Resolved Problems  Date Reviewed: 11/21/2023            Resolved    Leukocytosis 11/17/2023     Resolved by  Roscoe Bonilla PA-C    Acute respiratory failure ([address]) 11/16/2023     Resolved by  Annie Boone PA-C        Discharging Physician / Practitioner: Fide Shaffer PA-C  PCP: Sanchez Burrows MD  Admission Date:   Admission Orders (From admission, onward)       Ordered        11/15/23 0903  Inpatient Admission  Once            11/14/23 2033  Place in Observation  Once                          Discharge Date: 11/21/23    Consultations During Hospital Stay:  Geriatrics, neurology, APS, Hospice    Procedures Performed:   Non    Significant Findings / Test Results:   XR hips bilateral 3-4 vw w pelvis if performed  Result Date: 11/20/2023    Impression: No acute osseous abnormality. Echo complete w/ contrast if indicated  Result Date: 11/16/2023    Narrative:   Left Ventricle: Left ventricular cavity size is normal. Wall thickness is mildly increased. There is mild concentric hypertrophy. The left ventricular ejection fraction is 65%. Systolic function is normal. Wall motion is normal.   Left Atrium: The atrium is mildly dilated. Right Atrium: The atrium is mildly dilated. Aortic Valve: There is mild stenosis. Mitral Valve: There is moderate annular calcification. There is trace regurgitation. There is mild stenosis. Tricuspid Valve: There is mild to moderate regurgitation.      CT head wo contrast  Result Date: 11/15/2023    Impression: Age-indeterminate lacunar infarct in the left basal ganglia/internal capsule is new since May. No acute large vessel distribution infarct or acute hemorrhage. Chronic microangiopathy. XR knee 3 vw right non injury  Result Date: 11/15/2023    Impression: No acute osseous abnormality. Severe tricompartmental osteoarthritis. CT recon only lumbar spine  Result Date: 11/14/2023    Impression: Advanced disc degenerative change in the lumbar spine. CT abdomen pelvis wo contrast  Result Date: 11/14/2023    Impression: Moderate amount of stool in the colon may indicate constipation. No evidence of bowel obstruction, colitis or diverticulitis. Incidental Findings:   None     Test Results Pending at Discharge (will require follow up): None     Outpatient Tests Requested:  None    Complications:  None    Reason for Admission: back pain    Hospital Course:   Lakshmi Lawrence is a 80 y.o. female patient with a past medical history of atrial fibrillation, osteoarthritis, chronic back pain, hypertension, urinary incontinence, CKD who originally presented to the hospital on 11/14/2023 due to acute on chronic back pain. According the patient the day of admission, she was seen in the emergency department on 11/8 secondary to lower extremity edema. She was discharged home in stable condition with follow-up to her PCP however while in the emergency department on 11/8 she began to develop right-sided back pain and right-sided hip pain thought to be secondary to the stretcher. Since that time, she had been having difficult time ambulating secondary to pain and decreased mobility. She was seen in consultation by acute pain service who adjusted their pain regimen. Consideration was made for MRI however she was unable to tolerate this and was likely going to need anesthesia which would be high risk given her age and comorbidities.   This was then further discussed with the patient's daughter, Silvia Esquivel who understood the plan at this time. The decision was made that if they were unable to control the patient's pain over the next 24 to 48 hours, with maximum conservative therapies, comfort measures/hospice would likely be indicated at that we would not have to balance the adverse effects of pain medications with her respiratory status/wakefulness. The patient's family members were in agreement with this. Ultimately, hospice evaluated the patient on 11/20/2023 and she was determined to be a candidate for home hospice. She was ultimately discharged home in stable condition on 11/21/2023 with home hospice. Please see above list of diagnoses and related plan for additional information. Condition at Discharge: terminal    Discharge Day Visit / Exam:   Subjective: The patient is seen resting comfortably in bed. She is happy to go home today. She offers no other complaints at this time. Pain is controlled. Vitals: Blood Pressure: 155/68 (11/21/23 0718)  Pulse: 58 (11/20/23 2129)  Temperature: 97.5 °F (36.4 °C) (11/21/23 0718)  Temp Source: Oral (11/20/23 0713)  Respirations: 18 (11/20/23 2129)  Height: 5' 1" (154.9 cm) (11/16/23 1030)  Weight - Scale: 91.2 kg (201 lb) (11/16/23 1030)  SpO2: 94 % (11/20/23 2129)  Exam:   Physical Exam  Constitutional:       General: She is not in acute distress. Appearance: Normal appearance. She is not ill-appearing, toxic-appearing or diaphoretic. HENT:      Head: Normocephalic and atraumatic. Cardiovascular:      Rate and Rhythm: Normal rate. Rhythm irregular. Heart sounds: No murmur heard. No friction rub. No gallop. Pulmonary:      Effort: Pulmonary effort is normal. No respiratory distress. Breath sounds: Normal breath sounds. No wheezing, rhonchi or rales. Abdominal:      General: Abdomen is flat. Bowel sounds are normal. There is no distension. Palpations: Abdomen is soft. Tenderness:  There is no abdominal tenderness. Musculoskeletal:      Right lower leg: No edema. Left lower leg: No edema. Skin:     General: Skin is warm and dry. Coloration: Skin is not jaundiced or pale. Neurological:      General: No focal deficit present. Mental Status: She is alert. Mental status is at baseline. Discussion with Family: Updated  (daughter) via phone. Discharge instructions/Information to patient and family:   See after visit summary for information provided to patient and family. Provisions for Follow-Up Care:  See after visit summary for information related to follow-up care and any pertinent home health orders. Mobility at time of Discharge:   Basic Mobility Inpatient Raw Score: 7  -HLM Goal: 2: Bed activities/Dependent transfer  JH-HLM Achieved: 4: Move to chair/commode  HLM Goal NOT achieved. Continue to encourage mobility in post discharge setting. Disposition:   Other: Home Hospice    Planned Readmission: n/a     Discharge Statement:  I spent 55 minutes discharging the patient. This time was spent on the day of discharge. I had direct contact with the patient on the day of discharge. Greater than 50% of the total time was spent examining patient, answering all patient questions, arranging and discussing plan of care with patient as well as directly providing post-discharge instructions. Additional time then spent on discharge activities. Discharge Medications:  See after visit summary for reconciled discharge medications provided to patient and/or family.       **Please Note: This note may have been constructed using a voice recognition system** General Sunscreen Counseling: I recommended a broad spectrum sunscreen with a SPF of 30 or higher.  I explained that SPF 30 sunscreens block approximately 97 percent of the sun's harmful rays.  Sunscreens should be applied at least 15 minutes prior to expected sun exposure and then every 2 hours after that as long as sun exposure continues. If swimming or exercising sunscreen should be reapplied every 45 minutes to an hour after getting wet or sweating.  One ounce, or the equivalent of a shot glass full of sunscreen, is adequate to protect the skin not covered by a bathing suit. I also recommended a lip balm with a sunscreen as well. Sun protective clothing is also recommended. Detail Level: Detailed

## 2024-01-10 ENCOUNTER — HOME CARE VISIT (OUTPATIENT)
Dept: HOME HEALTH SERVICES | Facility: HOME HEALTHCARE | Age: 89
End: 2024-01-10
Payer: MEDICARE

## 2024-01-10 PROCEDURE — G0156 HHCP-SVS OF AIDE,EA 15 MIN: HCPCS

## 2024-01-13 ENCOUNTER — HOME CARE VISIT (OUTPATIENT)
Dept: HOME HOSPICE | Facility: HOSPICE | Age: 89
End: 2024-01-13
Payer: MEDICARE

## 2024-01-13 ENCOUNTER — HOME CARE VISIT (OUTPATIENT)
Dept: HOME HEALTH SERVICES | Facility: HOME HEALTHCARE | Age: 89
End: 2024-01-13
Payer: MEDICARE

## 2024-01-13 PROCEDURE — G0156 HHCP-SVS OF AIDE,EA 15 MIN: HCPCS

## 2024-01-16 ENCOUNTER — HOME CARE VISIT (OUTPATIENT)
Dept: HOME HEALTH SERVICES | Facility: HOME HEALTHCARE | Age: 89
End: 2024-01-16
Payer: MEDICARE

## 2024-01-16 VITALS
TEMPERATURE: 97.5 F | HEART RATE: 62 BPM | DIASTOLIC BLOOD PRESSURE: 62 MMHG | SYSTOLIC BLOOD PRESSURE: 115 MMHG | RESPIRATION RATE: 18 BRPM

## 2024-01-16 PROCEDURE — G0156 HHCP-SVS OF AIDE,EA 15 MIN: HCPCS

## 2024-01-16 PROCEDURE — G0299 HHS/HOSPICE OF RN EA 15 MIN: HCPCS

## 2024-01-18 ENCOUNTER — HOME CARE VISIT (OUTPATIENT)
Dept: HOME HEALTH SERVICES | Facility: HOME HEALTHCARE | Age: 89
End: 2024-01-18
Payer: MEDICARE

## 2024-01-18 PROCEDURE — G0156 HHCP-SVS OF AIDE,EA 15 MIN: HCPCS

## 2024-01-22 ENCOUNTER — HOME CARE VISIT (OUTPATIENT)
Dept: HOME HEALTH SERVICES | Facility: HOME HEALTHCARE | Age: 89
End: 2024-01-22
Payer: MEDICARE

## 2024-01-22 PROCEDURE — G0156 HHCP-SVS OF AIDE,EA 15 MIN: HCPCS

## 2024-01-23 ENCOUNTER — HOME CARE VISIT (OUTPATIENT)
Dept: HOME HEALTH SERVICES | Facility: HOME HEALTHCARE | Age: 89
End: 2024-01-23
Payer: MEDICARE

## 2024-01-23 VITALS
TEMPERATURE: 98.2 F | HEART RATE: 62 BPM | SYSTOLIC BLOOD PRESSURE: 118 MMHG | RESPIRATION RATE: 18 BRPM | DIASTOLIC BLOOD PRESSURE: 60 MMHG

## 2024-01-23 PROCEDURE — G0299 HHS/HOSPICE OF RN EA 15 MIN: HCPCS

## 2024-01-24 ENCOUNTER — HOME CARE VISIT (OUTPATIENT)
Dept: HOME HEALTH SERVICES | Facility: HOME HEALTHCARE | Age: 89
End: 2024-01-24
Payer: MEDICARE

## 2024-01-24 PROCEDURE — G0156 HHCP-SVS OF AIDE,EA 15 MIN: HCPCS

## 2024-01-26 ENCOUNTER — HOME CARE VISIT (OUTPATIENT)
Dept: HOME HEALTH SERVICES | Facility: HOME HEALTHCARE | Age: 89
End: 2024-01-26
Payer: MEDICARE

## 2024-01-26 PROCEDURE — G0156 HHCP-SVS OF AIDE,EA 15 MIN: HCPCS

## 2024-01-29 ENCOUNTER — HOME CARE VISIT (OUTPATIENT)
Dept: HOME HEALTH SERVICES | Facility: HOME HEALTHCARE | Age: 89
End: 2024-01-29
Payer: MEDICARE

## 2024-01-29 PROCEDURE — G0156 HHCP-SVS OF AIDE,EA 15 MIN: HCPCS

## 2024-01-30 ENCOUNTER — HOME CARE VISIT (OUTPATIENT)
Dept: HOME HEALTH SERVICES | Facility: HOME HEALTHCARE | Age: 89
End: 2024-01-30
Payer: MEDICARE

## 2024-01-30 ENCOUNTER — HOME CARE VISIT (OUTPATIENT)
Dept: HOME HOSPICE | Facility: HOSPICE | Age: 89
End: 2024-01-30
Payer: MEDICARE

## 2024-01-30 PROCEDURE — G0299 HHS/HOSPICE OF RN EA 15 MIN: HCPCS

## 2024-01-30 PROCEDURE — G0155 HHCP-SVS OF CSW,EA 15 MIN: HCPCS

## 2024-01-31 DIAGNOSIS — I10 HYPERTENSION, UNSPECIFIED TYPE: ICD-10-CM

## 2024-01-31 DIAGNOSIS — E03.9 HYPOTHYROIDISM, UNSPECIFIED TYPE: ICD-10-CM

## 2024-01-31 DIAGNOSIS — R60.0 BILATERAL LOWER EXTREMITY EDEMA: Primary | ICD-10-CM

## 2024-01-31 RX ORDER — TORSEMIDE 5 MG/1
5 TABLET ORAL DAILY PRN
Qty: 30 TABLET | Refills: 2 | Status: SHIPPED | OUTPATIENT
Start: 2024-01-31

## 2024-01-31 RX ORDER — LOSARTAN POTASSIUM 50 MG/1
50 TABLET ORAL DAILY
Qty: 30 TABLET | Refills: 2 | Status: SHIPPED | OUTPATIENT
Start: 2024-01-31

## 2024-01-31 NOTE — TELEPHONE ENCOUNTER
1/31/2024 6:04 PM  [facility] facility patient requests {HH Refill:non-hospice covered medications, patient/family choosing to continue.  Filled electronically via Epic as per [place] State Law.    Requested Prescriptions     Signed Prescriptions Disp Refills    losartan (COZAAR) 50 mg tablet 30 tablet 2     Sig: Take 1 tablet (50 mg total) by mouth daily     Authorizing Provider: CARMINA BRITTON    torsemide (DEMADEX) 5 MG tablet 30 tablet 2     Sig: Take 1 tablet (5 mg total) by mouth daily as needed (edema)     Authorizing Provider: CARMINA BRITTON CRNP  [facility]  Hospice Answering Service: [phone]  You can find me on [employer]!

## 2024-02-01 ENCOUNTER — HOME CARE VISIT (OUTPATIENT)
Dept: HOME HEALTH SERVICES | Facility: HOME HEALTHCARE | Age: 89
End: 2024-02-01
Payer: MEDICARE

## 2024-02-01 PROCEDURE — G0156 HHCP-SVS OF AIDE,EA 15 MIN: HCPCS

## 2024-02-01 RX ORDER — LEVOTHYROXINE SODIUM 0.05 MG/1
50 TABLET ORAL DAILY
Qty: 15 TABLET | Refills: 0 | Status: SHIPPED | OUTPATIENT
Start: 2024-02-01

## 2024-02-02 ENCOUNTER — HOME CARE VISIT (OUTPATIENT)
Dept: HOME HOSPICE | Facility: HOSPICE | Age: 89
End: 2024-02-02
Payer: MEDICARE

## 2024-02-05 ENCOUNTER — HOME CARE VISIT (OUTPATIENT)
Dept: HOME HEALTH SERVICES | Facility: HOME HEALTHCARE | Age: 89
End: 2024-02-05
Payer: MEDICARE

## 2024-02-05 PROCEDURE — G0156 HHCP-SVS OF AIDE,EA 15 MIN: HCPCS

## 2024-02-06 ENCOUNTER — HOME CARE VISIT (OUTPATIENT)
Dept: HOME HEALTH SERVICES | Facility: HOME HEALTHCARE | Age: 89
End: 2024-02-06
Payer: MEDICARE

## 2024-02-06 PROCEDURE — G0299 HHS/HOSPICE OF RN EA 15 MIN: HCPCS

## 2024-02-07 ENCOUNTER — HOME CARE VISIT (OUTPATIENT)
Dept: HOME HEALTH SERVICES | Facility: HOME HEALTHCARE | Age: 89
End: 2024-02-07
Payer: MEDICARE

## 2024-02-07 PROCEDURE — G0156 HHCP-SVS OF AIDE,EA 15 MIN: HCPCS

## 2024-02-09 ENCOUNTER — HOME CARE VISIT (OUTPATIENT)
Dept: HOME HEALTH SERVICES | Facility: HOME HEALTHCARE | Age: 89
End: 2024-02-09
Payer: MEDICARE

## 2024-02-09 PROCEDURE — G0156 HHCP-SVS OF AIDE,EA 15 MIN: HCPCS

## 2024-02-12 ENCOUNTER — HOME CARE VISIT (OUTPATIENT)
Dept: HOME HEALTH SERVICES | Facility: HOME HEALTHCARE | Age: 89
End: 2024-02-12
Payer: MEDICARE

## 2024-02-12 PROCEDURE — G0156 HHCP-SVS OF AIDE,EA 15 MIN: HCPCS

## 2024-02-14 ENCOUNTER — HOME CARE VISIT (OUTPATIENT)
Dept: HOME HEALTH SERVICES | Facility: HOME HEALTHCARE | Age: 89
End: 2024-02-14
Payer: MEDICARE

## 2024-02-14 PROCEDURE — G0299 HHS/HOSPICE OF RN EA 15 MIN: HCPCS

## 2024-02-14 PROCEDURE — G0156 HHCP-SVS OF AIDE,EA 15 MIN: HCPCS

## 2024-02-16 ENCOUNTER — HOME CARE VISIT (OUTPATIENT)
Dept: HOME HEALTH SERVICES | Facility: HOME HEALTHCARE | Age: 89
End: 2024-02-16
Payer: MEDICARE

## 2024-02-16 PROCEDURE — G0156 HHCP-SVS OF AIDE,EA 15 MIN: HCPCS

## 2024-02-20 ENCOUNTER — HOME CARE VISIT (OUTPATIENT)
Dept: HOME HEALTH SERVICES | Facility: HOME HEALTHCARE | Age: 89
End: 2024-02-20
Payer: MEDICARE

## 2024-02-20 VITALS
TEMPERATURE: 97.5 F | RESPIRATION RATE: 18 BRPM | DIASTOLIC BLOOD PRESSURE: 70 MMHG | HEART RATE: 76 BPM | SYSTOLIC BLOOD PRESSURE: 132 MMHG

## 2024-02-20 DIAGNOSIS — E03.9 HYPOTHYROIDISM, UNSPECIFIED TYPE: ICD-10-CM

## 2024-02-20 DIAGNOSIS — I10 HYPERTENSION, UNSPECIFIED TYPE: ICD-10-CM

## 2024-02-20 PROCEDURE — G0299 HHS/HOSPICE OF RN EA 15 MIN: HCPCS

## 2024-02-20 PROCEDURE — G0156 HHCP-SVS OF AIDE,EA 15 MIN: HCPCS

## 2024-02-20 RX ORDER — LEVOTHYROXINE SODIUM 0.05 MG/1
50 TABLET ORAL DAILY
Qty: 30 TABLET | Refills: 0 | Status: SHIPPED | OUTPATIENT
Start: 2024-02-20

## 2024-02-22 ENCOUNTER — HOME CARE VISIT (OUTPATIENT)
Dept: HOME HEALTH SERVICES | Facility: HOME HEALTHCARE | Age: 89
End: 2024-02-22
Payer: MEDICARE

## 2024-02-22 PROCEDURE — G0156 HHCP-SVS OF AIDE,EA 15 MIN: HCPCS

## 2024-02-26 ENCOUNTER — HOME CARE VISIT (OUTPATIENT)
Dept: HOME HEALTH SERVICES | Facility: HOME HEALTHCARE | Age: 89
End: 2024-02-26
Payer: MEDICARE

## 2024-02-26 PROCEDURE — G0156 HHCP-SVS OF AIDE,EA 15 MIN: HCPCS

## 2024-02-28 ENCOUNTER — HOME CARE VISIT (OUTPATIENT)
Dept: HOME HOSPICE | Facility: HOSPICE | Age: 89
End: 2024-02-28
Payer: MEDICARE

## 2024-02-28 ENCOUNTER — HOME CARE VISIT (OUTPATIENT)
Dept: HOME HEALTH SERVICES | Facility: HOME HEALTHCARE | Age: 89
End: 2024-02-28

## 2024-02-28 ENCOUNTER — HOME CARE VISIT (OUTPATIENT)
Dept: HOME HEALTH SERVICES | Facility: HOME HEALTHCARE | Age: 89
End: 2024-02-28
Payer: MEDICARE

## 2024-02-28 VITALS
TEMPERATURE: 96.9 F | DIASTOLIC BLOOD PRESSURE: 94 MMHG | RESPIRATION RATE: 18 BRPM | SYSTOLIC BLOOD PRESSURE: 140 MMHG | HEART RATE: 48 BPM

## 2024-02-28 PROCEDURE — G0299 HHS/HOSPICE OF RN EA 15 MIN: HCPCS

## 2024-02-28 PROCEDURE — G0155 HHCP-SVS OF CSW,EA 15 MIN: HCPCS

## 2024-03-04 ENCOUNTER — HOME CARE VISIT (OUTPATIENT)
Dept: HOME HEALTH SERVICES | Facility: HOME HEALTHCARE | Age: 89
End: 2024-03-04
Payer: MEDICARE

## 2024-03-04 PROCEDURE — G0156 HHCP-SVS OF AIDE,EA 15 MIN: HCPCS

## 2024-03-05 ENCOUNTER — HOME CARE VISIT (OUTPATIENT)
Dept: HOME HEALTH SERVICES | Facility: HOME HEALTHCARE | Age: 89
End: 2024-03-05
Payer: MEDICARE

## 2024-03-05 PROCEDURE — G0299 HHS/HOSPICE OF RN EA 15 MIN: HCPCS

## 2024-03-06 ENCOUNTER — HOME CARE VISIT (OUTPATIENT)
Dept: HOME HEALTH SERVICES | Facility: HOME HEALTHCARE | Age: 89
End: 2024-03-06
Payer: MEDICARE

## 2024-03-06 PROCEDURE — G0156 HHCP-SVS OF AIDE,EA 15 MIN: HCPCS

## 2024-03-08 ENCOUNTER — HOME CARE VISIT (OUTPATIENT)
Dept: HOME HEALTH SERVICES | Facility: HOME HEALTHCARE | Age: 89
End: 2024-03-08
Payer: MEDICARE

## 2024-03-08 PROCEDURE — G0156 HHCP-SVS OF AIDE,EA 15 MIN: HCPCS

## 2024-03-11 ENCOUNTER — HOME CARE VISIT (OUTPATIENT)
Dept: HOME HEALTH SERVICES | Facility: HOME HEALTHCARE | Age: 89
End: 2024-03-11
Payer: MEDICARE

## 2024-03-11 VITALS
TEMPERATURE: 98.2 F | SYSTOLIC BLOOD PRESSURE: 112 MMHG | HEART RATE: 62 BPM | RESPIRATION RATE: 16 BRPM | DIASTOLIC BLOOD PRESSURE: 70 MMHG

## 2024-03-11 PROCEDURE — G0299 HHS/HOSPICE OF RN EA 15 MIN: HCPCS

## 2024-03-12 ENCOUNTER — HOME CARE VISIT (OUTPATIENT)
Dept: HOME HEALTH SERVICES | Facility: HOME HEALTHCARE | Age: 89
End: 2024-03-12
Payer: MEDICARE

## 2024-03-12 PROCEDURE — G0156 HHCP-SVS OF AIDE,EA 15 MIN: HCPCS

## 2024-03-14 ENCOUNTER — HOME CARE VISIT (OUTPATIENT)
Dept: HOME HEALTH SERVICES | Facility: HOME HEALTHCARE | Age: 89
End: 2024-03-14
Payer: MEDICARE

## 2024-03-14 PROCEDURE — G0156 HHCP-SVS OF AIDE,EA 15 MIN: HCPCS

## 2024-03-18 ENCOUNTER — HOME CARE VISIT (OUTPATIENT)
Dept: HOME HOSPICE | Facility: HOSPICE | Age: 89
End: 2024-03-18
Payer: MEDICARE

## 2024-03-19 ENCOUNTER — HOME CARE VISIT (OUTPATIENT)
Dept: HOME HEALTH SERVICES | Facility: HOME HEALTHCARE | Age: 89
End: 2024-03-19
Payer: MEDICARE

## 2024-03-19 ENCOUNTER — HOME CARE VISIT (OUTPATIENT)
Dept: HOME HOSPICE | Facility: HOSPICE | Age: 89
End: 2024-03-19
Payer: MEDICARE

## 2024-03-19 PROCEDURE — G0155 HHCP-SVS OF CSW,EA 15 MIN: HCPCS

## 2024-03-19 PROCEDURE — G0156 HHCP-SVS OF AIDE,EA 15 MIN: HCPCS

## 2024-03-20 ENCOUNTER — HOME CARE VISIT (OUTPATIENT)
Dept: HOME HEALTH SERVICES | Facility: HOME HEALTHCARE | Age: 89
End: 2024-03-20
Payer: MEDICARE

## 2024-03-20 VITALS
TEMPERATURE: 98.1 F | RESPIRATION RATE: 16 BRPM | HEART RATE: 52 BPM | DIASTOLIC BLOOD PRESSURE: 75 MMHG | SYSTOLIC BLOOD PRESSURE: 102 MMHG

## 2024-03-20 DIAGNOSIS — E03.9 HYPOTHYROIDISM, UNSPECIFIED TYPE: ICD-10-CM

## 2024-03-20 PROCEDURE — G0299 HHS/HOSPICE OF RN EA 15 MIN: HCPCS

## 2024-03-20 RX ORDER — LEVOTHYROXINE SODIUM 0.05 MG/1
50 TABLET ORAL DAILY
Qty: 30 TABLET | Refills: 0 | Status: SHIPPED | OUTPATIENT
Start: 2024-03-20

## 2024-03-21 ENCOUNTER — HOME CARE VISIT (OUTPATIENT)
Dept: HOME HEALTH SERVICES | Facility: HOME HEALTHCARE | Age: 89
End: 2024-03-21
Payer: MEDICARE

## 2024-03-21 PROCEDURE — G0156 HHCP-SVS OF AIDE,EA 15 MIN: HCPCS

## 2024-03-25 ENCOUNTER — HOME CARE VISIT (OUTPATIENT)
Dept: HOME HOSPICE | Facility: HOSPICE | Age: 89
End: 2024-03-25
Payer: MEDICARE

## 2024-03-25 ENCOUNTER — HOME CARE VISIT (OUTPATIENT)
Dept: HOME HEALTH SERVICES | Facility: HOME HEALTHCARE | Age: 89
End: 2024-03-25
Payer: MEDICARE

## 2024-03-25 PROCEDURE — G0156 HHCP-SVS OF AIDE,EA 15 MIN: HCPCS

## 2024-03-26 ENCOUNTER — HOME CARE VISIT (OUTPATIENT)
Dept: HOME HEALTH SERVICES | Facility: HOME HEALTHCARE | Age: 89
End: 2024-03-26
Payer: MEDICARE

## 2024-03-26 PROCEDURE — G0299 HHS/HOSPICE OF RN EA 15 MIN: HCPCS

## 2024-03-27 ENCOUNTER — HOME CARE VISIT (OUTPATIENT)
Dept: HOME HEALTH SERVICES | Facility: HOME HEALTHCARE | Age: 89
End: 2024-03-27
Payer: MEDICARE

## 2024-03-27 PROCEDURE — G0156 HHCP-SVS OF AIDE,EA 15 MIN: HCPCS

## 2024-03-29 ENCOUNTER — HOME CARE VISIT (OUTPATIENT)
Dept: HOME HEALTH SERVICES | Facility: HOME HEALTHCARE | Age: 89
End: 2024-03-29
Payer: MEDICARE

## 2024-03-29 PROCEDURE — G0156 HHCP-SVS OF AIDE,EA 15 MIN: HCPCS

## 2024-04-02 ENCOUNTER — HOME CARE VISIT (OUTPATIENT)
Dept: HOME HEALTH SERVICES | Facility: HOME HEALTHCARE | Age: 89
End: 2024-04-02
Payer: MEDICARE

## 2024-04-02 PROCEDURE — G0156 HHCP-SVS OF AIDE,EA 15 MIN: HCPCS

## 2024-04-03 ENCOUNTER — HOME CARE VISIT (OUTPATIENT)
Dept: HOME HEALTH SERVICES | Facility: HOME HEALTHCARE | Age: 89
End: 2024-04-03
Payer: MEDICARE

## 2024-04-03 VITALS
HEART RATE: 76 BPM | SYSTOLIC BLOOD PRESSURE: 135 MMHG | RESPIRATION RATE: 18 BRPM | TEMPERATURE: 98.1 F | DIASTOLIC BLOOD PRESSURE: 90 MMHG

## 2024-04-03 PROCEDURE — G0299 HHS/HOSPICE OF RN EA 15 MIN: HCPCS

## 2024-04-04 ENCOUNTER — HOME CARE VISIT (OUTPATIENT)
Dept: HOME HEALTH SERVICES | Facility: HOME HEALTHCARE | Age: 89
End: 2024-04-04
Payer: MEDICARE

## 2024-04-04 PROCEDURE — G0156 HHCP-SVS OF AIDE,EA 15 MIN: HCPCS

## 2024-04-07 ENCOUNTER — HOME CARE VISIT (OUTPATIENT)
Dept: HOME HEALTH SERVICES | Facility: HOME HEALTHCARE | Age: 89
End: 2024-04-07
Payer: MEDICARE

## 2024-04-07 PROCEDURE — G0156 HHCP-SVS OF AIDE,EA 15 MIN: HCPCS

## 2024-04-09 ENCOUNTER — HOME CARE VISIT (OUTPATIENT)
Dept: HOME HEALTH SERVICES | Facility: HOME HEALTHCARE | Age: 89
End: 2024-04-09
Payer: MEDICARE

## 2024-04-09 DIAGNOSIS — E03.9 HYPOTHYROIDISM, UNSPECIFIED TYPE: ICD-10-CM

## 2024-04-09 PROCEDURE — G0156 HHCP-SVS OF AIDE,EA 15 MIN: HCPCS

## 2024-04-09 RX ORDER — LEVOTHYROXINE SODIUM 0.05 MG/1
50 TABLET ORAL DAILY
Qty: 15 TABLET | Refills: 0 | Status: SHIPPED | OUTPATIENT
Start: 2024-04-09

## 2024-04-10 ENCOUNTER — HOME CARE VISIT (OUTPATIENT)
Dept: HOME HEALTH SERVICES | Facility: HOME HEALTHCARE | Age: 89
End: 2024-04-10
Payer: MEDICARE

## 2024-04-10 PROCEDURE — G0299 HHS/HOSPICE OF RN EA 15 MIN: HCPCS

## 2024-04-11 ENCOUNTER — HOME CARE VISIT (OUTPATIENT)
Dept: HOME HOSPICE | Facility: HOSPICE | Age: 89
End: 2024-04-11
Payer: MEDICARE

## 2024-04-11 ENCOUNTER — HOME CARE VISIT (OUTPATIENT)
Dept: HOME HEALTH SERVICES | Facility: HOME HEALTHCARE | Age: 89
End: 2024-04-11
Payer: MEDICARE

## 2024-04-11 PROCEDURE — G0156 HHCP-SVS OF AIDE,EA 15 MIN: HCPCS

## 2024-04-11 PROCEDURE — G0155 HHCP-SVS OF CSW,EA 15 MIN: HCPCS

## 2024-04-14 ENCOUNTER — HOME CARE VISIT (OUTPATIENT)
Dept: HOME HEALTH SERVICES | Facility: HOME HEALTHCARE | Age: 89
End: 2024-04-14
Payer: MEDICARE

## 2024-04-14 PROCEDURE — G0156 HHCP-SVS OF AIDE,EA 15 MIN: HCPCS

## 2024-04-16 ENCOUNTER — HOME CARE VISIT (OUTPATIENT)
Dept: HOME HEALTH SERVICES | Facility: HOME HEALTHCARE | Age: 89
End: 2024-04-16
Payer: MEDICARE

## 2024-04-16 VITALS
SYSTOLIC BLOOD PRESSURE: 132 MMHG | TEMPERATURE: 97.9 F | DIASTOLIC BLOOD PRESSURE: 80 MMHG | HEART RATE: 78 BPM | RESPIRATION RATE: 16 BRPM

## 2024-04-16 PROCEDURE — G0299 HHS/HOSPICE OF RN EA 15 MIN: HCPCS

## 2024-04-16 PROCEDURE — G0156 HHCP-SVS OF AIDE,EA 15 MIN: HCPCS

## 2024-04-18 ENCOUNTER — HOME CARE VISIT (OUTPATIENT)
Dept: HOME HEALTH SERVICES | Facility: HOME HEALTHCARE | Age: 89
End: 2024-04-18
Payer: MEDICARE

## 2024-04-18 PROCEDURE — G0156 HHCP-SVS OF AIDE,EA 15 MIN: HCPCS

## 2024-04-22 ENCOUNTER — HOME CARE VISIT (OUTPATIENT)
Dept: HOME HEALTH SERVICES | Facility: HOME HEALTHCARE | Age: 89
End: 2024-04-22
Payer: MEDICARE

## 2024-04-22 PROCEDURE — G0299 HHS/HOSPICE OF RN EA 15 MIN: HCPCS

## 2024-04-23 ENCOUNTER — HOME CARE VISIT (OUTPATIENT)
Dept: HOME HEALTH SERVICES | Facility: HOME HEALTHCARE | Age: 89
End: 2024-04-23
Payer: MEDICARE

## 2024-04-23 PROCEDURE — G0156 HHCP-SVS OF AIDE,EA 15 MIN: HCPCS

## 2024-04-25 ENCOUNTER — HOME CARE VISIT (OUTPATIENT)
Dept: HOME HEALTH SERVICES | Facility: HOME HEALTHCARE | Age: 89
End: 2024-04-25
Payer: MEDICARE

## 2024-04-25 PROCEDURE — G0156 HHCP-SVS OF AIDE,EA 15 MIN: HCPCS

## 2024-04-27 ENCOUNTER — HOME CARE VISIT (OUTPATIENT)
Dept: HOME HEALTH SERVICES | Facility: HOME HEALTHCARE | Age: 89
End: 2024-04-27
Payer: MEDICARE

## 2024-04-27 PROCEDURE — G0156 HHCP-SVS OF AIDE,EA 15 MIN: HCPCS

## 2024-04-29 ENCOUNTER — HOME CARE VISIT (OUTPATIENT)
Dept: HOME HEALTH SERVICES | Facility: HOME HEALTHCARE | Age: 89
End: 2024-04-29
Payer: MEDICARE

## 2024-04-29 PROCEDURE — G0156 HHCP-SVS OF AIDE,EA 15 MIN: HCPCS

## 2024-04-30 ENCOUNTER — HOME CARE VISIT (OUTPATIENT)
Dept: HOME HEALTH SERVICES | Facility: HOME HEALTHCARE | Age: 89
End: 2024-04-30
Payer: MEDICARE

## 2024-04-30 VITALS
HEART RATE: 88 BPM | DIASTOLIC BLOOD PRESSURE: 80 MMHG | SYSTOLIC BLOOD PRESSURE: 130 MMHG | RESPIRATION RATE: 16 BRPM | TEMPERATURE: 98.4 F

## 2024-04-30 PROCEDURE — G0299 HHS/HOSPICE OF RN EA 15 MIN: HCPCS

## 2024-05-01 ENCOUNTER — HOME CARE VISIT (OUTPATIENT)
Dept: HOME HEALTH SERVICES | Facility: HOME HEALTHCARE | Age: 89
End: 2024-05-01
Payer: MEDICARE

## 2024-05-01 PROCEDURE — G0156 HHCP-SVS OF AIDE,EA 15 MIN: HCPCS

## 2024-05-03 ENCOUNTER — HOME CARE VISIT (OUTPATIENT)
Dept: HOME HOSPICE | Facility: HOSPICE | Age: 89
End: 2024-05-03
Payer: MEDICARE

## 2024-05-03 ENCOUNTER — TELEPHONE (OUTPATIENT)
Dept: HOME HEALTH SERVICES | Facility: HOME HEALTHCARE | Age: 89
End: 2024-05-03

## 2024-05-03 ENCOUNTER — HOME CARE VISIT (OUTPATIENT)
Dept: HOME HEALTH SERVICES | Facility: HOME HEALTHCARE | Age: 89
End: 2024-05-03
Payer: MEDICARE

## 2024-05-03 PROCEDURE — G0299 HHS/HOSPICE OF RN EA 15 MIN: HCPCS

## 2024-05-03 RX ADMIN — OXYCODONE HYDROCHLORIDE 20 MG: 5 TABLET ORAL at 19:47

## 2024-05-03 RX ADMIN — ASPIRIN 325 MG: 81 TABLET ORAL at 19:46

## 2024-05-04 ENCOUNTER — HOME CARE VISIT (OUTPATIENT)
Dept: HOME HEALTH SERVICES | Facility: HOME HEALTHCARE | Age: 89
End: 2024-05-04
Payer: MEDICARE

## 2024-05-04 PROCEDURE — G0299 HHS/HOSPICE OF RN EA 15 MIN: HCPCS

## 2024-05-05 ENCOUNTER — HOME CARE VISIT (OUTPATIENT)
Dept: HOME HOSPICE | Facility: HOSPICE | Age: 89
End: 2024-05-05
Payer: MEDICARE

## 2024-05-06 ENCOUNTER — HOME CARE VISIT (OUTPATIENT)
Dept: HOME HEALTH SERVICES | Facility: HOME HEALTHCARE | Age: 89
End: 2024-05-06
Payer: MEDICARE

## 2024-05-06 PROCEDURE — G0299 HHS/HOSPICE OF RN EA 15 MIN: HCPCS

## 2024-05-07 ENCOUNTER — HOME CARE VISIT (OUTPATIENT)
Dept: HOME HEALTH SERVICES | Facility: HOME HEALTHCARE | Age: 89
End: 2024-05-07
Payer: MEDICARE

## 2024-05-07 ENCOUNTER — HOME CARE VISIT (OUTPATIENT)
Dept: HOME HOSPICE | Facility: HOSPICE | Age: 89
End: 2024-05-07
Payer: MEDICARE

## 2024-05-07 VITALS
RESPIRATION RATE: 18 BRPM | HEART RATE: 68 BPM | DIASTOLIC BLOOD PRESSURE: 80 MMHG | SYSTOLIC BLOOD PRESSURE: 130 MMHG | TEMPERATURE: 97.9 F

## 2024-05-07 PROCEDURE — G0155 HHCP-SVS OF CSW,EA 15 MIN: HCPCS

## 2024-05-07 PROCEDURE — G0156 HHCP-SVS OF AIDE,EA 15 MIN: HCPCS

## 2024-05-08 ENCOUNTER — HOME CARE VISIT (OUTPATIENT)
Dept: HOME HEALTH SERVICES | Facility: HOME HEALTHCARE | Age: 89
End: 2024-05-08
Payer: MEDICARE

## 2024-05-08 PROCEDURE — G0299 HHS/HOSPICE OF RN EA 15 MIN: HCPCS

## 2024-05-08 PROCEDURE — G0156 HHCP-SVS OF AIDE,EA 15 MIN: HCPCS

## 2024-05-09 ENCOUNTER — HOME CARE VISIT (OUTPATIENT)
Dept: HOME HOSPICE | Facility: HOSPICE | Age: 89
End: 2024-05-09
Payer: MEDICARE

## 2024-05-09 PROCEDURE — G0155 HHCP-SVS OF CSW,EA 15 MIN: HCPCS

## 2024-05-10 ENCOUNTER — HOME CARE VISIT (OUTPATIENT)
Dept: HOME HEALTH SERVICES | Facility: HOME HEALTHCARE | Age: 89
End: 2024-05-10
Payer: MEDICARE

## 2024-05-10 ENCOUNTER — HOME CARE VISIT (OUTPATIENT)
Dept: HOME HOSPICE | Facility: HOSPICE | Age: 89
End: 2024-05-10
Payer: MEDICARE

## 2024-05-10 PROCEDURE — G0299 HHS/HOSPICE OF RN EA 15 MIN: HCPCS

## 2024-05-11 ENCOUNTER — HOME CARE VISIT (OUTPATIENT)
Dept: HOME HEALTH SERVICES | Facility: HOME HEALTHCARE | Age: 89
End: 2024-05-11
Payer: MEDICARE

## 2024-05-11 ENCOUNTER — HOME CARE VISIT (OUTPATIENT)
Dept: HOME HOSPICE | Facility: HOSPICE | Age: 89
End: 2024-05-11
Payer: MEDICARE

## 2024-05-11 NOTE — CASE COMMUNICATION
Daughter Nell declined SN visit by phone. Nell reports nephew is registered nurse and assesses pt as comfortable at this time. SN reinforced with Nell to call hospice with any pt needs or concerns- Enll verbalized understanding.

## 2024-05-12 ENCOUNTER — HOME CARE VISIT (OUTPATIENT)
Dept: HOME HOSPICE | Facility: HOSPICE | Age: 89
End: 2024-05-12
Payer: MEDICARE

## 2024-05-12 PROCEDURE — G0155 HHCP-SVS OF CSW,EA 15 MIN: HCPCS

## 2024-05-13 ENCOUNTER — HOME CARE VISIT (OUTPATIENT)
Dept: HOME HEALTH SERVICES | Facility: HOME HEALTHCARE | Age: 89
End: 2024-05-13
Payer: MEDICARE

## 2024-05-13 VITALS
SYSTOLIC BLOOD PRESSURE: 118 MMHG | RESPIRATION RATE: 16 BRPM | DIASTOLIC BLOOD PRESSURE: 70 MMHG | HEART RATE: 96 BPM | TEMPERATURE: 98.4 F

## 2024-05-13 PROCEDURE — G0299 HHS/HOSPICE OF RN EA 15 MIN: HCPCS

## 2024-05-13 PROCEDURE — G0156 HHCP-SVS OF AIDE,EA 15 MIN: HCPCS

## 2024-05-14 ENCOUNTER — HOME CARE VISIT (OUTPATIENT)
Dept: HOME HEALTH SERVICES | Facility: HOME HEALTHCARE | Age: 89
End: 2024-05-14
Payer: MEDICARE

## 2024-05-14 ENCOUNTER — HOME CARE VISIT (OUTPATIENT)
Dept: HOME HOSPICE | Facility: HOSPICE | Age: 89
End: 2024-05-14
Payer: MEDICARE

## 2024-05-14 PROCEDURE — G0155 HHCP-SVS OF CSW,EA 15 MIN: HCPCS

## 2024-05-14 PROCEDURE — G0156 HHCP-SVS OF AIDE,EA 15 MIN: HCPCS

## 2024-05-14 PROCEDURE — G0299 HHS/HOSPICE OF RN EA 15 MIN: HCPCS

## 2024-05-15 ENCOUNTER — HOME CARE VISIT (OUTPATIENT)
Dept: HOME HEALTH SERVICES | Facility: HOME HEALTHCARE | Age: 89
End: 2024-05-15
Payer: MEDICARE

## 2024-05-15 PROCEDURE — G0299 HHS/HOSPICE OF RN EA 15 MIN: HCPCS

## 2024-05-15 PROCEDURE — G0156 HHCP-SVS OF AIDE,EA 15 MIN: HCPCS

## 2024-05-16 ENCOUNTER — HOME CARE VISIT (OUTPATIENT)
Dept: HOME HEALTH SERVICES | Facility: HOME HEALTHCARE | Age: 89
End: 2024-05-16
Payer: MEDICARE

## 2024-05-16 PROCEDURE — G0299 HHS/HOSPICE OF RN EA 15 MIN: HCPCS

## 2024-05-17 ENCOUNTER — HOME CARE VISIT (OUTPATIENT)
Dept: HOME HOSPICE | Facility: HOSPICE | Age: 89
End: 2024-05-17
Payer: MEDICARE

## 2024-05-17 ENCOUNTER — HOME CARE VISIT (OUTPATIENT)
Dept: HOME HEALTH SERVICES | Facility: HOME HEALTHCARE | Age: 89
End: 2024-05-17
Payer: MEDICARE

## 2024-05-17 PROCEDURE — G0156 HHCP-SVS OF AIDE,EA 15 MIN: HCPCS

## 2024-05-18 ENCOUNTER — HOME CARE VISIT (OUTPATIENT)
Dept: HOME HEALTH SERVICES | Facility: HOME HEALTHCARE | Age: 89
End: 2024-05-18
Payer: MEDICARE

## 2024-05-18 PROCEDURE — G0299 HHS/HOSPICE OF RN EA 15 MIN: HCPCS

## 2024-06-06 ENCOUNTER — HOME CARE VISIT (OUTPATIENT)
Dept: HOME HOSPICE | Facility: HOSPICE | Age: 89
End: 2024-06-06
Payer: MEDICARE

## 2024-06-06 NOTE — CASE COMMUNICATION
"Ida Gutierrez, Bereavement Final IDG 24 (4LR) Due: 24   : 2/3/1927  SOC: 23  DOD: 24  Diagnosis: Stroke, CKD  Primary: Daughter - Nell Prieto was a 89 year old woman who lives at home with her children: Nell, Noris, and Carissa. There are also sons Gal and Josue. It was mentioned Carissa and Noris seem to have some mental health concerns and do not engage with others. They also were not active caregivers. Nell  was Ida's primary caregiver and was a former HHA at [employer]. Ida was  twice and is a . 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 Ida was Jewish, but not connected with Buddhist. Family stated Ida was in \"agony\" and just wanted their mother comfortable. Nell voiced humor and spending time with her dogs, children and grandchildren as ways she jaspal. Nell and Noris voiced coping well and that the family is dutta pportive of each other. Nell did become tearful when discussing her mom's decline as she noted previous times when she would bounce back. She voiced being fearful that her 2 sisters did not understand their mother's decline and that they could not feed her. MSW did provide EOL education to daughters and family appeared accepting. Nell is open to bereavement follow-up. She is assessed at LR. Gal is agreeable to follow-up, but stated he  is very difficult to get a hold of. He is assessed at LR.    TOD: Family was present at TOD. Supportive of one another. Support services were declined.    Call Assignments:  BERLIN Lezama to reassess daughter Noris Syed at LR.(Due: 24)  JOHN Talbot to reassess daughter Nell Price and son Josue Syed at LR. (Due: 24)  Chaplain Almaraz to reassess son Gal Syed at LR. (Due: 24)  *Nell and Noris live together. Mailing s to be sent under Nell's chart.*  "

## 2024-06-27 NOTE — PLAN OF CARE
Problem: Prexisting or High Potential for Compromised Skin Integrity  Goal: Skin integrity is maintained or improved  Description: INTERVENTIONS:  - Identify patients at risk for skin breakdown  - Assess and monitor skin integrity  - Assess and monitor nutrition and hydration status  - Monitor labs   - Assess for incontinence   - Turn and reposition patient  - Assist with mobility/ambulation  - Relieve pressure over bony prominences  - Avoid friction and shearing  - Provide appropriate hygiene as needed including keeping skin clean and dry  - Evaluate need for skin moisturizer/barrier cream  - Collaborate with interdisciplinary team   - Patient/family teaching  - Consider wound care consult   Outcome: Progressing     Problem: PAIN - ADULT  Goal: Verbalizes/displays adequate comfort level or baseline comfort level  Description: Interventions:  - Encourage patient to monitor pain and request assistance  - Assess pain using appropriate pain scale  - Administer analgesics based on type and severity of pain and evaluate response  - Implement non-pharmacological measures as appropriate and evaluate response  - Consider cultural and social influences on pain and pain management  - Notify physician/advanced practitioner if interventions unsuccessful or patient reports new pain  Outcome: Progressing     Problem: INFECTION - ADULT  Goal: Absence or prevention of progression during hospitalization  Description: INTERVENTIONS:  - Assess and monitor for signs and symptoms of infection  - Monitor lab/diagnostic results  - Monitor all insertion sites, i.e. indwelling lines, tubes, and drains  - Monitor endotracheal if appropriate and nasal secretions for changes in amount and color  - [place] appropriate cooling/warming therapies per order  - Administer medications as ordered  - Instruct and encourage patient and family to use good hand hygiene technique  - Identify and instruct in appropriate isolation precautions for identified infection/condition  Outcome: Progressing  Goal: Absence of fever/infection during neutropenic period  Description: INTERVENTIONS:  - Monitor WBC    Outcome: Progressing     Problem: SAFETY ADULT  Goal: Patient will remain free of falls  Description: INTERVENTIONS:  - Educate patient/family on patient safety including physical limitations  - Instruct patient to call for assistance with activity   - Consult OT/PT to assist with strengthening/mobility   - Keep Call bell within reach  - Keep bed low and locked with side rails adjusted as appropriate  - Keep care items and personal belongings within reach  - Initiate and maintain comfort rounds  - Make Fall Risk Sign visible to staff  - Offer Toileting every  Hours, in advance of need  - Initiate/Maintain alarm  - Obtain necessary fall risk management equipment:   - Apply yellow socks and bracelet for high fall risk patients  - Consider moving patient to room near nurses station  Outcome: Progressing  Goal: Maintain or return to baseline ADL function  Description: INTERVENTIONS:  -  Assess patient's ability to carry out ADLs; assess patient's baseline for ADL function and identify physical deficits which impact ability to perform ADLs (bathing, care of mouth/teeth, toileting, grooming, dressing, etc.)  - Assess/evaluate cause of self-care deficits   - Assess range of motion  - Assess patient's mobility; develop plan if impaired  - Assess patient's need for assistive devices and provide as appropriate  - Encourage maximum independence but intervene and supervise when necessary  - Involve family in performance of ADLs  - Assess for home care needs following discharge   - Consider OT consult to assist with ADL evaluation and planning for discharge  - Provide patient education as appropriate  Outcome: Progressing  Goal: Maintains/Returns to pre admission functional level  Description: INTERVENTIONS:  - Perform AM-PAC 6 Click Basic Mobility/ Daily Activity assessment daily.  - Set and communicate daily mobility goal to care team and patient/family/caregiver. - Collaborate with rehabilitation services on mobility goals if consulted  - Perform Range of Motion  times a day. - Reposition patient every  hours. - Dangle patient  times a day  - Stand patient  times a day  - Ambulate patient  times a day  - Out of bed to chair  times a day   - Out of bed for meals imes a day  - Out of bed for toileting  - Record patient progress and toleration of activity level   Outcome: Progressing     Problem: DISCHARGE PLANNING  Goal: Discharge to home or other facility with appropriate resources  Description: INTERVENTIONS:  - Identify barriers to discharge w/patient and caregiver  - Arrange for needed discharge resources and transportation as appropriate  - Identify discharge learning needs (meds, wound care, etc.)  - Arrange for interpretive services to assist at discharge as needed  - Refer to Case Management Department for coordinating discharge planning if the patient needs post-hospital services based on physician/advanced practitioner order or complex needs related to functional status, cognitive ability, or social support system  Outcome: Progressing     Problem: Knowledge Deficit  Goal: Patient/family/caregiver demonstrates understanding of disease process, treatment plan, medications, and discharge instructions  Description: Complete learning assessment and assess knowledge base. Interventions:  - Provide teaching at level of understanding  - Provide teaching via preferred learning methods  Outcome: Progressing     Problem: Nutrition/Hydration-ADULT  Goal: Nutrient/Hydration intake appropriate for improving, restoring or maintaining nutritional needs  Description: Monitor and assess patient's nutrition/hydration status for malnutrition. Collaborate with interdisciplinary team and initiate plan and interventions as ordered.   Monitor patient's weight and dietary intake as ordered or per policy. Utilize nutrition screening tool and intervene as necessary. Determine patient's food preferences and provide high-protein, high-caloric foods as appropriate.      INTERVENTIONS:  - Monitor oral intake, urinary output, labs, and treatment plans  - Assess nutrition and hydration status and recommend course of action  - Evaluate amount of meals eaten  - Assist patient with eating if necessary   - Allow adequate time for meals  - Recommend/ encourage appropriate diets, oral nutritional supplements, and vitamin/mineral supplements  - Order, calculate, and assess calorie counts as needed  - Recommend, monitor, and adjust tube feedings and TPN/PPN based on assessed needs  - Assess need for intravenous fluids  - Provide specific nutrition/hydration education as appropriate  - Include patient/family/caregiver in decisions related to nutrition  Outcome: Progressing Medical Necessity Information: It is in your best interest to select a reason for this procedure from the list below. All of these items fulfill various CMS LCD requirements except the new and changing color options. Consent: The patient's consent was obtained including but not limited to risks of crusting, scabbing, blistering, scarring, darker or lighter pigmentary change, recurrence, incomplete removal and infection. Treatment Number (Will Not Render If 0): 4 Render Post-Care Instructions In Note?: no Post-Care Instructions: I reviewed with the patient in detail post-care instructions. Patient is to wear sunprotection, and avoid picking at any of the treated lesions. Pt may apply Vaseline to crusted or scabbing areas. Detail Level: Detailed Anesthesia Volume In Cc: 0.5 Medical Necessity Clause: This procedure was medically necessary because the lesions that were treated were: